# Patient Record
Sex: FEMALE | Race: BLACK OR AFRICAN AMERICAN | Employment: FULL TIME | URBAN - METROPOLITAN AREA
[De-identification: names, ages, dates, MRNs, and addresses within clinical notes are randomized per-mention and may not be internally consistent; named-entity substitution may affect disease eponyms.]

---

## 2017-02-21 RX ORDER — SERTRALINE HYDROCHLORIDE 100 MG/1
TABLET, FILM COATED ORAL
Qty: 90 TABLET | Refills: 2 | Status: SHIPPED | OUTPATIENT
Start: 2017-02-21 | End: 2017-12-13 | Stop reason: SDUPTHER

## 2017-02-21 RX ORDER — GLIMEPIRIDE 4 MG/1
TABLET ORAL
Qty: 180 TABLET | Refills: 2 | Status: SHIPPED | OUTPATIENT
Start: 2017-02-21 | End: 2018-10-01 | Stop reason: SDUPTHER

## 2017-03-02 ENCOUNTER — TELEPHONE (OUTPATIENT)
Dept: INTERNAL MEDICINE CLINIC | Age: 62
End: 2017-03-02

## 2017-03-02 DIAGNOSIS — E11.9 TYPE 2 DIABETES MELLITUS WITHOUT COMPLICATION, WITHOUT LONG-TERM CURRENT USE OF INSULIN (HCC): ICD-10-CM

## 2017-03-06 ENCOUNTER — OFFICE VISIT (OUTPATIENT)
Dept: INTERNAL MEDICINE CLINIC | Age: 62
End: 2017-03-06

## 2017-03-06 VITALS
OXYGEN SATURATION: 98 % | RESPIRATION RATE: 12 BRPM | HEIGHT: 66 IN | DIASTOLIC BLOOD PRESSURE: 78 MMHG | SYSTOLIC BLOOD PRESSURE: 114 MMHG | HEART RATE: 73 BPM | TEMPERATURE: 97.8 F | BODY MASS INDEX: 36.74 KG/M2 | WEIGHT: 228.6 LBS

## 2017-03-06 DIAGNOSIS — E55.9 VITAMIN D DEFICIENCY: ICD-10-CM

## 2017-03-06 DIAGNOSIS — E78.5 HYPERLIPIDEMIA, UNSPECIFIED HYPERLIPIDEMIA TYPE: ICD-10-CM

## 2017-03-06 DIAGNOSIS — I10 ESSENTIAL HYPERTENSION: ICD-10-CM

## 2017-03-06 DIAGNOSIS — E53.8 VITAMIN B 12 DEFICIENCY: ICD-10-CM

## 2017-03-06 DIAGNOSIS — F41.9 ANXIETY: ICD-10-CM

## 2017-03-06 DIAGNOSIS — F32.A DEPRESSION, UNSPECIFIED DEPRESSION TYPE: ICD-10-CM

## 2017-03-06 DIAGNOSIS — E11.9 TYPE 2 DIABETES MELLITUS WITHOUT COMPLICATION, WITHOUT LONG-TERM CURRENT USE OF INSULIN (HCC): Primary | ICD-10-CM

## 2017-03-06 LAB
CREATININE URINE: 62.7 MG/DL (ref 28–259)
HBA1C MFR BLD: 7.3 %
MICROALBUMIN UR-MCNC: <1.2 MG/DL
MICROALBUMIN/CREAT UR-RTO: NORMAL MG/G (ref 0–30)

## 2017-03-06 PROCEDURE — 3017F COLORECTAL CA SCREEN DOC REV: CPT | Performed by: INTERNAL MEDICINE

## 2017-03-06 PROCEDURE — 99214 OFFICE O/P EST MOD 30 MIN: CPT | Performed by: INTERNAL MEDICINE

## 2017-03-06 PROCEDURE — G8417 CALC BMI ABV UP PARAM F/U: HCPCS | Performed by: INTERNAL MEDICINE

## 2017-03-06 PROCEDURE — G8484 FLU IMMUNIZE NO ADMIN: HCPCS | Performed by: INTERNAL MEDICINE

## 2017-03-06 PROCEDURE — 1036F TOBACCO NON-USER: CPT | Performed by: INTERNAL MEDICINE

## 2017-03-06 PROCEDURE — 3045F PR MOST RECENT HEMOGLOBIN A1C LEVEL 7.0-9.0%: CPT | Performed by: INTERNAL MEDICINE

## 2017-03-06 PROCEDURE — 83036 HEMOGLOBIN GLYCOSYLATED A1C: CPT | Performed by: INTERNAL MEDICINE

## 2017-03-06 PROCEDURE — G8427 DOCREV CUR MEDS BY ELIG CLIN: HCPCS | Performed by: INTERNAL MEDICINE

## 2017-03-06 PROCEDURE — 3014F SCREEN MAMMO DOC REV: CPT | Performed by: INTERNAL MEDICINE

## 2017-03-06 ASSESSMENT — ENCOUNTER SYMPTOMS
CHEST TIGHTNESS: 0
SINUS PRESSURE: 0
CONSTIPATION: 0
COUGH: 0
VOMITING: 0
RHINORRHEA: 0
SORE THROAT: 0
DIARRHEA: 0
NAUSEA: 0
WHEEZING: 0
ABDOMINAL PAIN: 0
SHORTNESS OF BREATH: 0
BLOOD IN STOOL: 0

## 2017-03-21 RX ORDER — LOSARTAN POTASSIUM 50 MG/1
TABLET ORAL
Qty: 90 TABLET | Refills: 2 | Status: SHIPPED | OUTPATIENT
Start: 2017-03-21 | End: 2018-10-01 | Stop reason: SDUPTHER

## 2017-03-21 RX ORDER — HYDROCHLOROTHIAZIDE 25 MG/1
TABLET ORAL
Qty: 90 TABLET | Refills: 2 | Status: SHIPPED | OUTPATIENT
Start: 2017-03-21 | End: 2018-10-01 | Stop reason: SDUPTHER

## 2017-04-07 DIAGNOSIS — E11.9 TYPE 2 DIABETES MELLITUS WITHOUT COMPLICATION, WITHOUT LONG-TERM CURRENT USE OF INSULIN (HCC): ICD-10-CM

## 2017-04-07 RX ORDER — DAPAGLIFLOZIN 10 MG/1
TABLET, FILM COATED ORAL
Qty: 30 TABLET | Refills: 3 | Status: SHIPPED | OUTPATIENT
Start: 2017-04-07 | End: 2017-09-27 | Stop reason: SDUPTHER

## 2017-04-07 RX ORDER — SITAGLIPTIN 100 MG/1
TABLET, FILM COATED ORAL
Qty: 30 TABLET | Refills: 3 | Status: SHIPPED | OUTPATIENT
Start: 2017-04-07 | End: 2017-09-27 | Stop reason: SDUPTHER

## 2017-04-12 ENCOUNTER — HOSPITAL ENCOUNTER (OUTPATIENT)
Dept: DIABETES SERVICES | Age: 62
Discharge: HOME | End: 2017-04-30
Attending: INTERNAL MEDICINE | Admitting: INTERNAL MEDICINE

## 2017-04-12 DIAGNOSIS — E11.9 TYPE 2 DIABETES MELLITUS WITHOUT COMPLICATIONS (HCC): ICD-10-CM

## 2017-04-12 ASSESSMENT — PATIENT HEALTH QUESTIONNAIRE - PHQ9
SUM OF ALL RESPONSES TO PHQ9 QUESTIONS 1 & 2: 2
SUM OF ALL RESPONSES TO PHQ QUESTIONS 1-9: 2
1. LITTLE INTEREST OR PLEASURE IN DOING THINGS: 1
2. FEELING DOWN, DEPRESSED OR HOPELESS: 1

## 2017-05-08 RX ORDER — ATORVASTATIN CALCIUM 20 MG/1
TABLET, FILM COATED ORAL
Qty: 30 TABLET | Refills: 5 | Status: SHIPPED | OUTPATIENT
Start: 2017-05-08 | End: 2018-03-21 | Stop reason: SDUPTHER

## 2017-05-09 ENCOUNTER — OFFICE VISIT (OUTPATIENT)
Dept: INTERNAL MEDICINE CLINIC | Age: 62
End: 2017-05-09

## 2017-05-09 VITALS
DIASTOLIC BLOOD PRESSURE: 74 MMHG | TEMPERATURE: 98.7 F | HEART RATE: 89 BPM | HEIGHT: 66 IN | OXYGEN SATURATION: 98 % | WEIGHT: 227 LBS | BODY MASS INDEX: 36.48 KG/M2 | RESPIRATION RATE: 14 BRPM | SYSTOLIC BLOOD PRESSURE: 120 MMHG

## 2017-05-09 DIAGNOSIS — J06.9 UPPER RESPIRATORY TRACT INFECTION, UNSPECIFIED TYPE: Primary | ICD-10-CM

## 2017-05-09 PROCEDURE — 3017F COLORECTAL CA SCREEN DOC REV: CPT | Performed by: INTERNAL MEDICINE

## 2017-05-09 PROCEDURE — 99213 OFFICE O/P EST LOW 20 MIN: CPT | Performed by: INTERNAL MEDICINE

## 2017-05-09 PROCEDURE — G8427 DOCREV CUR MEDS BY ELIG CLIN: HCPCS | Performed by: INTERNAL MEDICINE

## 2017-05-09 PROCEDURE — G8417 CALC BMI ABV UP PARAM F/U: HCPCS | Performed by: INTERNAL MEDICINE

## 2017-05-09 PROCEDURE — 3014F SCREEN MAMMO DOC REV: CPT | Performed by: INTERNAL MEDICINE

## 2017-05-09 PROCEDURE — 1036F TOBACCO NON-USER: CPT | Performed by: INTERNAL MEDICINE

## 2017-05-09 RX ORDER — FLUTICASONE PROPIONATE 50 MCG
2 SPRAY, SUSPENSION (ML) NASAL DAILY
Qty: 1 BOTTLE | Refills: 0 | Status: SHIPPED | OUTPATIENT
Start: 2017-05-09 | End: 2020-04-29 | Stop reason: ALTCHOICE

## 2017-05-09 RX ORDER — BROMPHENIRAMINE MALEATE, PSEUDOEPHEDRINE HYDROCHLORIDE, AND DEXTROMETHORPHAN HYDROBROMIDE 2; 30; 10 MG/5ML; MG/5ML; MG/5ML
5 SYRUP ORAL 4 TIMES DAILY PRN
Qty: 120 ML | Refills: 0 | Status: SHIPPED | OUTPATIENT
Start: 2017-05-09 | End: 2017-06-05 | Stop reason: ALTCHOICE

## 2017-05-09 RX ORDER — AMOXICILLIN 875 MG/1
875 TABLET, COATED ORAL 2 TIMES DAILY
Qty: 20 TABLET | Refills: 0 | Status: SHIPPED | OUTPATIENT
Start: 2017-05-09 | End: 2017-05-19

## 2017-05-09 ASSESSMENT — ENCOUNTER SYMPTOMS
SORE THROAT: 1
RHINORRHEA: 1
COUGH: 1
SHORTNESS OF BREATH: 0
CHEST TIGHTNESS: 0
SINUS PRESSURE: 1
WHEEZING: 0

## 2017-06-05 ENCOUNTER — OFFICE VISIT (OUTPATIENT)
Dept: INTERNAL MEDICINE CLINIC | Age: 62
End: 2017-06-05

## 2017-06-05 VITALS
BODY MASS INDEX: 37.32 KG/M2 | RESPIRATION RATE: 12 BRPM | OXYGEN SATURATION: 98 % | WEIGHT: 232.2 LBS | HEART RATE: 71 BPM | TEMPERATURE: 98 F | SYSTOLIC BLOOD PRESSURE: 120 MMHG | HEIGHT: 66 IN | DIASTOLIC BLOOD PRESSURE: 76 MMHG

## 2017-06-05 DIAGNOSIS — E11.9 TYPE 2 DIABETES MELLITUS WITHOUT COMPLICATION, WITHOUT LONG-TERM CURRENT USE OF INSULIN (HCC): Primary | ICD-10-CM

## 2017-06-05 DIAGNOSIS — F41.9 ANXIETY: ICD-10-CM

## 2017-06-05 DIAGNOSIS — E55.9 VITAMIN D DEFICIENCY: ICD-10-CM

## 2017-06-05 DIAGNOSIS — F32.A DEPRESSION, UNSPECIFIED DEPRESSION TYPE: ICD-10-CM

## 2017-06-05 DIAGNOSIS — E78.5 HYPERLIPIDEMIA, UNSPECIFIED HYPERLIPIDEMIA TYPE: ICD-10-CM

## 2017-06-05 DIAGNOSIS — E53.8 VITAMIN B 12 DEFICIENCY: ICD-10-CM

## 2017-06-05 DIAGNOSIS — I10 ESSENTIAL HYPERTENSION: ICD-10-CM

## 2017-06-05 LAB
A/G RATIO: 1.5 (ref 1.1–2.2)
ALBUMIN SERPL-MCNC: 4.1 G/DL (ref 3.4–5)
ALP BLD-CCNC: 65 U/L (ref 40–129)
ALT SERPL-CCNC: 12 U/L (ref 10–40)
ANION GAP SERPL CALCULATED.3IONS-SCNC: 14 MMOL/L (ref 3–16)
AST SERPL-CCNC: 18 U/L (ref 15–37)
BILIRUB SERPL-MCNC: 0.4 MG/DL (ref 0–1)
BUN BLDV-MCNC: 22 MG/DL (ref 7–20)
CALCIUM SERPL-MCNC: 9.4 MG/DL (ref 8.3–10.6)
CHLORIDE BLD-SCNC: 101 MMOL/L (ref 99–110)
CHOLESTEROL, TOTAL: 166 MG/DL (ref 0–199)
CO2: 26 MMOL/L (ref 21–32)
CREAT SERPL-MCNC: 0.9 MG/DL (ref 0.6–1.2)
GFR AFRICAN AMERICAN: >60
GFR NON-AFRICAN AMERICAN: >60
GLOBULIN: 2.8 G/DL
GLUCOSE BLD-MCNC: 127 MG/DL (ref 70–99)
HDLC SERPL-MCNC: 68 MG/DL (ref 40–60)
LDL CHOLESTEROL CALCULATED: 83 MG/DL
POTASSIUM SERPL-SCNC: 4.6 MMOL/L (ref 3.5–5.1)
SODIUM BLD-SCNC: 141 MMOL/L (ref 136–145)
TOTAL PROTEIN: 6.9 G/DL (ref 6.4–8.2)
TRIGL SERPL-MCNC: 77 MG/DL (ref 0–150)
VITAMIN B-12: 452 PG/ML (ref 211–911)
VITAMIN D 25-HYDROXY: 43.2 NG/ML
VLDLC SERPL CALC-MCNC: 15 MG/DL

## 2017-06-05 PROCEDURE — 3045F PR MOST RECENT HEMOGLOBIN A1C LEVEL 7.0-9.0%: CPT | Performed by: INTERNAL MEDICINE

## 2017-06-05 PROCEDURE — 99214 OFFICE O/P EST MOD 30 MIN: CPT | Performed by: INTERNAL MEDICINE

## 2017-06-05 PROCEDURE — G8417 CALC BMI ABV UP PARAM F/U: HCPCS | Performed by: INTERNAL MEDICINE

## 2017-06-05 PROCEDURE — G8427 DOCREV CUR MEDS BY ELIG CLIN: HCPCS | Performed by: INTERNAL MEDICINE

## 2017-06-05 PROCEDURE — 3014F SCREEN MAMMO DOC REV: CPT | Performed by: INTERNAL MEDICINE

## 2017-06-05 PROCEDURE — 1036F TOBACCO NON-USER: CPT | Performed by: INTERNAL MEDICINE

## 2017-06-05 PROCEDURE — 3017F COLORECTAL CA SCREEN DOC REV: CPT | Performed by: INTERNAL MEDICINE

## 2017-06-05 ASSESSMENT — ENCOUNTER SYMPTOMS
CONSTIPATION: 0
ABDOMINAL PAIN: 0
SINUS PRESSURE: 0
DIARRHEA: 0
SHORTNESS OF BREATH: 0
BLOOD IN STOOL: 0
CHEST TIGHTNESS: 0
WHEEZING: 0
VOMITING: 0
NAUSEA: 0
COUGH: 0
RHINORRHEA: 0
SORE THROAT: 0

## 2017-06-06 LAB
ESTIMATED AVERAGE GLUCOSE: 188.6 MG/DL
HBA1C MFR BLD: 8.2 %

## 2017-09-14 ENCOUNTER — OFFICE VISIT (OUTPATIENT)
Dept: INTERNAL MEDICINE CLINIC | Age: 62
End: 2017-09-14

## 2017-09-14 VITALS
BODY MASS INDEX: 37 KG/M2 | WEIGHT: 230.2 LBS | RESPIRATION RATE: 14 BRPM | SYSTOLIC BLOOD PRESSURE: 128 MMHG | HEIGHT: 66 IN | TEMPERATURE: 97.4 F | HEART RATE: 64 BPM | DIASTOLIC BLOOD PRESSURE: 78 MMHG | OXYGEN SATURATION: 96 %

## 2017-09-14 DIAGNOSIS — F41.9 ANXIETY: ICD-10-CM

## 2017-09-14 DIAGNOSIS — R43.8 METALLIC TASTE: ICD-10-CM

## 2017-09-14 DIAGNOSIS — Z00.00 ANNUAL PHYSICAL EXAM: Primary | ICD-10-CM

## 2017-09-14 DIAGNOSIS — E78.5 HYPERLIPIDEMIA, UNSPECIFIED HYPERLIPIDEMIA TYPE: ICD-10-CM

## 2017-09-14 DIAGNOSIS — F32.A DEPRESSION, UNSPECIFIED DEPRESSION TYPE: ICD-10-CM

## 2017-09-14 DIAGNOSIS — E53.8 VITAMIN B 12 DEFICIENCY: ICD-10-CM

## 2017-09-14 DIAGNOSIS — E55.9 VITAMIN D DEFICIENCY: ICD-10-CM

## 2017-09-14 DIAGNOSIS — Z00.00 ANNUAL PHYSICAL EXAM: ICD-10-CM

## 2017-09-14 DIAGNOSIS — E11.9 TYPE 2 DIABETES MELLITUS WITHOUT COMPLICATION, WITHOUT LONG-TERM CURRENT USE OF INSULIN (HCC): ICD-10-CM

## 2017-09-14 DIAGNOSIS — I10 ESSENTIAL HYPERTENSION: ICD-10-CM

## 2017-09-14 LAB
A/G RATIO: 1.4 (ref 1.1–2.2)
ALBUMIN SERPL-MCNC: 3.8 G/DL (ref 3.4–5)
ALP BLD-CCNC: 62 U/L (ref 40–129)
ALT SERPL-CCNC: 12 U/L (ref 10–40)
ANION GAP SERPL CALCULATED.3IONS-SCNC: 13 MMOL/L (ref 3–16)
AST SERPL-CCNC: 16 U/L (ref 15–37)
BASOPHILS ABSOLUTE: 0.1 K/UL (ref 0–0.2)
BASOPHILS RELATIVE PERCENT: 1.1 %
BILIRUB SERPL-MCNC: 0.5 MG/DL (ref 0–1)
BILIRUBIN, POC: NORMAL
BLOOD URINE, POC: NORMAL
BUN BLDV-MCNC: 20 MG/DL (ref 7–20)
CALCIUM SERPL-MCNC: 9.4 MG/DL (ref 8.3–10.6)
CHLORIDE BLD-SCNC: 101 MMOL/L (ref 99–110)
CHOLESTEROL, TOTAL: 173 MG/DL (ref 0–199)
CLARITY, POC: NORMAL
CO2: 27 MMOL/L (ref 21–32)
COLOR, POC: NORMAL
CREAT SERPL-MCNC: 0.8 MG/DL (ref 0.6–1.2)
EOSINOPHILS ABSOLUTE: 0.1 K/UL (ref 0–0.6)
EOSINOPHILS RELATIVE PERCENT: 1.5 %
GFR AFRICAN AMERICAN: >60
GFR NON-AFRICAN AMERICAN: >60
GLOBULIN: 2.8 G/DL
GLUCOSE BLD-MCNC: 140 MG/DL (ref 70–99)
GLUCOSE URINE, POC: NORMAL
HBA1C MFR BLD: 7.7 %
HCT VFR BLD CALC: 42 % (ref 36–48)
HDLC SERPL-MCNC: 61 MG/DL (ref 40–60)
HEMOGLOBIN: 13.5 G/DL (ref 12–16)
KETONES, POC: NORMAL
LDL CHOLESTEROL CALCULATED: 92 MG/DL
LEUKOCYTE EST, POC: NORMAL
LYMPHOCYTES ABSOLUTE: 1.5 K/UL (ref 1–5.1)
LYMPHOCYTES RELATIVE PERCENT: 26.1 %
MCH RBC QN AUTO: 28.6 PG (ref 26–34)
MCHC RBC AUTO-ENTMCNC: 32.2 G/DL (ref 31–36)
MCV RBC AUTO: 88.9 FL (ref 80–100)
MONOCYTES ABSOLUTE: 0.3 K/UL (ref 0–1.3)
MONOCYTES RELATIVE PERCENT: 4.5 %
NEUTROPHILS ABSOLUTE: 3.9 K/UL (ref 1.7–7.7)
NEUTROPHILS RELATIVE PERCENT: 66.8 %
NITRITE, POC: NORMAL
PDW BLD-RTO: 15.8 % (ref 12.4–15.4)
PH, POC: 5.5
PLATELET # BLD: 180 K/UL (ref 135–450)
PMV BLD AUTO: 8.8 FL (ref 5–10.5)
POTASSIUM SERPL-SCNC: 4.5 MMOL/L (ref 3.5–5.1)
PROTEIN, POC: NORMAL
RBC # BLD: 4.72 M/UL (ref 4–5.2)
SODIUM BLD-SCNC: 141 MMOL/L (ref 136–145)
SPECIFIC GRAVITY, POC: 1.02
TOTAL PROTEIN: 6.6 G/DL (ref 6.4–8.2)
TRIGL SERPL-MCNC: 98 MG/DL (ref 0–150)
TSH SERPL DL<=0.05 MIU/L-ACNC: 1.21 UIU/ML (ref 0.27–4.2)
UROBILINOGEN, POC: 0.2
VITAMIN B-12: 341 PG/ML (ref 211–911)
VITAMIN D 25-HYDROXY: 43.5 NG/ML
VLDLC SERPL CALC-MCNC: 20 MG/DL
WBC # BLD: 5.8 K/UL (ref 4–11)

## 2017-09-14 PROCEDURE — 83036 HEMOGLOBIN GLYCOSYLATED A1C: CPT | Performed by: INTERNAL MEDICINE

## 2017-09-14 PROCEDURE — 99396 PREV VISIT EST AGE 40-64: CPT | Performed by: INTERNAL MEDICINE

## 2017-09-14 PROCEDURE — 81002 URINALYSIS NONAUTO W/O SCOPE: CPT | Performed by: INTERNAL MEDICINE

## 2017-09-14 RX ORDER — PIOGLITAZONEHYDROCHLORIDE 15 MG/1
15 TABLET ORAL DAILY
Qty: 30 TABLET | Refills: 3 | Status: SHIPPED | OUTPATIENT
Start: 2017-09-14 | End: 2017-12-13 | Stop reason: DRUGHIGH

## 2017-09-14 ASSESSMENT — ENCOUNTER SYMPTOMS
WHEEZING: 0
APNEA: 0
TROUBLE SWALLOWING: 0
CHEST TIGHTNESS: 0
VOMITING: 0
VOICE CHANGE: 0
EYE PAIN: 0
CONSTIPATION: 0
DIARRHEA: 0
EYE ITCHING: 0
SINUS PRESSURE: 0
SORE THROAT: 0
NAUSEA: 0
BLOOD IN STOOL: 0
ABDOMINAL DISTENTION: 0
BACK PAIN: 0
CHOKING: 0
SHORTNESS OF BREATH: 0
PHOTOPHOBIA: 0
COUGH: 0
ANAL BLEEDING: 0
RECTAL PAIN: 0
EYE REDNESS: 0
EYE DISCHARGE: 0
FACIAL SWELLING: 0
ABDOMINAL PAIN: 0

## 2017-09-19 PROBLEM — R43.8 METALLIC TASTE: Status: ACTIVE | Noted: 2017-09-19

## 2017-09-19 ASSESSMENT — ENCOUNTER SYMPTOMS: RHINORRHEA: 1

## 2017-09-27 DIAGNOSIS — E11.9 TYPE 2 DIABETES MELLITUS WITHOUT COMPLICATION, WITHOUT LONG-TERM CURRENT USE OF INSULIN (HCC): ICD-10-CM

## 2017-09-27 RX ORDER — SITAGLIPTIN 100 MG/1
TABLET, FILM COATED ORAL
Qty: 30 TABLET | Refills: 5 | Status: SHIPPED | OUTPATIENT
Start: 2017-09-27 | End: 2018-10-22 | Stop reason: SDUPTHER

## 2017-09-27 RX ORDER — DAPAGLIFLOZIN 10 MG/1
TABLET, FILM COATED ORAL
Qty: 30 TABLET | Refills: 5 | Status: SHIPPED | OUTPATIENT
Start: 2017-09-27 | End: 2018-10-22 | Stop reason: SDUPTHER

## 2017-10-31 LAB — DIABETIC RETINOPATHY: NORMAL

## 2017-11-22 ENCOUNTER — OFFICE VISIT (OUTPATIENT)
Dept: INTERNAL MEDICINE CLINIC | Age: 62
End: 2017-11-22

## 2017-11-22 VITALS
HEART RATE: 82 BPM | SYSTOLIC BLOOD PRESSURE: 132 MMHG | WEIGHT: 233 LBS | OXYGEN SATURATION: 98 % | BODY MASS INDEX: 38.18 KG/M2 | DIASTOLIC BLOOD PRESSURE: 88 MMHG | TEMPERATURE: 98.5 F

## 2017-11-22 DIAGNOSIS — J06.9 ACUTE URI: Primary | ICD-10-CM

## 2017-11-22 DIAGNOSIS — J04.0 LARYNGITIS: ICD-10-CM

## 2017-11-22 PROCEDURE — 3017F COLORECTAL CA SCREEN DOC REV: CPT | Performed by: INTERNAL MEDICINE

## 2017-11-22 PROCEDURE — 1036F TOBACCO NON-USER: CPT | Performed by: INTERNAL MEDICINE

## 2017-11-22 PROCEDURE — 99213 OFFICE O/P EST LOW 20 MIN: CPT | Performed by: INTERNAL MEDICINE

## 2017-11-22 PROCEDURE — 3014F SCREEN MAMMO DOC REV: CPT | Performed by: INTERNAL MEDICINE

## 2017-11-22 PROCEDURE — G8427 DOCREV CUR MEDS BY ELIG CLIN: HCPCS | Performed by: INTERNAL MEDICINE

## 2017-11-22 PROCEDURE — G8417 CALC BMI ABV UP PARAM F/U: HCPCS | Performed by: INTERNAL MEDICINE

## 2017-11-22 PROCEDURE — G8484 FLU IMMUNIZE NO ADMIN: HCPCS | Performed by: INTERNAL MEDICINE

## 2017-11-22 RX ORDER — AZITHROMYCIN 250 MG/1
TABLET, FILM COATED ORAL
Qty: 6 TABLET | Refills: 0 | Status: SHIPPED | OUTPATIENT
Start: 2017-11-22 | End: 2017-12-02

## 2017-11-22 ASSESSMENT — ENCOUNTER SYMPTOMS
COUGH: 1
VOICE CHANGE: 1
SORE THROAT: 1
WHEEZING: 0
SHORTNESS OF BREATH: 0

## 2017-11-22 NOTE — PROGRESS NOTES
Subjective:      Patient ID: Indra Leahy is a 60 y.o. female. HPI  She started coughing on Saturday. She is coughing up yellow green phlegm. She lost her voice yesterday. Review of Systems   Constitutional: Positive for chills. Negative for fever. HENT: Positive for congestion, postnasal drip, sore throat and voice change. Respiratory: Positive for cough. Negative for shortness of breath and wheezing. Objective:   Physical Exam   Constitutional: She is oriented to person, place, and time. She appears well-developed and well-nourished. No distress. Hoarse voice   HENT:   Right Ear: Tympanic membrane normal.   Left Ear: Tympanic membrane normal.   Nose: Nose normal.   Mouth/Throat: Oropharynx is clear and moist.   Eyes: Conjunctivae are normal. Pupils are equal, round, and reactive to light. Neck: Neck supple. No JVD present. No thyromegaly present. Cardiovascular: Normal rate, regular rhythm and normal heart sounds. Pulmonary/Chest: Effort normal and breath sounds normal. No respiratory distress. She has no wheezes. Musculoskeletal: She exhibits no edema. Lymphadenopathy:     She has no cervical adenopathy. Neurological: She is alert and oriented to person, place, and time. Psychiatric: She has a normal mood and affect. Blood pressure 132/88, pulse 82, temperature 98.5 °F (36.9 °C), temperature source Oral, weight 233 lb (105.7 kg), last menstrual period 01/02/2013, SpO2 98 %.         Assessment:      URI with bronchitis and/or sinusitis with post nasal drainage  laryngitis      Plan:      mucinex   Z abdifatah   Voice rest if possible

## 2017-11-30 ENCOUNTER — PATIENT MESSAGE (OUTPATIENT)
Dept: INTERNAL MEDICINE CLINIC | Age: 62
End: 2017-11-30

## 2017-12-01 NOTE — TELEPHONE ENCOUNTER
From: Magdi Kerns  To: Aidan Campa MD  Sent: 11/30/2017 5:26 PM EST  Subject: Test Results Question     Record update - Flu shot - September 19, 2017 and the pneumonia 23

## 2017-12-13 ENCOUNTER — OFFICE VISIT (OUTPATIENT)
Dept: INTERNAL MEDICINE CLINIC | Age: 62
End: 2017-12-13

## 2017-12-13 VITALS
TEMPERATURE: 98 F | SYSTOLIC BLOOD PRESSURE: 132 MMHG | HEART RATE: 70 BPM | HEIGHT: 66 IN | BODY MASS INDEX: 37.45 KG/M2 | RESPIRATION RATE: 12 BRPM | OXYGEN SATURATION: 95 % | WEIGHT: 233 LBS | DIASTOLIC BLOOD PRESSURE: 78 MMHG

## 2017-12-13 DIAGNOSIS — E78.2 MIXED HYPERLIPIDEMIA: ICD-10-CM

## 2017-12-13 DIAGNOSIS — E55.9 VITAMIN D DEFICIENCY: ICD-10-CM

## 2017-12-13 DIAGNOSIS — I10 ESSENTIAL HYPERTENSION: ICD-10-CM

## 2017-12-13 DIAGNOSIS — F41.9 ANXIETY: ICD-10-CM

## 2017-12-13 DIAGNOSIS — E53.8 VITAMIN B 12 DEFICIENCY: ICD-10-CM

## 2017-12-13 DIAGNOSIS — E11.9 TYPE 2 DIABETES MELLITUS WITHOUT COMPLICATION, WITHOUT LONG-TERM CURRENT USE OF INSULIN (HCC): Primary | ICD-10-CM

## 2017-12-13 DIAGNOSIS — F32.A DEPRESSION, UNSPECIFIED DEPRESSION TYPE: ICD-10-CM

## 2017-12-13 LAB — HBA1C MFR BLD: 8.9 %

## 2017-12-13 PROCEDURE — 3014F SCREEN MAMMO DOC REV: CPT | Performed by: INTERNAL MEDICINE

## 2017-12-13 PROCEDURE — G8484 FLU IMMUNIZE NO ADMIN: HCPCS | Performed by: INTERNAL MEDICINE

## 2017-12-13 PROCEDURE — 83036 HEMOGLOBIN GLYCOSYLATED A1C: CPT | Performed by: INTERNAL MEDICINE

## 2017-12-13 PROCEDURE — 1036F TOBACCO NON-USER: CPT | Performed by: INTERNAL MEDICINE

## 2017-12-13 PROCEDURE — G8417 CALC BMI ABV UP PARAM F/U: HCPCS | Performed by: INTERNAL MEDICINE

## 2017-12-13 PROCEDURE — 3045F PR MOST RECENT HEMOGLOBIN A1C LEVEL 7.0-9.0%: CPT | Performed by: INTERNAL MEDICINE

## 2017-12-13 PROCEDURE — 3017F COLORECTAL CA SCREEN DOC REV: CPT | Performed by: INTERNAL MEDICINE

## 2017-12-13 PROCEDURE — G8427 DOCREV CUR MEDS BY ELIG CLIN: HCPCS | Performed by: INTERNAL MEDICINE

## 2017-12-13 PROCEDURE — 99214 OFFICE O/P EST MOD 30 MIN: CPT | Performed by: INTERNAL MEDICINE

## 2017-12-13 RX ORDER — PIOGLITAZONEHYDROCHLORIDE 30 MG/1
30 TABLET ORAL DAILY
Qty: 30 TABLET | Refills: 3 | Status: SHIPPED | OUTPATIENT
Start: 2017-12-13 | End: 2018-10-01 | Stop reason: ALTCHOICE

## 2017-12-13 RX ORDER — SERTRALINE HYDROCHLORIDE 100 MG/1
150 TABLET, FILM COATED ORAL DAILY
Qty: 135 TABLET | Refills: 0 | Status: SHIPPED | OUTPATIENT
Start: 2017-12-13 | End: 2018-04-23 | Stop reason: DRUGHIGH

## 2017-12-13 ASSESSMENT — ENCOUNTER SYMPTOMS
ABDOMINAL PAIN: 0
CONSTIPATION: 0
SINUS PRESSURE: 0
CHEST TIGHTNESS: 0
WHEEZING: 0
BLOOD IN STOOL: 0
RHINORRHEA: 0
NAUSEA: 0
SHORTNESS OF BREATH: 0
VOMITING: 0
SORE THROAT: 0
COUGH: 0
DIARRHEA: 0

## 2017-12-13 NOTE — PATIENT INSTRUCTIONS
-Continue same medications  -Increase Actos to 30mg once daily  -Increase Sertraline to 150mg once daily  -Limit carbohydrates to 45 grams with meals and 15 grams with snacks  -Low sodium diet  -Low fat, low cholesterol diet  -Regular aerobic exercise

## 2017-12-13 NOTE — PROGRESS NOTES
syncope, weakness, light-headedness, numbness and headaches. Psychiatric/Behavioral: Negative for decreased concentration, dysphoric mood and sleep disturbance. The patient is not nervous/anxious. No Known Allergies      Outpatient Prescriptions Marked as Taking for the 12/13/17 encounter (Office Visit) with Yadi Zheng MD   Medication Sig Dispense Refill    FARXIGA 10 MG tablet TAKE 1 TABLET BY MOUTH IN THE MORNING  30 tablet 5    JANUVIA 100 MG tablet TAKE 1 TABLET BY MOUTH ONE TIME A DAY  30 tablet 5    pioglitazone (ACTOS) 15 MG tablet Take 1 tablet by mouth daily 30 tablet 3    metFORMIN (GLUCOPHAGE) 1000 MG tablet TAKE 1 TABLET BY MOUTH TWO TIMES A DAY WITH MEALS 60 tablet 5    fluticasone (FLONASE) 50 MCG/ACT nasal spray 2 sprays by Nasal route daily 1 Bottle 0    atorvastatin (LIPITOR) 20 MG tablet TAKE 1 TABLET BY MOUTH AT BEDTIME  30 tablet 5    hydrochlorothiazide (HYDRODIURIL) 25 MG tablet TAKE 1 TABLET DAILY 90 tablet 2    losartan (COZAAR) 50 MG tablet TAKE 1 TABLET DAILY 90 tablet 2    sertraline (ZOLOFT) 100 MG tablet TAKE 1 TABLET DAILY 90 tablet 2    glimepiride (AMARYL) 4 MG tablet TAKE 1 TABLET TWICE A  tablet 2    vitamin B-12 (CYANOCOBALAMIN) 500 MCG tablet Take 2 tablets by mouth daily 60 tablet 3    Ascorbic Acid (VITAMIN C) 500 MG tablet Take 500 mg by mouth daily.  Cholecalciferol (VITAMIN D PO) Take 1,000 Units by mouth.  MULTIPLE VITAMIN PO Take  by mouth.  aspirin 81 MG EC tablet Take 81 mg by mouth daily. Vitals:    12/13/17 0856   BP: 132/78   Site: Right Arm   Position: Sitting   Cuff Size: Large Adult   Pulse: 70   Resp: 12   Temp: 98 °F (36.7 °C)   TempSrc: Oral   SpO2: 95%   Weight: 233 lb (105.7 kg)   Height: 5' 5.5\" (1.664 m)     Body mass index is 38.18 kg/m². Physical Exam   Constitutional: She is oriented to person, place, and time. She appears well-developed and well-nourished. No distress.    Obese middle aged BF   HENT:   Mouth/Throat: Oropharynx is clear and moist and mucous membranes are normal.   Eyes: Conjunctivae, EOM and lids are normal. Pupils are equal, round, and reactive to light. Neck: Neck supple. Carotid bruit is not present. No thyromegaly present. Cardiovascular: Normal rate, regular rhythm, S1 normal, S2 normal and normal heart sounds. Exam reveals no gallop and no friction rub. No murmur heard. Pulmonary/Chest: Effort normal and breath sounds normal. No respiratory distress. She has no wheezes. She has no rhonchi. She has no rales. Abdominal: Soft. Normal appearance and bowel sounds are normal. She exhibits no distension. There is no hepatosplenomegaly. There is no tenderness. There is no rebound. Musculoskeletal: She exhibits no edema. Lymphadenopathy:        Head (right side): No submandibular adenopathy present. Head (left side): No submandibular adenopathy present. Neurological: She is alert and oriented to person, place, and time. Psychiatric: She has a normal mood and affect. Nursing note reviewed. Lab Results   Component Value Date    LABA1C 8.9 12/13/2017       Assessment/Plan     1. Type 2 diabetes mellitus without complication, without long-term current use of insulin (HCC)    - POCT glycosylated hemoglobin (Hb A1C)  - pioglitazone (ACTOS) 30 MG tablet; Take 1 tablet by mouth daily  Dispense: 30 tablet; Refill: 3    2. Essential hypertension  -blood pressure normal  -Continue same medications  -Low sodium diet  -Regular aerobic exercise    3. Mixed hyperlipidemia  -Continue same medications  -Low fat, low cholesterol diet  -Regular aerobic exercise    4. Depression, unspecified depression type    - sertraline (ZOLOFT) 100 MG tablet; Take 1.5 tablets by mouth daily  Dispense: 135 tablet; Refill: 0    5. Anxiety    - sertraline (ZOLOFT) 100 MG tablet; Take 1.5 tablets by mouth daily  Dispense: 135 tablet; Refill: 0    6. Vitamin D deficiency      7.  Vitamin B 12 deficiency        Discussed use, benefit, and side effects of prescribed medications. Barriers to medication compliance addressed. All patient questions answered. Pt voiced understanding. Return in about 3 months (around 3/13/2018) for diabetes, hypertension, hyperlipidemia, depression, and anxiety.

## 2018-03-22 RX ORDER — ATORVASTATIN CALCIUM 20 MG/1
TABLET, FILM COATED ORAL
Qty: 30 TABLET | Refills: 5 | Status: SHIPPED | OUTPATIENT
Start: 2018-03-22 | End: 2018-10-01 | Stop reason: SINTOL

## 2018-03-22 NOTE — TELEPHONE ENCOUNTER
Last Office Visit:12/13/2017  Future Office Visit: 4/23/2018  Last Filled: 2/12/2018 Atorvastatin 20 mg tab # 30

## 2018-04-23 ENCOUNTER — OFFICE VISIT (OUTPATIENT)
Dept: INTERNAL MEDICINE CLINIC | Age: 63
End: 2018-04-23

## 2018-04-23 VITALS
OXYGEN SATURATION: 95 % | HEART RATE: 71 BPM | WEIGHT: 239.6 LBS | DIASTOLIC BLOOD PRESSURE: 80 MMHG | HEIGHT: 66 IN | SYSTOLIC BLOOD PRESSURE: 128 MMHG | BODY MASS INDEX: 38.51 KG/M2

## 2018-04-23 DIAGNOSIS — F32.A DEPRESSION, UNSPECIFIED DEPRESSION TYPE: ICD-10-CM

## 2018-04-23 DIAGNOSIS — E78.2 MIXED HYPERLIPIDEMIA: ICD-10-CM

## 2018-04-23 DIAGNOSIS — E53.8 VITAMIN B 12 DEFICIENCY: ICD-10-CM

## 2018-04-23 DIAGNOSIS — I10 ESSENTIAL HYPERTENSION: ICD-10-CM

## 2018-04-23 DIAGNOSIS — Z12.31 ENCOUNTER FOR SCREENING MAMMOGRAM FOR BREAST CANCER: ICD-10-CM

## 2018-04-23 DIAGNOSIS — E55.9 VITAMIN D DEFICIENCY: ICD-10-CM

## 2018-04-23 DIAGNOSIS — K21.9 GASTROESOPHAGEAL REFLUX DISEASE, ESOPHAGITIS PRESENCE NOT SPECIFIED: ICD-10-CM

## 2018-04-23 DIAGNOSIS — F41.9 ANXIETY: ICD-10-CM

## 2018-04-23 LAB
A/G RATIO: 1.6 (ref 1.1–2.2)
ALBUMIN SERPL-MCNC: 4.2 G/DL (ref 3.4–5)
ALP BLD-CCNC: 75 U/L (ref 40–129)
ALT SERPL-CCNC: 16 U/L (ref 10–40)
ANION GAP SERPL CALCULATED.3IONS-SCNC: 12 MMOL/L (ref 3–16)
AST SERPL-CCNC: 19 U/L (ref 15–37)
BILIRUB SERPL-MCNC: 0.5 MG/DL (ref 0–1)
BUN BLDV-MCNC: 19 MG/DL (ref 7–20)
CALCIUM SERPL-MCNC: 9.3 MG/DL (ref 8.3–10.6)
CHLORIDE BLD-SCNC: 99 MMOL/L (ref 99–110)
CHOLESTEROL, TOTAL: 182 MG/DL (ref 0–199)
CO2: 29 MMOL/L (ref 21–32)
CREAT SERPL-MCNC: 0.8 MG/DL (ref 0.6–1.2)
CREATININE URINE: 103 MG/DL (ref 28–259)
GFR AFRICAN AMERICAN: >60
GFR NON-AFRICAN AMERICAN: >60
GLOBULIN: 2.7 G/DL
GLUCOSE BLD-MCNC: 178 MG/DL (ref 70–99)
HBA1C MFR BLD: 8.8 %
HDLC SERPL-MCNC: 67 MG/DL (ref 40–60)
LDL CHOLESTEROL CALCULATED: 96 MG/DL
MICROALBUMIN UR-MCNC: <1.2 MG/DL
MICROALBUMIN/CREAT UR-RTO: NORMAL MG/G (ref 0–30)
POTASSIUM SERPL-SCNC: 4.7 MMOL/L (ref 3.5–5.1)
SODIUM BLD-SCNC: 140 MMOL/L (ref 136–145)
TOTAL PROTEIN: 6.9 G/DL (ref 6.4–8.2)
TRIGL SERPL-MCNC: 94 MG/DL (ref 0–150)
VITAMIN B-12: 408 PG/ML (ref 211–911)
VITAMIN D 25-HYDROXY: 40.7 NG/ML
VLDLC SERPL CALC-MCNC: 19 MG/DL

## 2018-04-23 PROCEDURE — G8427 DOCREV CUR MEDS BY ELIG CLIN: HCPCS | Performed by: INTERNAL MEDICINE

## 2018-04-23 PROCEDURE — G8417 CALC BMI ABV UP PARAM F/U: HCPCS | Performed by: INTERNAL MEDICINE

## 2018-04-23 PROCEDURE — 99214 OFFICE O/P EST MOD 30 MIN: CPT | Performed by: INTERNAL MEDICINE

## 2018-04-23 PROCEDURE — 83036 HEMOGLOBIN GLYCOSYLATED A1C: CPT | Performed by: INTERNAL MEDICINE

## 2018-04-23 PROCEDURE — 3017F COLORECTAL CA SCREEN DOC REV: CPT | Performed by: INTERNAL MEDICINE

## 2018-04-23 PROCEDURE — 1036F TOBACCO NON-USER: CPT | Performed by: INTERNAL MEDICINE

## 2018-04-23 PROCEDURE — 3014F SCREEN MAMMO DOC REV: CPT | Performed by: INTERNAL MEDICINE

## 2018-04-23 PROCEDURE — 2022F DILAT RTA XM EVC RTNOPTHY: CPT | Performed by: INTERNAL MEDICINE

## 2018-04-23 PROCEDURE — 3045F PR MOST RECENT HEMOGLOBIN A1C LEVEL 7.0-9.0%: CPT | Performed by: INTERNAL MEDICINE

## 2018-04-23 RX ORDER — SERTRALINE HYDROCHLORIDE 100 MG/1
200 TABLET, FILM COATED ORAL DAILY
Qty: 60 TABLET | Refills: 1 | Status: SHIPPED | OUTPATIENT
Start: 2018-04-23 | End: 2018-10-01

## 2018-04-23 ASSESSMENT — PATIENT HEALTH QUESTIONNAIRE - PHQ9
SUM OF ALL RESPONSES TO PHQ QUESTIONS 1-9: 2
2. FEELING DOWN, DEPRESSED OR HOPELESS: 1
SUM OF ALL RESPONSES TO PHQ9 QUESTIONS 1 & 2: 2
1. LITTLE INTEREST OR PLEASURE IN DOING THINGS: 1

## 2018-04-23 ASSESSMENT — ENCOUNTER SYMPTOMS
SINUS PRESSURE: 0
ABDOMINAL PAIN: 0
CHEST TIGHTNESS: 0
DIARRHEA: 0
TROUBLE SWALLOWING: 0
WHEEZING: 0
BLOOD IN STOOL: 0
COUGH: 0
NAUSEA: 0
SHORTNESS OF BREATH: 0
SORE THROAT: 0
RHINORRHEA: 0
CONSTIPATION: 0
VOMITING: 0

## 2018-04-24 RX ORDER — PEN NEEDLE, DIABETIC 31 GX5/16"
NEEDLE, DISPOSABLE MISCELLANEOUS
Qty: 100 EACH | Refills: 3 | Status: SHIPPED | OUTPATIENT
Start: 2018-04-24 | End: 2019-01-02 | Stop reason: SDUPTHER

## 2018-05-21 ENCOUNTER — OFFICE VISIT (OUTPATIENT)
Dept: INTERNAL MEDICINE CLINIC | Age: 63
End: 2018-05-21

## 2018-05-21 VITALS
HEART RATE: 66 BPM | BODY MASS INDEX: 37.93 KG/M2 | WEIGHT: 236 LBS | OXYGEN SATURATION: 98 % | HEIGHT: 66 IN | DIASTOLIC BLOOD PRESSURE: 70 MMHG | SYSTOLIC BLOOD PRESSURE: 122 MMHG

## 2018-05-21 DIAGNOSIS — M79.605 LEFT LEG PAIN: ICD-10-CM

## 2018-05-21 DIAGNOSIS — H53.2 DOUBLE VISION: ICD-10-CM

## 2018-05-21 DIAGNOSIS — F32.A DEPRESSION, UNSPECIFIED DEPRESSION TYPE: ICD-10-CM

## 2018-05-21 LAB — TOTAL CK: 108 U/L (ref 26–192)

## 2018-05-21 PROCEDURE — 3014F SCREEN MAMMO DOC REV: CPT | Performed by: INTERNAL MEDICINE

## 2018-05-21 PROCEDURE — G8417 CALC BMI ABV UP PARAM F/U: HCPCS | Performed by: INTERNAL MEDICINE

## 2018-05-21 PROCEDURE — 1036F TOBACCO NON-USER: CPT | Performed by: INTERNAL MEDICINE

## 2018-05-21 PROCEDURE — 2022F DILAT RTA XM EVC RTNOPTHY: CPT | Performed by: INTERNAL MEDICINE

## 2018-05-21 PROCEDURE — G8427 DOCREV CUR MEDS BY ELIG CLIN: HCPCS | Performed by: INTERNAL MEDICINE

## 2018-05-21 PROCEDURE — 99214 OFFICE O/P EST MOD 30 MIN: CPT | Performed by: INTERNAL MEDICINE

## 2018-05-21 PROCEDURE — 3045F PR MOST RECENT HEMOGLOBIN A1C LEVEL 7.0-9.0%: CPT | Performed by: INTERNAL MEDICINE

## 2018-05-21 PROCEDURE — 3017F COLORECTAL CA SCREEN DOC REV: CPT | Performed by: INTERNAL MEDICINE

## 2018-05-22 ASSESSMENT — ENCOUNTER SYMPTOMS
VOMITING: 0
SINUS PRESSURE: 0
SORE THROAT: 0
RHINORRHEA: 0
SHORTNESS OF BREATH: 0
NAUSEA: 0
ABDOMINAL PAIN: 0
CONSTIPATION: 0
CHEST TIGHTNESS: 0
DIARRHEA: 0
COUGH: 0
WHEEZING: 0
BLOOD IN STOOL: 0

## 2018-05-30 ENCOUNTER — HOSPITAL ENCOUNTER (OUTPATIENT)
Dept: VASCULAR LAB | Age: 63
Discharge: HOME | End: 2018-05-30
Attending: INTERNAL MEDICINE | Admitting: INTERNAL MEDICINE

## 2018-05-30 DIAGNOSIS — M79.605 PAIN OF LEFT LEG: ICD-10-CM

## 2018-05-30 DIAGNOSIS — M79.652 PAIN OF LEFT THIGH: Primary | ICD-10-CM

## 2018-05-30 DIAGNOSIS — M25.572 LEFT ANKLE PAIN, UNSPECIFIED CHRONICITY: ICD-10-CM

## 2018-05-30 DIAGNOSIS — M25.472 LEFT ANKLE SWELLING: ICD-10-CM

## 2018-06-04 ENCOUNTER — OFFICE VISIT (OUTPATIENT)
Dept: INTERNAL MEDICINE CLINIC | Age: 63
End: 2018-06-04

## 2018-06-04 VITALS
BODY MASS INDEX: 38.83 KG/M2 | HEIGHT: 66 IN | OXYGEN SATURATION: 98 % | TEMPERATURE: 98.1 F | DIASTOLIC BLOOD PRESSURE: 72 MMHG | WEIGHT: 241.6 LBS | HEART RATE: 60 BPM | SYSTOLIC BLOOD PRESSURE: 122 MMHG

## 2018-06-04 DIAGNOSIS — M79.605 LEFT LEG PAIN: Primary | ICD-10-CM

## 2018-06-04 PROCEDURE — 3017F COLORECTAL CA SCREEN DOC REV: CPT | Performed by: INTERNAL MEDICINE

## 2018-06-04 PROCEDURE — G8427 DOCREV CUR MEDS BY ELIG CLIN: HCPCS | Performed by: INTERNAL MEDICINE

## 2018-06-04 PROCEDURE — 1036F TOBACCO NON-USER: CPT | Performed by: INTERNAL MEDICINE

## 2018-06-04 PROCEDURE — G8417 CALC BMI ABV UP PARAM F/U: HCPCS | Performed by: INTERNAL MEDICINE

## 2018-06-04 PROCEDURE — 99213 OFFICE O/P EST LOW 20 MIN: CPT | Performed by: INTERNAL MEDICINE

## 2018-06-04 PROCEDURE — 3014F SCREEN MAMMO DOC REV: CPT | Performed by: INTERNAL MEDICINE

## 2018-10-01 ENCOUNTER — OFFICE VISIT (OUTPATIENT)
Dept: INTERNAL MEDICINE CLINIC | Age: 63
End: 2018-10-01
Payer: COMMERCIAL

## 2018-10-01 VITALS
HEIGHT: 66 IN | DIASTOLIC BLOOD PRESSURE: 80 MMHG | BODY MASS INDEX: 38.38 KG/M2 | WEIGHT: 238.8 LBS | HEART RATE: 66 BPM | OXYGEN SATURATION: 99 % | SYSTOLIC BLOOD PRESSURE: 120 MMHG

## 2018-10-01 DIAGNOSIS — R07.9 CHEST PAIN, UNSPECIFIED TYPE: ICD-10-CM

## 2018-10-01 DIAGNOSIS — I10 ESSENTIAL HYPERTENSION: ICD-10-CM

## 2018-10-01 DIAGNOSIS — E78.2 MIXED HYPERLIPIDEMIA: ICD-10-CM

## 2018-10-01 DIAGNOSIS — K21.9 GASTROESOPHAGEAL REFLUX DISEASE, ESOPHAGITIS PRESENCE NOT SPECIFIED: ICD-10-CM

## 2018-10-01 DIAGNOSIS — F33.1 MODERATE EPISODE OF RECURRENT MAJOR DEPRESSIVE DISORDER (HCC): ICD-10-CM

## 2018-10-01 DIAGNOSIS — F41.9 ANXIETY: ICD-10-CM

## 2018-10-01 LAB
A/G RATIO: 1.3 (ref 1.1–2.2)
ALBUMIN SERPL-MCNC: 3.9 G/DL (ref 3.4–5)
ALP BLD-CCNC: 77 U/L (ref 40–129)
ALT SERPL-CCNC: 13 U/L (ref 10–40)
ANION GAP SERPL CALCULATED.3IONS-SCNC: 13 MMOL/L (ref 3–16)
AST SERPL-CCNC: 14 U/L (ref 15–37)
BILIRUB SERPL-MCNC: 0.6 MG/DL (ref 0–1)
BUN BLDV-MCNC: 17 MG/DL (ref 7–20)
CALCIUM SERPL-MCNC: 9.5 MG/DL (ref 8.3–10.6)
CHLORIDE BLD-SCNC: 101 MMOL/L (ref 99–110)
CHOLESTEROL, TOTAL: 234 MG/DL (ref 0–199)
CO2: 26 MMOL/L (ref 21–32)
CREAT SERPL-MCNC: 0.8 MG/DL (ref 0.6–1.2)
GFR AFRICAN AMERICAN: >60
GFR NON-AFRICAN AMERICAN: >60
GLOBULIN: 3.1 G/DL
GLUCOSE BLD-MCNC: 240 MG/DL (ref 70–99)
HBA1C MFR BLD: 11.4 %
HDLC SERPL-MCNC: 56 MG/DL (ref 40–60)
LDL CHOLESTEROL CALCULATED: 158 MG/DL
POTASSIUM SERPL-SCNC: 4.5 MMOL/L (ref 3.5–5.1)
SODIUM BLD-SCNC: 140 MMOL/L (ref 136–145)
TOTAL PROTEIN: 7 G/DL (ref 6.4–8.2)
TRIGL SERPL-MCNC: 101 MG/DL (ref 0–150)
VLDLC SERPL CALC-MCNC: 20 MG/DL

## 2018-10-01 PROCEDURE — G8417 CALC BMI ABV UP PARAM F/U: HCPCS | Performed by: INTERNAL MEDICINE

## 2018-10-01 PROCEDURE — 3046F HEMOGLOBIN A1C LEVEL >9.0%: CPT | Performed by: INTERNAL MEDICINE

## 2018-10-01 PROCEDURE — G8427 DOCREV CUR MEDS BY ELIG CLIN: HCPCS | Performed by: INTERNAL MEDICINE

## 2018-10-01 PROCEDURE — 3014F SCREEN MAMMO DOC REV: CPT | Performed by: INTERNAL MEDICINE

## 2018-10-01 PROCEDURE — 1036F TOBACCO NON-USER: CPT | Performed by: INTERNAL MEDICINE

## 2018-10-01 PROCEDURE — 2022F DILAT RTA XM EVC RTNOPTHY: CPT | Performed by: INTERNAL MEDICINE

## 2018-10-01 PROCEDURE — G8482 FLU IMMUNIZE ORDER/ADMIN: HCPCS | Performed by: INTERNAL MEDICINE

## 2018-10-01 PROCEDURE — 3017F COLORECTAL CA SCREEN DOC REV: CPT | Performed by: INTERNAL MEDICINE

## 2018-10-01 PROCEDURE — 99214 OFFICE O/P EST MOD 30 MIN: CPT | Performed by: INTERNAL MEDICINE

## 2018-10-01 PROCEDURE — 83036 HEMOGLOBIN GLYCOSYLATED A1C: CPT | Performed by: INTERNAL MEDICINE

## 2018-10-01 RX ORDER — LOSARTAN POTASSIUM 50 MG/1
TABLET ORAL
Qty: 90 TABLET | Refills: 2 | Status: SHIPPED | OUTPATIENT
Start: 2018-10-01 | End: 2019-10-23 | Stop reason: SDUPTHER

## 2018-10-01 RX ORDER — HYDROCHLOROTHIAZIDE 25 MG/1
TABLET ORAL
Qty: 90 TABLET | Refills: 2 | Status: SHIPPED | OUTPATIENT
Start: 2018-10-01 | End: 2019-10-23 | Stop reason: SDUPTHER

## 2018-10-01 RX ORDER — GLIMEPIRIDE 4 MG/1
TABLET ORAL
Qty: 180 TABLET | Refills: 2 | Status: SHIPPED | OUTPATIENT
Start: 2018-10-01 | End: 2019-10-23 | Stop reason: SDUPTHER

## 2018-10-01 RX ORDER — DULOXETIN HYDROCHLORIDE 30 MG/1
30 CAPSULE, DELAYED RELEASE ORAL DAILY
Qty: 30 CAPSULE | Refills: 1 | Status: SHIPPED | OUTPATIENT
Start: 2018-10-01 | End: 2018-10-29 | Stop reason: SDUPTHER

## 2018-10-01 RX ORDER — OMEPRAZOLE 20 MG/1
20 CAPSULE, DELAYED RELEASE ORAL DAILY
Qty: 30 CAPSULE | Refills: 1 | Status: SHIPPED | OUTPATIENT
Start: 2018-10-01 | End: 2018-10-29 | Stop reason: SDUPTHER

## 2018-10-01 ASSESSMENT — ENCOUNTER SYMPTOMS
DIARRHEA: 0
SHORTNESS OF BREATH: 0
ABDOMINAL PAIN: 0
CHEST TIGHTNESS: 0
NAUSEA: 0
SORE THROAT: 0
BLOOD IN STOOL: 0
SINUS PRESSURE: 0
WHEEZING: 0
RHINORRHEA: 0
COUGH: 0
CONSTIPATION: 0
VOMITING: 0

## 2018-10-01 NOTE — PROGRESS NOTES
Misty Puentes   YOB: 1955    Date of Visit:  10/1/2018    Chief Complaint   Patient presents with    Diabetes    Hypertension    Hyperlipidemia    Depression       HPI  Diabetes Mellitus Type 2:   Current Medications: Levemir 10 units nightly, Farxiga 10mg po q day, Januvia 100mg po q day, Glimepiride 4mg po bid, and Metformin 1000mg po bid  Medication compliance:  noncompliant: since last visit  Diet compliance:  sometimes  Home blood sugar records: patient does not test  Any episodes of hypoglycemia? no  Eye exam current (within one year): yes on 10/31/17  Daily Aspirin? Yes    Hypertension:    Current Medications:Losartan 50mg po q day and HCTZ 25mg po q day  Home blood pressure monitoring: No.    Patient is adherent to a low sodium diet. Antihypertensive medication side effects: no medication side effects noted. Hyperlipidemia:    Stopped Atorvastatin due to muscle pain and the pain resolved  Diet: Low fat, low cholesterol     Current exercise: walks 12,000-15,000 steps per day    Pt states she checked out and hasn't been taking her medication regularly and has only been taking medication off and on. Pt states she hasn't been checking her blood sugars. Pt states she ran out of money and couldn't come in for her appointment. Pt is working 2 jobs. Pt states she can't afford counseling. Pt states she has had 2 family members pass away and her sister was diagnosed with a brain tumor near her brainstem. Depression- Pt states Sertraline doesn't work and she stopped it shortly after last visit. Pt states she took Paxil in the past but doesn't recall about it. Pt feels depressed. Pt states she has no motivation. Pt feels sad and down. Pt has crying spells but not very often. Anxiety- Pt states Sertraline doesn't work and she stopped it shortly after last visit. Pt states she worries a lot. Pt denies feeling nervous and jittery.  Pt      Pt states she feels burning pain center of chest

## 2018-10-22 DIAGNOSIS — E11.9 TYPE 2 DIABETES MELLITUS WITHOUT COMPLICATION, WITHOUT LONG-TERM CURRENT USE OF INSULIN (HCC): ICD-10-CM

## 2018-10-29 ENCOUNTER — OFFICE VISIT (OUTPATIENT)
Dept: INTERNAL MEDICINE CLINIC | Age: 63
End: 2018-10-29
Payer: COMMERCIAL

## 2018-10-29 VITALS
BODY MASS INDEX: 37.67 KG/M2 | HEIGHT: 66 IN | HEART RATE: 63 BPM | WEIGHT: 234.4 LBS | SYSTOLIC BLOOD PRESSURE: 120 MMHG | OXYGEN SATURATION: 95 % | DIASTOLIC BLOOD PRESSURE: 72 MMHG

## 2018-10-29 DIAGNOSIS — K21.9 GASTROESOPHAGEAL REFLUX DISEASE, ESOPHAGITIS PRESENCE NOT SPECIFIED: ICD-10-CM

## 2018-10-29 DIAGNOSIS — F33.1 MODERATE EPISODE OF RECURRENT MAJOR DEPRESSIVE DISORDER (HCC): ICD-10-CM

## 2018-10-29 DIAGNOSIS — E78.2 MIXED HYPERLIPIDEMIA: ICD-10-CM

## 2018-10-29 DIAGNOSIS — F41.9 ANXIETY: ICD-10-CM

## 2018-10-29 PROCEDURE — G8417 CALC BMI ABV UP PARAM F/U: HCPCS | Performed by: INTERNAL MEDICINE

## 2018-10-29 PROCEDURE — 1036F TOBACCO NON-USER: CPT | Performed by: INTERNAL MEDICINE

## 2018-10-29 PROCEDURE — G8482 FLU IMMUNIZE ORDER/ADMIN: HCPCS | Performed by: INTERNAL MEDICINE

## 2018-10-29 PROCEDURE — G8427 DOCREV CUR MEDS BY ELIG CLIN: HCPCS | Performed by: INTERNAL MEDICINE

## 2018-10-29 PROCEDURE — 3014F SCREEN MAMMO DOC REV: CPT | Performed by: INTERNAL MEDICINE

## 2018-10-29 PROCEDURE — 3017F COLORECTAL CA SCREEN DOC REV: CPT | Performed by: INTERNAL MEDICINE

## 2018-10-29 PROCEDURE — 99214 OFFICE O/P EST MOD 30 MIN: CPT | Performed by: INTERNAL MEDICINE

## 2018-10-29 PROCEDURE — 3046F HEMOGLOBIN A1C LEVEL >9.0%: CPT | Performed by: INTERNAL MEDICINE

## 2018-10-29 PROCEDURE — 2022F DILAT RTA XM EVC RTNOPTHY: CPT | Performed by: INTERNAL MEDICINE

## 2018-10-29 RX ORDER — DULOXETIN HYDROCHLORIDE 30 MG/1
30 CAPSULE, DELAYED RELEASE ORAL DAILY
Qty: 90 CAPSULE | Refills: 0 | Status: SHIPPED | OUTPATIENT
Start: 2018-10-29 | End: 2019-04-18 | Stop reason: SDUPTHER

## 2018-10-29 RX ORDER — OMEPRAZOLE 20 MG/1
20 CAPSULE, DELAYED RELEASE ORAL DAILY
Qty: 90 CAPSULE | Refills: 1 | Status: SHIPPED | OUTPATIENT
Start: 2018-10-29 | End: 2019-06-21 | Stop reason: SDUPTHER

## 2018-10-29 RX ORDER — ROSUVASTATIN CALCIUM 5 MG/1
5 TABLET, COATED ORAL NIGHTLY
Qty: 30 TABLET | Refills: 3 | Status: SHIPPED | OUTPATIENT
Start: 2018-10-29 | End: 2019-03-31 | Stop reason: SDUPTHER

## 2018-10-29 ASSESSMENT — ENCOUNTER SYMPTOMS
COUGH: 0
SHORTNESS OF BREATH: 0
BLOOD IN STOOL: 0
VOMITING: 0
CHEST TIGHTNESS: 0
RHINORRHEA: 0
ABDOMINAL PAIN: 0
CONSTIPATION: 0
DIARRHEA: 0
SINUS PRESSURE: 0
SORE THROAT: 0
WHEEZING: 0
NAUSEA: 0

## 2018-10-29 NOTE — PATIENT INSTRUCTIONS
-Continue same medications  -Start Crestor 5mg nightly for cholesterol  -Limit carbohydrates to 45 grams with meals and 15 grams with snacks  -Low fat, low cholesterol diet  -Low sodium diet  -Regular aerobic exercise

## 2018-12-13 ENCOUNTER — OFFICE VISIT (OUTPATIENT)
Dept: INTERNAL MEDICINE CLINIC | Age: 63
End: 2018-12-13
Payer: COMMERCIAL

## 2018-12-13 VITALS
TEMPERATURE: 98.4 F | HEART RATE: 103 BPM | SYSTOLIC BLOOD PRESSURE: 146 MMHG | WEIGHT: 236 LBS | BODY MASS INDEX: 38.68 KG/M2 | OXYGEN SATURATION: 98 % | RESPIRATION RATE: 16 BRPM | DIASTOLIC BLOOD PRESSURE: 90 MMHG

## 2018-12-13 DIAGNOSIS — H61.22 IMPACTED CERUMEN OF LEFT EAR: ICD-10-CM

## 2018-12-13 DIAGNOSIS — J06.9 UPPER RESPIRATORY TRACT INFECTION, UNSPECIFIED TYPE: Primary | ICD-10-CM

## 2018-12-13 PROCEDURE — G8427 DOCREV CUR MEDS BY ELIG CLIN: HCPCS | Performed by: INTERNAL MEDICINE

## 2018-12-13 PROCEDURE — G8482 FLU IMMUNIZE ORDER/ADMIN: HCPCS | Performed by: INTERNAL MEDICINE

## 2018-12-13 PROCEDURE — 99213 OFFICE O/P EST LOW 20 MIN: CPT | Performed by: INTERNAL MEDICINE

## 2018-12-13 PROCEDURE — 3014F SCREEN MAMMO DOC REV: CPT | Performed by: INTERNAL MEDICINE

## 2018-12-13 PROCEDURE — G8417 CALC BMI ABV UP PARAM F/U: HCPCS | Performed by: INTERNAL MEDICINE

## 2018-12-13 PROCEDURE — 3017F COLORECTAL CA SCREEN DOC REV: CPT | Performed by: INTERNAL MEDICINE

## 2018-12-13 PROCEDURE — 1036F TOBACCO NON-USER: CPT | Performed by: INTERNAL MEDICINE

## 2018-12-13 RX ORDER — AMOXICILLIN 875 MG/1
875 TABLET, COATED ORAL 2 TIMES DAILY
Qty: 20 TABLET | Refills: 0 | Status: SHIPPED | OUTPATIENT
Start: 2018-12-13 | End: 2018-12-23

## 2018-12-13 RX ORDER — FLUTICASONE PROPIONATE 50 MCG
2 SPRAY, SUSPENSION (ML) NASAL DAILY
Qty: 1 BOTTLE | Refills: 0 | Status: SHIPPED | OUTPATIENT
Start: 2018-12-13 | End: 2019-04-16 | Stop reason: SDUPTHER

## 2018-12-13 RX ORDER — BROMPHENIRAMINE MALEATE, PSEUDOEPHEDRINE HYDROCHLORIDE, AND DEXTROMETHORPHAN HYDROBROMIDE 2; 30; 10 MG/5ML; MG/5ML; MG/5ML
5 SYRUP ORAL 4 TIMES DAILY PRN
Qty: 120 ML | Refills: 0 | Status: SHIPPED | OUTPATIENT
Start: 2018-12-13 | End: 2019-04-16

## 2018-12-13 ASSESSMENT — ENCOUNTER SYMPTOMS
SINUS PRESSURE: 0
COUGH: 1
RHINORRHEA: 1
WHEEZING: 0
SORE THROAT: 1
CHEST TIGHTNESS: 0
SINUS PAIN: 0
SHORTNESS OF BREATH: 0

## 2018-12-14 ASSESSMENT — ENCOUNTER SYMPTOMS: VOICE CHANGE: 1

## 2019-01-14 ENCOUNTER — OFFICE VISIT (OUTPATIENT)
Dept: INTERNAL MEDICINE CLINIC | Age: 64
End: 2019-01-14
Payer: COMMERCIAL

## 2019-01-14 VITALS
DIASTOLIC BLOOD PRESSURE: 80 MMHG | SYSTOLIC BLOOD PRESSURE: 110 MMHG | HEART RATE: 83 BPM | WEIGHT: 229.7 LBS | OXYGEN SATURATION: 99 % | BODY MASS INDEX: 36.92 KG/M2 | HEIGHT: 66 IN

## 2019-01-14 DIAGNOSIS — E78.2 MIXED HYPERLIPIDEMIA: ICD-10-CM

## 2019-01-14 DIAGNOSIS — K21.9 GASTROESOPHAGEAL REFLUX DISEASE, ESOPHAGITIS PRESENCE NOT SPECIFIED: ICD-10-CM

## 2019-01-14 DIAGNOSIS — F33.1 MODERATE EPISODE OF RECURRENT MAJOR DEPRESSIVE DISORDER (HCC): ICD-10-CM

## 2019-01-14 DIAGNOSIS — I10 ESSENTIAL HYPERTENSION: ICD-10-CM

## 2019-01-14 DIAGNOSIS — F41.9 ANXIETY: ICD-10-CM

## 2019-01-14 LAB
A/G RATIO: 1.5 (ref 1.1–2.2)
ALBUMIN SERPL-MCNC: 4.3 G/DL (ref 3.4–5)
ALP BLD-CCNC: 63 U/L (ref 40–129)
ALT SERPL-CCNC: 10 U/L (ref 10–40)
ANION GAP SERPL CALCULATED.3IONS-SCNC: 14 MMOL/L (ref 3–16)
AST SERPL-CCNC: 16 U/L (ref 15–37)
BILIRUB SERPL-MCNC: 0.4 MG/DL (ref 0–1)
BUN BLDV-MCNC: 25 MG/DL (ref 7–20)
CALCIUM SERPL-MCNC: 9.5 MG/DL (ref 8.3–10.6)
CHLORIDE BLD-SCNC: 101 MMOL/L (ref 99–110)
CHOLESTEROL, TOTAL: 152 MG/DL (ref 0–199)
CO2: 25 MMOL/L (ref 21–32)
CREAT SERPL-MCNC: 1 MG/DL (ref 0.6–1.2)
GFR AFRICAN AMERICAN: >60
GFR NON-AFRICAN AMERICAN: 56
GLOBULIN: 2.8 G/DL
GLUCOSE BLD-MCNC: 122 MG/DL (ref 70–99)
HBA1C MFR BLD: 9.9 %
HDLC SERPL-MCNC: 53 MG/DL (ref 40–60)
LDL CHOLESTEROL CALCULATED: 83 MG/DL
POTASSIUM SERPL-SCNC: 4.1 MMOL/L (ref 3.5–5.1)
SODIUM BLD-SCNC: 140 MMOL/L (ref 136–145)
TOTAL PROTEIN: 7.1 G/DL (ref 6.4–8.2)
TRIGL SERPL-MCNC: 78 MG/DL (ref 0–150)
VLDLC SERPL CALC-MCNC: 16 MG/DL

## 2019-01-14 PROCEDURE — 3046F HEMOGLOBIN A1C LEVEL >9.0%: CPT | Performed by: INTERNAL MEDICINE

## 2019-01-14 PROCEDURE — 3017F COLORECTAL CA SCREEN DOC REV: CPT | Performed by: INTERNAL MEDICINE

## 2019-01-14 PROCEDURE — G8482 FLU IMMUNIZE ORDER/ADMIN: HCPCS | Performed by: INTERNAL MEDICINE

## 2019-01-14 PROCEDURE — 99214 OFFICE O/P EST MOD 30 MIN: CPT | Performed by: INTERNAL MEDICINE

## 2019-01-14 PROCEDURE — 83036 HEMOGLOBIN GLYCOSYLATED A1C: CPT | Performed by: INTERNAL MEDICINE

## 2019-01-14 PROCEDURE — G8427 DOCREV CUR MEDS BY ELIG CLIN: HCPCS | Performed by: INTERNAL MEDICINE

## 2019-01-14 PROCEDURE — 2022F DILAT RTA XM EVC RTNOPTHY: CPT | Performed by: INTERNAL MEDICINE

## 2019-01-14 PROCEDURE — 1036F TOBACCO NON-USER: CPT | Performed by: INTERNAL MEDICINE

## 2019-01-14 PROCEDURE — G8417 CALC BMI ABV UP PARAM F/U: HCPCS | Performed by: INTERNAL MEDICINE

## 2019-01-14 ASSESSMENT — ENCOUNTER SYMPTOMS
WHEEZING: 0
COUGH: 0
SINUS PRESSURE: 0
NAUSEA: 0
ABDOMINAL PAIN: 0
CHEST TIGHTNESS: 0
CONSTIPATION: 0
DIARRHEA: 0
RHINORRHEA: 0
SORE THROAT: 0
SHORTNESS OF BREATH: 0
TROUBLE SWALLOWING: 0
VOMITING: 0
BLOOD IN STOOL: 0

## 2019-01-29 ENCOUNTER — CLINICAL DOCUMENTATION (OUTPATIENT)
Dept: INTERNAL MEDICINE CLINIC | Age: 64
End: 2019-01-29

## 2019-03-31 DIAGNOSIS — E78.2 MIXED HYPERLIPIDEMIA: ICD-10-CM

## 2019-04-01 RX ORDER — ROSUVASTATIN CALCIUM 5 MG/1
5 TABLET, COATED ORAL NIGHTLY
Qty: 30 TABLET | Refills: 3 | Status: SHIPPED | OUTPATIENT
Start: 2019-04-01 | End: 2019-04-16 | Stop reason: SDUPTHER

## 2019-04-01 NOTE — TELEPHONE ENCOUNTER
Medication:   Requested Prescriptions     Pending Prescriptions Disp Refills    rosuvastatin (CRESTOR) 5 MG tablet [Pharmacy Med Name: Rosuvastatin Calcium Oral Tablet 5 MG] 30 tablet 2     Sig: TAKE 1 TABLET BY MOUTH NIGHTLY       Last Filled:  10/29/18    Patient Phone Number: [phone] (home) [phone] Marisela Khan (work)    Last appt: 1/14/2019   Next appt: 4/16/2019    Last Lipid:   Lab Results   Component Value Date    CHOL 152 01/14/2019    TRIG 78 01/14/2019    HDL 53 01/14/2019    HDL 47 05/11/2012    [address] 83 01/14/2019       Last OARRS: No flowsheet data found.     Preferred Pharmacy:   [address] [place] [address] 45, [address] [address]  Phone: [phone] Fax: [phone] [facility], [address] [facility] [phone] Dewayne Perez [phone]  [facility] [address]  Phone: [phone] Fax: [phone] [facility] [address] [phone] Dewayne Perez [phone]  [facility] [address]  Phone: [phone] Fax: [phone]

## 2019-04-16 ENCOUNTER — OFFICE VISIT (OUTPATIENT)
Dept: INTERNAL MEDICINE CLINIC | Age: 64
End: 2019-04-16
Payer: COMMERCIAL

## 2019-04-16 VITALS
BODY MASS INDEX: 39.05 KG/M2 | TEMPERATURE: 97.8 F | SYSTOLIC BLOOD PRESSURE: 110 MMHG | OXYGEN SATURATION: 95 % | HEIGHT: 65 IN | WEIGHT: 234.4 LBS | HEART RATE: 66 BPM | DIASTOLIC BLOOD PRESSURE: 80 MMHG

## 2019-04-16 LAB
CREATININE URINE: 241.3 MG/DL (ref 28–259)
HBA1C MFR BLD: 7.9 %
MICROALBUMIN UR-MCNC: 1.8 MG/DL
MICROALBUMIN/CREAT UR-RTO: 7.5 MG/G (ref 0–30)

## 2019-04-16 PROCEDURE — 3017F COLORECTAL CA SCREEN DOC REV: CPT | Performed by: INTERNAL MEDICINE

## 2019-04-16 PROCEDURE — 3045F PR MOST RECENT HEMOGLOBIN A1C LEVEL 7.0-9.0%: CPT | Performed by: INTERNAL MEDICINE

## 2019-04-16 PROCEDURE — 99213 OFFICE O/P EST LOW 20 MIN: CPT | Performed by: INTERNAL MEDICINE

## 2019-04-16 PROCEDURE — 1036F TOBACCO NON-USER: CPT | Performed by: INTERNAL MEDICINE

## 2019-04-16 PROCEDURE — 83036 HEMOGLOBIN GLYCOSYLATED A1C: CPT | Performed by: INTERNAL MEDICINE

## 2019-04-16 PROCEDURE — 2022F DILAT RTA XM EVC RTNOPTHY: CPT | Performed by: INTERNAL MEDICINE

## 2019-04-16 PROCEDURE — G8427 DOCREV CUR MEDS BY ELIG CLIN: HCPCS | Performed by: INTERNAL MEDICINE

## 2019-04-16 PROCEDURE — G8417 CALC BMI ABV UP PARAM F/U: HCPCS | Performed by: INTERNAL MEDICINE

## 2019-04-16 RX ORDER — ROSUVASTATIN CALCIUM 5 MG/1
5 TABLET, COATED ORAL NIGHTLY
Qty: 90 TABLET | Refills: 1 | Status: SHIPPED | OUTPATIENT
Start: 2019-04-16 | End: 2019-12-20 | Stop reason: SDUPTHER

## 2019-04-16 ASSESSMENT — ENCOUNTER SYMPTOMS
VOMITING: 0
ABDOMINAL PAIN: 0
NAUSEA: 0
SHORTNESS OF BREATH: 0

## 2019-04-16 NOTE — PROGRESS NOTES
Eloisa Guzman   Date ofBirth:  1955    Date of Visit:  4/16/2019    Chief Complaint   Patient presents with    Diabetes       HPI    Diabetes Mellitus Type 2: uncontrolled   Hgb A1c 9.9%  on 1/14/19  Current Medications: Levemir to 20 units qhs, Farxiga 10mg po q day, Januvia 100mg po q day, Glimepiride 4mg po bid, and Metformin 1000mg po bid  Diet compliance:  Patient states she tries to decrease carbohydrates  Home blood sugar records: Patient tests her blood sugar  2 times daily. Fasting blood sugar averages 122 and bedtime blood sugar averages 100  Any episodes of hypoglycemia? No  Eye exam current (within one year): yes on 1/29/19  Daily Aspirin? Yes  Exercise- Patient states she started exercising regularly on 4/1/19 and walks and dances for exercise to get 12,000 steps per day     Review of Systems   Constitutional: Negative for fatigue. Eyes: Negative for visual disturbance. Respiratory: Negative for shortness of breath. Cardiovascular: Negative for chest pain. Gastrointestinal: Negative for abdominal pain, nausea and vomiting. Genitourinary: Negative for dysuria and frequency. Skin: Negative for wound. Neurological: Negative for dizziness, light-headedness, numbness and headaches.        No Known Allergies  Outpatient Medications Marked as Taking for the 4/16/19 encounter (Office Visit) with Denyce Schwab, MD   Medication Sig Dispense Refill    rosuvastatin (CRESTOR) 5 MG tablet TAKE 1 TABLET BY MOUTH NIGHTLY 30 tablet 3    insulin detemir (LEVEMIR FLEXTOUCH) 100 UNIT/ML injection pen Inject 20 Units into the skin nightly      Insulin Pen Needle (B-D ULTRAFINE III SHORT PEN) 31G X 8 MM MISC USE AS DIRECTED 100 each 3    omeprazole (PRILOSEC) 20 MG delayed release capsule Take 1 capsule by mouth daily 90 capsule 1    DULoxetine (CYMBALTA) 30 MG extended release capsule Take 1 capsule by mouth daily 90 capsule 0    dapagliflozin (FARXIGA) 10 MG tablet TAKE 1 TABLET BY MOUTH IN THE MORNING 30 tablet 5    metFORMIN (GLUCOPHAGE) 1000 MG tablet TAKE 1 TABLET BY MOUTH TWO TIMES A DAY WITH MEALS 60 tablet 5    SITagliptin (JANUVIA) 100 MG tablet TAKE 1 TABLET BY MOUTH ONE TIME A DAY 30 tablet 5    hydrochlorothiazide (HYDRODIURIL) 25 MG tablet TAKE 1 TABLET DAILY 90 tablet 2    losartan (COZAAR) 50 MG tablet TAKE 1 TABLET DAILY 90 tablet 2    glimepiride (AMARYL) 4 MG tablet TAKE 1 TABLET TWICE A  tablet 2    Insulin Pen Needle 31G X 6 MM MISC 1 each by Does not apply route daily 100 each 3    fluticasone (FLONASE) 50 MCG/ACT nasal spray 2 sprays by Nasal route daily 1 Bottle 0    vitamin B-12 (CYANOCOBALAMIN) 500 MCG tablet Take 2 tablets by mouth daily 60 tablet 3    Ascorbic Acid (VITAMIN C) 500 MG tablet Take 500 mg by mouth daily.  Cholecalciferol (VITAMIN D PO) Take 1,000 Units by mouth.  MULTIPLE VITAMIN PO Take  by mouth.  aspirin 81 MG EC tablet Take 81 mg by mouth daily. Vitals:    04/16/19 0900   BP: 110/80   Site: Right Upper Arm   Position: Sitting   Cuff Size: Large Adult   Pulse: 66   Temp: 97.8 °F (36.6 °C)   TempSrc: Oral   SpO2: 95%   Weight: 234 lb 6.4 oz (106.3 kg)   Height: 5' 5\" (1.651 m)     Body mass index is 39.01 kg/m². Physical Exam   Constitutional: She appears well-developed and well-nourished. No distress. HENT:   Mouth/Throat: Mucous membranes are normal.   Eyes: Pupils are equal, round, and reactive to light. Conjunctivae, EOM and lids are normal.   Neck: Carotid bruit is not present. No thyromegaly present. Cardiovascular: Normal rate, regular rhythm, S1 normal, S2 normal, normal heart sounds and intact distal pulses. No murmur heard. Pulmonary/Chest: Effort normal and breath sounds normal. She has no wheezes. She has no rhonchi. She has no rales. Abdominal: Soft. Normal appearance and bowel sounds are normal. She exhibits no distension. There is no hepatosplenomegaly. There is no tenderness.  There is no rebound. Lymphadenopathy:        Head (right side): No submandibular adenopathy present. Head (left side): No submandibular adenopathy present. Nursing note reviewed. Results for POC orders placed in visit on 04/16/19   POCT glycosylated hemoglobin (Hb A1C)   Result Value Ref Range    Hemoglobin A1C 7.9 %     Lab Review   Orders Only on 01/29/2019   Component Date Value    OD Visual Acuity Distance 01/29/2019 20/25     Visual Acuity Distance E* 01/29/2019 20/25     Intraocular Pressure Eye 01/29/2019                      Value:20  20      Diabetic Retinopathy 01/29/2019 NEGATIVE     Cataracts 01/29/2019 POSITIVE     Glaucoma 01/29/2019 NEGATIVE    Orders Only on 01/14/2019   Component Date Value    Sodium 01/14/2019 140     Potassium 01/14/2019 4.1     Chloride 01/14/2019 101     CO2 01/14/2019 25     Anion Gap 01/14/2019 14     Glucose 01/14/2019 122*    BUN 01/14/2019 25*    CREATININE 01/14/2019 1.0     GFR Non- 01/14/2019 56*    GFR  01/14/2019 >60     Calcium 01/14/2019 9.5     Total Protein 01/14/2019 7.1     Alb 01/14/2019 4.3     Albumin/Globulin Ratio 01/14/2019 1.5     Total Bilirubin 01/14/2019 0.4     Alkaline Phosphatase 01/14/2019 63     ALT 01/14/2019 10     AST 01/14/2019 16     Globulin 01/14/2019 2.8     Cholesterol, Total 01/14/2019 152     Triglycerides 01/14/2019 78     HDL 01/14/2019 53     LDL Calculated 01/14/2019 83     VLDL Cholesterol Calcula* 01/14/2019 16    Office Visit on 01/14/2019   Component Date Value    Hemoglobin A1C 01/14/2019 9.9          Assessment/Plan     1. Uncontrolled type 2 diabetes mellitus without complication, with long-term current use of insulin ([facility])  - Hemoglobin A1c of 7.9% has improved and is near goal of 7.0%   - Continue same medications  - Increase insulin detemir (LEVEMIR FLEXTOUCH) 100 UNIT/ML injection pen;  Inject 23 Units into the skin nightly  Dispense: 30 mL; Refill: 3  - Insulin Pen Needle (B-D ULTRAFINE III SHORT PEN) 31G X 8 MM MISC; USE AS DIRECTED  Dispense: 100 each; Refill: 3  - Limit carbohydrates to 45 grams with meals and 15 grams with snacks  - Regular aerobic exercise  - MICROALBUMIN / CREATININE URINE RATIO  - POCT glycosylated hemoglobin (Hb A1C)        Discussed medications with patient, who voiced understanding of their use and indications. All questions answered. Return in about 3 months (around 7/16/2019) for diabetes, hypertension, hyperlipidemia, depression, and anxiety.

## 2019-04-16 NOTE — PATIENT INSTRUCTIONS
1. Uncontrolled type 2 diabetes mellitus without complication, with long-term current use of insulin (HCC)  - Hemoglobin A1c of 7.9% has improved and is near goal of 7.0%   - Continue same medications  - Increase insulin detemir (LEVEMIR FLEXTOUCH) 100 UNIT/ML injection pen; Inject 23 Units into the skin nightly  Dispense: 30 mL; Refill: 3  - Insulin Pen Needle (B-D ULTRAFINE III SHORT PEN) 31G X 8 MM MISC; USE AS DIRECTED  Dispense: 100 each;  Refill: 3  - Limit carbohydrates to 45 grams with meals and 15 grams with snacks  - Regular aerobic exercise  - MICROALBUMIN / CREATININE URINE RATIO  - POCT glycosylated hemoglobin (Hb A1C)

## 2019-04-18 DIAGNOSIS — F41.9 ANXIETY: ICD-10-CM

## 2019-04-18 DIAGNOSIS — F33.1 MODERATE EPISODE OF RECURRENT MAJOR DEPRESSIVE DISORDER (HCC): ICD-10-CM

## 2019-04-18 RX ORDER — DULOXETIN HYDROCHLORIDE 30 MG/1
CAPSULE, DELAYED RELEASE ORAL
Qty: 90 CAPSULE | Refills: 1 | Status: SHIPPED | OUTPATIENT
Start: 2019-04-18 | End: 2020-02-04

## 2019-06-21 DIAGNOSIS — K21.9 GASTROESOPHAGEAL REFLUX DISEASE, ESOPHAGITIS PRESENCE NOT SPECIFIED: ICD-10-CM

## 2019-06-22 RX ORDER — OMEPRAZOLE 20 MG/1
20 CAPSULE, DELAYED RELEASE ORAL DAILY
Qty: 90 CAPSULE | Refills: 1 | Status: SHIPPED | OUTPATIENT
Start: 2019-06-22 | End: 2020-04-28

## 2019-07-16 ENCOUNTER — OFFICE VISIT (OUTPATIENT)
Dept: INTERNAL MEDICINE CLINIC | Age: 64
End: 2019-07-16
Payer: COMMERCIAL

## 2019-07-16 VITALS
OXYGEN SATURATION: 97 % | SYSTOLIC BLOOD PRESSURE: 120 MMHG | HEIGHT: 65 IN | DIASTOLIC BLOOD PRESSURE: 78 MMHG | HEART RATE: 72 BPM | BODY MASS INDEX: 38.25 KG/M2 | WEIGHT: 229.6 LBS

## 2019-07-16 DIAGNOSIS — E78.2 MIXED HYPERLIPIDEMIA: ICD-10-CM

## 2019-07-16 DIAGNOSIS — K21.9 GASTROESOPHAGEAL REFLUX DISEASE, ESOPHAGITIS PRESENCE NOT SPECIFIED: ICD-10-CM

## 2019-07-16 DIAGNOSIS — F33.1 MODERATE EPISODE OF RECURRENT MAJOR DEPRESSIVE DISORDER (HCC): ICD-10-CM

## 2019-07-16 DIAGNOSIS — M79.671 RIGHT FOOT PAIN: ICD-10-CM

## 2019-07-16 DIAGNOSIS — F41.9 ANXIETY: ICD-10-CM

## 2019-07-16 DIAGNOSIS — E11.9 TYPE 2 DIABETES MELLITUS WITHOUT COMPLICATION, WITHOUT LONG-TERM CURRENT USE OF INSULIN (HCC): Primary | ICD-10-CM

## 2019-07-16 DIAGNOSIS — I10 ESSENTIAL HYPERTENSION: ICD-10-CM

## 2019-07-16 LAB — HBA1C MFR BLD: 7.6 %

## 2019-07-16 PROCEDURE — 83036 HEMOGLOBIN GLYCOSYLATED A1C: CPT | Performed by: INTERNAL MEDICINE

## 2019-07-16 PROCEDURE — 99214 OFFICE O/P EST MOD 30 MIN: CPT | Performed by: INTERNAL MEDICINE

## 2019-07-16 PROCEDURE — 3017F COLORECTAL CA SCREEN DOC REV: CPT | Performed by: INTERNAL MEDICINE

## 2019-07-16 PROCEDURE — 1036F TOBACCO NON-USER: CPT | Performed by: INTERNAL MEDICINE

## 2019-07-16 PROCEDURE — G8417 CALC BMI ABV UP PARAM F/U: HCPCS | Performed by: INTERNAL MEDICINE

## 2019-07-16 PROCEDURE — 2022F DILAT RTA XM EVC RTNOPTHY: CPT | Performed by: INTERNAL MEDICINE

## 2019-07-16 PROCEDURE — 3045F PR MOST RECENT HEMOGLOBIN A1C LEVEL 7.0-9.0%: CPT | Performed by: INTERNAL MEDICINE

## 2019-07-16 PROCEDURE — G8427 DOCREV CUR MEDS BY ELIG CLIN: HCPCS | Performed by: INTERNAL MEDICINE

## 2019-07-16 ASSESSMENT — ENCOUNTER SYMPTOMS
VOMITING: 0
RHINORRHEA: 0
SHORTNESS OF BREATH: 0
SORE THROAT: 0
CHEST TIGHTNESS: 0
SINUS PRESSURE: 0
COUGH: 0
WHEEZING: 0
ABDOMINAL PAIN: 0
NAUSEA: 0
CONSTIPATION: 0
DIARRHEA: 0
TROUBLE SWALLOWING: 0
BLOOD IN STOOL: 0

## 2019-07-16 NOTE — PROGRESS NOTES
MG tablet TAKE 1 TABLET TWICE A  tablet 2    Insulin Pen Needle 31G X 6 MM MISC 1 each by Does not apply route daily 100 each 3    fluticasone (FLONASE) 50 MCG/ACT nasal spray 2 sprays by Nasal route daily 1 Bottle 0    vitamin B-12 (CYANOCOBALAMIN) 500 MCG tablet Take 2 tablets by mouth daily 60 tablet 3    Ascorbic Acid (VITAMIN C) 500 MG tablet Take 500 mg by mouth daily.  Cholecalciferol (VITAMIN D PO) Take 1,000 Units by mouth.  MULTIPLE VITAMIN PO Take  by mouth.  aspirin 81 MG EC tablet Take 81 mg by mouth daily. Vitals:    07/16/19 0842   BP: 120/78   Site: Left Upper Arm   Position: Sitting   Cuff Size: Large Adult   Pulse: 72   SpO2: 97%   Weight: 229 lb 9.6 oz (104.1 kg)   Height: 5' 5\" (1.651 m)     Body mass index is 38.21 kg/m². Physical Exam   Constitutional: She is oriented to person, place, and time. She appears well-developed and well-nourished. No distress. HENT:   Mouth/Throat: Oropharynx is clear and moist and mucous membranes are normal.   Eyes: Pupils are equal, round, and reactive to light. Conjunctivae, EOM and lids are normal.   Neck: Neck supple. Carotid bruit is not present. No thyromegaly present. Cardiovascular: Normal rate, regular rhythm, S1 normal, S2 normal, normal heart sounds and intact distal pulses. Exam reveals no gallop and no friction rub. No murmur heard. Pulmonary/Chest: Effort normal and breath sounds normal. No respiratory distress. She has no wheezes. She has no rhonchi. She has no rales. Abdominal: Soft. Normal appearance and bowel sounds are normal. She exhibits no distension. There is no hepatosplenomegaly. There is no tenderness. There is no rebound. Musculoskeletal: She exhibits no edema. Right foot: There is tenderness (right heel). There is no swelling. Lymphadenopathy:        Head (right side): No submandibular adenopathy present. Head (left side): No submandibular adenopathy present.

## 2019-08-26 ENCOUNTER — OFFICE VISIT (OUTPATIENT)
Dept: ORTHOPEDIC SURGERY | Age: 64
End: 2019-08-26
Payer: COMMERCIAL

## 2019-08-26 DIAGNOSIS — M76.61 ACHILLES TENDINITIS, RIGHT LEG: ICD-10-CM

## 2019-08-26 DIAGNOSIS — M79.671 PAIN OF RIGHT HEEL: Primary | ICD-10-CM

## 2019-08-26 DIAGNOSIS — M72.2 PLANTAR FASCIITIS OF RIGHT FOOT: ICD-10-CM

## 2019-08-26 PROCEDURE — L4361 PNEUMA/VAC WALK BOOT PRE OTS: HCPCS | Performed by: PODIATRIST

## 2019-08-26 PROCEDURE — G8427 DOCREV CUR MEDS BY ELIG CLIN: HCPCS | Performed by: PODIATRIST

## 2019-08-26 PROCEDURE — 3017F COLORECTAL CA SCREEN DOC REV: CPT | Performed by: PODIATRIST

## 2019-08-26 PROCEDURE — 99203 OFFICE O/P NEW LOW 30 MIN: CPT | Performed by: PODIATRIST

## 2019-08-26 PROCEDURE — G8417 CALC BMI ABV UP PARAM F/U: HCPCS | Performed by: PODIATRIST

## 2019-08-26 PROCEDURE — 1036F TOBACCO NON-USER: CPT | Performed by: PODIATRIST

## 2019-08-26 RX ORDER — POLYMYXIN B SULFATE AND TRIMETHOPRIM 1; 10000 MG/ML; [USP'U]/ML
SOLUTION OPHTHALMIC
Refills: 0 | Status: ON HOLD | COMMUNITY
Start: 2019-06-10 | End: 2020-06-01 | Stop reason: ALTCHOICE

## 2019-08-26 NOTE — PROGRESS NOTES
and written instructions for the use of and application of this item were provided. They were instructed to contact the office immediately should the brace result in increased pain, decreased sensation, increased swelling or worsening of the condition.

## 2019-08-27 ENCOUNTER — TELEPHONE (OUTPATIENT)
Dept: ORTHOPEDIC SURGERY | Age: 64
End: 2019-08-27

## 2019-10-05 DIAGNOSIS — E11.9 TYPE 2 DIABETES MELLITUS WITHOUT COMPLICATION, WITHOUT LONG-TERM CURRENT USE OF INSULIN (HCC): ICD-10-CM

## 2019-10-23 DIAGNOSIS — I10 ESSENTIAL HYPERTENSION: ICD-10-CM

## 2019-10-23 RX ORDER — HYDROCHLOROTHIAZIDE 25 MG/1
TABLET ORAL
Qty: 90 TABLET | Refills: 1 | Status: SHIPPED | OUTPATIENT
Start: 2019-10-23 | End: 2020-09-14 | Stop reason: SDUPTHER

## 2019-10-23 RX ORDER — GLIMEPIRIDE 4 MG/1
TABLET ORAL
Qty: 180 TABLET | Refills: 1 | Status: ON HOLD | OUTPATIENT
Start: 2019-10-23 | End: 2020-06-01 | Stop reason: ALTCHOICE

## 2019-10-23 RX ORDER — LOSARTAN POTASSIUM 50 MG/1
TABLET ORAL
Qty: 90 TABLET | Refills: 1 | Status: SHIPPED | OUTPATIENT
Start: 2019-10-23 | End: 2019-12-20 | Stop reason: SDUPTHER

## 2019-10-30 ENCOUNTER — OFFICE VISIT (OUTPATIENT)
Dept: PRIMARY CARE CLINIC | Age: 64
End: 2019-10-30
Payer: COMMERCIAL

## 2019-10-30 VITALS
BODY MASS INDEX: 38.99 KG/M2 | TEMPERATURE: 98 F | HEIGHT: 65 IN | OXYGEN SATURATION: 100 % | SYSTOLIC BLOOD PRESSURE: 162 MMHG | WEIGHT: 234 LBS | HEART RATE: 67 BPM | RESPIRATION RATE: 12 BRPM | DIASTOLIC BLOOD PRESSURE: 96 MMHG

## 2019-10-30 DIAGNOSIS — E78.2 MIXED HYPERLIPIDEMIA: ICD-10-CM

## 2019-10-30 DIAGNOSIS — K21.9 GASTROESOPHAGEAL REFLUX DISEASE, ESOPHAGITIS PRESENCE NOT SPECIFIED: ICD-10-CM

## 2019-10-30 DIAGNOSIS — E11.9 TYPE 2 DIABETES MELLITUS WITHOUT COMPLICATION, WITH LONG-TERM CURRENT USE OF INSULIN (HCC): ICD-10-CM

## 2019-10-30 DIAGNOSIS — Z79.4 TYPE 2 DIABETES MELLITUS WITHOUT COMPLICATION, WITH LONG-TERM CURRENT USE OF INSULIN (HCC): ICD-10-CM

## 2019-10-30 DIAGNOSIS — E53.8 VITAMIN B 12 DEFICIENCY: ICD-10-CM

## 2019-10-30 DIAGNOSIS — E55.9 VITAMIN D DEFICIENCY: ICD-10-CM

## 2019-10-30 DIAGNOSIS — Z00.00 ANNUAL PHYSICAL EXAM: ICD-10-CM

## 2019-10-30 DIAGNOSIS — R01.1 HEART MURMUR: ICD-10-CM

## 2019-10-30 DIAGNOSIS — F32.A DEPRESSION, UNSPECIFIED DEPRESSION TYPE: ICD-10-CM

## 2019-10-30 DIAGNOSIS — F41.9 ANXIETY: ICD-10-CM

## 2019-10-30 DIAGNOSIS — I10 ESSENTIAL HYPERTENSION: ICD-10-CM

## 2019-10-30 DIAGNOSIS — Z00.00 ANNUAL PHYSICAL EXAM: Primary | ICD-10-CM

## 2019-10-30 LAB
A/G RATIO: 1.6 (ref 1.1–2.2)
ALBUMIN SERPL-MCNC: 4.2 G/DL (ref 3.4–5)
ALP BLD-CCNC: 64 U/L (ref 40–129)
ALT SERPL-CCNC: 10 U/L (ref 10–40)
ANION GAP SERPL CALCULATED.3IONS-SCNC: 14 MMOL/L (ref 3–16)
AST SERPL-CCNC: 17 U/L (ref 15–37)
BASOPHILS ABSOLUTE: 0.1 K/UL (ref 0–0.2)
BASOPHILS RELATIVE PERCENT: 1.2 %
BILIRUB SERPL-MCNC: 0.5 MG/DL (ref 0–1)
BUN BLDV-MCNC: 15 MG/DL (ref 7–20)
CALCIUM SERPL-MCNC: 9.5 MG/DL (ref 8.3–10.6)
CHLORIDE BLD-SCNC: 100 MMOL/L (ref 99–110)
CHOLESTEROL, TOTAL: 199 MG/DL (ref 0–199)
CO2: 27 MMOL/L (ref 21–32)
CREAT SERPL-MCNC: 0.9 MG/DL (ref 0.6–1.2)
EOSINOPHILS ABSOLUTE: 0.1 K/UL (ref 0–0.6)
EOSINOPHILS RELATIVE PERCENT: 1.4 %
GFR AFRICAN AMERICAN: >60
GFR NON-AFRICAN AMERICAN: >60
GLOBULIN: 2.6 G/DL
GLUCOSE BLD-MCNC: 139 MG/DL (ref 70–99)
HCT VFR BLD CALC: 41.5 % (ref 36–48)
HDLC SERPL-MCNC: 57 MG/DL (ref 40–60)
HEMOGLOBIN: 13 G/DL (ref 12–16)
LDL CHOLESTEROL CALCULATED: 118 MG/DL
LYMPHOCYTES ABSOLUTE: 1.4 K/UL (ref 1–5.1)
LYMPHOCYTES RELATIVE PERCENT: 23.9 %
MCH RBC QN AUTO: 27.8 PG (ref 26–34)
MCHC RBC AUTO-ENTMCNC: 31.4 G/DL (ref 31–36)
MCV RBC AUTO: 88.8 FL (ref 80–100)
MONOCYTES ABSOLUTE: 0.3 K/UL (ref 0–1.3)
MONOCYTES RELATIVE PERCENT: 5.8 %
NEUTROPHILS ABSOLUTE: 4 K/UL (ref 1.7–7.7)
NEUTROPHILS RELATIVE PERCENT: 67.7 %
PDW BLD-RTO: 15.6 % (ref 12.4–15.4)
PLATELET # BLD: 227 K/UL (ref 135–450)
PMV BLD AUTO: 8.8 FL (ref 5–10.5)
POTASSIUM SERPL-SCNC: 4.6 MMOL/L (ref 3.5–5.1)
RBC # BLD: 4.67 M/UL (ref 4–5.2)
SODIUM BLD-SCNC: 141 MMOL/L (ref 136–145)
TOTAL PROTEIN: 6.8 G/DL (ref 6.4–8.2)
TRIGL SERPL-MCNC: 120 MG/DL (ref 0–150)
TSH SERPL DL<=0.05 MIU/L-ACNC: 2.67 UIU/ML (ref 0.27–4.2)
VITAMIN B-12: 525 PG/ML (ref 211–911)
VITAMIN D 25-HYDROXY: 40 NG/ML
VLDLC SERPL CALC-MCNC: 24 MG/DL
WBC # BLD: 5.9 K/UL (ref 4–11)

## 2019-10-30 PROCEDURE — G8484 FLU IMMUNIZE NO ADMIN: HCPCS | Performed by: INTERNAL MEDICINE

## 2019-10-30 PROCEDURE — 99396 PREV VISIT EST AGE 40-64: CPT | Performed by: INTERNAL MEDICINE

## 2019-10-30 ASSESSMENT — ENCOUNTER SYMPTOMS
VOICE CHANGE: 0
SORE THROAT: 0
ABDOMINAL PAIN: 0
PHOTOPHOBIA: 0
NAUSEA: 0
SINUS PRESSURE: 0
EYE DISCHARGE: 0
BLOOD IN STOOL: 0
SHORTNESS OF BREATH: 0
DIARRHEA: 0
WHEEZING: 0
ABDOMINAL DISTENTION: 0
CONSTIPATION: 0
CHEST TIGHTNESS: 0
TROUBLE SWALLOWING: 0
FACIAL SWELLING: 0
VOMITING: 0
EYE ITCHING: 0
ANAL BLEEDING: 0
BACK PAIN: 0
COUGH: 0
CHOKING: 0
SINUS PAIN: 0
RECTAL PAIN: 0
EYE REDNESS: 0
APNEA: 0
RHINORRHEA: 0
EYE PAIN: 0

## 2019-10-31 LAB
ESTIMATED AVERAGE GLUCOSE: 182.9 MG/DL
HBA1C MFR BLD: 8 %

## 2019-11-14 ENCOUNTER — HOSPITAL ENCOUNTER (OUTPATIENT)
Dept: NON INVASIVE DIAGNOSTICS | Age: 64
Discharge: HOME | End: 2019-11-14
Payer: COMMERCIAL

## 2019-11-14 DIAGNOSIS — R01.1 HEART MURMUR: ICD-10-CM

## 2019-11-14 LAB
LV EF: 58 %
LVEF MODALITY: NORMAL

## 2019-11-14 PROCEDURE — 93306 TTE W/DOPPLER COMPLETE: CPT

## 2019-11-27 ENCOUNTER — TELEPHONE (OUTPATIENT)
Dept: PRIMARY CARE CLINIC | Age: 64
End: 2019-11-27

## 2019-11-27 DIAGNOSIS — R01.1 MURMUR: Primary | ICD-10-CM

## 2019-11-27 DIAGNOSIS — I35.0 MODERATE TO SEVERE AORTIC STENOSIS: ICD-10-CM

## 2019-12-02 PROBLEM — I35.0 MODERATE TO SEVERE AORTIC STENOSIS: Status: ACTIVE | Noted: 2019-12-02

## 2019-12-02 PROBLEM — R01.1 MURMUR: Status: ACTIVE | Noted: 2019-12-02

## 2019-12-04 ENCOUNTER — TELEPHONE (OUTPATIENT)
Dept: PRIMARY CARE CLINIC | Age: 64
End: 2019-12-04

## 2019-12-20 ENCOUNTER — OFFICE VISIT (OUTPATIENT)
Dept: CARDIOLOGY CLINIC | Age: 64
End: 2019-12-20
Payer: COMMERCIAL

## 2019-12-20 VITALS
BODY MASS INDEX: 39.24 KG/M2 | HEART RATE: 76 BPM | SYSTOLIC BLOOD PRESSURE: 140 MMHG | WEIGHT: 235.8 LBS | DIASTOLIC BLOOD PRESSURE: 90 MMHG

## 2019-12-20 DIAGNOSIS — R53.83 FATIGUE, UNSPECIFIED TYPE: ICD-10-CM

## 2019-12-20 DIAGNOSIS — E78.2 MIXED HYPERLIPIDEMIA: ICD-10-CM

## 2019-12-20 DIAGNOSIS — I35.0 NONRHEUMATIC AORTIC (VALVE) STENOSIS: Primary | ICD-10-CM

## 2019-12-20 DIAGNOSIS — I10 ESSENTIAL HYPERTENSION: ICD-10-CM

## 2019-12-20 DIAGNOSIS — R01.1 MURMUR: ICD-10-CM

## 2019-12-20 PROCEDURE — G8417 CALC BMI ABV UP PARAM F/U: HCPCS | Performed by: INTERNAL MEDICINE

## 2019-12-20 PROCEDURE — 1123F ACP DISCUSS/DSCN MKR DOCD: CPT | Performed by: INTERNAL MEDICINE

## 2019-12-20 PROCEDURE — 1090F PRES/ABSN URINE INCON ASSESS: CPT | Performed by: INTERNAL MEDICINE

## 2019-12-20 PROCEDURE — 3017F COLORECTAL CA SCREEN DOC REV: CPT | Performed by: INTERNAL MEDICINE

## 2019-12-20 PROCEDURE — G8427 DOCREV CUR MEDS BY ELIG CLIN: HCPCS | Performed by: INTERNAL MEDICINE

## 2019-12-20 PROCEDURE — 93000 ELECTROCARDIOGRAM COMPLETE: CPT | Performed by: INTERNAL MEDICINE

## 2019-12-20 PROCEDURE — 99204 OFFICE O/P NEW MOD 45 MIN: CPT | Performed by: INTERNAL MEDICINE

## 2019-12-20 PROCEDURE — G8400 PT W/DXA NO RESULTS DOC: HCPCS | Performed by: INTERNAL MEDICINE

## 2019-12-20 PROCEDURE — 4040F PNEUMOC VAC/ADMIN/RCVD: CPT | Performed by: INTERNAL MEDICINE

## 2019-12-20 PROCEDURE — 1036F TOBACCO NON-USER: CPT | Performed by: INTERNAL MEDICINE

## 2019-12-20 PROCEDURE — G8484 FLU IMMUNIZE NO ADMIN: HCPCS | Performed by: INTERNAL MEDICINE

## 2019-12-20 RX ORDER — ROSUVASTATIN CALCIUM 20 MG/1
20 TABLET, COATED ORAL NIGHTLY
Qty: 90 TABLET | Refills: 3 | Status: SHIPPED | OUTPATIENT
Start: 2019-12-20 | End: 2020-09-14 | Stop reason: SDUPTHER

## 2019-12-20 RX ORDER — LOSARTAN POTASSIUM 100 MG/1
TABLET ORAL
Qty: 90 TABLET | Refills: 3 | Status: SHIPPED | OUTPATIENT
Start: 2019-12-20 | End: 2021-03-04 | Stop reason: SDUPTHER

## 2020-01-21 DIAGNOSIS — E78.2 MIXED HYPERLIPIDEMIA: ICD-10-CM

## 2020-01-21 DIAGNOSIS — I10 ESSENTIAL HYPERTENSION: ICD-10-CM

## 2020-01-21 LAB
A/G RATIO: 1.7 (ref 1.1–2.2)
ALBUMIN SERPL-MCNC: 4.1 G/DL (ref 3.4–5)
ALP BLD-CCNC: 59 U/L (ref 40–129)
ALT SERPL-CCNC: 18 U/L (ref 10–40)
ANION GAP SERPL CALCULATED.3IONS-SCNC: 13 MMOL/L (ref 3–16)
AST SERPL-CCNC: 27 U/L (ref 15–37)
BILIRUB SERPL-MCNC: 0.3 MG/DL (ref 0–1)
BUN BLDV-MCNC: 13 MG/DL (ref 7–20)
CALCIUM SERPL-MCNC: 9.2 MG/DL (ref 8.3–10.6)
CHLORIDE BLD-SCNC: 102 MMOL/L (ref 99–110)
CHOLESTEROL, TOTAL: 120 MG/DL (ref 0–199)
CO2: 25 MMOL/L (ref 21–32)
CREAT SERPL-MCNC: 0.9 MG/DL (ref 0.6–1.2)
GFR AFRICAN AMERICAN: >60
GFR NON-AFRICAN AMERICAN: >60
GLOBULIN: 2.4 G/DL
GLUCOSE BLD-MCNC: 153 MG/DL (ref 70–99)
HDLC SERPL-MCNC: 55 MG/DL (ref 40–60)
LDL CHOLESTEROL CALCULATED: 52 MG/DL
POTASSIUM SERPL-SCNC: 4.9 MMOL/L (ref 3.5–5.1)
SODIUM BLD-SCNC: 140 MMOL/L (ref 136–145)
TOTAL PROTEIN: 6.5 G/DL (ref 6.4–8.2)
TRIGL SERPL-MCNC: 67 MG/DL (ref 0–150)
VLDLC SERPL CALC-MCNC: 13 MG/DL

## 2020-01-30 ENCOUNTER — OFFICE VISIT (OUTPATIENT)
Dept: PRIMARY CARE CLINIC | Age: 65
End: 2020-01-30
Payer: COMMERCIAL

## 2020-01-30 VITALS
OXYGEN SATURATION: 98 % | HEIGHT: 65 IN | DIASTOLIC BLOOD PRESSURE: 78 MMHG | WEIGHT: 233 LBS | BODY MASS INDEX: 38.82 KG/M2 | SYSTOLIC BLOOD PRESSURE: 128 MMHG | HEART RATE: 72 BPM

## 2020-01-30 LAB — HBA1C MFR BLD: 8.3 %

## 2020-01-30 PROCEDURE — G8417 CALC BMI ABV UP PARAM F/U: HCPCS | Performed by: INTERNAL MEDICINE

## 2020-01-30 PROCEDURE — 1123F ACP DISCUSS/DSCN MKR DOCD: CPT | Performed by: INTERNAL MEDICINE

## 2020-01-30 PROCEDURE — 2022F DILAT RTA XM EVC RTNOPTHY: CPT | Performed by: INTERNAL MEDICINE

## 2020-01-30 PROCEDURE — 4040F PNEUMOC VAC/ADMIN/RCVD: CPT | Performed by: INTERNAL MEDICINE

## 2020-01-30 PROCEDURE — 3017F COLORECTAL CA SCREEN DOC REV: CPT | Performed by: INTERNAL MEDICINE

## 2020-01-30 PROCEDURE — 99214 OFFICE O/P EST MOD 30 MIN: CPT | Performed by: INTERNAL MEDICINE

## 2020-01-30 PROCEDURE — 1090F PRES/ABSN URINE INCON ASSESS: CPT | Performed by: INTERNAL MEDICINE

## 2020-01-30 PROCEDURE — 83036 HEMOGLOBIN GLYCOSYLATED A1C: CPT | Performed by: INTERNAL MEDICINE

## 2020-01-30 PROCEDURE — G8484 FLU IMMUNIZE NO ADMIN: HCPCS | Performed by: INTERNAL MEDICINE

## 2020-01-30 PROCEDURE — G8427 DOCREV CUR MEDS BY ELIG CLIN: HCPCS | Performed by: INTERNAL MEDICINE

## 2020-01-30 PROCEDURE — 1036F TOBACCO NON-USER: CPT | Performed by: INTERNAL MEDICINE

## 2020-01-30 PROCEDURE — G8400 PT W/DXA NO RESULTS DOC: HCPCS | Performed by: INTERNAL MEDICINE

## 2020-01-30 ASSESSMENT — ENCOUNTER SYMPTOMS
NAUSEA: 0
WHEEZING: 0
SHORTNESS OF BREATH: 0
TROUBLE SWALLOWING: 0
SORE THROAT: 0
CONSTIPATION: 0
SINUS PRESSURE: 0
ABDOMINAL PAIN: 0
VOMITING: 0
CHEST TIGHTNESS: 0
DIARRHEA: 0
BLOOD IN STOOL: 0
RHINORRHEA: 0
COUGH: 0

## 2020-01-30 NOTE — PROGRESS NOTES
constipation, diarrhea, nausea and vomiting. Genitourinary: Positive for frequency (usual, drinks alot of water). Negative for dysuria and hematuria. Musculoskeletal: Negative for arthralgias and myalgias. Skin: Negative for rash and wound. Neurological: Negative for dizziness, tremors, syncope, weakness, light-headedness, numbness and headaches. Psychiatric/Behavioral: Positive for dysphoric mood (stable). Negative for decreased concentration and sleep disturbance. The patient is nervous/anxious (stable).         Allergies   Allergen Reactions    No Known Allergies      Outpatient Medications Marked as Taking for the 1/30/20 encounter (Office Visit) with Christine Elise MD   Medication Sig Dispense Refill    losartan (COZAAR) 100 MG tablet Take 1 table daily 90 tablet 3    rosuvastatin (CRESTOR) 20 MG tablet Take 1 tablet by mouth nightly 90 tablet 3    hydrochlorothiazide (HYDRODIURIL) 25 MG tablet TAKE 1 TABLET DAILY 90 tablet 1    glimepiride (AMARYL) 4 MG tablet TAKE 1 TABLET TWICE A  tablet 1    dapagliflozin (FARXIGA) 10 MG tablet TAKE 1 TABLET BY MOUTH IN THE MORNING 30 tablet 4    metFORMIN (GLUCOPHAGE) 1000 MG tablet TAKE 1 TABLET BY MOUTH TWO TIMES A DAY WITH MEALS 60 tablet 4    SITagliptin (JANUVIA) 100 MG tablet TAKE 1 TABLET BY MOUTH ONE TIME A DAY 30 tablet 4    trimethoprim-polymyxin b (POLYTRIM) 96185-4.1 UNIT/ML-% ophthalmic solution INSTILL 1 DROP INTO THE AFFECTED EYE(S) EVERY 4 TO 6 HOURS FOR 7 TO 10 DAYS  0    omeprazole (PRILOSEC) 20 MG delayed release capsule Take 1 capsule by mouth daily 90 capsule 1    DULoxetine (CYMBALTA) 30 MG extended release capsule TAKE 1 CAPSULE DAILY 90 capsule 1    insulin detemir (LEVEMIR FLEXTOUCH) 100 UNIT/ML injection pen Inject 23 Units into the skin nightly 30 mL 3    Insulin Pen Needle (B-D ULTRAFINE III SHORT PEN) 31G X 8 MM MISC USE AS DIRECTED 100 each 3    Insulin Pen Needle 31G X 6 MM MISC 1 each by Does not apply route daily 100 each 3    fluticasone (FLONASE) 50 MCG/ACT nasal spray 2 sprays by Nasal route daily 1 Bottle 0    vitamin B-12 (CYANOCOBALAMIN) 500 MCG tablet Take 2 tablets by mouth daily 60 tablet 3    Ascorbic Acid (VITAMIN C) 500 MG tablet Take 500 mg by mouth daily.  Cholecalciferol (VITAMIN D PO) Take 1,000 Units by mouth.  MULTIPLE VITAMIN PO Take  by mouth.  aspirin 81 MG EC tablet Take 81 mg by mouth daily. Vitals:    01/30/20 0901   BP: 128/78   Site: Right Upper Arm   Position: Sitting   Cuff Size: Medium Adult   Pulse: 72   SpO2: 98%   Weight: 233 lb (105.7 kg)   Height: 5' 5\" (1.651 m)     Body mass index is 38.77 kg/m². Physical Exam  Nursing note reviewed. Constitutional:       General: She is not in acute distress. Appearance: Normal appearance. She is well-developed. Eyes:      General: Lids are normal.      Extraocular Movements: Extraocular movements intact. Conjunctiva/sclera: Conjunctivae normal.      Pupils: Pupils are equal, round, and reactive to light. Neck:      Musculoskeletal: Neck supple. Thyroid: No thyromegaly. Vascular: No carotid bruit. Cardiovascular:      Rate and Rhythm: Normal rate and regular rhythm. Heart sounds: Normal heart sounds, S1 normal and S2 normal. No murmur. No friction rub. No gallop. Pulmonary:      Effort: Pulmonary effort is normal. No respiratory distress. Breath sounds: Normal breath sounds. No wheezing, rhonchi or rales. Abdominal:      General: Bowel sounds are normal. There is no distension. Palpations: Abdomen is soft. Tenderness: There is no abdominal tenderness. Musculoskeletal:      Right lower leg: No edema. Left lower leg: No edema. Lymphadenopathy:      Head:      Right side of head: No submandibular adenopathy. Left side of head: No submandibular adenopathy. Neurological:      Mental Status: She is alert and oriented to person, place, and time. hemoglobin (Hb A1C)    2. Essential hypertension  -stable  -Continue same medications  -Low sodium diet  -Regular aerobic exercise    3. Mixed hyperlipidemia  -Continue same medications  -Low fat, low cholesterol diet  -Regular aerobic exercise    4. Depression, unspecified depression type  -stable  -Continue same medications    5. Anxiety  -stable  -Continue same medications    6. Gastroesophageal reflux disease, esophagitis presence not specified  -stable  -Continue same medications  -Decrease caffeine, avoid spicy foods, avoid tomato based foods  -Eat small meals instead of large meals  -Wait 2-3 hours after eating before lying down          Discussed medications with patient, who voiced understanding of their use and indications. All questions answered. Return in about 3 months (around 4/30/2020) for diabetes.

## 2020-01-30 NOTE — PATIENT INSTRUCTIONS
1. Type 2 diabetes mellitus without complication, with long-term current use of insulin (HCC)  - Hemoglobin A1c of 8.3% shows diabetes is not controlled  -Continue same medications  - metFORMIN (GLUCOPHAGE) 1000 MG tablet; Take 1 tablet by mouth 2 times daily (with meals)  Dispense: 180 tablet; Refill: 1  - Start Dulaglutide 0.75 MG/0.5ML SOPN; Inject 0.75 mg into the skin once a week  Dispense: 4 pen; Refill: 3  - Increase insulin detemir (LEVEMIR FLEXTOUCH) 100 UNIT/ML injection pen; Inject 30 Units into the skin nightly  Dispense: 30 mL; Refill: 1  -Decrease Glimepiride to 4mg with breakfast  -Limit carbohydrates to 45 grams with meals and 15 grams with snacks  -monitor blood sugars  -Regular aerobic exercise    2. Essential hypertension  -stable  -Continue same medications  -Low sodium diet  -Regular aerobic exercise    3. Mixed hyperlipidemia  -Continue same medications  -Low fat, low cholesterol diet  -Regular aerobic exercise    4. Depression, unspecified depression type  -stable  -Continue same medications    5. Anxiety  -stable  -Continue same medications    6.  Gastroesophageal reflux disease, esophagitis presence not specified  -stable  -Continue same medications  -Decrease caffeine, avoid spicy foods, avoid tomato based foods  -Eat small meals instead of large meals  -Wait 2-3 hours after eating before lying down

## 2020-02-04 RX ORDER — DULOXETIN HYDROCHLORIDE 30 MG/1
CAPSULE, DELAYED RELEASE ORAL
Qty: 90 CAPSULE | Refills: 1 | Status: SHIPPED | OUTPATIENT
Start: 2020-02-04 | End: 2020-09-14 | Stop reason: SDUPTHER

## 2020-02-04 NOTE — TELEPHONE ENCOUNTER
Medication:   Requested Prescriptions     Pending Prescriptions Disp Refills    DULoxetine (CYMBALTA) 30 MG extended release capsule [Pharmacy Med Name: DULOXETINE HCL DR CAPS 30MG] 90 capsule 4     Sig: TAKE 1 CAPSULE DAILY        Last Filled:      Patient Phone Number: [phone] (home) [phone] (work)    Last appt: 7/16/2019   Next appt: Visit date not found    Last OARRS: No flowsheet data found.     Preferred Pharmacy:   [facility] [address], [address] [address]  Phone: [phone] Fax: [phone] - [place] [address] [phone] [facility] [phone]  [facility] [place] [address]  Phone: [phone] Fax: [place] [address] [facility] 96, 92 [facility] [phone] [facility] [phone]  [facility] [address]  Phone: [phone] Fax: [phone]

## 2020-04-21 ENCOUNTER — TELEPHONE (OUTPATIENT)
Dept: PRIMARY CARE CLINIC | Age: 65
End: 2020-04-21

## 2020-04-22 ENCOUNTER — TELEPHONE (OUTPATIENT)
Dept: PRIMARY CARE CLINIC | Age: 65
End: 2020-04-22

## 2020-04-25 DIAGNOSIS — E11.9 TYPE 2 DIABETES MELLITUS WITHOUT COMPLICATION, WITHOUT LONG-TERM CURRENT USE OF INSULIN (HCC): ICD-10-CM

## 2020-04-25 DIAGNOSIS — E55.9 VITAMIN D DEFICIENCY: ICD-10-CM

## 2020-04-25 DIAGNOSIS — E53.8 VITAMIN B 12 DEFICIENCY: ICD-10-CM

## 2020-04-25 DIAGNOSIS — I10 ESSENTIAL HYPERTENSION: ICD-10-CM

## 2020-04-25 LAB
A/G RATIO: 1.3 (ref 1.1–2.2)
ALBUMIN SERPL-MCNC: 3.9 G/DL (ref 3.4–5)
ALP BLD-CCNC: 49 U/L (ref 40–129)
ALT SERPL-CCNC: 10 U/L (ref 10–40)
ANION GAP SERPL CALCULATED.3IONS-SCNC: 15 MMOL/L (ref 3–16)
AST SERPL-CCNC: 18 U/L (ref 15–37)
BILIRUB SERPL-MCNC: 0.4 MG/DL (ref 0–1)
BUN BLDV-MCNC: 18 MG/DL (ref 7–20)
CALCIUM SERPL-MCNC: 9.3 MG/DL (ref 8.3–10.6)
CHLORIDE BLD-SCNC: 104 MMOL/L (ref 99–110)
CO2: 25 MMOL/L (ref 21–32)
CREAT SERPL-MCNC: 0.9 MG/DL (ref 0.6–1.2)
GFR AFRICAN AMERICAN: >60
GFR NON-AFRICAN AMERICAN: >60
GLOBULIN: 3 G/DL
GLUCOSE BLD-MCNC: 81 MG/DL (ref 70–99)
POTASSIUM SERPL-SCNC: 4.9 MMOL/L (ref 3.5–5.1)
SODIUM BLD-SCNC: 144 MMOL/L (ref 136–145)
TOTAL PROTEIN: 6.9 G/DL (ref 6.4–8.2)
VITAMIN B-12: 386 PG/ML (ref 211–911)
VITAMIN D 25-HYDROXY: 24.4 NG/ML

## 2020-04-26 LAB
ESTIMATED AVERAGE GLUCOSE: 157.1 MG/DL
HBA1C MFR BLD: 7.1 %

## 2020-04-28 RX ORDER — OMEPRAZOLE 20 MG/1
CAPSULE, DELAYED RELEASE ORAL
Qty: 90 CAPSULE | Refills: 1 | Status: SHIPPED | OUTPATIENT
Start: 2020-04-28 | End: 2021-02-12 | Stop reason: DRUGHIGH

## 2020-04-28 RX ORDER — ROSUVASTATIN CALCIUM 5 MG/1
TABLET, COATED ORAL
Qty: 90 TABLET | Refills: 1 | Status: SHIPPED | OUTPATIENT
Start: 2020-04-28 | End: 2020-04-29 | Stop reason: DRUGHIGH

## 2020-04-29 ENCOUNTER — VIRTUAL VISIT (OUTPATIENT)
Dept: PRIMARY CARE CLINIC | Age: 65
End: 2020-04-29
Payer: COMMERCIAL

## 2020-04-29 VITALS — WEIGHT: 225 LBS | BODY MASS INDEX: 37.44 KG/M2

## 2020-04-29 DIAGNOSIS — Z00.00 ANNUAL PHYSICAL EXAM: ICD-10-CM

## 2020-04-29 LAB
BILIRUBIN URINE: NEGATIVE
BLOOD, URINE: NEGATIVE
CLARITY: CLEAR
COLOR: YELLOW
GLUCOSE URINE: 500 MG/DL
KETONES, URINE: NEGATIVE MG/DL
LEUKOCYTE ESTERASE, URINE: NEGATIVE
MICROSCOPIC EXAMINATION: ABNORMAL
NITRITE, URINE: NEGATIVE
PH UA: 6 (ref 5–8)
PROTEIN UA: NEGATIVE MG/DL
SPECIFIC GRAVITY UA: 1.01 (ref 1–1.03)
URINE TYPE: ABNORMAL
UROBILINOGEN, URINE: 1 E.U./DL

## 2020-04-29 PROCEDURE — 3017F COLORECTAL CA SCREEN DOC REV: CPT | Performed by: INTERNAL MEDICINE

## 2020-04-29 PROCEDURE — 3051F HG A1C>EQUAL 7.0%<8.0%: CPT | Performed by: INTERNAL MEDICINE

## 2020-04-29 PROCEDURE — G8427 DOCREV CUR MEDS BY ELIG CLIN: HCPCS | Performed by: INTERNAL MEDICINE

## 2020-04-29 PROCEDURE — 1123F ACP DISCUSS/DSCN MKR DOCD: CPT | Performed by: INTERNAL MEDICINE

## 2020-04-29 PROCEDURE — 99213 OFFICE O/P EST LOW 20 MIN: CPT | Performed by: INTERNAL MEDICINE

## 2020-04-29 PROCEDURE — 1090F PRES/ABSN URINE INCON ASSESS: CPT | Performed by: INTERNAL MEDICINE

## 2020-04-29 PROCEDURE — 2022F DILAT RTA XM EVC RTNOPTHY: CPT | Performed by: INTERNAL MEDICINE

## 2020-04-29 PROCEDURE — G8400 PT W/DXA NO RESULTS DOC: HCPCS | Performed by: INTERNAL MEDICINE

## 2020-04-29 PROCEDURE — 4040F PNEUMOC VAC/ADMIN/RCVD: CPT | Performed by: INTERNAL MEDICINE

## 2020-04-29 SDOH — ECONOMIC STABILITY: TRANSPORTATION INSECURITY
IN THE PAST 12 MONTHS, HAS THE LACK OF TRANSPORTATION KEPT YOU FROM MEDICAL APPOINTMENTS OR FROM GETTING MEDICATIONS?: NO

## 2020-04-29 SDOH — ECONOMIC STABILITY: FOOD INSECURITY: WITHIN THE PAST 12 MONTHS, YOU WORRIED THAT YOUR FOOD WOULD RUN OUT BEFORE YOU GOT MONEY TO BUY MORE.: NEVER TRUE

## 2020-04-29 SDOH — ECONOMIC STABILITY: INCOME INSECURITY: HOW HARD IS IT FOR YOU TO PAY FOR THE VERY BASICS LIKE FOOD, HOUSING, MEDICAL CARE, AND HEATING?: SOMEWHAT HARD

## 2020-04-29 SDOH — ECONOMIC STABILITY: TRANSPORTATION INSECURITY
IN THE PAST 12 MONTHS, HAS LACK OF TRANSPORTATION KEPT YOU FROM MEETINGS, WORK, OR FROM GETTING THINGS NEEDED FOR DAILY LIVING?: NO

## 2020-04-29 SDOH — ECONOMIC STABILITY: FOOD INSECURITY: WITHIN THE PAST 12 MONTHS, THE FOOD YOU BOUGHT JUST DIDN'T LAST AND YOU DIDN'T HAVE MONEY TO GET MORE.: NEVER TRUE

## 2020-04-29 ASSESSMENT — ENCOUNTER SYMPTOMS
SHORTNESS OF BREATH: 0
ABDOMINAL PAIN: 0
NAUSEA: 0
SINUS PRESSURE: 0
VOMITING: 0

## 2020-05-12 ENCOUNTER — NURSE TRIAGE (OUTPATIENT)
Dept: OTHER | Facility: CLINIC | Age: 65
End: 2020-05-12

## 2020-05-12 NOTE — TELEPHONE ENCOUNTER
Reason for Disposition   Age > 60 years    Answer Assessment - Initial Assessment Questions  1. LOCATION: \"Where does it hurt? \"       Middle of abd around belly button  2. RADIATION: \"Does the pain shoot anywhere else? \" (e.g., chest, back)     no  3. ONSET: \"When did the pain begin? \" (e.g., minutes, hours or days ago)      A month ago  4. SUDDEN: \"Gradual or sudden onset? \"     gradual  5. PATTERN \"Does the pain come and go, or is it constant? \"     - If constant: \"Is it getting better, staying the same, or worsening? \"       (Note: Constant means the pain never goes away completely; most serious pain is constant and it progresses)      - If intermittent: \"How long does it last?\" \"Do you have pain now? \"      (Note: Intermittent means the pain goes away completely between bouts)      Intermittent more constant  6. SEVERITY: \"How bad is the pain? \"  (e.g., Scale 1-10; mild, moderate, or severe)     - MILD (1-3): doesn't interfere with normal activities, abdomen soft and not tender to touch      - MODERATE (4-7): interferes with normal activities or awakens from sleep, tender to touch      - SEVERE (8-10): excruciating pain, doubled over, unable to do any normal activities        5  7. RECURRENT SYMPTOM: \"Have you ever had this type of abdominal pain before? \" If so, ask: \"When was the last time? \" and \"What happened that time? \"      Years ago  8. AGGRAVATING FACTORS: \"Does anything seem to cause this pain? \" (e.g., foods, stress, alcohol)      no  9. CARDIAC SYMPTOMS: \"Do you have any of the following symptoms: chest pain, difficulty breathing, sweating, nausea? \"      nausea  10. OTHER SYMPTOMS: \"Do you have any other symptoms? \" (e.g., fever, vomiting, diarrhea)      diarrhea  11. PREGNANCY: \"Is there any chance you are pregnant? \" \"When was your last menstrual period? \"        *No Answer*    Protocols used: ABDOMINAL PAIN - UPPER-ADULT-OH    Recommended pt call PCP for appointment or gastroenterologists for appointment

## 2020-05-14 RX ORDER — DULAGLUTIDE 0.75 MG/.5ML
INJECTION, SOLUTION SUBCUTANEOUS
Qty: 2 ML | Refills: 5 | Status: SHIPPED | OUTPATIENT
Start: 2020-05-14 | End: 2020-11-02

## 2020-05-14 NOTE — TELEPHONE ENCOUNTER
Medication:   Requested Prescriptions     Pending Prescriptions Disp Refills    TRULICITY 7.34 UA/7.7ZX SOPN [Pharmacy Med Name: Trulicity Subcutaneous Solution Pen-injector 0.75 MG/0.5ML] 2 mL 0     Sig: INJECT THE CONTENTS OF ONE PEN (0.75MG) UNDER THE SKIN ONCE A WEEK       Last Filled:      Patient Phone Number: [phone] (home) [phone] (work)    Last appt: 1/30/2020   Next appt: 7/29/2020    Last Labs DM:   Lab Results   Component Value Date    LABA1C 7.1 04/25/2020       Last OARRS: No flowsheet data found.     Preferred Pharmacy:   [facility] [address], [address] [address]  Phone: [phone] Fax: [phone] [facility] [place] [place] [phone] -  [phone]  [facility]  [address] [place] [address]  Phone: [phone] Fax: [place] [address] [facility] 96, 92 [facility] [phone] [place] [phone]  [facility] [address]  Phone: [phone] Fax: [phone]

## 2020-05-26 ENCOUNTER — OFFICE VISIT (OUTPATIENT)
Dept: PRIMARY CARE CLINIC | Age: 65
End: 2020-05-26

## 2020-05-27 LAB
SARS-COV-2: NOT DETECTED
SOURCE: NORMAL

## 2020-06-01 ENCOUNTER — HOSPITAL ENCOUNTER (OUTPATIENT)
Age: 65
Setting detail: OUTPATIENT SURGERY
Discharge: HOME | End: 2020-06-01
Attending: INTERNAL MEDICINE | Admitting: INTERNAL MEDICINE
Payer: COMMERCIAL

## 2020-06-01 VITALS
RESPIRATION RATE: 16 BRPM | BODY MASS INDEX: 37.49 KG/M2 | HEIGHT: 65 IN | OXYGEN SATURATION: 100 % | SYSTOLIC BLOOD PRESSURE: 144 MMHG | TEMPERATURE: 97.7 F | DIASTOLIC BLOOD PRESSURE: 77 MMHG | WEIGHT: 225 LBS | HEART RATE: 74 BPM

## 2020-06-01 LAB
GLUCOSE BLD-MCNC: 140 MG/DL (ref 70–99)
PERFORMED ON: ABNORMAL

## 2020-06-01 PROCEDURE — 7100000011 HC PHASE II RECOVERY - ADDTL 15 MIN: Performed by: INTERNAL MEDICINE

## 2020-06-01 PROCEDURE — 7100000010 HC PHASE II RECOVERY - FIRST 15 MIN: Performed by: INTERNAL MEDICINE

## 2020-06-01 PROCEDURE — 2709999900 HC NON-CHARGEABLE SUPPLY: Performed by: INTERNAL MEDICINE

## 2020-06-01 PROCEDURE — 88305 TISSUE EXAM BY PATHOLOGIST: CPT

## 2020-06-01 PROCEDURE — 3609012400 HC EGD TRANSORAL BIOPSY SINGLE/MULTIPLE: Performed by: INTERNAL MEDICINE

## 2020-06-01 PROCEDURE — 2580000003 HC RX 258: Performed by: INTERNAL MEDICINE

## 2020-06-01 PROCEDURE — 99153 MOD SED SAME PHYS/QHP EA: CPT | Performed by: INTERNAL MEDICINE

## 2020-06-01 PROCEDURE — 6360000002 HC RX W HCPCS: Performed by: INTERNAL MEDICINE

## 2020-06-01 PROCEDURE — 99152 MOD SED SAME PHYS/QHP 5/>YRS: CPT | Performed by: INTERNAL MEDICINE

## 2020-06-01 PROCEDURE — 3609010300 HC COLONOSCOPY W/BIOPSY SINGLE/MULTIPLE: Performed by: INTERNAL MEDICINE

## 2020-06-01 RX ORDER — DEXTROSE AND SODIUM CHLORIDE 5; .9 G/100ML; G/100ML
INJECTION, SOLUTION INTRAVENOUS CONTINUOUS
Status: DISCONTINUED | OUTPATIENT
Start: 2020-06-01 | End: 2020-06-01 | Stop reason: HOSPADM

## 2020-06-01 RX ORDER — MIDAZOLAM HYDROCHLORIDE 1 MG/ML
INJECTION INTRAMUSCULAR; INTRAVENOUS PRN
Status: DISCONTINUED | OUTPATIENT
Start: 2020-06-01 | End: 2020-06-01 | Stop reason: ALTCHOICE

## 2020-06-01 RX ORDER — FENTANYL CITRATE 50 UG/ML
INJECTION, SOLUTION INTRAMUSCULAR; INTRAVENOUS PRN
Status: DISCONTINUED | OUTPATIENT
Start: 2020-06-01 | End: 2020-06-01 | Stop reason: ALTCHOICE

## 2020-06-01 RX ADMIN — DEXTROSE AND SODIUM CHLORIDE: 5; 900 INJECTION, SOLUTION INTRAVENOUS at 09:11

## 2020-06-01 ASSESSMENT — PAIN SCALES - GENERAL
PAINLEVEL_OUTOF10: 0
PAINLEVEL_OUTOF10: 0

## 2020-06-01 ASSESSMENT — PAIN - FUNCTIONAL ASSESSMENT
PAIN_FUNCTIONAL_ASSESSMENT: FACES
PAIN_FUNCTIONAL_ASSESSMENT: 0-10

## 2020-06-01 ASSESSMENT — PAIN SCALES - WONG BAKER: WONGBAKER_NUMERICALRESPONSE: 0

## 2020-06-01 NOTE — H&P
Provider, MD   Insulin Pen Needle (B-D ULTRAFINE III SHORT PEN) 31G X 8 MM MISC USE AS DIRECTED 4/16/19   Tahira Olvera MD   vitamin B-12 (CYANOCOBALAMIN) 500 MCG tablet Take 2 tablets by mouth daily 11/23/15   Tahira Olvera MD   Cholecalciferol (VITAMIN D PO) Take 1,000 Units by mouth. Historical Provider, MD   MULTIPLE VITAMIN PO Take  by mouth. Historical Provider, MD   aspirin 81 MG EC tablet Take 81 mg by mouth daily. Historical Provider, MD       Allergies:    Allergies   Allergen Reactions    No Known Allergies        PSHx:    Past Surgical History:   Procedure Laterality Date    BREAST SURGERY      COLONOSCOPY  12/16/2016    Alonzo WIGGINS    DILATION AND CURETTAGE OF UTERUS      TONSILLECTOMY      TUBAL LIGATION         Social Hx:    Social History     Socioeconomic History    Marital status: Single     Spouse name: Not on file    Number of children: Not on file    Years of education: Not on file    Highest education level: Not on file   Occupational History    Occupation: [employer]    Occupation: [employer] resource strain: Somewhat hard   [place]hel[place] insecurity     Worry: Never true     Inability: Never true    Transportation needs     Medical: No     Non-medical: No   Tobacco Use    Smoking status: Never Smoker    Smokeless tobacco: Never Used   Substance and Sexual Activity    Alcohol use: Not Currently     Comment: 3 drinks per year    Drug use: No    Sexual activity: Not on file   Lifestyle    Physical activity     Days per week: Not on file     Minutes per session: Not on file    Stress: Not on file   Relationships    Social connections     Talks on phone: Not on file     Gets together: Not on file     Attends Protestant service: Not on file     Active member of club or organization: Not on file     Attends meetings of clubs or organizations: Not on file     Relationship status: Not on file    Intimate partner violence     Fear of polypectomy syndrome, splenic injury, need for additional procedures or surgery, risks of anesthesia. Patient understands it is their responsibility to call office for pathology results if they do not hear from my office within 1-2 weeks. All questions answered.     Sakshi Drew MD  6/1/2020

## 2020-06-17 RX ORDER — PEN NEEDLE, DIABETIC 31 GX5/16"
NEEDLE, DISPOSABLE MISCELLANEOUS
Qty: 90 EACH | Refills: 10 | Status: SHIPPED | OUTPATIENT
Start: 2020-06-17 | End: 2020-11-04

## 2020-06-17 RX ORDER — PEN NEEDLE, DIABETIC 31 GX5/16"
NEEDLE, DISPOSABLE MISCELLANEOUS
Qty: 100 EACH | Refills: 3 | Status: SHIPPED | OUTPATIENT
Start: 2020-06-17 | End: 2021-05-18 | Stop reason: SDUPTHER

## 2020-06-25 ENCOUNTER — HOSPITAL ENCOUNTER (OUTPATIENT)
Dept: NON INVASIVE DIAGNOSTICS | Age: 65
Discharge: HOME | End: 2020-06-25
Payer: COMMERCIAL

## 2020-06-25 LAB
LV EF: 65 %
LVEF MODALITY: NORMAL

## 2020-06-25 PROCEDURE — C8929 TTE W OR WO FOL WCON,DOPPLER: HCPCS

## 2020-06-26 ENCOUNTER — TELEPHONE (OUTPATIENT)
Dept: CARDIOLOGY CLINIC | Age: 65
End: 2020-06-26

## 2020-07-17 ENCOUNTER — OFFICE VISIT (OUTPATIENT)
Dept: CARDIOLOGY CLINIC | Age: 65
End: 2020-07-17
Payer: COMMERCIAL

## 2020-07-17 VITALS
HEART RATE: 68 BPM | WEIGHT: 219.8 LBS | HEIGHT: 65 IN | DIASTOLIC BLOOD PRESSURE: 76 MMHG | SYSTOLIC BLOOD PRESSURE: 134 MMHG | BODY MASS INDEX: 36.62 KG/M2 | TEMPERATURE: 97.9 F

## 2020-07-17 PROCEDURE — 99214 OFFICE O/P EST MOD 30 MIN: CPT | Performed by: INTERNAL MEDICINE

## 2020-07-17 PROCEDURE — G8427 DOCREV CUR MEDS BY ELIG CLIN: HCPCS | Performed by: INTERNAL MEDICINE

## 2020-07-17 PROCEDURE — G8417 CALC BMI ABV UP PARAM F/U: HCPCS | Performed by: INTERNAL MEDICINE

## 2020-07-17 PROCEDURE — 1090F PRES/ABSN URINE INCON ASSESS: CPT | Performed by: INTERNAL MEDICINE

## 2020-07-17 PROCEDURE — 1036F TOBACCO NON-USER: CPT | Performed by: INTERNAL MEDICINE

## 2020-07-17 PROCEDURE — G8400 PT W/DXA NO RESULTS DOC: HCPCS | Performed by: INTERNAL MEDICINE

## 2020-07-17 PROCEDURE — 3017F COLORECTAL CA SCREEN DOC REV: CPT | Performed by: INTERNAL MEDICINE

## 2020-07-17 PROCEDURE — 1123F ACP DISCUSS/DSCN MKR DOCD: CPT | Performed by: INTERNAL MEDICINE

## 2020-07-17 PROCEDURE — 4040F PNEUMOC VAC/ADMIN/RCVD: CPT | Performed by: INTERNAL MEDICINE

## 2020-07-17 NOTE — PROGRESS NOTES
71 y.o. here for FU echo for AS. States that she feels more tired, described as more sleepy. Denies exercise intolerance including SOB, CP, dizziness, fatigue. No syncope. States that she walks but not for exercise. No limiting symptoms. States that compared to 6 mos ago, she feels the same. 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       Past Medical History:   Diagnosis Date    Anemia     Anxiety     Depression 4/30/2013    GERD (gastroesophageal reflux disease) 1/31/2013    Hyperlipidemia     Hypertension     Panic disorder     PUD (peptic ulcer disease)     Type II or unspecified type diabetes mellitus without mention of complication, not stated as uncontrolled     Unspecified sleep apnea     Vitamin B 12 deficiency 10/19/2015    Vitamin D deficiency 5/19/2015     Past Surgical History:   Procedure Laterality Date    BREAST SURGERY      COLONOSCOPY  12/16/2016    Alonzo WIGGINS    COLONOSCOPY  6/1/2020    COLONOSCOPY WITH BIOPSY performed by Nhan Schrader MD at [place] [address]      TUBAL LIGATION      UPPER GASTROINTESTINAL ENDOSCOPY N/A 6/1/2020    EGD BIOPSY performed by Nhan Schrader MD at [address] History     Tobacco Use    Smoking status: Never Smoker    Smokeless tobacco: Never Used   Substance Use Topics    Alcohol use: Not Currently     Comment: 3 drinks per year    Drug use: No     Allergies   Allergen Reactions    No Known Allergies      Family History   Problem Relation Age of Onset    Breast Cancer Mother     Cancer Mother 52        breast     Diabetes Father     Hypertension Father     Colon Cancer Father     Cancer Father 72        colon    Breast Cancer Other     Cancer Other     Cancer Maternal Aunt         x 2 breast cancer    Diabetes Sister     Cancer Maternal Grandmother     Cancer Paternal Uncle         colon    Cancer Paternal Cousin         colon cancer - stage 4    Bipolar Disorder Son     Depression Son      Review of Systems   General: No fevers, chills, fatigue, or night sweats. No abnormal changes in weight. HEENT: No blurry or decreased vision. No changes in hearing, nasal discharge or sore throat. Cardiovascular: See HPI. No cramping in legs or buttocks when walking. Respiratory: No cough, hemoptysis, or wheezing. No history of asthma. Gastrointestinal: No abdominal pain, hematochezia, melana, or history of GI ulcers. Admits to acid reflux. Genito-Urinary: No dysuria or hematuria. No urgency or polyuria. Musculoskeletal: No complaints of joint pain, joint swelling or muscular weakness/soreness. Neurological: No dizziness or headaches. No numbness/tingling, speech problems or weakness. No history of a stroke or TIA. Psychological: No anxiety or depression  Hematological and Lymphatic: No abnormal bleeding or bruising, blood clots, jaundice. Endocrine: No malaise/lethargy, palpitations, polydipsia/polyuria, temperature intolerance or unexpected weight changes. Skin: No rashes or non-healing ulcers. PE:  Blood pressure 134/76, pulse 68, temperature 97.9 °F (36.6 °C), height 5' 5\" (1.651 m), weight 219 lb 12.8 oz (99.7 kg), last menstrual period 01/02/2013. General (appearance): Well developed. No distress  Eyes: Anicteric. EOMI. Neck: Supple. No JVD  Ears/Nose/Mouth/Thorat: No cyanosis  CV: RRR. 2/6 wing. Soft diastolic murmur? + I6/A4. Respiratory:  Clear B, normal respiratory effort  GI: Abd s/nt/nd. No peritoneal signs  Skin: Warm, dry. No rashes  Neuro/Psych: Alert and oriented x 3. Appropriate behavior  Ext:  No c/c. No pitting edema  Pulses:  2+ carotid.  No bruits    Lab Results   Component Value Date    WBC 5.9 10/30/2019    HGB 13.0 10/30/2019    HCT 41.5 10/30/2019    MCV 88.8 10/30/2019     10/30/2019     Lab Results   Component Value Date     04/25/2020    K 4.9 04/25/2020     04/25/2020    CO2 25 04/25/2020    BUN 18 04/25/2020    CREATININE 0.9 04/25/2020    GLUCOSE 81 04/25/2020    CALCIUM 9.3 04/25/2020    PROT 6.9 04/25/2020    LABALBU 3.9 04/25/2020    BILITOT 0.4 04/25/2020    ALKPHOS 49 04/25/2020    AST 18 04/25/2020    ALT 10 04/25/2020    LABGLOM >60 04/25/2020    GFRAA >60 04/25/2020    AGRATIO 1.3 04/25/2020    GLOB 3.0 04/25/2020     No results found for: INR, PROTIME    Lab Results   Component Value Date    CHOL 120 01/21/2020    CHOL 199 10/30/2019    CHOL 152 01/14/2019     Lab Results   Component Value Date    TRIG 67 01/21/2020    TRIG 120 10/30/2019    TRIG 78 01/14/2019     Lab Results   Component Value Date    HDL 55 01/21/2020    HDL 57 10/30/2019    HDL 53 01/14/2019     Lab Results   Component Value Date    LDLCALC 52 01/21/2020    LDLCALC 118 (H) 10/30/2019    LDLCALC 83 01/14/2019     Lab Results   Component Value Date    LABVLDL 13 01/21/2020    LABVLDL 24 10/30/2019    LABVLDL 16 01/14/2019     No results found for: CHOLHDLRATIO    6/2020 TTE: EF 65%. Cannot rule out bicuspid valve. Severe AS with MG of 43 and PV of 4.1 m/s. Tr TR. PASP 23.    11/2019 Echo: Concentric left ventricular hypertrophy. EF 55-60%. No RWMA. Mod-severe AS ( and MG 24). Mild MR and TX. Tr TR, RVSP of 19.     Current Outpatient Medications   Medication Instructions    aspirin 81 mg, DAILY    Cholecalciferol (VITAMIN D PO) 5,000 Units, Oral    dapagliflozin (FARXIGA) 10 MG tablet TAKE 1 TABLET BY MOUTH IN THE MORNING     DULoxetine (CYMBALTA) 30 MG extended release capsule TAKE 1 CAPSULE DAILY    hydrochlorothiazide (HYDRODIURIL) 25 MG tablet TAKE 1 TABLET DAILY    insulin detemir (LEVEMIR FLEXTOUCH) 30 Units, Subcutaneous, NIGHTLY    Insulin Pen Needle (B-D ULTRAFINE III SHORT PEN) 31G X 8 MM MISC USE AS DIRECTED    Insulin Pen Needle (B-D ULTRAFINE III SHORT PEN) 31G X 8 MM MISC USE AS DIRECTED    losartan (COZAAR) 100 MG tablet Take 1 table daily    metFORMIN (GLUCOPHAGE) 1,000 mg, Oral, 2 TIMES DAILY WITH MEALS    MULTIPLE VITAMIN PO Take  by mouth.  omeprazole (PRILOSEC) 20 MG delayed release capsule TAKE 1 CAPSULE DAILY    rosuvastatin (CRESTOR) 20 mg, Oral, NIGHTLY    SITagliptin (JANUVIA) 100 MG tablet TAKE 1 TABLET BY MOUTH ONE TIME A DAY     [facility] 2.76 EL/3.6PW SOPN INJECT THE CONTENTS OF ONE PEN (0.75MG) UNDER THE SKIN ONCE A WEEK    vitamin B-12 (CYANOCOBALAMIN) 1,000 mcg, Oral, DAILY    vitamin C (ASCORBIC ACID) 500 mg, DAILY       Assess:  1. Nonrheumatic aortic (valve) stenosis    2. Mixed hyperlipidemia    3. Fatigue, unspecified type    4. Essential hypertension        Plan:  -Continue to monitor for symptoms of AS. Try to increase exercise to assess for change in symptoms. -FU 3 mos, sooner if symptoms worsen  -Cont asa, statin, HCTZ, and losartan  -Discussed progression of AS, and particularly the time we will need to fix the valve. Luigi Beard MD, [facility]

## 2020-07-29 ENCOUNTER — OFFICE VISIT (OUTPATIENT)
Dept: PRIMARY CARE CLINIC | Age: 65
End: 2020-07-29
Payer: COMMERCIAL

## 2020-07-29 VITALS
OXYGEN SATURATION: 99 % | RESPIRATION RATE: 16 BRPM | WEIGHT: 219 LBS | DIASTOLIC BLOOD PRESSURE: 88 MMHG | TEMPERATURE: 98.6 F | SYSTOLIC BLOOD PRESSURE: 130 MMHG | BODY MASS INDEX: 36.44 KG/M2 | HEART RATE: 83 BPM

## 2020-07-29 DIAGNOSIS — E55.9 VITAMIN D DEFICIENCY: ICD-10-CM

## 2020-07-29 LAB
HBA1C MFR BLD: 6.6 %
VITAMIN D 25-HYDROXY: 60.7 NG/ML

## 2020-07-29 PROCEDURE — G8417 CALC BMI ABV UP PARAM F/U: HCPCS | Performed by: INTERNAL MEDICINE

## 2020-07-29 PROCEDURE — G8400 PT W/DXA NO RESULTS DOC: HCPCS | Performed by: INTERNAL MEDICINE

## 2020-07-29 PROCEDURE — 1123F ACP DISCUSS/DSCN MKR DOCD: CPT | Performed by: INTERNAL MEDICINE

## 2020-07-29 PROCEDURE — 99214 OFFICE O/P EST MOD 30 MIN: CPT | Performed by: INTERNAL MEDICINE

## 2020-07-29 PROCEDURE — 83036 HEMOGLOBIN GLYCOSYLATED A1C: CPT | Performed by: INTERNAL MEDICINE

## 2020-07-29 PROCEDURE — 2022F DILAT RTA XM EVC RTNOPTHY: CPT | Performed by: INTERNAL MEDICINE

## 2020-07-29 PROCEDURE — G8427 DOCREV CUR MEDS BY ELIG CLIN: HCPCS | Performed by: INTERNAL MEDICINE

## 2020-07-29 PROCEDURE — 4040F PNEUMOC VAC/ADMIN/RCVD: CPT | Performed by: INTERNAL MEDICINE

## 2020-07-29 PROCEDURE — 3044F HG A1C LEVEL LT 7.0%: CPT | Performed by: INTERNAL MEDICINE

## 2020-07-29 PROCEDURE — 1036F TOBACCO NON-USER: CPT | Performed by: INTERNAL MEDICINE

## 2020-07-29 PROCEDURE — 3017F COLORECTAL CA SCREEN DOC REV: CPT | Performed by: INTERNAL MEDICINE

## 2020-07-29 PROCEDURE — 1090F PRES/ABSN URINE INCON ASSESS: CPT | Performed by: INTERNAL MEDICINE

## 2020-07-29 NOTE — PATIENT INSTRUCTIONS
shingles will experience PHN. The risk of PHN increases with age. An older adult with shingles is more likely to develop PHN and have longer lasting and more severe pain than a younger person with shingles. Shingles is caused by the varicella zoster virus, the same virus that causes chickenpox. After you have chickenpox, the virus stays in your body and can cause shingles later in life. Shingles cannot be passed from one person to another, but the virus that causes shingles can spread and cause chickenpox in someone who had never had chickenpox or received chickenpox vaccine. Recombinant shingles vaccine  Recombinant shingles vaccine provides strong protection against shingles. By preventing shingles, recombinant shingles vaccine also protects against PHN. Recombinant shingles vaccine is the preferred vaccine for the prevention of shingles. However, a different vaccine, live shingles vaccine, may be used in some circumstances. The recombinant shingles vaccine is recommended for adults 50 years and older without serious immune problems. It is given as a two-dose series. This vaccine is also recommended for people who have already gotten another type of shingles vaccine, the live shingles vaccine. There is no live virus in this vaccine. Shingles vaccine may be given at the same time as other vaccines. Talk with your health care provider  Tell your vaccine provider if the person getting the vaccine:  · Has had an allergic reaction after a previous dose of recombinant shingles vaccine, or has any severe, life-threatening allergies. · Is pregnant or breastfeeding. · Is currently experiencing an episode of shingles. In some cases, your health care provider may decide to postpone shingles vaccination to a future visit. People with minor illnesses, such as a cold, may be vaccinated. People who are moderately or severely ill should usually wait until they recover before getting recombinant shingles vaccine.   Your health care provider can give you more information. Risks of a vaccine reaction  · A sore arm with mild or moderate pain is very common after recombinant shingles vaccine, affecting about 80% of vaccinated people. Redness and swelling can also happen at the site of the injection. · Tiredness, muscle pain, headache, shivering, fever, stomach pain, and nausea happen after vaccination in more than half of people who receive recombinant shingles vaccine. In clinical trials, about 1 out of 6 people who got recombinant zoster vaccine experienced side effects that prevented them from doing regular activities. Symptoms usually went away on their own in 2 to 3 days. You should still get the second dose of recombinant zoster vaccine even if you had one of these reactions after the first dose. People sometimes faint after medical procedures, including vaccination. Tell your provider if you feel dizzy or have vision changes or ringing in the ears. As with any medicine, there is a very remote chance of a vaccine causing a severe allergic reaction, other serious injury, or death. What if there is a serious problem? An allergic reaction could occur after the vaccinated person leaves the clinic. If you see signs of a severe allergic reaction (hives, swelling of the face and throat, difficulty breathing, a fast heartbeat, dizziness, or weakness), call 9-1-1 and get the person to the nearest hospital.  For other signs that concern you, call your health care provider. Adverse reactions should be reported to the Vaccine Adverse Event Reporting System (VAERS). Your health care provider will usually file this report, or you can do it yourself. Visit the VAERS website at www.vaers. hhs.gov or call 0-986.549.7839. VAERS is only for reporting reactions, and VAERS staff do not give medical advice. How can I learn more? · Ask your health care provider. · Call your local or state health department.   · Contact the [facility] Disease Control and Prevention (CDC):  ? Call 5-736.474.9573 (1-800-CDC-INFO) or  ? Visit CDC's website at www.cdc.gov/vaccines  Vaccine Information Statement  Recombinant Zoster Vaccine  10/30/2019  [facility] and [facility] for Disease Control and Prevention  Many Vaccine Information Statements are available in [place] and other languages. See www.immunize.org/vis. Hojas de Información Sobre Vacunas están disponibles en Español y en muchos otros idiomas. Visite Yoshi.si   Care instructions adapted under license by [facility] ([facility]). If you have questions about a medical condition or this instruction, always ask your healthcare professional. [address] any warranty or liability for your use of this information.

## 2020-07-29 NOTE — PROGRESS NOTES
Trini Lee   Date ofBirth:  1955    Date of Visit:  7/29/2020    Chief Complaint   Patient presents with    Diabetes    Hypertension    Hyperlipidemia    Depression    Anxiety    Gastroesophageal Reflux    Other     vitamin D deficiency       HPI  Diabetes Mellitus Type 2:   Current Medications: Levemir 30 units qhs, Farxiga 10mg po q day, Januvia 318RM po q day, Trulicity 7.52WS subcutaneously q week, and Metformin 1000mg po bid  Pt states Trulicity gives her a lot of gas. Diet:  low carbohydrates  Home blood sugar records: Patient tests her blood sugar 1-2 times per day. Fasting averages 100  Any episodes of hypoglycemia?no  Eye exam current (within one year): yes on 1/23/20  Daily Aspirin? Yes  Exercise-walks 20 minutes just started last week and plans to walk 5 days per week     Hypertension:    Current Medications:Losartan 100mg po q day and HCTZ 25mg po q day  Home blood pressure monitoring: No   Diet: low salt  Exercise-walks 20 minutes just started last week and plans to walk 5 days per week     Hyperlipidemia:    Current medications: Crestor 20mg po qhs  Diet: Low fat, low cholesterol     Depression:  Current Medication: Cymbalta 30mg po q day. Pt states depression has been ok. Pt denies crying spells.     Anxiety:  Current Medication: Cymbalta 30mg po q day. Pt states her anxiety has been ok. Pt denies feeling nervous and jittery.      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 Pt states her Gastroenterologist increased Omeprazole to 40mg po q day. Pt denies heartburn and reflux  Pt decreases spicy foods, tomato based foods, and caffeine.     Vitamin D deficiency:  Current Medication: Vitamin D 5,000 IU po q day       Review of Systems   Constitutional: Negative for appetite change, chills, fatigue, fever and unexpected weight change.    HENT: Negative for congestion, postnasal drip, rhinorrhea, sinus hydrochlorothiazide (HYDRODIURIL) 25 MG tablet TAKE 1 TABLET DAILY 90 tablet 1    vitamin B-12 (CYANOCOBALAMIN) 500 MCG tablet Take 2 tablets by mouth daily 60 tablet 3    Ascorbic Acid (VITAMIN C) 500 MG tablet Take 500 mg by mouth daily.  Cholecalciferol (VITAMIN D PO) Take 5,000 Units by mouth       MULTIPLE VITAMIN PO Take  by mouth.  aspirin 81 MG EC tablet Take 81 mg by mouth daily. Vitals:    07/29/20 0914   BP: 130/88   Pulse: 83   Resp: 16   Temp: 98.6 °F (37 °C)   SpO2: 99%   Weight: 219 lb (99.3 kg)     Body mass index is 36.44 kg/m². Physical Exam  Nursing note reviewed. Constitutional:       General: She is not in acute distress. Appearance: Normal appearance. She is well-developed. HENT:      Mouth/Throat:      Pharynx: Oropharynx is clear. Eyes:      General: Lids are normal.      Extraocular Movements: Extraocular movements intact. Conjunctiva/sclera: Conjunctivae normal.      Pupils: Pupils are equal, round, and reactive to light. Neck:      Musculoskeletal: Neck supple. Thyroid: No thyromegaly. Vascular: No carotid bruit. Cardiovascular:      Rate and Rhythm: Normal rate and regular rhythm. Heart sounds: Normal heart sounds, S1 normal and S2 normal. No murmur. No friction rub. No gallop. Pulmonary:      Effort: Pulmonary effort is normal. No respiratory distress. Breath sounds: Normal breath sounds. No wheezing, rhonchi or rales. Abdominal:      General: Bowel sounds are normal. There is no distension. Palpations: Abdomen is soft. Tenderness: There is no abdominal tenderness. Musculoskeletal:      Right lower leg: No edema. Left lower leg: No edema. Lymphadenopathy:      Head:      Right side of head: No submandibular adenopathy. Left side of head: No submandibular adenopathy. Neurological:      Mental Status: She is alert and oriented to person, place, and time.    Psychiatric:         Mood and Affect: Mood normal.           Results for POC orders placed in visit on 07/29/20   POCT glycosylated hemoglobin (Hb A1C)   Result Value Ref Range    Hemoglobin A1C 6.6 %     Lab Review   Hospital Outpatient Visit on 06/25/2020   Component Date Value    Left Ventricular Ejectio* 06/25/2020 65     LVEF MODALITY 06/25/2020 ECHO    Admission on 06/01/2020, Discharged on 06/01/2020   Component Date Value    POC Glucose 06/01/2020 140*    Performed on 06/01/2020 ACCU-CHEK    Office Visit on 05/26/2020   Component Date Value    SARS-CoV-2 05/26/2020 Not Detected     Source 05/26/2020 NP swab    Orders Only on 04/29/2020   Component Date Value    Color, UA 04/29/2020 YELLOW     Clarity, UA 04/29/2020 Clear     Glucose, Ur 04/29/2020 500*    Bilirubin Urine 04/29/2020 Negative     Ketones, Urine 04/29/2020 Negative     Specific Gravity, UA 04/29/2020 1.015     Blood, Urine 04/29/2020 Negative     pH, UA 04/29/2020 6.0     Protein, UA 04/29/2020 Negative     Urobilinogen, Urine 04/29/2020 1.0     Nitrite, Urine 04/29/2020 Negative     Leukocyte Esterase, Urine 04/29/2020 Negative     Microscopic Examination 04/29/2020 Not Indicated     Urine Type 04/29/2020 [place]    Orders Only on 04/25/2020   Component Date Value    Vitamin B-12 04/25/2020 386     Vit D, 25-Hydroxy 04/25/2020 24.4*    Hemoglobin A1C 04/25/2020 7.1     eAG 04/25/2020 157.1     Sodium 04/25/2020 144     Potassium 04/25/2020 4.9     Chloride 04/25/2020 104     CO2 04/25/2020 25     Anion Gap 04/25/2020 15     Glucose 04/25/2020 81     BUN 04/25/2020 18     CREATININE 04/25/2020 0.9     GFR Non- 04/25/2020 >60     GFR  04/25/2020 >60     Calcium 04/25/2020 9.3     Total Protein 04/25/2020 6.9     Alb 04/25/2020 3.9     Albumin/Globulin Ratio 04/25/2020 1.3     Total Bilirubin 04/25/2020 0.4     Alkaline Phosphatase 04/25/2020 49     ALT 04/25/2020 10     AST 04/25/2020 18     Globulin 04/25/2020 3.0    Office Visit on 01/30/2020   Component Date Value    Hemoglobin A1C 01/30/2020 8.3          Assessment/Plan     1. Type 2 diabetes mellitus without complication, without long-term current use of insulin (HCC)  - Hemoglobin A1c of 6.6% shows is controlled  -Continue same medications  -Limit carbohydrates to 45 grams with meals and 15 grams with snacks  -monitor blood sugars  -Regular aerobic exercise  - POCT glycosylated hemoglobin (Hb A1C)    2. Essential hypertension  -stable  -Continue same medications  -Low sodium diet  -Regular aerobic exercise    3. Mixed hyperlipidemia  -Continue same medications  -Low fat, low cholesterol diet  -Regular aerobic exercise    4. Vitamin D deficiency  -Continue same medications  - Vitamin D 25 Hydroxy; Future    5. Depression, unspecified depression type  -stable  -Continue same medications    6. Anxiety  -stable  -Continue same medications    7. Immunization due  - zoster recombinant adjuvanted vaccine [facility]) injection 50 mcg    8. Gastroesophageal reflux disease, esophagitis presence not specified  -stable  -Continue same medications  -Decrease caffeine, avoid spicy foods, avoid tomato based foods  -Eat small meals instead of large meals  -Wait 2-3 hours after eating before lying down        Discussed medications with patient, who voiced understanding of their use and indications. All questions answered.       Return in about 3 months (around 10/30/2020) for Annual physical exam.

## 2020-07-30 ASSESSMENT — ENCOUNTER SYMPTOMS
ABDOMINAL PAIN: 0
CONSTIPATION: 0
COUGH: 0
RHINORRHEA: 0
SINUS PRESSURE: 0
VOMITING: 0
SHORTNESS OF BREATH: 0
WHEEZING: 0
NAUSEA: 0
DIARRHEA: 0
CHEST TIGHTNESS: 0
SORE THROAT: 0
TROUBLE SWALLOWING: 0
BLOOD IN STOOL: 0

## 2020-09-08 NOTE — TELEPHONE ENCOUNTER
Medication:   Requested Prescriptions     Pending Prescriptions Disp Refills    SITagliptin (JANUVIA) 100 MG tablet [Pharmacy Med Name: Januvia Oral Tablet 100 MG] 30 tablet 0     Sig: TAKE 1 TABLET BY MOUTH ONE TIME A DAY    dapagliflozin (Siddharth Carrionn) 10 MG tablet [Pharmacy Med Name: Gurpreet Mo Oral Tablet 10 MG] 30 tablet 0     Sig: TAKE 1 TABLET BY MOUTH IN THE MORNING       Last Filled:      Patient Phone Number: [phone] (home) [phone] (work)    Last appt: 7/29/2020   Next appt: 11/3/2020    Last Labs DM:   Lab Results   Component Value Date    LABA1C 6.6 07/29/2020       Last OARRS: No flowsheet data found.     Preferred Pharmacy:   [facility] [address] [address]  Phone: [phone] Fax: [phone] [facility] [place] [phone] [facility] [phone]  [facility]  [place] [address]  Phone: [phone] Fax: [address] \[facility] [place] [facility]\"" [address] [facility] [phone] Clara Lloyd [phone]  [facility] [address]  Phone: [phone] Fax: [phone]

## 2020-09-14 RX ORDER — HYDROCHLOROTHIAZIDE 25 MG/1
25 TABLET ORAL DAILY
Qty: 90 TABLET | Refills: 1 | Status: SHIPPED | OUTPATIENT
Start: 2020-09-14 | End: 2021-03-04 | Stop reason: SDUPTHER

## 2020-09-14 RX ORDER — DULOXETIN HYDROCHLORIDE 30 MG/1
30 CAPSULE, DELAYED RELEASE ORAL DAILY
Qty: 90 CAPSULE | Refills: 1 | Status: SHIPPED | OUTPATIENT
Start: 2020-09-14 | End: 2021-04-24 | Stop reason: SDUPTHER

## 2020-09-14 NOTE — TELEPHONE ENCOUNTER
Medication:   Requested Prescriptions     Pending Prescriptions Disp Refills    DULoxetine (CYMBALTA) 30 MG extended release capsule 90 capsule 1    hydroCHLOROthiazide (HYDRODIURIL) 25 MG tablet 90 tablet 1     Last Filled: 2.4.20, 10.23.20    Last appt: 7/29/2020   Next appt: 11/3/2020    Last OARRS: No flowsheet data found.

## 2020-09-15 RX ORDER — ROSUVASTATIN CALCIUM 20 MG/1
20 TABLET, COATED ORAL NIGHTLY
Qty: 90 TABLET | Refills: 3 | Status: SHIPPED | OUTPATIENT
Start: 2020-09-15 | End: 2021-02-26 | Stop reason: SDUPTHER

## 2020-09-15 NOTE — TELEPHONE ENCOUNTER
Requested Prescriptions     Pending Prescriptions Disp Refills    rosuvastatin (CRESTOR) 20 MG tablet 90 tablet 3     Sig: Take 1 tablet by mouth nightly          Number: 90    Refills: 3    Last Office Visit: 7/17/20    Next Office Visit: 10/16/20

## 2020-10-30 ENCOUNTER — OFFICE VISIT (OUTPATIENT)
Dept: CARDIOLOGY CLINIC | Age: 65
End: 2020-10-30
Payer: COMMERCIAL

## 2020-10-30 VITALS
BODY MASS INDEX: 36.29 KG/M2 | TEMPERATURE: 96.6 F | SYSTOLIC BLOOD PRESSURE: 130 MMHG | WEIGHT: 217.8 LBS | HEIGHT: 65 IN | DIASTOLIC BLOOD PRESSURE: 78 MMHG | HEART RATE: 76 BPM

## 2020-10-30 PROCEDURE — G8484 FLU IMMUNIZE NO ADMIN: HCPCS | Performed by: INTERNAL MEDICINE

## 2020-10-30 PROCEDURE — G8417 CALC BMI ABV UP PARAM F/U: HCPCS | Performed by: INTERNAL MEDICINE

## 2020-10-30 PROCEDURE — G8399 PT W/DXA RESULTS DOCUMENT: HCPCS | Performed by: INTERNAL MEDICINE

## 2020-10-30 PROCEDURE — 1123F ACP DISCUSS/DSCN MKR DOCD: CPT | Performed by: INTERNAL MEDICINE

## 2020-10-30 PROCEDURE — G8427 DOCREV CUR MEDS BY ELIG CLIN: HCPCS | Performed by: INTERNAL MEDICINE

## 2020-10-30 PROCEDURE — 99214 OFFICE O/P EST MOD 30 MIN: CPT | Performed by: INTERNAL MEDICINE

## 2020-10-30 PROCEDURE — 4040F PNEUMOC VAC/ADMIN/RCVD: CPT | Performed by: INTERNAL MEDICINE

## 2020-10-30 PROCEDURE — 1036F TOBACCO NON-USER: CPT | Performed by: INTERNAL MEDICINE

## 2020-10-30 PROCEDURE — 3017F COLORECTAL CA SCREEN DOC REV: CPT | Performed by: INTERNAL MEDICINE

## 2020-10-30 PROCEDURE — 1090F PRES/ABSN URINE INCON ASSESS: CPT | Performed by: INTERNAL MEDICINE

## 2020-10-30 NOTE — PROGRESS NOTES
71 y.o. here for FU echo for AS. Still feeling very tired. Sometimes has a \"pressure\" on the heart/chest or like \"something is sitting on it. \"   Doesn't NOTICE that it is happening when walking. Doesn't notice pain while working at [employer] but does notice it sometimes at [employer]. Gets it at night and in morning; sometimes while in bed laying down. Also sometimes feels palpitations/heart beating fast.    No orthopnea. No PND. Overall feels more tired than previously. Does NOT feel winded. Does dancing and has no problems with that.     Past Medical History:   Diagnosis Date    Anemia     Anxiety     Depression 4/30/2013    GERD (gastroesophageal reflux disease) 1/31/2013    Hyperlipidemia     Hypertension     Panic disorder     PUD (peptic ulcer disease)     Type II or unspecified type diabetes mellitus without mention of complication, not stated as uncontrolled     Unspecified sleep apnea     Vitamin B 12 deficiency 10/19/2015    Vitamin D deficiency 5/19/2015     Past Surgical History:   Procedure Laterality Date    BREAST SURGERY      COLONOSCOPY  12/16/2016    Alonzo WIGGINS    COLONOSCOPY  6/1/2020    COLONOSCOPY WITH BIOPSY performed by Kyung Saldana MD at [address]      UPPER GASTROINTESTINAL ENDOSCOPY N/A 6/1/2020    EGD BIOPSY performed by Kyung Saldana MD at [address] [facility] [address] History     Tobacco Use    Smoking status: Never Smoker    Smokeless tobacco: Never Used   Substance Use Topics    Alcohol use: Not Currently     Comment: 3 drinks per year    Drug use: No     Allergies   Allergen Reactions    No Known Allergies      Family History   Problem Relation Age of Onset    Breast Cancer Mother     Cancer Mother 52        breast     Diabetes Father     Hypertension Father     Colon Cancer Father     Cancer Father 72        colon    Breast Cancer Other     Cancer Other     Cancer Maternal Aunt         x 2 breast cancer    Diabetes Sister     Cancer Maternal Grandmother     Cancer Paternal Uncle         colon    Cancer Paternal Cousin         colon cancer - stage 4    Bipolar Disorder Son     Depression Son      Review of Systems   General: No fevers, chills, fatigue, or night sweats. No abnormal changes in weight. HEENT: No blurry or decreased vision. No changes in hearing, nasal discharge or sore throat. Cardiovascular: See HPI. No cramping in legs or buttocks when walking. Respiratory: No cough, hemoptysis, or wheezing. No history of asthma. Gastrointestinal: No abdominal pain, hematochezia, melana, or history of GI ulcers. Admits to acid reflux. Genito-Urinary: No dysuria or hematuria. No urgency or polyuria. Musculoskeletal: No complaints of joint pain, joint swelling or muscular weakness/soreness. Neurological: No dizziness or headaches. No numbness/tingling, speech problems or weakness. No history of a stroke or TIA. Psychological: No anxiety or depression  Hematological and Lymphatic: No abnormal bleeding or bruising, blood clots, jaundice. Endocrine: No malaise/lethargy, palpitations, polydipsia/polyuria, temperature intolerance or unexpected weight changes. Skin: No rashes or non-healing ulcers. PE:  Blood pressure 130/78, pulse 76, temperature 96.6 °F (35.9 °C), height 5' 5\" (1.651 m), weight 217 lb 12.8 oz (98.8 kg), last menstrual period 01/02/2013. General (appearance): Well developed. No distress  Eyes: Anicteric. EOMI. Neck: Supple. No JVD  Ears/Nose/Mouth/Thorat: No cyanosis  CV: RRR, 2/6 wing. Good S1/S2. Murmur mid peaking   Respiratory:  Clear B, normal respiratory effort  GI: Abd s/nt/nd. No peritoneal signs  Skin: Warm, dry. No rashes  Neuro/Psych: Alert and oriented x 3. Appropriate behavior  Ext:  No c/c. No pitting edema  Pulses:  2+ carotid.  No bruits    Lab Results   Component Value Date    WBC 5.9 10/30/2019    HGB

## 2020-11-02 RX ORDER — DULAGLUTIDE 0.75 MG/.5ML
INJECTION, SOLUTION SUBCUTANEOUS
Qty: 2 ML | Refills: 5 | Status: SHIPPED | OUTPATIENT
Start: 2020-11-02 | End: 2021-03-08 | Stop reason: SDUPTHER

## 2020-11-03 ENCOUNTER — OFFICE VISIT (OUTPATIENT)
Dept: PRIMARY CARE CLINIC | Age: 65
End: 2020-11-03
Payer: COMMERCIAL

## 2020-11-03 VITALS
RESPIRATION RATE: 16 BRPM | HEIGHT: 65 IN | HEART RATE: 70 BPM | SYSTOLIC BLOOD PRESSURE: 126 MMHG | BODY MASS INDEX: 35.99 KG/M2 | WEIGHT: 216 LBS | TEMPERATURE: 97.5 F | OXYGEN SATURATION: 97 % | DIASTOLIC BLOOD PRESSURE: 82 MMHG

## 2020-11-03 DIAGNOSIS — E78.2 MIXED HYPERLIPIDEMIA: ICD-10-CM

## 2020-11-03 DIAGNOSIS — E55.9 VITAMIN D DEFICIENCY: ICD-10-CM

## 2020-11-03 DIAGNOSIS — Z00.00 ANNUAL PHYSICAL EXAM: ICD-10-CM

## 2020-11-03 DIAGNOSIS — E53.8 VITAMIN B 12 DEFICIENCY: ICD-10-CM

## 2020-11-03 PROBLEM — E11.9 TYPE 2 DIABETES MELLITUS WITHOUT COMPLICATION, WITH LONG-TERM CURRENT USE OF INSULIN (HCC): Status: ACTIVE | Noted: 2018-10-01

## 2020-11-03 PROBLEM — Z79.4 TYPE 2 DIABETES MELLITUS WITHOUT COMPLICATION, WITH LONG-TERM CURRENT USE OF INSULIN (HCC): Status: ACTIVE | Noted: 2018-10-01

## 2020-11-03 LAB
A/G RATIO: 1.8 (ref 1.1–2.2)
ALBUMIN SERPL-MCNC: 4.4 G/DL (ref 3.4–5)
ALP BLD-CCNC: 57 U/L (ref 40–129)
ALT SERPL-CCNC: 9 U/L (ref 10–40)
ANION GAP SERPL CALCULATED.3IONS-SCNC: 12 MMOL/L (ref 3–16)
AST SERPL-CCNC: 15 U/L (ref 15–37)
BASOPHILS ABSOLUTE: 0 K/UL (ref 0–0.2)
BASOPHILS RELATIVE PERCENT: 0.6 %
BILIRUB SERPL-MCNC: 0.4 MG/DL (ref 0–1)
BILIRUBIN, POC: NORMAL
BLOOD URINE, POC: NORMAL
BUN BLDV-MCNC: 26 MG/DL (ref 7–20)
CALCIUM SERPL-MCNC: 9.8 MG/DL (ref 8.3–10.6)
CHLORIDE BLD-SCNC: 102 MMOL/L (ref 99–110)
CHOLESTEROL, TOTAL: 139 MG/DL (ref 0–199)
CLARITY, POC: CLEAR
CO2: 27 MMOL/L (ref 21–32)
COLOR, POC: YELLOW
CREAT SERPL-MCNC: 1.1 MG/DL (ref 0.6–1.2)
CREATININE URINE: 183.9 MG/DL (ref 28–259)
EOSINOPHILS ABSOLUTE: 0.1 K/UL (ref 0–0.6)
EOSINOPHILS RELATIVE PERCENT: 1.2 %
GFR AFRICAN AMERICAN: >60
GFR NON-AFRICAN AMERICAN: 50
GLOBULIN: 2.4 G/DL
GLUCOSE BLD-MCNC: 87 MG/DL (ref 70–99)
GLUCOSE URINE, POC: 1000
HBA1C MFR BLD: 6.5 %
HCT VFR BLD CALC: 37.3 % (ref 36–48)
HDLC SERPL-MCNC: 56 MG/DL (ref 40–60)
HEMOGLOBIN: 12 G/DL (ref 12–16)
KETONES, POC: NORMAL
LDL CHOLESTEROL CALCULATED: 70 MG/DL
LEUKOCYTE EST, POC: NORMAL
LYMPHOCYTES ABSOLUTE: 1.3 K/UL (ref 1–5.1)
LYMPHOCYTES RELATIVE PERCENT: 20.3 %
MCH RBC QN AUTO: 28.5 PG (ref 26–34)
MCHC RBC AUTO-ENTMCNC: 32.1 G/DL (ref 31–36)
MCV RBC AUTO: 88.7 FL (ref 80–100)
MICROALBUMIN UR-MCNC: 1.8 MG/DL
MICROALBUMIN/CREAT UR-RTO: 9.8 MG/G (ref 0–30)
MONOCYTES ABSOLUTE: 0.3 K/UL (ref 0–1.3)
MONOCYTES RELATIVE PERCENT: 5.4 %
NEUTROPHILS ABSOLUTE: 4.6 K/UL (ref 1.7–7.7)
NEUTROPHILS RELATIVE PERCENT: 72.5 %
NITRITE, POC: NORMAL
PDW BLD-RTO: 15.4 % (ref 12.4–15.4)
PH, POC: 5
PLATELET # BLD: 209 K/UL (ref 135–450)
PMV BLD AUTO: 8.3 FL (ref 5–10.5)
POTASSIUM SERPL-SCNC: 4.9 MMOL/L (ref 3.5–5.1)
PROTEIN, POC: NORMAL
RBC # BLD: 4.2 M/UL (ref 4–5.2)
SODIUM BLD-SCNC: 141 MMOL/L (ref 136–145)
SPECIFIC GRAVITY, POC: 1.02
TOTAL PROTEIN: 6.8 G/DL (ref 6.4–8.2)
TRIGL SERPL-MCNC: 65 MG/DL (ref 0–150)
TSH SERPL DL<=0.05 MIU/L-ACNC: 1.63 UIU/ML (ref 0.27–4.2)
UROBILINOGEN, POC: 0.2
VITAMIN B-12: 445 PG/ML (ref 211–911)
VITAMIN D 25-HYDROXY: 61.6 NG/ML
VLDLC SERPL CALC-MCNC: 13 MG/DL
WBC # BLD: 6.4 K/UL (ref 4–11)

## 2020-11-03 PROCEDURE — 81002 URINALYSIS NONAUTO W/O SCOPE: CPT | Performed by: INTERNAL MEDICINE

## 2020-11-03 PROCEDURE — G8484 FLU IMMUNIZE NO ADMIN: HCPCS | Performed by: INTERNAL MEDICINE

## 2020-11-03 PROCEDURE — 99397 PER PM REEVAL EST PAT 65+ YR: CPT | Performed by: INTERNAL MEDICINE

## 2020-11-03 PROCEDURE — 90750 HZV VACC RECOMBINANT IM: CPT | Performed by: INTERNAL MEDICINE

## 2020-11-03 PROCEDURE — 83036 HEMOGLOBIN GLYCOSYLATED A1C: CPT | Performed by: INTERNAL MEDICINE

## 2020-11-03 PROCEDURE — 90471 IMMUNIZATION ADMIN: CPT | Performed by: INTERNAL MEDICINE

## 2020-11-03 RX ORDER — INSULIN DETEMIR 100 [IU]/ML
30 INJECTION, SOLUTION SUBCUTANEOUS NIGHTLY
Qty: 30 ML | Refills: 1 | Status: ON HOLD | OUTPATIENT
Start: 2020-11-03 | End: 2021-03-24 | Stop reason: SDUPTHER

## 2020-11-03 ASSESSMENT — ENCOUNTER SYMPTOMS
DIARRHEA: 0
PHOTOPHOBIA: 0
CONSTIPATION: 0
TROUBLE SWALLOWING: 0
BLOOD IN STOOL: 0
SHORTNESS OF BREATH: 0
VOICE CHANGE: 0
NAUSEA: 0
EYE ITCHING: 0
CHEST TIGHTNESS: 0
ABDOMINAL DISTENTION: 0
EYE DISCHARGE: 0
EYE PAIN: 0
EYE REDNESS: 0
RECTAL PAIN: 0
APNEA: 0
VOMITING: 0
SORE THROAT: 0
CHOKING: 0
COUGH: 0
BACK PAIN: 0
RHINORRHEA: 0
ABDOMINAL PAIN: 0
SINUS PRESSURE: 0
FACIAL SWELLING: 0
ANAL BLEEDING: 0
WHEEZING: 0
SINUS PAIN: 0

## 2020-11-03 NOTE — PROGRESS NOTES
Sandra Batista   YOB: 1955    Date of Visit:  11/3/2020    Chief Complaint   Patient presents with    Annual Exam       HPI  Pt presents for annual physical exam. Pap smear done by Gynecology on 9/21/20. Mammogram done on 10/10/20. Colonoscopy done on 6/1/20. Diabetes Mellitus Type 2:   Current Medications: Levemir 30 units qhs, Farxiga 10mg po q day, Januvia 673FM po q day, Trulicity 6.42UJ subcutaneously q week, and Metformin 1000mg po bid  Diet:  low carbohydrates  Home blood sugar records: Patient tests her blood sugar 1-2 times per day. Fasting averages 100 and bedtime averages 120  Any episodes of hypoglycemia? some blood sugars in the 60's every couple of weeks or so  Eye exam current (within one year): yes on 1/23/20  Daily Aspirin? Yes  Exercise-dancing around her house and walks 10 minutes per day 3 times per week     Hypertension:    Current Medications:Losartan 100mg po q day and HCTZ 25mg po q day  Home blood pressure monitoring: No   Diet: low salt  Exercise-dancing around her house and walks 10 minutes per day 3 times per week     Hyperlipidemia:    Current medications: Crestor 20mg po qhs  Diet: Low fat, low cholesterol     Depression:  Current Medication: Cymbalta 30mg po q day. Pt states depression has been ok. Pt denies crying spells. Pt states some mornings she struggles to get out of bed but gets up and then she is pretty much ok.     Anxiety:  Current Medication: Cymbalta 30mg po q day. Pt states her anxiety has been ok. Pt denies panic attacks.     GERD:  Current Medication: Omeprazole 40mg po q day  Pt states she had heartburn and reflux last night and prior to last night hadn't had any heartburn or reflux since August. Pt states she ate torrez beans with onions.   Pt decreases spicy foods, tomato based foods, and caffeine.     Vitamin D deficiency:  Current Medication: Vitamin D 5,000 IU po q day    Vitamin B12 deficiency:  Current Medication: Vitamin B12 1,000 mcg po q day    Review of Systems   Constitutional: Negative for activity change, appetite change, chills, diaphoresis, fatigue, fever and unexpected weight change. HENT: Negative for congestion, ear discharge, ear pain, facial swelling, hearing loss, mouth sores, nosebleeds, postnasal drip, rhinorrhea, sinus pressure, sinus pain, sneezing, sore throat, tinnitus, trouble swallowing and voice change. Eyes: Negative for photophobia, pain, discharge, redness, itching and visual disturbance. Respiratory: Negative for apnea, cough, choking, chest tightness, shortness of breath and wheezing. Cardiovascular: Positive for palpitations (occasional fast heartbeats at night. Pt is seeing Cardiology). Negative for chest pain and leg swelling. Gastrointestinal: Negative for abdominal distention, abdominal pain, anal bleeding, blood in stool, constipation, diarrhea, nausea, rectal pain and vomiting. Endocrine: Negative for cold intolerance and heat intolerance. Genitourinary: Negative for decreased urine volume, difficulty urinating, dysuria, flank pain, frequency, genital sores, hematuria, menstrual problem, pelvic pain, urgency, vaginal bleeding, vaginal discharge and vaginal pain. Musculoskeletal: Negative for arthralgias, back pain, gait problem, joint swelling, myalgias, neck pain and neck stiffness. Skin: Negative for rash and wound. Neurological: Negative for dizziness, tremors, seizures, syncope, facial asymmetry, speech difficulty, weakness, light-headedness, numbness and headaches. Hematological: Negative for adenopathy. Does not bruise/bleed easily. Psychiatric/Behavioral: Positive for dysphoric mood. Negative for decreased concentration and sleep disturbance. The patient is nervous/anxious. All other systems reviewed and are negative.       Past Medical History:   Diagnosis Date    Anemia     Anxiety     Depression 4/30/2013    GERD (gastroesophageal reflux disease) 1/31/2013    Hyperlipidemia     Hypertension     Panic disorder     PUD (peptic ulcer disease)     Type II or unspecified type diabetes mellitus without mention of complication, not stated as uncontrolled     Unspecified sleep apnea     Vitamin B 12 deficiency 10/19/2015    Vitamin D deficiency 5/19/2015       Past Surgical History:   Procedure Laterality Date    BREAST SURGERY      COLONOSCOPY  12/16/2016    [facility]GIGINS    COLONOSCOPY  6/1/2020    COLONOSCOPY WITH BIOPSY performed by Alvin Fraire MD at [address]      UPPER GASTROINTESTINAL ENDOSCOPY N/A 6/1/2020    EGD BIOPSY performed by Alvin Fraire MD at [address] [facility] Medications Marked as Taking for the 11/3/20 encounter (Office Visit) with Belgica Eastman MD   Medication Sig Dispense Refill    insulin detemir (LEVEMIR FLEXTOUCH) 100 UNIT/ML injection pen Inject 30 Units into the skin nightly 30 mL 1    TRULICITY 2.92 AF/8.2VP SOPN INJECT THE CONTENTS OF 1 PEN (0.75MG) UNDER THE SKIN ONCE A WEEK 2 mL 5    rosuvastatin (CRESTOR) 20 MG tablet Take 1 tablet by mouth nightly 90 tablet 3    DULoxetine (CYMBALTA) 30 MG extended release capsule Take 1 capsule by mouth daily 90 capsule 1    hydroCHLOROthiazide (HYDRODIURIL) 25 MG tablet Take 1 tablet by mouth daily 90 tablet 1    SITagliptin (JANUVIA) 100 MG tablet TAKE 1 TABLET BY MOUTH ONE TIME A DAY 30 tablet 5    dapagliflozin (FARXIGA) 10 MG tablet TAKE 1 TABLET BY MOUTH IN THE MORNING 30 tablet 5    Insulin Pen Needle (B-D ULTRAFINE III SHORT PEN) 31G X 8 MM MISC USE AS DIRECTED 100 each 3    omeprazole (PRILOSEC) 20 MG delayed release capsule TAKE 1 CAPSULE DAILY (Patient taking differently: 40 mg ) 90 capsule 1    metFORMIN (GLUCOPHAGE) 1000 MG tablet Take 1 tablet by mouth 2 times daily (with meals) 180 tablet 1    losartan (COZAAR) 100 MG tablet Take 1 table daily 90 tablet 3 Vitals:    11/03/20 0801   BP: 126/82   Site: Left Upper Arm   Position: Sitting   Cuff Size: Large Adult   Pulse: 70   Resp: 16   Temp: 97.5 °F (36.4 °C)   TempSrc: Infrared   SpO2: 97%   Weight: 216 lb (98 kg)   Height: 5' 5\" (1.651 m)     Body mass index is 35.94 kg/m². Physical Exam  Nursing note reviewed. Constitutional:       General: She is not in acute distress. Appearance: Normal appearance. She is well-developed. HENT:      Head: Normocephalic and atraumatic. Right Ear: Hearing, tympanic membrane and ear canal normal.      Left Ear: Hearing, tympanic membrane and ear canal normal.      Nose: Nose normal.      Mouth/Throat:      Pharynx: Oropharynx is clear. Uvula midline. Eyes:      General: Lids are normal.      Extraocular Movements: Extraocular movements intact. Conjunctiva/sclera: Conjunctivae normal.      Pupils: Pupils are equal, round, and reactive to light. Neck:      Musculoskeletal: Neck supple. Thyroid: No thyromegaly. Vascular: No carotid bruit. Cardiovascular:      Rate and Rhythm: Normal rate and regular rhythm. Heart sounds: S1 normal and S2 normal. Murmur present. Systolic murmur present. No friction rub. No gallop. Pulmonary:      Effort: Pulmonary effort is normal. No respiratory distress. Breath sounds: Normal breath sounds. No wheezing, rhonchi or rales. Abdominal:      General: Bowel sounds are normal. There is no distension. Palpations: Abdomen is soft. There is no mass. Tenderness: There is no abdominal tenderness. There is no rebound. Genitourinary:     Comments: Deferred  Musculoskeletal: Normal range of motion. Right shoulder: She exhibits normal range of motion and no tenderness. Left shoulder: She exhibits normal range of motion and no tenderness. Right elbow: She exhibits no swelling. No tenderness found. Left elbow: She exhibits no swelling. No tenderness found.       Right wrist: She exhibits no tenderness and no swelling. Left wrist: She exhibits no tenderness and no swelling. Right hip: She exhibits no tenderness. Left hip: She exhibits no tenderness. Right knee: She exhibits no swelling. No tenderness found. Left knee: She exhibits no swelling. No tenderness found. Right ankle: She exhibits no swelling. No tenderness. Left ankle: She exhibits no swelling. No tenderness. Cervical back: She exhibits normal range of motion, no tenderness and no spasm. Thoracic back: She exhibits no tenderness and no spasm. Lumbar back: She exhibits normal range of motion, no tenderness and no spasm. Right upper arm: She exhibits no tenderness. Left upper arm: She exhibits no tenderness. Right hand: She exhibits no tenderness and no swelling. Left hand: She exhibits no tenderness and no swelling. Right upper leg: She exhibits no tenderness. Left upper leg: She exhibits no tenderness. Right lower leg: She exhibits no tenderness. No edema. Left lower leg: She exhibits no tenderness. No edema. Right foot: No tenderness or swelling. Left foot: No tenderness or swelling. Lymphadenopathy:      Head:      Right side of head: No submandibular adenopathy. Left side of head: No submandibular adenopathy. Cervical: No cervical adenopathy. Skin:     General: Skin is warm and dry. Findings: No bruising, erythema, lesion or rash. Comments: No foot ulcers   Neurological:      Mental Status: She is alert and oriented to person, place, and time. Cranial Nerves: No cranial nerve deficit. Gait: Gait normal.      Deep Tendon Reflexes: Reflexes are normal and symmetric. Babinski sign absent on the right side. Babinski sign absent on the left side.       Comments: Bilateral foot monofilament exam normal   Psychiatric:         Mood and Affect: Mood normal.         Speech: Speech normal.             Results for POC orders placed in visit on 11/03/20   POCT glycosylated hemoglobin (Hb A1C)   Result Value Ref Range    Hemoglobin A1C 6.5 %   POCT Urinalysis no Micro   Result Value Ref Range    Color, UA yellow     Clarity, UA clear     Glucose, UA POC 1,000     Bilirubin, UA neg     Ketones, UA neg     Spec Grav, UA 1.025     Blood, UA POC neg     pH, UA 5.0     Protein, UA POC neg     Urobilinogen, UA 0.2     Leukocytes, UA neg     Nitrite, UA neg          Assessment/Plan     1. Annual physical exam  - CBC Auto Differential; Future  - Comprehensive Metabolic Panel; Future  - Lipid Panel; Future  - TSH without Reflex; Future  - POCT Urinalysis no Micro  - Pap smear done by Gynecology on 9/21/20  - Mammogram done on 10/10/20  - Colonoscopy done on 6/1/20  - Tdap done on 9/1/16  - Flu shot done on 9/14/20  - Shingrix vaccine done on 7/29/20 and 2nd dose today  - Regular aerobic exercise    2. Type 2 diabetes mellitus without complication, with long-term current use of insulin ([facility])  - Hemoglobin A1c of 6.5% shows diabetes is well controlled  - Microalbumin / Creatinine Urine Ratio  - POCT glycosylated hemoglobin (Hb A1C)  -  DIABETES FOOT EXAM  -Continue same medications  - insulin detemir (LEVEMIR FLEXTOUCH) 100 UNIT/ML injection pen; Inject 30 Units into the skin nightly  Dispense: 30 mL; Refill: 1  -Decrease SITagliptin (JANUVIA) 50 MG tablet; Take 1 tablet by mouth daily  Dispense: 30 tablet; Refill: 5  -Limit carbohydrates to 45 grams with meals and 15 grams with snacks  -monitor blood sugars  -Regular aerobic exercise    3. Essential hypertension  -stable  -Continue same medications  -Low sodium diet  -Regular aerobic exercise    4. Mixed hyperlipidemia  -Continue same medications  -Low fat, low cholesterol diet  -Regular aerobic exercise  - Lipid Panel; Future    5.  Gastroesophageal reflux disease, unspecified whether esophagitis present  -stable  -Continue same medications  -Decrease caffeine, avoid spicy foods, avoid tomato based foods  -Eat small meals instead of large meals  -Wait 2-3 hours after eating before lying down    6. Moderate episode of recurrent major depressive disorder (HCC)  -stable  -Continue same medications    7. Vitamin D deficiency  -stable  -Continue same medications  -Vitamin D 25 Hydroxy; Future    8. Vitamin B 12 deficiency  -stable  -Continue same medications  - Vitamin B12; Future    9. Anxiety  -stable  -Continue same medications    10. Need for shingles vaccine  - Zoster recombinant [facility]) given      Discussed medications with patient, who voiced understanding of their use and indications. All questions answered. Return in about 4 months (around 3/3/2021) for diabetes, hypertension, hyperlipidemia, depression, anxiety,and GERD.

## 2020-11-03 NOTE — PATIENT INSTRUCTIONS
1. Annual physical exam  - CBC Auto Differential; Future  - Comprehensive Metabolic Panel; Future  - Lipid Panel; Future  - TSH without Reflex; Future  - POCT Urinalysis no Micro  - Pap smear done by Gynecology on 9/21/20  - Mammogram done on 10/10/20  - Colonoscopy done on 6/1/20  - Tdap done on 9/1/16  - Flu shot done on 9/14/20  - Shingrix vaccine done on 7/29/20 and 2nd dose today  - Regular aerobic exercise    2. Type 2 diabetes mellitus without complication, with long-term current use of insulin ([facility])  - Hemoglobin A1c of 6.5% shows diabetes is well controlled  - Microalbumin / Creatinine Urine Ratio  - POCT glycosylated hemoglobin (Hb A1C)  -  DIABETES FOOT EXAM  -Continue same medications  - insulin detemir (LEVEMIR FLEXTOUCH) 100 UNIT/ML injection pen; Inject 30 Units into the skin nightly  Dispense: 30 mL; Refill: 1  -Decrease SITagliptin (JANUVIA) 50 MG tablet; Take 1 tablet by mouth daily  Dispense: 30 tablet; Refill: 5  -Limit carbohydrates to 45 grams with meals and 15 grams with snacks  -monitor blood sugars  -Regular aerobic exercise    3. Essential hypertension  -stable  -Continue same medications  -Low sodium diet  -Regular aerobic exercise    4. Mixed hyperlipidemia  -Continue same medications  -Low fat, low cholesterol diet  -Regular aerobic exercise  - Lipid Panel; Future    5. Gastroesophageal reflux disease, unspecified whether esophagitis present  -stable  -Continue same medications  -Decrease caffeine, avoid spicy foods, avoid tomato based foods  -Eat small meals instead of large meals  -Wait 2-3 hours after eating before lying down    6. Moderate episode of recurrent major depressive disorder (HCC)  -stable  -Continue same medications    7. Vitamin D deficiency  -stable  -Continue same medications  -Vitamin D 25 Hydroxy; Future    8. Vitamin B 12 deficiency  -stable  -Continue same medications  - Vitamin B12; Future    9.  Anxiety  -stable  -Continue same medications      Patient Education Well Visit, Over 72: Care Instructions  Your Care Instructions     Physical exams can help you stay healthy. Your doctor has checked your overall health and may have suggested ways to take good care of yourself. He or she also may have recommended tests. At home, you can help prevent illness with healthy eating, regular exercise, and other steps. Follow-up care is a key part of your treatment and safety. Be sure to make and go to all appointments, and call your doctor if you are having problems. It's also a good idea to know your test results and keep a list of the medicines you take. How can you care for yourself at home? · Reach and stay at a healthy weight. This will lower your risk for many problems, such as obesity, diabetes, heart disease, and high blood pressure. · Get at least 30 minutes of exercise on most days of the week. Walking is a good choice. You also may want to do other activities, such as running, swimming, cycling, or playing tennis or team sports. · Do not smoke. Smoking can make health problems worse. If you need help quitting, talk to your doctor about stop-smoking programs and medicines. These can increase your chances of quitting for good. · Protect your skin from too much sun. When you're outdoors from 10 a.m. to 4 p.m., stay in the shade or cover up with clothing and a hat with a wide brim. Wear sunglasses that block UV rays. Even when it's cloudy, put broad-spectrum sunscreen (SPF 30 or higher) on any exposed skin. · See a dentist one or two times a year for checkups and to have your teeth cleaned. · Wear a seat belt in the car. Follow your doctor's advice about when to have certain tests. These tests can spot problems early. For men and women  · Cholesterol. Your doctor will tell you how often to have this done based on your overall health and other things that can increase your risk for heart attack and stroke. · Blood pressure.  Have your blood pressure checked during a routine doctor visit. Your doctor will tell you how often to check your blood pressure based on your age, your blood pressure results, and other factors. · Diabetes. Ask your doctor whether you should have tests for diabetes. · Vision. Experts recommend that you have yearly exams for glaucoma and other age-related eye problems. · Hearing. Tell your doctor if you notice any change in your hearing. You can have tests to find out how well you hear. · Colon cancer tests. Keep having colon cancer tests as your doctor recommends. You can have one of several types of tests. · Heart attack and stroke risk. At least every 4 to 6 years, you should have your risk for heart attack and stroke assessed. Your doctor uses factors such as your age, blood pressure, cholesterol, and whether you smoke or have diabetes to show what your risk for a heart attack or stroke is over the next 10 years. · Osteoporosis. Talk to your doctor about whether you should have a bone density test to find out whether you have thinning bones. Ask your doctor if you need to take a calcium plus vitamin D supplement. You may be able to get enough calcium and vitamin D through your diet. For women  · Pap test and pelvic exam. You may no longer need a Pap test. Talk with your doctor about whether to stop or continue to have Pap tests. · Breast exam and mammogram. Ask how often you should have a mammogram, which is an X-ray of your breasts. A mammogram can spot breast cancer before it can be felt and when it is easiest to treat. · Thyroid disease. Talk to your doctor about whether to have your thyroid checked as part of a regular physical exam. Women have an increased chance of a thyroid problem. For men  · Prostate exam. Talk to your doctor about whether you should have a blood test (called a PSA test) for prostate cancer.  Experts recommend that you discuss the benefits and risks of the test with your doctor before you decide whether to have this

## 2021-01-08 ENCOUNTER — TELEPHONE (OUTPATIENT)
Dept: CARDIOLOGY CLINIC | Age: 66
End: 2021-01-08

## 2021-01-11 ENCOUNTER — PROCEDURE VISIT (OUTPATIENT)
Dept: CARDIOLOGY CLINIC | Age: 66
End: 2021-01-11
Payer: COMMERCIAL

## 2021-01-11 DIAGNOSIS — R53.83 FATIGUE, UNSPECIFIED TYPE: ICD-10-CM

## 2021-01-11 DIAGNOSIS — I35.0 NONRHEUMATIC AORTIC (VALVE) STENOSIS: ICD-10-CM

## 2021-01-11 DIAGNOSIS — R00.2 PALPITATIONS: ICD-10-CM

## 2021-01-11 LAB
LV EF: 65 %
LVEF MODALITY: NORMAL

## 2021-01-11 PROCEDURE — 93306 TTE W/DOPPLER COMPLETE: CPT | Performed by: INTERNAL MEDICINE

## 2021-01-15 ENCOUNTER — PREP FOR PROCEDURE (OUTPATIENT)
Dept: CARDIOLOGY CLINIC | Age: 66
End: 2021-01-15

## 2021-01-15 ENCOUNTER — NURSE ONLY (OUTPATIENT)
Dept: CARDIOLOGY CLINIC | Age: 66
End: 2021-01-15

## 2021-01-15 ENCOUNTER — OFFICE VISIT (OUTPATIENT)
Dept: CARDIOLOGY CLINIC | Age: 66
End: 2021-01-15
Payer: COMMERCIAL

## 2021-01-15 VITALS
HEIGHT: 65 IN | SYSTOLIC BLOOD PRESSURE: 130 MMHG | DIASTOLIC BLOOD PRESSURE: 76 MMHG | BODY MASS INDEX: 35.75 KG/M2 | HEART RATE: 76 BPM | WEIGHT: 214.6 LBS | TEMPERATURE: 97 F

## 2021-01-15 DIAGNOSIS — I35.0 AORTIC VALVE STENOSIS, ETIOLOGY OF CARDIAC VALVE DISEASE UNSPECIFIED: Primary | ICD-10-CM

## 2021-01-15 DIAGNOSIS — R42 DIZZINESS: ICD-10-CM

## 2021-01-15 DIAGNOSIS — I10 ESSENTIAL HYPERTENSION: ICD-10-CM

## 2021-01-15 DIAGNOSIS — I35.0 NONRHEUMATIC AORTIC VALVE STENOSIS: Primary | ICD-10-CM

## 2021-01-15 DIAGNOSIS — E78.2 MIXED HYPERLIPIDEMIA: ICD-10-CM

## 2021-01-15 PROCEDURE — 1036F TOBACCO NON-USER: CPT | Performed by: INTERNAL MEDICINE

## 2021-01-15 PROCEDURE — 3017F COLORECTAL CA SCREEN DOC REV: CPT | Performed by: INTERNAL MEDICINE

## 2021-01-15 PROCEDURE — 99214 OFFICE O/P EST MOD 30 MIN: CPT | Performed by: INTERNAL MEDICINE

## 2021-01-15 PROCEDURE — G8417 CALC BMI ABV UP PARAM F/U: HCPCS | Performed by: INTERNAL MEDICINE

## 2021-01-15 PROCEDURE — G8427 DOCREV CUR MEDS BY ELIG CLIN: HCPCS | Performed by: INTERNAL MEDICINE

## 2021-01-15 PROCEDURE — 1123F ACP DISCUSS/DSCN MKR DOCD: CPT | Performed by: INTERNAL MEDICINE

## 2021-01-15 PROCEDURE — G8484 FLU IMMUNIZE NO ADMIN: HCPCS | Performed by: INTERNAL MEDICINE

## 2021-01-15 PROCEDURE — 1090F PRES/ABSN URINE INCON ASSESS: CPT | Performed by: INTERNAL MEDICINE

## 2021-01-15 PROCEDURE — 4040F PNEUMOC VAC/ADMIN/RCVD: CPT | Performed by: INTERNAL MEDICINE

## 2021-01-15 PROCEDURE — G8399 PT W/DXA RESULTS DOCUMENT: HCPCS | Performed by: INTERNAL MEDICINE

## 2021-01-15 RX ORDER — SODIUM CHLORIDE 0.9 % (FLUSH) 0.9 %
10 SYRINGE (ML) INJECTION EVERY 12 HOURS SCHEDULED
Status: CANCELLED | OUTPATIENT
Start: 2021-01-15

## 2021-01-15 RX ORDER — SODIUM CHLORIDE 9 MG/ML
INJECTION, SOLUTION INTRAVENOUS CONTINUOUS
Status: CANCELLED | OUTPATIENT
Start: 2021-01-15

## 2021-01-15 RX ORDER — SODIUM CHLORIDE 0.9 % (FLUSH) 0.9 %
10 SYRINGE (ML) INJECTION PRN
Status: CANCELLED | OUTPATIENT
Start: 2021-01-15

## 2021-01-15 RX ORDER — ASPIRIN 325 MG
325 TABLET ORAL ONCE
Status: CANCELLED | OUTPATIENT
Start: 2021-01-28

## 2021-01-15 NOTE — PROGRESS NOTES
76 y.o. here for FU echo for AS. Feels tired. No change. \"Sometimes heart feels heavy. \" No major change. No sob ex when carrying heavy objects at work ([employer]). Gets dizzy bending over. No n/v/LH/dizziness. Still gets some cp at [employer]; does manual activity there. Every once in awhile dances; feels ok but gets winded doing that.     Past Medical History:   Diagnosis Date    Anemia     Anxiety     Depression 4/30/2013    GERD (gastroesophageal reflux disease) 1/31/2013    Hyperlipidemia     Hypertension     Panic disorder     PUD (peptic ulcer disease)     Type II or unspecified type diabetes mellitus without mention of complication, not stated as uncontrolled     Unspecified sleep apnea     Vitamin B 12 deficiency 10/19/2015    Vitamin D deficiency 5/19/2015     Past Surgical History:   Procedure Laterality Date    BREAST SURGERY      COLONOSCOPY  12/16/2016    Alonzo WIGGINS    COLONOSCOPY  6/1/2020    COLONOSCOPY WITH BIOPSY performed by Sindy Jones MD at [address]      UPPER GASTROINTESTINAL ENDOSCOPY N/A 6/1/2020    EGD BIOPSY performed by Sindy Jones MD at [address] History     Tobacco Use    Smoking status: Never Smoker    Smokeless tobacco: Never Used   Substance Use Topics    Alcohol use: Not Currently     Comment: 3 drinks per year    Drug use: No     Allergies   Allergen Reactions    No Known Allergies      Family History   Problem Relation Age of Onset    Breast Cancer Mother     Cancer Mother 52        breast     Diabetes Father     Hypertension Father     Colon Cancer Father     Cancer Father 72        colon    Breast Cancer Other     Cancer Other     Cancer Maternal Aunt         x 2 breast cancer    Diabetes Sister     Cancer Maternal Grandmother     Cancer Paternal Uncle         colon    Cancer Paternal Cousin         colon cancer - stage 4    Bipolar Disorder Son     Depression Son      Review of Systems   General: No fevers, chills, fatigue, or night sweats. No abnormal changes in weight. HEENT: No blurry or decreased vision. No changes in hearing, nasal discharge or sore throat. Cardiovascular: See HPI. No cramping in legs or buttocks when walking. Respiratory: No cough, hemoptysis, or wheezing. No history of asthma. Gastrointestinal: No abdominal pain, hematochezia, melana, or history of GI ulcers. Admits to acid reflux. Genito-Urinary: No dysuria or hematuria. No urgency or polyuria. Musculoskeletal: No complaints of joint pain, joint swelling or muscular weakness/soreness. Neurological: No dizziness or headaches. No numbness/tingling, speech problems or weakness. No history of a stroke or TIA. Psychological: No anxiety or depression  Hematological and Lymphatic: No abnormal bleeding or bruising, blood clots, jaundice. Endocrine: No malaise/lethargy, palpitations, polydipsia/polyuria, temperature intolerance or unexpected weight changes. Skin: No rashes or non-healing ulcers. PE:  Blood pressure 130/76, pulse 76, temperature 97 °F (36.1 °C), height 5' 5\" (1.651 m), weight 214 lb 9.6 oz (97.3 kg), last menstrual period 01/02/2013. General (appearance): Well developed. No distress  Eyes: Anicteric. EOMI. Neck: Supple. No JVD  Ears/Nose/Mouth/Thorat: No cyanosis  CV: RRR, 2/6 wing. + S1/S2. Respiratory:  Clear B, normal respiratory effort  GI: Abd s/nt/nd. No peritoneal signs  Skin: Warm, dry. No rashes  Neuro/Psych: Alert and oriented x 3. Appropriate behavior  Ext:  No c/c. No pitting edema  Pulses:  2+ carotid.  No bruits    Lab Results   Component Value Date    WBC 6.4 11/03/2020    HGB 12.0 11/03/2020    HCT 37.3 11/03/2020    MCV 88.7 11/03/2020     11/03/2020     Lab Results   Component Value Date     11/03/2020    K 4.9 11/03/2020     11/03/2020    CO2 27 11/03/2020    BUN 26 (H) 11/03/2020    CREATININE 1.1 11/03/2020    GLUCOSE 87 11/03/2020    CALCIUM 9.8 11/03/2020    PROT 6.8 11/03/2020    LABALBU 4.4 11/03/2020    BILITOT 0.4 11/03/2020    ALKPHOS 57 11/03/2020    AST 15 11/03/2020    ALT 9 (L) 11/03/2020    LABGLOM 50 (A) 11/03/2020    GFRAA >60 11/03/2020    AGRATIO 1.8 11/03/2020    GLOB 2.4 11/03/2020     No results found for: INR, PROTIME    Lab Results   Component Value Date    CHOL 139 11/03/2020    CHOL 120 01/21/2020    CHOL 199 10/30/2019     Lab Results   Component Value Date    TRIG 65 11/03/2020    TRIG 67 01/21/2020    TRIG 120 10/30/2019     Lab Results   Component Value Date    HDL 56 11/03/2020    HDL 55 01/21/2020    HDL 57 10/30/2019     Lab Results   Component Value Date    LDLCALC 70 11/03/2020    LDLCALC 52 01/21/2020    LDLCALC 118 (H) 10/30/2019     Lab Results   Component Value Date    LABVLDL 13 11/03/2020    LABVLDL 13 01/21/2020    LABVLDL 24 10/30/2019     No results found for: CHOLHDLRATIO    1/2021 TTE: EF 65%. Severe AS with MG of 46 and PV of 4.6 m/s    6/2020 TTE: EF 65%. Cannot rule out bicuspid valve. Severe AS with MG of 43 and PV of 4.1 m/s. Tr TR. PASP 23.    11/2019 Echo: Concentric left ventricular hypertrophy. EF 55-60%. No RWMA. Mod-severe AS ( and MG 24). Mild MR and NV. Tr TR, RVSP of 19. Current Outpatient Medications   Medication Instructions    aspirin 81 mg, DAILY    Cholecalciferol (VITAMIN D PO) 5,000 Units, Oral    dapagliflozin (FARXIGA) 10 MG tablet TAKE 1 TABLET BY MOUTH IN THE MORNING     DULoxetine (CYMBALTA) 30 mg, Oral, DAILY    hydroCHLOROthiazide (HYDRODIURIL) 25 mg, Oral, DAILY    Insulin Pen Needle (B-D ULTRAFINE III SHORT PEN) 31G X 8 MM MISC USE AS DIRECTED    Levemir FlexTouch 30 Units, Subcutaneous, NIGHTLY    losartan (COZAAR) 100 MG tablet Take 1 table daily    metFORMIN (GLUCOPHAGE) 1,000 mg, Oral, 2 TIMES DAILY WITH MEALS    MULTIPLE VITAMIN PO Take  by mouth.     omeprazole (PRILOSEC) 20 MG delayed release capsule TAKE 1 CAPSULE DAILY    rosuvastatin (CRESTOR) 20 mg, Oral, NIGHTLY    SITagliptin (JANUVIA) 50 mg, Oral, DAILY    TRULICITY 8.03 TI/2.3RW SOPN INJECT THE CONTENTS OF 1 PEN (0.75MG) UNDER THE SKIN ONCE A WEEK    verapamil (CALAN SR) 120 mg, Oral, DAILY    vitamin B-12 (CYANOCOBALAMIN) 1,000 mcg, Oral, DAILY    vitamin C (ASCORBIC ACID) 500 mg, DAILY       Assess:  76 y.o. here for f/u on AS. Previous echo showed possible bicuspid valve. Now having symptoms of SOB with exertion, dizziness, tiredness/sleepiness, and occ chest pain. 1. Nonrheumatic aortic valve stenosis    2. Dizziness    3. Mixed hyperlipidemia    4. Essential hypertension      Plan:  -AVR workup. TAVR vs SAVR. Will start workup; LHC,  carotid US, CVTS eval, dental eval. CTA C/A/P if TAVR is best option.   -Cont asa, statin, losartan, hctz  -Given the possibility of bicuspid valve, younger age, and generally good function, open surgical valve with Inspiris may be a good option. If discussion with Dr. Pearson Course leads to TAVR, that'd be a good option too. Desire Browne MD, [facility] [place], [place]

## 2021-01-15 NOTE — Clinical Note
Siddharth,  Severe symptomatic AS. Has DM and some weight issues, but actually in good shape and is 66. Echo suggested possible bicuspid valve, so may be good candidate for sheila capellan if you see fit. TAVR feasible too. Cath and consult with you coming up. Thanks,  Federico Early looks good, but just thought she's reached a fairly sig point and the AS is severe. She had not been excited in the past, but now she's ready to get fixed if needed.

## 2021-01-15 NOTE — PROGRESS NOTES
Left Heart Catheterization    A left heart catheterization is a procedure that provides your cardiologist with detailed information regarding how your heart functions. A small catheter (long, fine tube) is inserted into an artery (a vessel that carries blood and oxygen) that leads to your heart. While watching with x-ray equipment, small amounts of dye are injected which enables visualization of the heart arteries and chambers. The pictures that your cardiologist receives from the cardiac catheterization enable him or her to decide on the best treatment for you. Date of the procedure:  1/28/21    Time of arrival: 0830    Cardiologist performing the procedure: Ernesto_________________    Instructions for your left heart catheterization:    1. Bring a list of your medications to the hospital.    2.  Please notify us before the procedure if you are allergic to anything; especially x-ray contrast dye, iodine, nickel, or any type of jewelry. This is very important! 3. Do not eat or drink anything at all after midnight (or 8 hours) prior to the procedure. 4.  Take all morning medications EXCEPT any diuretics (water pills) the day of the procedure with a small sip of water. 5.  If you are on Coumadin, Warfarin, or Peguero Clas, please notify us so that we can make adjustments to your medication. 6.  If you are taking Xarelto, Eliquis, or Pradaxa, please stop staking these medications two days prior to the procedure (including the day of the procedure). 7.  If you are diabetic, check your blood sugar in the morning. If your blood sugar is 120 or less, do not take insulin. If your blood sugar is more than 120, take half the dose of your normal insulin. Do not take Metformin the night before your procedure or morning of the procedure. 8.  You MUST have someone to drive you home--no driving for 24 hours after your procedure. If an intervention is performed, you might stay overnight in the hospital.    9.  Discharge instructions will be given to you at the time of your procedure. 10.  For any questions or if you cannot keep this appointment for any reason, please call [phone]. Spoke with pt regarding procedure. Pre-op instructions, time and location of arrival discussed. Pt will get COVID test 5-6 days prior to procedure. Pt verbalized understanding and all questions answered at this time.

## 2021-01-18 NOTE — TELEPHONE ENCOUNTER
[facility] Medication Refills:         verapamil (CALAN SR) 120 MG extended release tablet  Take 1 tablet by mouth daily, Disp-90 tablet,R-3    [address], [address] [place] [address] [phone]         Last Office Visit: 01/15/21    Next Office Visit: 01/28/21    Last Refill: 10/30/20    Last Labs 11/03/20

## 2021-01-25 ENCOUNTER — OFFICE VISIT (OUTPATIENT)
Dept: PRIMARY CARE CLINIC | Age: 66
End: 2021-01-25
Payer: COMMERCIAL

## 2021-01-25 DIAGNOSIS — Z01.818 PREOP EXAMINATION: Primary | ICD-10-CM

## 2021-01-25 DIAGNOSIS — I35.0 NONRHEUMATIC AORTIC VALVE STENOSIS: ICD-10-CM

## 2021-01-25 LAB
BUN BLDV-MCNC: 23 MG/DL (ref 7–20)
CREAT SERPL-MCNC: 1.1 MG/DL (ref 0.6–1.2)
GFR AFRICAN AMERICAN: >60
GFR NON-AFRICAN AMERICAN: 50

## 2021-01-25 PROCEDURE — G8417 CALC BMI ABV UP PARAM F/U: HCPCS | Performed by: NURSE PRACTITIONER

## 2021-01-25 PROCEDURE — G8428 CUR MEDS NOT DOCUMENT: HCPCS | Performed by: NURSE PRACTITIONER

## 2021-01-25 PROCEDURE — 99211 OFF/OP EST MAY X REQ PHY/QHP: CPT | Performed by: NURSE PRACTITIONER

## 2021-01-26 LAB — SARS-COV-2: NOT DETECTED

## 2021-01-28 ENCOUNTER — HOSPITAL ENCOUNTER (OUTPATIENT)
Dept: CARDIAC CATH/INVASIVE PROCEDURES | Age: 66
Discharge: HOME | End: 2021-01-30
Payer: COMMERCIAL

## 2021-01-28 VITALS — HEIGHT: 66 IN | WEIGHT: 214 LBS | TEMPERATURE: 98.5 F | BODY MASS INDEX: 34.39 KG/M2

## 2021-01-28 LAB
A/G RATIO: 1.4 (ref 1.1–2.2)
ALBUMIN SERPL-MCNC: 3.9 G/DL (ref 3.4–5)
ALP BLD-CCNC: 48 U/L (ref 40–129)
ALT SERPL-CCNC: 9 U/L (ref 10–40)
ANION GAP SERPL CALCULATED.3IONS-SCNC: 9 MMOL/L (ref 3–16)
AST SERPL-CCNC: 14 U/L (ref 15–37)
BILIRUB SERPL-MCNC: 0.4 MG/DL (ref 0–1)
BUN BLDV-MCNC: 28 MG/DL (ref 7–20)
CALCIUM SERPL-MCNC: 9.5 MG/DL (ref 8.3–10.6)
CHLORIDE BLD-SCNC: 102 MMOL/L (ref 99–110)
CO2: 26 MMOL/L (ref 21–32)
CREAT SERPL-MCNC: 1 MG/DL (ref 0.6–1.2)
EKG ATRIAL RATE: 71 BPM
EKG DIAGNOSIS: NORMAL
EKG P AXIS: 40 DEGREES
EKG P-R INTERVAL: 138 MS
EKG Q-T INTERVAL: 446 MS
EKG QRS DURATION: 120 MS
EKG QTC CALCULATION (BAZETT): 484 MS
EKG R AXIS: -46 DEGREES
EKG T AXIS: 34 DEGREES
EKG VENTRICULAR RATE: 71 BPM
GFR AFRICAN AMERICAN: >60
GFR NON-AFRICAN AMERICAN: 55
GLOBULIN: 2.7 G/DL
GLUCOSE BLD-MCNC: 99 MG/DL (ref 70–99)
HCT VFR BLD CALC: 33.2 % (ref 36–48)
HEMOGLOBIN: 10.6 G/DL (ref 12–16)
INR BLD: 1.11 (ref 0.86–1.14)
MCH RBC QN AUTO: 26.7 PG (ref 26–34)
MCHC RBC AUTO-ENTMCNC: 32 G/DL (ref 31–36)
MCV RBC AUTO: 83.5 FL (ref 80–100)
PDW BLD-RTO: 15.4 % (ref 12.4–15.4)
PLATELET # BLD: 260 K/UL (ref 135–450)
PMV BLD AUTO: 7.7 FL (ref 5–10.5)
POTASSIUM SERPL-SCNC: 4.1 MMOL/L (ref 3.5–5.1)
PROTHROMBIN TIME: 12.9 SEC (ref 10–13.2)
RBC # BLD: 3.98 M/UL (ref 4–5.2)
SODIUM BLD-SCNC: 137 MMOL/L (ref 136–145)
TOTAL PROTEIN: 6.6 G/DL (ref 6.4–8.2)
WBC # BLD: 5.3 K/UL (ref 4–11)

## 2021-01-28 PROCEDURE — 93454 CORONARY ARTERY ANGIO S&I: CPT

## 2021-01-28 PROCEDURE — 6360000004 HC RX CONTRAST MEDICATION: Performed by: INTERNAL MEDICINE

## 2021-01-28 PROCEDURE — 93010 ELECTROCARDIOGRAM REPORT: CPT | Performed by: INTERNAL MEDICINE

## 2021-01-28 PROCEDURE — C1894 INTRO/SHEATH, NON-LASER: HCPCS

## 2021-01-28 PROCEDURE — C1769 GUIDE WIRE: HCPCS

## 2021-01-28 PROCEDURE — 80053 COMPREHEN METABOLIC PANEL: CPT

## 2021-01-28 PROCEDURE — 2500000003 HC RX 250 WO HCPCS

## 2021-01-28 PROCEDURE — 93005 ELECTROCARDIOGRAM TRACING: CPT | Performed by: INTERNAL MEDICINE

## 2021-01-28 PROCEDURE — 85610 PROTHROMBIN TIME: CPT

## 2021-01-28 PROCEDURE — 6360000002 HC RX W HCPCS

## 2021-01-28 PROCEDURE — 85027 COMPLETE CBC AUTOMATED: CPT

## 2021-01-28 PROCEDURE — 2709999900 HC NON-CHARGEABLE SUPPLY

## 2021-01-28 PROCEDURE — 99152 MOD SED SAME PHYS/QHP 5/>YRS: CPT | Performed by: INTERNAL MEDICINE

## 2021-01-28 PROCEDURE — 93454 CORONARY ARTERY ANGIO S&I: CPT | Performed by: INTERNAL MEDICINE

## 2021-01-28 RX ORDER — SODIUM CHLORIDE 9 MG/ML
INJECTION, SOLUTION INTRAVENOUS CONTINUOUS
Status: DISCONTINUED | OUTPATIENT
Start: 2021-01-28 | End: 2021-01-28 | Stop reason: SDUPTHER

## 2021-01-28 RX ORDER — SODIUM CHLORIDE 0.9 % (FLUSH) 0.9 %
10 SYRINGE (ML) INJECTION EVERY 12 HOURS SCHEDULED
Status: DISCONTINUED | OUTPATIENT
Start: 2021-01-28 | End: 2021-01-31 | Stop reason: HOSPADM

## 2021-01-28 RX ORDER — SODIUM CHLORIDE 0.9 % (FLUSH) 0.9 %
10 SYRINGE (ML) INJECTION PRN
Status: DISCONTINUED | OUTPATIENT
Start: 2021-01-28 | End: 2021-01-31 | Stop reason: HOSPADM

## 2021-01-28 RX ORDER — ACETAMINOPHEN 325 MG/1
650 TABLET ORAL EVERY 4 HOURS PRN
Status: DISCONTINUED | OUTPATIENT
Start: 2021-01-28 | End: 2021-01-31 | Stop reason: HOSPADM

## 2021-01-28 RX ORDER — SODIUM CHLORIDE 9 MG/ML
INJECTION, SOLUTION INTRAVENOUS CONTINUOUS
Status: ACTIVE | OUTPATIENT
Start: 2021-01-28 | End: 2021-01-28

## 2021-01-28 RX ORDER — SODIUM CHLORIDE 0.9 % (FLUSH) 0.9 %
10 SYRINGE (ML) INJECTION PRN
Status: DISCONTINUED | OUTPATIENT
Start: 2021-01-28 | End: 2021-01-28 | Stop reason: SDUPTHER

## 2021-01-28 RX ORDER — SODIUM CHLORIDE 0.9 % (FLUSH) 0.9 %
10 SYRINGE (ML) INJECTION EVERY 12 HOURS SCHEDULED
Status: DISCONTINUED | OUTPATIENT
Start: 2021-01-28 | End: 2021-01-28 | Stop reason: SDUPTHER

## 2021-01-28 RX ORDER — ASPIRIN 325 MG
325 TABLET ORAL ONCE
Status: DISCONTINUED | OUTPATIENT
Start: 2021-01-28 | End: 2021-01-31 | Stop reason: HOSPADM

## 2021-01-28 RX ADMIN — IOHEXOL 150 ML: 350 INJECTION, SOLUTION INTRAVENOUS at 10:36

## 2021-01-28 NOTE — PROCEDURES
Birdie Moritz  76 y.o.  [id number]      Indication:  Pre-op AVR    Mallampati Class: 3    ASA Class: 3    After informed consent was obtained and history and exam reviewed, the patient was taken to the cardiac cath lab. The patient had both groins and the right wrist prepped and draped in sterile fashion. 1% Xylocaine was used to anesthetize the right wrist.  A needle was inserted into the radial artery and a 5/6 Fr sheath was inserted. A solution of 2.5 mg verapamil, 200 mcg nitroglycerin, and 3,000U of heparin was administered via the sheath. Continuous pulse-oximetry and ECG monitoring was performed, and frequent blood pressure assessment was performed. An independent trained observer pushed medications at my direction. We monitored the patient's level of consciousness and vital signs/physiologic status throughout the procedure duration (see start and stop times below). A JR4 catheter was advanced through the sheath over a guide wire and advanced under fluoroscopic guidance into the ascending aorta. The wire was removed and the right coronary artery was engaged. Digital angiograms were recorded. The catheter was then removed and a JL 3.5 catheter was then advanced over the wire into the ascending aorta. The wire was removed, and the left main coronary artery was engaged. Digital angiograms were recorded. Catheter removed over a wire. Hemostasis was obtained by TR Band    Complications: None    Estimated blood loss: < 50 mL    Sedation: 1 mg Versed, 25 mcg Fentanyl  Sedation start: 1023  Sedation stop: 1038    Angiographic Findings:  Right dominant system  Left Main:  Normal    Left Anterior Descending:  Minimal irregular plaque    Ramus Intermedius: No obstructive plaque    Circumflex:  No obstructive plaque    Right Coronary:  <30% stenosis in the mid vessel, mildly irregular plaque. Left Ventriculogram:  Not performed.     Hemodynamics (mm Hg):  Central Aortic Pressure:  97/75 (86)    Conclusions:  -No obstructive coronary artery disease    Recommendations:  -Follow-up with Dr. Michel Regan for AVR  -Continue medical management of CAD

## 2021-01-28 NOTE — H&P
76 y.o. here for cath. Has severe AS and poss bicuspid valve. Has been having some chest pain and sob with exertion. No sob ex when carrying heavy objects at work ([employer]). Gets dizzy bending over. No n/v/LH/dizziness. Still gets some cp at [employer]; does manual activity there. Every once in awhile dances; feels ok but gets winded doing that.     Past Medical History:   Diagnosis Date    Anemia     Anxiety     Depression 4/30/2013    GERD (gastroesophageal reflux disease) 1/31/2013    Hyperlipidemia     Hypertension     Panic disorder     PUD (peptic ulcer disease)     Type II or unspecified type diabetes mellitus without mention of complication, not stated as uncontrolled     Unspecified sleep apnea     Vitamin B 12 deficiency 10/19/2015    Vitamin D deficiency 5/19/2015     Past Surgical History:   Procedure Laterality Date    BREAST SURGERY      COLONOSCOPY  12/16/2016    Alonzo WIGGINS    COLONOSCOPY  6/1/2020    COLONOSCOPY WITH BIOPSY performed by Colette Liu MD at [address]      UPPER GASTROINTESTINAL ENDOSCOPY N/A 6/1/2020    EGD BIOPSY performed by Colette Liu MD at [address] History     Tobacco Use    Smoking status: Never Smoker    Smokeless tobacco: Never Used   Substance Use Topics    Alcohol use: Not Currently     Comment: 3 drinks per year    Drug use: No     Allergies   Allergen Reactions    No Known Allergies      Family History   Problem Relation Age of Onset    Breast Cancer Mother     Cancer Mother 52        breast     Diabetes Father     Hypertension Father     Colon Cancer Father     Cancer Father 72        colon    Breast Cancer Other     Cancer Other     Cancer Maternal Aunt         x 2 breast cancer    Diabetes Sister     Cancer Maternal Grandmother     Cancer Paternal Uncle         colon    Cancer Paternal Cousin         colon cancer - stage 4    Bipolar Disorder Son     Depression Son      Review of Systems   General: No fevers, chills, fatigue, or night sweats. No abnormal changes in weight. HEENT: No blurry or decreased vision. No changes in hearing, nasal discharge or sore throat. Cardiovascular: See HPI. No cramping in legs or buttocks when walking. Respiratory: No cough, hemoptysis, or wheezing. No history of asthma. Gastrointestinal: No abdominal pain, hematochezia, melana, or history of GI ulcers. Admits to acid reflux. Genito-Urinary: No dysuria or hematuria. No urgency or polyuria. Musculoskeletal: No complaints of joint pain, joint swelling or muscular weakness/soreness. Neurological: No dizziness or headaches. No numbness/tingling, speech problems or weakness. No history of a stroke or TIA. Psychological: No anxiety or depression  Hematological and Lymphatic: No abnormal bleeding or bruising, blood clots, jaundice. Endocrine: No malaise/lethargy, palpitations, polydipsia/polyuria, temperature intolerance or unexpected weight changes. Skin: No rashes or non-healing ulcers. PE:  Temperature 98.5 °F (36.9 °C), temperature source Oral, height 5' 6\" (1.676 m), weight 214 lb (97.1 kg), last menstrual period 01/02/2013. General (appearance): Well developed. No distress  Eyes: Anicteric. EOMI. Neck: Supple. No JVD  Ears/Nose/Mouth/Thorat: No cyanosis  CV: RRR, 2/6 wing. + S1/S2. Respiratory:  Clear B, normal respiratory effort  GI: Abd s/nt/nd. No peritoneal signs  Skin: Warm, dry. No rashes  Neuro/Psych: Alert and oriented x 3. Appropriate behavior  Ext:  No c/c. No pitting edema  Pulses:  2+ carotid.  No bruits    Lab Results   Component Value Date    WBC 5.3 01/28/2021    HGB 10.6 (L) 01/28/2021    HCT 33.2 (L) 01/28/2021    MCV 83.5 01/28/2021     01/28/2021     Lab Results   Component Value Date     01/28/2021    K 4.1 01/28/2021     01/28/2021    CO2 26 01/28/2021    BUN 28 (H) 01/28/2021    CREATININE 1.0 01/28/2021    GLUCOSE 99 01/28/2021    CALCIUM 9.5 01/28/2021    PROT 6.6 01/28/2021    LABALBU 3.9 01/28/2021    BILITOT 0.4 01/28/2021    ALKPHOS 48 01/28/2021    AST 14 (L) 01/28/2021    ALT 9 (L) 01/28/2021    LABGLOM 55 (A) 01/28/2021    GFRAA >60 01/28/2021    AGRATIO 1.4 01/28/2021    GLOB 2.7 01/28/2021     Lab Results   Component Value Date    INR 1.11 01/28/2021    PROTIME 12.9 01/28/2021       Lab Results   Component Value Date    CHOL 139 11/03/2020    CHOL 120 01/21/2020    CHOL 199 10/30/2019     Lab Results   Component Value Date    TRIG 65 11/03/2020    TRIG 67 01/21/2020    TRIG 120 10/30/2019     Lab Results   Component Value Date    HDL 56 11/03/2020    HDL 55 01/21/2020    HDL 57 10/30/2019     Lab Results   Component Value Date    LDLCALC 70 11/03/2020    LDLCALC 52 01/21/2020    LDLCALC 118 (H) 10/30/2019     Lab Results   Component Value Date    LABVLDL 13 11/03/2020    LABVLDL 13 01/21/2020    LABVLDL 24 10/30/2019     No results found for: CHOLHDLRATIO    1/2021 TTE: EF 65%. Severe AS with MG of 46 and PV of 4.6 m/s    6/2020 TTE: EF 65%. Cannot rule out bicuspid valve. Severe AS with MG of 43 and PV of 4.1 m/s. Tr TR. PASP 23.    11/2019 Echo: Concentric left ventricular hypertrophy. EF 55-60%. No RWMA. Mod-severe AS ( and MG 24). Mild MR and MI. Tr TR, RVSP of 19.     Current Outpatient Medications   Medication Instructions    aspirin 81 mg, DAILY    Cholecalciferol (VITAMIN D PO) 5,000 Units, Oral    dapagliflozin (FARXIGA) 10 MG tablet TAKE 1 TABLET BY MOUTH IN THE MORNING     DULoxetine (CYMBALTA) 30 mg, Oral, DAILY    hydroCHLOROthiazide (HYDRODIURIL) 25 mg, Oral, DAILY    Insulin Pen Needle (B-D ULTRAFINE III SHORT PEN) 31G X 8 MM MISC USE AS DIRECTED    Levemir FlexTouch 30 Units, Subcutaneous, NIGHTLY    losartan (COZAAR) 100 MG tablet Take 1 table daily    metFORMIN (GLUCOPHAGE) 1,000 mg, Oral, 2 TIMES DAILY WITH MEALS    MULTIPLE VITAMIN PO Take  by mouth.  omeprazole (PRILOSEC) 20 MG delayed release capsule TAKE 1 CAPSULE DAILY    rosuvastatin (CRESTOR) 20 mg, Oral, NIGHTLY    SITagliptin (JANUVIA) 50 mg, Oral, DAILY    TRULICITY 6.87 IN/7.3BL SOPN INJECT THE CONTENTS OF 1 PEN (0.75MG) UNDER THE SKIN ONCE A WEEK    verapamil (CALAN SR) 120 mg, Oral, DAILY    vitamin B-12 (CYANOCOBALAMIN) 1,000 mcg, Oral, DAILY    vitamin C (ASCORBIC ACID) 500 mg, DAILY       Assess:  76 y.o. here for f/u on AS. Previous echo showed possible bicuspid valve. Now having symptoms of SOB with exertion, dizziness, tiredness/sleepiness, and occ chest pain. Severe AS  MONTANEZ  Chest pain    Plan:  -Cath today  -AVR workup. TAVR vs SAVR. -Cont asa, statin, losartan, hctz  -Given the possibility of bicuspid valve, younger age, and generally good function, open surgical valve with Inspiris may be a good option. If discussion with Dr. Jamey Gillette leads to TAVR, that'd be a good option too. Raymond Lockhart MD, [facility]

## 2021-01-28 NOTE — PLAN OF CARE
Brief Pre-Op Note/Sedation Assessment      Manuela Sharp  1955  Room/bed info not found      [id number]  10:01 AM    Planned Procedure: Cardiac Catheterization Procedure    Post Procedure Plan: Return to same level of care    Consent: I have discussed with the patient and/or the patient representative the indication, alternatives, and the possible risks and/or complications of the planned procedure and the anesthesia methods. The patient and/or patient representative appear to understand and agree to proceed. Chief Complaint: Chest Pain/Pressure  Dyspnea on Exertion      Indications for Cath Procedure:  Valvular Disease - Aortic Stenosis; Stenosis Severity: Severe  Anginal Classification within 2 weeks:  CCS III - Symptoms with everyday living activities, i.e., moderate limitation  NYHA Heart Failure Class within 2 weeks: class 2 CHF, diastolic  Is Cath Lab Visit Valve-related?: Aortic Stenosis; Stenosis Severity: Severe  Surgical Risk: N/A  Functional Type: Class 3 angina    Anti- Anginal Meds within 2 weeks:   Yes: Ca Channel Blockers, Aspirin and Statin (Any)    Stress or Imaging Studies Performed (within 6 months):  None     Vital Signs:  Temp 98.5 °F (36.9 °C) (Oral)   Ht 5' 6\" (1.676 m)   Wt 214 lb (97.1 kg)   LMP 01/02/2013   BMI 34.54 kg/m²     Allergies:   Allergies   Allergen Reactions    No Known Allergies        Past Medical History:  Past Medical History:   Diagnosis Date    Anemia     Anxiety     Depression 4/30/2013    GERD (gastroesophageal reflux disease) 1/31/2013    Hyperlipidemia     Hypertension     Panic disorder     PUD (peptic ulcer disease)     Type II or unspecified type diabetes mellitus without mention of complication, not stated as uncontrolled     Unspecified sleep apnea     Vitamin B 12 deficiency 10/19/2015    Vitamin D deficiency 5/19/2015         Surgical History:  Past Surgical History:   Procedure Laterality Date    BREAST SURGERY      COLONOSCOPY 12/16/2016    Alonzo WIGGINS    COLONOSCOPY  6/1/2020    COLONOSCOPY WITH BIOPSY performed by [facility]ine Paige MD at [facility]      TUBAL LIGATION      UPPER GASTROINTESTINAL ENDOSCOPY N/A 6/1/2020    EGD BIOPSY performed by Maxine Paige MD at [facility] ENDOSCOPY         Medications:  Current Outpatient Medications   Medication Sig Dispense Refill    verapamil (CALAN SR) 120 MG extended release tablet Take 1 tablet by mouth daily 90 tablet 3    insulin detemir (LEVEMIR FLEXTOUCH) 100 UNIT/ML injection pen Inject 30 Units into the skin nightly 30 mL 1    SITagliptin (JANUVIA) 50 MG tablet Take 1 tablet by mouth daily 30 tablet 5    metFORMIN (GLUCOPHAGE) 1000 MG tablet Take 1 tablet by mouth 2 times daily (with meals) 180 tablet 1    TRULICITY 1.99 TU/6.5XF SOPN INJECT THE CONTENTS OF 1 PEN (0.75MG) UNDER THE SKIN ONCE A WEEK 2 mL 5    rosuvastatin (CRESTOR) 20 MG tablet Take 1 tablet by mouth nightly 90 tablet 3    DULoxetine (CYMBALTA) 30 MG extended release capsule Take 1 capsule by mouth daily 90 capsule 1    hydroCHLOROthiazide (HYDRODIURIL) 25 MG tablet Take 1 tablet by mouth daily 90 tablet 1    dapagliflozin (FARXIGA) 10 MG tablet TAKE 1 TABLET BY MOUTH IN THE MORNING 30 tablet 5    Insulin Pen Needle (B-D ULTRAFINE III SHORT PEN) 31G X 8 MM MISC USE AS DIRECTED 100 each 3    omeprazole (PRILOSEC) 20 MG delayed release capsule TAKE 1 CAPSULE DAILY (Patient taking differently: 40 mg ) 90 capsule 1    losartan (COZAAR) 100 MG tablet Take 1 table daily 90 tablet 3    vitamin B-12 (CYANOCOBALAMIN) 500 MCG tablet Take 2 tablets by mouth daily 60 tablet 3    Ascorbic Acid (VITAMIN C) 500 MG tablet Take 500 mg by mouth daily.  Cholecalciferol (VITAMIN D PO) Take 5,000 Units by mouth       MULTIPLE VITAMIN PO Take  by mouth.  aspirin 81 MG EC tablet Take 81 mg by mouth daily.          Current Facility-Administered

## 2021-02-05 ENCOUNTER — HOSPITAL ENCOUNTER (OUTPATIENT)
Dept: VASCULAR LAB | Age: 66
Discharge: HOME | End: 2021-02-05
Payer: COMMERCIAL

## 2021-02-05 ENCOUNTER — HOSPITAL ENCOUNTER (OUTPATIENT)
Dept: CT IMAGING | Age: 66
Discharge: HOME | End: 2021-02-05
Payer: COMMERCIAL

## 2021-02-05 DIAGNOSIS — I35.0 NONRHEUMATIC AORTIC VALVE STENOSIS: ICD-10-CM

## 2021-02-05 DIAGNOSIS — R42 DIZZINESS: ICD-10-CM

## 2021-02-05 PROCEDURE — 6360000004 HC RX CONTRAST MEDICATION: Performed by: INTERNAL MEDICINE

## 2021-02-05 PROCEDURE — 74174 CTA ABD&PLVS W/CONTRAST: CPT

## 2021-02-05 PROCEDURE — 93880 EXTRACRANIAL BILAT STUDY: CPT

## 2021-02-05 RX ADMIN — IOPAMIDOL 100 ML: 755 INJECTION, SOLUTION INTRAVENOUS at 08:52

## 2021-02-12 ENCOUNTER — OFFICE VISIT (OUTPATIENT)
Dept: CARDIOTHORACIC SURGERY | Age: 66
End: 2021-02-12
Payer: COMMERCIAL

## 2021-02-12 VITALS
HEIGHT: 66 IN | WEIGHT: 211.8 LBS | OXYGEN SATURATION: 92 % | TEMPERATURE: 97.5 F | RESPIRATION RATE: 14 BRPM | BODY MASS INDEX: 34.04 KG/M2 | DIASTOLIC BLOOD PRESSURE: 82 MMHG | SYSTOLIC BLOOD PRESSURE: 122 MMHG | HEART RATE: 84 BPM

## 2021-02-12 DIAGNOSIS — I35.0 AORTIC STENOSIS, SEVERE: Primary | ICD-10-CM

## 2021-02-12 PROCEDURE — 1123F ACP DISCUSS/DSCN MKR DOCD: CPT | Performed by: THORACIC SURGERY (CARDIOTHORACIC VASCULAR SURGERY)

## 2021-02-12 PROCEDURE — G8399 PT W/DXA RESULTS DOCUMENT: HCPCS | Performed by: THORACIC SURGERY (CARDIOTHORACIC VASCULAR SURGERY)

## 2021-02-12 PROCEDURE — 3017F COLORECTAL CA SCREEN DOC REV: CPT | Performed by: THORACIC SURGERY (CARDIOTHORACIC VASCULAR SURGERY)

## 2021-02-12 PROCEDURE — 4040F PNEUMOC VAC/ADMIN/RCVD: CPT | Performed by: THORACIC SURGERY (CARDIOTHORACIC VASCULAR SURGERY)

## 2021-02-12 PROCEDURE — G8417 CALC BMI ABV UP PARAM F/U: HCPCS | Performed by: THORACIC SURGERY (CARDIOTHORACIC VASCULAR SURGERY)

## 2021-02-12 PROCEDURE — G8427 DOCREV CUR MEDS BY ELIG CLIN: HCPCS | Performed by: THORACIC SURGERY (CARDIOTHORACIC VASCULAR SURGERY)

## 2021-02-12 PROCEDURE — 1036F TOBACCO NON-USER: CPT | Performed by: THORACIC SURGERY (CARDIOTHORACIC VASCULAR SURGERY)

## 2021-02-12 PROCEDURE — 1090F PRES/ABSN URINE INCON ASSESS: CPT | Performed by: THORACIC SURGERY (CARDIOTHORACIC VASCULAR SURGERY)

## 2021-02-12 PROCEDURE — 99204 OFFICE O/P NEW MOD 45 MIN: CPT | Performed by: THORACIC SURGERY (CARDIOTHORACIC VASCULAR SURGERY)

## 2021-02-12 PROCEDURE — G8484 FLU IMMUNIZE NO ADMIN: HCPCS | Performed by: THORACIC SURGERY (CARDIOTHORACIC VASCULAR SURGERY)

## 2021-02-12 RX ORDER — OMEPRAZOLE 40 MG/1
40 CAPSULE, DELAYED RELEASE ORAL DAILY
Status: ON HOLD | COMMUNITY
End: 2021-03-24 | Stop reason: HOSPADM

## 2021-02-12 ASSESSMENT — ENCOUNTER SYMPTOMS
BACK PAIN: 0
WHEEZING: 0
APNEA: 0
CONSTIPATION: 0
SHORTNESS OF BREATH: 1
DIARRHEA: 0
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
## 2021-02-22 NOTE — PROGRESS NOTES
hospitalization, the order may or may not be in effect during this procedure. I give my doctor permission to give me blood or blood products. I understand that there are risks with receiving blood such as hepatitis, AIDS, fever, or allergic reaction. I acknowledge that the risks, benefits, and alternatives of this treatment have been explained to me and that no express or implied warranty has been given by the hospital, any blood bank, or any person or entity as to the blood or blood components transfused. At the discretion of my doctor, I agree to allow observers, equipment/product representatives and allow photographing, and/or televising of the procedure, provided my name or identity is maintained confidentially. I agree the hospital may dispose of or use for scientific or educational purposes any tissue, fluid, or body parts which may be removed.     ________________________________Date________Time______ am/pm  ([facility])  Patient or Signature of Closest Relative or Legal Guardian    ________________________________Date________Time______am/pm      Page 1 of  1  Witness

## 2021-02-24 ENCOUNTER — TELEPHONE (OUTPATIENT)
Dept: CARDIOTHORACIC SURGERY | Age: 66
End: 2021-02-24

## 2021-02-24 NOTE — TELEPHONE ENCOUNTER
Spoke with patient regarding surgery scheduled for 3/12/2021 at 7:00 am with arrival time of 5:00 am at [facility]. with Dr. Jenetta Saint to include: aortic valve replacement with dilated ascending aorta repair. Patient is scheduled for pre-admit lab work completion on 3/8/2021 at 9:00 am with COVID swab at 8:40 am. Patient instructed not to eat or drink after midnight the day of surgery and to call our office with any questions or concerns.

## 2021-02-26 DIAGNOSIS — E78.2 MIXED HYPERLIPIDEMIA: ICD-10-CM

## 2021-03-01 NOTE — TELEPHONE ENCOUNTER
Requested Prescriptions     Pending Prescriptions Disp Refills    rosuvastatin (CRESTOR) 20 MG tablet 90 tablet 3     Sig: Take 1 tablet by mouth nightly          Number: 90    Refills: 3    Last Office Visit: [phone]    Next Office Visit: Visit date not found     Last Labs: 1.28.21

## 2021-03-02 RX ORDER — ROSUVASTATIN CALCIUM 20 MG/1
20 TABLET, COATED ORAL NIGHTLY
Qty: 90 TABLET | Refills: 3 | Status: SHIPPED | OUTPATIENT
Start: 2021-03-02 | End: 2021-05-18 | Stop reason: SDUPTHER

## 2021-03-03 ENCOUNTER — OFFICE VISIT (OUTPATIENT)
Dept: PRIMARY CARE CLINIC | Age: 66
End: 2021-03-03
Payer: COMMERCIAL

## 2021-03-03 VITALS
TEMPERATURE: 97.1 F | RESPIRATION RATE: 14 BRPM | WEIGHT: 208 LBS | BODY MASS INDEX: 34.66 KG/M2 | SYSTOLIC BLOOD PRESSURE: 122 MMHG | DIASTOLIC BLOOD PRESSURE: 86 MMHG | HEART RATE: 70 BPM | HEIGHT: 65 IN

## 2021-03-03 DIAGNOSIS — D64.9 ANEMIA, UNSPECIFIED TYPE: ICD-10-CM

## 2021-03-03 DIAGNOSIS — E78.2 MIXED HYPERLIPIDEMIA: ICD-10-CM

## 2021-03-03 DIAGNOSIS — Z79.4 TYPE 2 DIABETES MELLITUS WITHOUT COMPLICATION, WITH LONG-TERM CURRENT USE OF INSULIN (HCC): Primary | ICD-10-CM

## 2021-03-03 DIAGNOSIS — K21.9 GASTROESOPHAGEAL REFLUX DISEASE, UNSPECIFIED WHETHER ESOPHAGITIS PRESENT: ICD-10-CM

## 2021-03-03 DIAGNOSIS — F41.9 ANXIETY: ICD-10-CM

## 2021-03-03 DIAGNOSIS — I10 ESSENTIAL HYPERTENSION: ICD-10-CM

## 2021-03-03 DIAGNOSIS — E11.9 TYPE 2 DIABETES MELLITUS WITHOUT COMPLICATION, WITH LONG-TERM CURRENT USE OF INSULIN (HCC): Primary | ICD-10-CM

## 2021-03-03 DIAGNOSIS — F33.1 MODERATE EPISODE OF RECURRENT MAJOR DEPRESSIVE DISORDER (HCC): ICD-10-CM

## 2021-03-03 DIAGNOSIS — E04.1 THYROID NODULE: ICD-10-CM

## 2021-03-03 DIAGNOSIS — N28.89 RIGHT RENAL MASS: ICD-10-CM

## 2021-03-03 LAB
HBA1C MFR BLD: 6.6 %
HCT VFR BLD CALC: 34.5 % (ref 36–48)
HEMOGLOBIN: 10.7 G/DL (ref 12–16)
IRON SATURATION: 12 % (ref 15–50)
IRON: 39 UG/DL (ref 37–145)
MCH RBC QN AUTO: 25.6 PG (ref 26–34)
MCHC RBC AUTO-ENTMCNC: 31.1 G/DL (ref 31–36)
MCV RBC AUTO: 82.2 FL (ref 80–100)
PDW BLD-RTO: 16.3 % (ref 12.4–15.4)
PLATELET # BLD: 253 K/UL (ref 135–450)
PMV BLD AUTO: 8.3 FL (ref 5–10.5)
RBC # BLD: 4.2 M/UL (ref 4–5.2)
TOTAL IRON BINDING CAPACITY: 326 UG/DL (ref 260–445)
WBC # BLD: 5.7 K/UL (ref 4–11)

## 2021-03-03 PROCEDURE — 4040F PNEUMOC VAC/ADMIN/RCVD: CPT | Performed by: INTERNAL MEDICINE

## 2021-03-03 PROCEDURE — 3017F COLORECTAL CA SCREEN DOC REV: CPT | Performed by: INTERNAL MEDICINE

## 2021-03-03 PROCEDURE — G8399 PT W/DXA RESULTS DOCUMENT: HCPCS | Performed by: INTERNAL MEDICINE

## 2021-03-03 PROCEDURE — G8427 DOCREV CUR MEDS BY ELIG CLIN: HCPCS | Performed by: INTERNAL MEDICINE

## 2021-03-03 PROCEDURE — 99214 OFFICE O/P EST MOD 30 MIN: CPT | Performed by: INTERNAL MEDICINE

## 2021-03-03 PROCEDURE — 1036F TOBACCO NON-USER: CPT | Performed by: INTERNAL MEDICINE

## 2021-03-03 PROCEDURE — G8417 CALC BMI ABV UP PARAM F/U: HCPCS | Performed by: INTERNAL MEDICINE

## 2021-03-03 PROCEDURE — G8484 FLU IMMUNIZE NO ADMIN: HCPCS | Performed by: INTERNAL MEDICINE

## 2021-03-03 PROCEDURE — 1090F PRES/ABSN URINE INCON ASSESS: CPT | Performed by: INTERNAL MEDICINE

## 2021-03-03 PROCEDURE — 2022F DILAT RTA XM EVC RTNOPTHY: CPT | Performed by: INTERNAL MEDICINE

## 2021-03-03 PROCEDURE — 1123F ACP DISCUSS/DSCN MKR DOCD: CPT | Performed by: INTERNAL MEDICINE

## 2021-03-03 PROCEDURE — 3044F HG A1C LEVEL LT 7.0%: CPT | Performed by: INTERNAL MEDICINE

## 2021-03-03 PROCEDURE — 83036 HEMOGLOBIN GLYCOSYLATED A1C: CPT | Performed by: INTERNAL MEDICINE

## 2021-03-03 ASSESSMENT — ENCOUNTER SYMPTOMS
ABDOMINAL PAIN: 0
VOMITING: 0
RHINORRHEA: 0
NAUSEA: 0
CONSTIPATION: 0
SINUS PAIN: 0
SINUS PRESSURE: 0
COUGH: 0
SHORTNESS OF BREATH: 0
BLOOD IN STOOL: 0
CHEST TIGHTNESS: 0
SORE THROAT: 0
TROUBLE SWALLOWING: 0
WHEEZING: 0
DIARRHEA: 0

## 2021-03-03 NOTE — PATIENT INSTRUCTIONS
-Discontinue Januvia  -Limit carbohydrates to 45 grams with meals and 15 grams with snacks  -Low sodium diet  -Low fat, low cholesterol diet  -Regular aerobic exercise  -Thyroid ultrasound  -CT renal

## 2021-03-03 NOTE — PROGRESS NOTES
Charla Nunez   Date ofBirth:  1955    Date of Visit:  3/3/2021    Chief Complaint   Patient presents with    Diabetes    Hypertension    Hyperlipidemia    Depression    Anxiety    Gastroesophageal Reflux       HPI  Diabetes Mellitus Type 2:   Current Medications: Levemir 30 units qhs, Farxiga 10mg po q day, Januvia 30AL po q day, Trulicity 0.75mg subcutaneously q week, and Metformin 1000mg po bid  Patient states her insurance will cover Januvia but patient's price would be $500 which is not affordable  Diet:  low carbohydrates  Home blood sugar records: Patient tests her blood sugar 1-2 times per day. Fasting averages 98 and bedtime averages 100  Any episodes of hypoglycemia? patient states she has had some low blood sugars of 54-60  Eye exam current (within one year): yes  Daily Aspirin? Yes  Exercise: none     Hypertension:    Current Medications:Losartan 100mg po q day and HCTZ 25mg po q day  Home blood pressure monitoring: No   Diet: low salt  Exercise: none     Hyperlipidemia:    Current medications: Crestor 20mg po qhs  Diet: Low fat, low cholesterol     Depression:  Current Medication: Cymbalta 30mg po q day. Patient states her depression is ok.      Anxiety:  Current Medication: Cymbalta 30mg po q day. Pt states her anxiety is ok. Patient states sometimes she gets a little anxious and tries to calm herself back down.     GERD:  Current Medication: Omeprazole 40mg po q day  Patient states he had an episode of heartburn yesterday after drinking coffee. Patient states otherwise she hasn't had any heartburn or reflux in a long time. Patient states she eats very little spicy foods. Patient decreases tomato based foods. Patient drinks 2 cups of coffee per day. Patient needs follow up for abnormalities on her CTA chest abdomen done on 2/5/21 for aortic valve stenosis. Patient's CT showed a renal mass and thyroid nodule. Review of Systems   Constitutional: Positive for fatigue. Negative for appetite change, chills, fever and unexpected weight change. HENT: Negative for congestion, postnasal drip, rhinorrhea, sinus pressure, sinus pain, sore throat and trouble swallowing. Eyes: Positive for visual disturbance (Pt is seeing Ophthalmology). Respiratory: Negative for cough, chest tightness, shortness of breath and wheezing. Cardiovascular: Negative for chest pain, palpitations and leg swelling. Gastrointestinal: Negative for abdominal pain, blood in stool, constipation, diarrhea, nausea and vomiting. Endocrine: Negative for cold intolerance. Genitourinary: Negative for dysuria, frequency and hematuria. Musculoskeletal: Negative for arthralgias and myalgias. Skin: Negative for rash and wound. Neurological: Positive for dizziness. Negative for tremors, syncope, weakness, light-headedness, numbness and headaches. Psychiatric/Behavioral: Positive for dysphoric mood. Negative for decreased concentration and sleep disturbance. The patient is nervous/anxious.         Allergies   Allergen Reactions    No Known Allergies      Outpatient Medications Marked as Taking for the 3/3/21 encounter (Office Visit) with Zoltan Steele MD   Medication Sig Dispense Refill    rosuvastatin (CRESTOR) 20 MG tablet Take 1 tablet by mouth nightly 90 tablet 3    omeprazole (PRILOSEC) 40 MG delayed release capsule Take 40 mg by mouth daily      verapamil (CALAN SR) 120 MG extended release tablet Take 1 tablet by mouth daily 90 tablet 3    insulin detemir (LEVEMIR FLEXTOUCH) 100 UNIT/ML injection pen Inject 30 Units into the skin nightly (Patient taking differently: Inject 30 Units into the skin daily (with breakfast)  30 mL 1    SITagliptin (JANUVIA) 50 MG tablet Take 1 tablet by mouth daily 30 tablet 5    metFORMIN (GLUCOPHAGE) 1000 MG tablet Take 1 tablet by mouth 2 times daily (with meals) 180 tablet 1    TRULICITY 6.07 TV/3.9JW SOPN INJECT THE CONTENTS OF 1 PEN (0.75MG) UNDER THE SKIN ONCE A WEEK 2 mL 5    DULoxetine (CYMBALTA) 30 MG extended release capsule Take 1 capsule by mouth daily 90 capsule 1     hydroCHLOROthiazide (HYDRODIURIL) 25 MG tablet Take 1 tablet by mouth daily 90 tablet 1    dapagliflozin (FARXIGA) 10 MG tablet TAKE 1 TABLET BY MOUTH IN THE MORNING 30 tablet 5    Insulin Pen Needle (B-D ULTRAFINE III SHORT PEN) 31G X 8 MM MISC USE AS DIRECTED 100 each 3    losartan (COZAAR) 100 MG tablet Take 1 table daily 90 tablet 3    vitamin B-12 (CYANOCOBALAMIN) 500 MCG tablet Take 2 tablets by mouth daily 60 tablet 3    Ascorbic Acid (VITAMIN C) 500 MG tablet Take 500 mg by mouth daily.  Cholecalciferol (VITAMIN D PO) Take 5,000 Units by mouth daily       MULTIPLE VITAMIN PO Take  by mouth.  aspirin 81 MG EC tablet Take 81 mg by mouth daily. Vitals:    03/03/21 0802   BP: 122/86   Site: Left Upper Arm   Position: Sitting   Cuff Size: Medium Adult   Pulse: 70   Resp: 14   Temp: 97.1 °F (36.2 °C)   TempSrc: Skin   Weight: 208 lb (94.3 kg)   Height: 5' 5\" (1.651 m)     Body mass index is 34.61 kg/m². Physical Exam  Nursing note reviewed. Constitutional:       General: She is not in acute distress. Appearance: Normal appearance. She is well-developed. HENT:      Mouth/Throat:      Pharynx: Oropharynx is clear. Eyes:      General: Lids are normal.      Extraocular Movements: Extraocular movements intact. Conjunctiva/sclera: Conjunctivae normal.      Pupils: Pupils are equal, round, and reactive to light. Neck:      Musculoskeletal: Neck supple. Thyroid: No thyromegaly. Vascular: No carotid bruit. Cardiovascular:      Rate and Rhythm: Normal rate and regular rhythm. Heart sounds: S1 normal and S2 normal. Murmur present. No friction rub. No gallop. Pulmonary:      Effort: Pulmonary effort is normal. No respiratory distress. Breath sounds: Normal breath sounds. No wheezing, rhonchi or rales.    Abdominal:      General: Bowel sounds are normal. There is no distension. Palpations: Abdomen is soft. Tenderness: There is no abdominal tenderness. Musculoskeletal:      Right lower leg: No edema. Left lower leg: No edema. Lymphadenopathy:      Head:      Right side of head: No submandibular adenopathy. Left side of head: No submandibular adenopathy. Neurological:      Mental Status: She is alert and oriented to person, place, and time.    Psychiatric:         Mood and Affect: Mood normal.           Results for POC orders placed in visit on 03/03/21   POCT glycosylated hemoglobin (Hb A1C)   Result Value Ref Range    Hemoglobin A1C 6.6 %     Lab Review   Hospital Outpatient Visit on 01/28/2021   Component Date Value    WBC 01/28/2021 5.3     RBC 01/28/2021 3.98*    Hemoglobin 01/28/2021 10.6*    Hematocrit 01/28/2021 33.2*    MCV 01/28/2021 83.5     MCH 01/28/2021 26.7     MCHC 01/28/2021 32.0     RDW 01/28/2021 15.4     Platelets 27/07/2531 260     MPV 01/28/2021 7.7     Sodium 01/28/2021 137     Potassium 01/28/2021 4.1     Chloride 01/28/2021 102     CO2 01/28/2021 26     Anion Gap 01/28/2021 9     Glucose 01/28/2021 99     BUN 01/28/2021 28*    CREATININE 01/28/2021 1.0     GFR Non- 01/28/2021 55*    GFR  01/28/2021 >60     Calcium 01/28/2021 9.5     Total Protein 01/28/2021 6.6     Albumin 01/28/2021 3.9     Albumin/Globulin Ratio 01/28/2021 1.4     Total Bilirubin 01/28/2021 0.4     Alkaline Phosphatase 01/28/2021 48     ALT 01/28/2021 9*    AST 01/28/2021 14*    Globulin 01/28/2021 2.7     Ventricular Rate 01/28/2021 71     Atrial Rate 01/28/2021 71     P-R Interval 01/28/2021 138     QRS Duration 01/28/2021 120     Q-T Interval 01/28/2021 446     QTc Calculation (Bazett) 01/28/2021 484     P Axis 01/28/2021 40     R Axis 01/28/2021 -55     T Axis 01/28/2021 34     Diagnosis 01/28/2021 Normal sinus rhythmRight bundle branch blockLeft anterior fascicular block Bifascicular block Voltage criteria for left ventricular hypertrophyAbnormal ECGConfirmed by AMANDA JJ MD ([id number]) on 1/28/2021 1:04:41 PM     Protime 01/28/2021 12.9     INR 01/28/2021 1.11    Orders Only on 01/25/2021   Component Date Value    CREATININE 01/25/2021 1.1     GFR Non- 01/25/2021 50*    GFR  01/25/2021 >60     BUN 01/25/2021 23*   Office Visit on 01/25/2021   Component Date Value    SARS-CoV-2 01/25/2021 Not Detected    Procedure visit on 01/11/2021   Component Date Value    Left Ventricular Ejectio* 01/11/2021 65     LVEF MODALITY 01/11/2021 ECHO    Orders Only on 11/03/2020   Component Date Value    Vit D, 25-Hydroxy 11/03/2020 61.6     Vitamin B-12 11/03/2020 445     TSH 11/03/2020 1.63     Cholesterol, Total 11/03/2020 139     Triglycerides 11/03/2020 65     HDL 11/03/2020 56     LDL Calculated 11/03/2020 70     VLDL Cholesterol Calcula* 11/03/2020 13     Sodium 11/03/2020 141     Potassium 11/03/2020 4.9     Chloride 11/03/2020 102     CO2 11/03/2020 27     Anion Gap 11/03/2020 12     Glucose 11/03/2020 87     BUN 11/03/2020 26*    CREATININE 11/03/2020 1.1     GFR Non- 11/03/2020 50*    GFR  11/03/2020 >60     Calcium 11/03/2020 9.8     Total Protein 11/03/2020 6.8     Albumin 11/03/2020 4.4     Albumin/Globulin Ratio 11/03/2020 1.8     Total Bilirubin 11/03/2020 0.4     Alkaline Phosphatase 11/03/2020 57     ALT 11/03/2020 9*    AST 11/03/2020 15     Globulin 11/03/2020 2.4     WBC 11/03/2020 6.4     RBC 11/03/2020 4.20     Hemoglobin 11/03/2020 12.0     Hematocrit 11/03/2020 37.3     MCV 11/03/2020 88.7     MCH 11/03/2020 28.5     MCHC 11/03/2020 32.1     RDW 11/03/2020 15.4     Platelets 19/30/6446 209     MPV 11/03/2020 8.3     Neutrophils % 11/03/2020 72.5     Lymphocytes % 11/03/2020 20.3     Monocytes % 11/03/2020 5.4     Eosinophils % 11/03/2020 1. 2     Basophils % 11/03/2020 0.6     Neutrophils Absolute 11/03/2020 4.6     Lymphocytes Absolute 11/03/2020 1.3     Monocytes Absolute 11/03/2020 0.3     Eosinophils Absolute 11/03/2020 0.1     Basophils Absolute 11/03/2020 0.0    Office Visit on 11/03/2020   Component Date Value    Microalbumin, Random Uri* 11/03/2020 1.80     Creatinine, Ur 11/03/2020 183.9     Microalbumin Creatinine * 11/03/2020 9.8     Color, UA 11/03/2020 yellow     Clarity, UA 11/03/2020 clear     Glucose, UA POC 11/03/2020 1,000     Bilirubin, UA 11/03/2020 neg     Ketones, UA 11/03/2020 neg     Spec Grav, UA 11/03/2020 1.025     Blood, UA POC 11/03/2020 neg     pH, UA 11/03/2020 5.0     Protein, UA POC 11/03/2020 neg     Urobilinogen, UA 11/03/2020 0.2     Leukocytes, UA 11/03/2020 neg     Nitrite, UA 11/03/2020 neg     Hemoglobin A1C 11/03/2020 6.5        EXAM: CT ANGIOGRAPHY CHEST/ABDOMEN/PELVIS WITHOUT AND WITH CONTRAST 2/5/21       INDICATION: Aortic stenosis. Preprocedure planning for TAVR       COMPARISON: None       TECHNIQUE: Helically acquired axial unenhanced images were obtained through the chest, abdomen, and pelvis.  Following administration of 100 mL [id number]-073 IV, helically acquired axial retrospectively ECG gated thin section CTA images were obtained    through the chest, abdomen, and pelvis. 3-D volume rendered and maximum intensity projection reformats were reconstructed on the [employer] workstation.   Up-to-date CT equipment and radiation dose reduction techniques were employed.       CTA CHEST WITH AND WITHOUT CONTRAST:       FINDINGS:       HEART AND PERICARDIUM: Normal heart size. No pericardial effusion. Ectatic thoracic aorta as previously described.       VALVES: Bicuspid aortic valve.  Mitral valve appears unremarkable.       MEASUREMENTS:            Annulus Cross-section: 21.1 x 18.6 mm        Annulus Area: 316 mm?        Annulus Circumference: 63.1 mm        Aortic Root at SOV: Bicuspid aortic valve with long axis measurement of 34.7 and short axis measurement of 27.3 mm.        Aortic Root at STJ: 32.7 x 35.3 mm        Ascending Aorta: 46.0 x 44.2 mm        Left Coronary Height: 16.4 mm        Right Coronary Height: 16.2 mm        Aortic Valve to Innominate Art: 124 mm       THORACIC AORTA AND GREAT VESSELS: Ectatic and mildly aneurysmal ascending thoracic aorta with fusiform appearance with maximum measurement of the ascending thoracic aorta 4.6 cm. Tapers at the aortic arch and the descending thoracic aorta is normal in    caliber. Minimal wall calcification of the aortic arch. No more than 5% circumferential calcification. Left vertebral artery arises from the aortic arch. Visualized common carotid arteries and bifurcations appear to be intact without evidence of    stenosis. The bilateral subclavian arteries appear to be widely patent.       MEDIASTINUM : There is a left low-density thyroid nodule measuring approximately 1.5 cm in long dimension. No axillary lymphadenopathy. No significant clavicular lymphadenopathy.       PLEURA: No pleural abnormality.       LUNGS AND AIRWAYS: No effusion or significant consolidation. No suspicious pulmonary nodule.       BONES: No acute osseous abnormality.               CTA ABDOMEN AND PELVIS WITH AND WITHOUT CONTRAST:       FINDINGS:       ABDOMINAL AND PELVIC ARTERIES: Abdominal aorta is normal in caliber. No stenosis or significant calcification. Celiac and superior mesenteric arteries are unremarkable. The renal arteries are widely patent. Mild calcification of the proximal common iliac    arteries bilaterally without evidence of stenosis.  External iliac arteries and femoral arteries appear to be widely patent.       MEASUREMENTS (minimum cross-sectional diameters):            AORTA: 15.1 x 14.0 mm        R TESFAYE: 7.5 x 8.2 mm        R EIA: 7.3 x 6.4 mm        R CFA: 7.2 x 8.1 mm        L TESFAYE: 8.4 x 8.4 mm        L EIA: 6.6 x 7.1 mm        L CFA: 7.3 x 7.5 mm       LIVER: No focal liver abnormality. Gallbladder is unremarkable.       GALLBLADDER AND BILE DUCTS: No focal inflammation.       SPLEEN: Normal.       ADRENAL GLANDS: No adrenal abnormality.       KIDNEYS: Left kidney is normal. No hydronephrosis. In the lateral right kidney there is a 1 cm intermediate density lesion possibly a complex cyst with attenuation of 72 Hounsfield units. Further evaluation with CT renal mass protocol recommended. Early    renal cell cancer cannot be excluded.       BOWEL: Nonobstructive bowel gas pattern. Normal appendix.       PERITONEUM/RETROPERITONEUM: No free air. No free fluid.       ABDOMINAL WALL: Normal.       URINARY BLADDER: Normal.       REPRODUCTIVE ORGANS: Leiomyomatous uterus with numerous large calcified fibroids.       BONES: No suspicious osseous abnormality.           Impression       1. 1 cm possibly enhancing nodule in the right kidney. Follow-up CT renal mass protocol with and without contrast is recommended. A small renal cell carcinoma cannot be entirely excluded. 2. Aortic measurements as described. 3. Bicuspid aortic valve. 4. Fusiform mild aneurysmal dilation of the ascending thoracic aorta to 4.6 cm.   5. Leiomyomatous uterus. .       Assessment/Plan     1. Type 2 diabetes mellitus without complication, with long-term current use of insulin (HCC)  - Hemoglobin A1c of 6.6% shows diabetes is controlled  -Continue same medications  -Discontinue Januvia due to cost  -Limit carbohydrates to 45 grams with meals and 15 grams with snacks  -monitor blood sugars  -Regular aerobic exercise  -POCT glycosylated hemoglobin (Hb A1C)    2. Essential hypertension  -stable  -Continue same medications  -Low sodium diet  -Regular aerobic exercise    3. Mixed hyperlipidemia  -stable  -Continue same medications  -Low fat, low cholesterol diet  -Regular aerobic exercise    4.  Gastroesophageal reflux disease, unspecified whether esophagitis present  -stable  -Continue same medications  -Decrease caffeine, avoid spicy foods, avoid tomato based foods  -Eat small meals instead of large meals  -Wait 2-3 hours after eating before lying down    5. Moderate episode of recurrent major depressive disorder (HCC)  -stable  -Continue same medications    6. Anxiety  -stable  -Continue same medications    7. Anemia, unspecified type  - CBC; Future  - Iron and TIBC; Future    8. Thyroid nodule  -seen on CTA chest abdomen   - US THYROID; Future    9. Right renal mass  -seen on CTA chest abdomen  - CT KIDNEY W WO CONTRAST; Future        Discussed medications with patient, who voiced understanding of their use and indications. All questions answered. Return in about 4 months (around 7/3/2021) for diabetes, hypertension, hyperlipidemia, GERD, depression, anxiety, vitamin D, and vitamin B12.

## 2021-03-04 DIAGNOSIS — I10 ESSENTIAL HYPERTENSION: ICD-10-CM

## 2021-03-04 RX ORDER — HYDROCHLOROTHIAZIDE 25 MG/1
25 TABLET ORAL DAILY
Qty: 90 TABLET | Refills: 1 | Status: ON HOLD | OUTPATIENT
Start: 2021-03-04 | End: 2021-03-24 | Stop reason: HOSPADM

## 2021-03-04 NOTE — TELEPHONE ENCOUNTER
Medication:   Requested Prescriptions     Pending Prescriptions Disp Refills    hydroCHLOROthiazide (HYDRODIURIL) 25 MG tablet 90 tablet 1     Sig: Take 1 tablet by mouth daily     Last Filled: 9.14.20    Last appt: 3/3/2021   Next appt: 7/6/2021    Last OARRS: No flowsheet data found.

## 2021-03-04 NOTE — TELEPHONE ENCOUNTER
Requested Prescriptions     Pending Prescriptions Disp Refills    losartan (COZAAR) 100 MG tablet 90 tablet 3     Sig: Take 1 table daily          Number: 90    Refills: 3    Last Office Visit: 01/05/2021    Next Office Visit: ALEXANDRE

## 2021-03-05 RX ORDER — LOSARTAN POTASSIUM 100 MG/1
TABLET ORAL
Qty: 90 TABLET | Refills: 3 | Status: ON HOLD | OUTPATIENT
Start: 2021-03-05 | End: 2021-03-24 | Stop reason: HOSPADM

## 2021-03-08 ENCOUNTER — HOSPITAL ENCOUNTER (OUTPATIENT)
Dept: PREADMISSION TESTING | Age: 66
Discharge: HOME | End: 2021-03-12
Payer: COMMERCIAL

## 2021-03-08 ENCOUNTER — HOSPITAL ENCOUNTER (OUTPATIENT)
Dept: GENERAL RADIOLOGY | Age: 66
Discharge: HOME | End: 2021-03-08
Payer: COMMERCIAL

## 2021-03-08 ENCOUNTER — OFFICE VISIT (OUTPATIENT)
Dept: PRIMARY CARE CLINIC | Age: 66
End: 2021-03-08
Payer: COMMERCIAL

## 2021-03-08 VITALS
HEART RATE: 63 BPM | BODY MASS INDEX: 35.02 KG/M2 | DIASTOLIC BLOOD PRESSURE: 95 MMHG | TEMPERATURE: 98.5 F | WEIGHT: 210.2 LBS | SYSTOLIC BLOOD PRESSURE: 132 MMHG | RESPIRATION RATE: 16 BRPM | OXYGEN SATURATION: 100 % | HEIGHT: 65 IN

## 2021-03-08 DIAGNOSIS — Z01.818 PREOP EXAMINATION: Primary | ICD-10-CM

## 2021-03-08 DIAGNOSIS — E11.9 TYPE 2 DIABETES MELLITUS WITHOUT COMPLICATION, WITH LONG-TERM CURRENT USE OF INSULIN (HCC): ICD-10-CM

## 2021-03-08 DIAGNOSIS — Z79.4 TYPE 2 DIABETES MELLITUS WITHOUT COMPLICATION, WITH LONG-TERM CURRENT USE OF INSULIN (HCC): ICD-10-CM

## 2021-03-08 LAB
A/G RATIO: 1.3 (ref 1.1–2.2)
ABO/RH: NORMAL
ALBUMIN SERPL-MCNC: 3.9 G/DL (ref 3.4–5)
ALP BLD-CCNC: 55 U/L (ref 40–129)
ALT SERPL-CCNC: 12 U/L (ref 10–40)
ANION GAP SERPL CALCULATED.3IONS-SCNC: 9 MMOL/L (ref 3–16)
ANTIBODY SCREEN: NORMAL
APTT: 20.1 SEC (ref 24.2–36.2)
AST SERPL-CCNC: 20 U/L (ref 15–37)
BACTERIA: ABNORMAL /HPF
BILIRUB SERPL-MCNC: 0.3 MG/DL (ref 0–1)
BILIRUBIN URINE: NEGATIVE
BLOOD, URINE: NEGATIVE
BUN BLDV-MCNC: 27 MG/DL (ref 7–20)
CALCIUM SERPL-MCNC: 9.6 MG/DL (ref 8.3–10.6)
CHLORIDE BLD-SCNC: 102 MMOL/L (ref 99–110)
CHOLESTEROL, TOTAL: 138 MG/DL (ref 0–199)
CLARITY: CLEAR
CO2: 28 MMOL/L (ref 21–32)
COLOR: YELLOW
CREAT SERPL-MCNC: 1.3 MG/DL (ref 0.6–1.2)
EKG ATRIAL RATE: 64 BPM
EKG DIAGNOSIS: NORMAL
EKG P AXIS: 4 DEGREES
EKG P-R INTERVAL: 120 MS
EKG Q-T INTERVAL: 452 MS
EKG QRS DURATION: 136 MS
EKG QTC CALCULATION (BAZETT): 466 MS
EKG R AXIS: -50 DEGREES
EKG T AXIS: 65 DEGREES
EKG VENTRICULAR RATE: 64 BPM
EPITHELIAL CELLS, UA: ABNORMAL /HPF (ref 0–5)
FIBRINOGEN: 207 MG/DL (ref 200–397)
GFR AFRICAN AMERICAN: 50
GFR NON-AFRICAN AMERICAN: 41
GLOBULIN: 3 G/DL
GLUCOSE BLD-MCNC: 96 MG/DL (ref 70–99)
GLUCOSE URINE: >=1000 MG/DL
HCT VFR BLD CALC: 36.1 % (ref 36–48)
HDLC SERPL-MCNC: 49 MG/DL (ref 40–60)
HEMOGLOBIN: 11.3 G/DL (ref 12–16)
INR BLD: 0.97 (ref 0.86–1.14)
KETONES, URINE: NEGATIVE MG/DL
LDL CHOLESTEROL CALCULATED: 77 MG/DL
LEUKOCYTE ESTERASE, URINE: NEGATIVE
MAGNESIUM: 1.9 MG/DL (ref 1.8–2.4)
MCH RBC QN AUTO: 25.5 PG (ref 26–34)
MCHC RBC AUTO-ENTMCNC: 31.1 G/DL (ref 31–36)
MCV RBC AUTO: 81.9 FL (ref 80–100)
MICROSCOPIC EXAMINATION: YES
NITRITE, URINE: POSITIVE
PDW BLD-RTO: 16.4 % (ref 12.4–15.4)
PH UA: 6.5 (ref 5–8)
PLATELET # BLD: 189 K/UL (ref 135–450)
PMV BLD AUTO: 8.2 FL (ref 5–10.5)
POTASSIUM SERPL-SCNC: 4.5 MMOL/L (ref 3.5–5.1)
PROTEIN UA: ABNORMAL MG/DL
PROTHROMBIN TIME: 11.2 SEC (ref 10–13.2)
RBC # BLD: 4.41 M/UL (ref 4–5.2)
RBC UA: ABNORMAL /HPF (ref 0–4)
SARS-COV-2: NOT DETECTED
SODIUM BLD-SCNC: 139 MMOL/L (ref 136–145)
SPECIFIC GRAVITY UA: 1.02 (ref 1–1.03)
TOTAL PROTEIN: 6.9 G/DL (ref 6.4–8.2)
TRIGL SERPL-MCNC: 60 MG/DL (ref 0–150)
URINE TYPE: ABNORMAL
UROBILINOGEN, URINE: 0.2 E.U./DL
VLDLC SERPL CALC-MCNC: 12 MG/DL
WBC # BLD: 5.8 K/UL (ref 4–11)
WBC UA: ABNORMAL /HPF (ref 0–5)

## 2021-03-08 PROCEDURE — 99211 OFF/OP EST MAY X REQ PHY/QHP: CPT | Performed by: NURSE PRACTITIONER

## 2021-03-08 PROCEDURE — 83036 HEMOGLOBIN GLYCOSYLATED A1C: CPT

## 2021-03-08 PROCEDURE — 85730 THROMBOPLASTIN TIME PARTIAL: CPT

## 2021-03-08 PROCEDURE — 86900 BLOOD TYPING SEROLOGIC ABO: CPT

## 2021-03-08 PROCEDURE — 87086 URINE CULTURE/COLONY COUNT: CPT

## 2021-03-08 PROCEDURE — 81001 URINALYSIS AUTO W/SCOPE: CPT

## 2021-03-08 PROCEDURE — G8428 CUR MEDS NOT DOCUMENT: HCPCS | Performed by: NURSE PRACTITIONER

## 2021-03-08 PROCEDURE — 86850 RBC ANTIBODY SCREEN: CPT

## 2021-03-08 PROCEDURE — G8417 CALC BMI ABV UP PARAM F/U: HCPCS | Performed by: NURSE PRACTITIONER

## 2021-03-08 PROCEDURE — 80053 COMPREHEN METABOLIC PANEL: CPT

## 2021-03-08 PROCEDURE — 85384 FIBRINOGEN ACTIVITY: CPT

## 2021-03-08 PROCEDURE — 86901 BLOOD TYPING SEROLOGIC RH(D): CPT

## 2021-03-08 PROCEDURE — 85610 PROTHROMBIN TIME: CPT

## 2021-03-08 PROCEDURE — 85027 COMPLETE CBC AUTOMATED: CPT

## 2021-03-08 PROCEDURE — 80061 LIPID PANEL: CPT

## 2021-03-08 PROCEDURE — 93010 ELECTROCARDIOGRAM REPORT: CPT | Performed by: INTERNAL MEDICINE

## 2021-03-08 PROCEDURE — 87641 MR-STAPH DNA AMP PROBE: CPT

## 2021-03-08 PROCEDURE — 71046 X-RAY EXAM CHEST 2 VIEWS: CPT

## 2021-03-08 PROCEDURE — 83735 ASSAY OF MAGNESIUM: CPT

## 2021-03-08 PROCEDURE — 93005 ELECTROCARDIOGRAM TRACING: CPT | Performed by: THORACIC SURGERY (CARDIOTHORACIC VASCULAR SURGERY)

## 2021-03-08 RX ORDER — ACETAMINOPHEN 500 MG
1000 TABLET ORAL ONCE
Status: CANCELLED | OUTPATIENT
Start: 2021-03-12

## 2021-03-08 RX ORDER — DULAGLUTIDE 0.75 MG/.5ML
INJECTION, SOLUTION SUBCUTANEOUS
Qty: 6 ML | Refills: 1 | Status: ON HOLD | OUTPATIENT
Start: 2021-03-08 | End: 2021-03-24 | Stop reason: HOSPADM

## 2021-03-08 RX ORDER — GABAPENTIN 300 MG/1
900 CAPSULE ORAL ONCE
Status: CANCELLED | OUTPATIENT
Start: 2021-03-12

## 2021-03-08 NOTE — PROGRESS NOTES
Rhoda Romero RN   Registered Nurse      Progress Notes   Incomplete   Date of Service:  3/8/2021  9:00 AM         Related encounter: PRETESTING OPEN HEART from 3/8/2021 in [address] [place] all  [x]Manual[]Template[]Copied    Added by:  [x]Kristyn Collado RN    []Vicky for details  910am,  patient arrived, placed into room 29, alert oriented x 4, denies c/o pain or discomfort. On room air. Height weight done.       915am Dr. Jay Lamar, anesthesia here.     925am ekg done and Dr. Jay Lamar reviewed and signed.     925am lab here.     930am  Patient unable to void just yet; taking water at this time.      10am PAT visit done, patient reviewed and signed consent.      10:20am urine and urine C& S obtained, mrsa swab obtained and sent to lab.      1025am pre op instructions reviewed with patient. Call placed to Sheree Mehta RN; she will be in.     1030am Sheree Mehta RN here with patient. 1120am, pre op instructions reviewed and given to patient. Escorted patient for her chest xray and directed her to the pharmacy she will need to go to after chest xray.  Patient acknowledges  Understanding of pre op instructions.

## 2021-03-08 NOTE — TELEPHONE ENCOUNTER
Medication:   Requested Prescriptions     Pending Prescriptions Disp Refills    Dulaglutide (TRULICITY) 0.61 XC/3.0FZ SOPN 2 mL 5     Last Filled: 11.2.20    Last appt: 3/3/2021   Next appt: 7/6/2021    Last OARRS: No flowsheet data found.

## 2021-03-08 NOTE — PROGRESS NOTES
[facility]                          Date of Procedure 03/12/2021       All patients having surgery or anesthesia are required to be Covid tested. You will need to quarantine from the time you are tested until your surgery. PRIOR TO PROCEDURE DATE:  1. Please follow any guidelines/instructions prior to your procedure as advised by your surgeon. 2. Arrange for someone to drive you home and be with you for the first 24 hours after discharge for your safety after your procedure for which you received sedation. Ensure it is someone we can share information with regarding your discharge. 3. You must contact your surgeon for instructions IF:   You are taking any blood thinners, aspirin, anti-inflammatory or vitamin E.   There is a change in your physical condition such as a cold, fever, rash, cuts, sores or any other infection, especially near your surgical site. 4. Do not drink alcohol the day before or day of your procedure. 5. A Pre-op History and Physical for surgery MUST be completed by your Physician or Urgent Care within 30 days of your procedure date. Please bring a copy with you on the day of your procedure and along with any other testing performed. THE DAY OF YOUR PROCEDURE:  1. Follow instructions for ARRIVAL TIME as DIRECTED BY YOUR SURGEON. 2. Enter the MAIN entrance from [address] and follow the signs to the free [employer] or [employer] parking (offered free of charge 6am-5pm). 3. Enter the Main Entrance of the hospital (do NOT enter from the lower level of the parking garage). Upon entrance, check in with the  at the main desk on your left. If no one is available at the desk, proceed into the [facility] Waiting Room and go through the door directly into the [facility]. There is a Check-in desk ACROSS from Room 5 (marked with a sign hanging from the ceiling).  The phone number for the surgery center is [phone].    4. DO NOT EAT ANYTHING eight hours prior to arrival for surgery. May have 8 ounces of water 4 hours prior to arrival for surgery. NOTE: ALL Gastric, Bariatric and Bowel surgery patients MUST follow their surgeon's instructions. 5.  MEDICATIONS    Take the following medications with a SMALL sip of water: none   Use your usual dose of inhalers the morning of surgery. BRING your rescue inhaler with you to hospital.    Anesthesia does NOT want you to take insulin the morning of surgery. They will control your blood sugar while you are at the hospital. Please contact your ordering physician for instructions regarding your insulin the night before your procedure. If you have an insulin pump, please keep it set on basal rate. 6.  Do not swallow water when brushing teeth. No gum, candy, mints or ice chips. Refrain from smoking or at least decrease the amount. 7.  Dress in loose, comfortable clothing appropriate for redressing after your procedure. Do not wear jewelry (including body piercings), make-up (especially NO eye make-up), fingernail polish (NO toenail polish if foot/leg surgery), lotion, powders or metal hairclips. 8.  Dentures, glasses, or contacts will need to be removed before your procedure. Bring cases for your glasses, contacts, dentures, or hearing aids to protect them while you are in surgery. 9.  If you use a CPAP, please bring it with you on the day of your procedure. 10. We recommend that valuable personal  belongings, such as cash, cell phones, e-tablets or jewelry, be left at home during your stay. The hospital will not be responsible for valuables that are not secured in the hospital safe. However, if your insurance requires a co-pay, you may want to bring a method of payment, i.e. Check or credit card, if you wish to pay your co-pay the day of surgery. 11. If you are to stay overnight, you may bring a bag with personal items.  Please have any large items you may need brought in by your family after your arrival to your hospital room. 12. If you have a Living Will or Durable Power of , please bring a copy on the day of your procedure. 15. With your permission, one family member may accompany you while you are being prepared for surgery. Once you are ready, additional family members may join you. HOW WE KEEP YOU SAFE and WORK TO PREVENT SURGICAL SITE INFECTIONS:  1. Health care workers should always check your ID bracelet to verify your name and birth date. You will be asked many times to state your name, date of birth, and allergies. 2. Health care workers should always clean their hands with soap or alcohol gel before providing care to you. It is okay to ask anyone if they cleaned their hands before they touch you. 3. You will be actively involved in verifying the type of procedure you are having and ensuring the correct surgical site. This will be confirmed multiple times prior to your procedure. Do NOT tiff your surgery site UNLESS instructed to by your surgeon. 4. Do not shave or wax for 72 hours prior to procedure near your operative site. Shaving with a razor can irritate your skin and make it easier to develop an infection. On the day of your procedure, any hair that needs to be removed near the surgical site will be clipped by a healthcare worker using a special clippers designed to avoid skin irritation. 5. When you are in the operating room, your surgical site will be cleansed with a special soap, and in most cases, you will be given an antibiotic before the surgery begins. What to expect AFTER YOUR PROCEDURE:  1. Immediately following your procedure, your will be taken to the PACU for the first phase of your recovery. Your nurse will help you recover from any potential side effects of anesthesia, such as extreme drowsiness, changes in your vital signs or breathing patterns.  Nausea, headache, muscle aches, or sore throat may also occur after anesthesia. Your nurse will help you manage these potential side effects. 2. For comfort and safety, arrange to have someone at home with you for the first 24 hours after discharge. 3. You and your family will be given written instructions about your diet, activity, dressing care, medications, and return visits. 4. Once at home, should issues with nausea, pain, or bleeding occur, or should you notice any signs of infection, you should call your surgeon. 5. Always clean your hands before and after caring for your wound. Do not let your family touch your surgery site without cleaning their hands. 6. Narcotic pain medications can cause significant constipation. You may want to add a stool softener to your postoperative medication schedule or speak to your surgeon on how best to manage this SIDE EFFECT. SPECIAL INSTRUCTIONS see educational packets given to you. Thank you for allowing us to care for you. We strive to exceed your expectations in the overall delivery of care and service provided to you and your family. If you need to contact us for any reason, please call us at [phone]. Instructions reviewed and copy given to patient during preadmission testing visit. Carolyn Jorgensen. 3/8/2021 .11:02 AM      ADDITIONAL EDUCATIONAL INFORMATION REVIEWED / PROVIDED TO YOU AND YOUR FAMILY:  Yes Taking Control of Your Pain   Yes FAQs about Surgical Site Infections    Yes Cardiac Surgery Instructions for AM admission to the hospital  Yes Bactroban® Nasal Ointment Instructions for Cardiac Surgery  Yes Learning About Preventing Pressure Sores  Yes Cardiac Surgery Preoperative Hibiclens® Bathing Instructions  Yes Your Care after Heart Surgery Binder    Yes Hibiclens® Bathing Instructions  No Antibacterial Soap

## 2021-03-08 NOTE — PROGRESS NOTES
910am,  patient arrived, placed into room 29, alert oriented x 4, denies c/o pain or discomfort. On room air. Height weight done. 915am Dr. Jose Lujan, anesthesia here. 925am ekg done and Dr. Jose Lujan reviewed and signed. 925am lab here. 930am  Patient unable to void just yet; taking water at this time. 10am PAT visit done, patient reviewed and signed consent. 10:20am urine and urine C& S obtained, mrsa swab obtained and sent to lab.     1025am pre op instructions reviewed with patient. Call placed to Gagan Camacho RN; she will be in.    1030am Gagan Camacho RN here with patient.

## 2021-03-08 NOTE — PROGRESS NOTES
PRE-OP CARDIAC SURGERY TEACHING    Reviewed the upcoming operation with patient. Patient educated, using the teach back method. Reviewed the open heart teaching binder with the patient, specifically patient plan of care, goals and expectations, diet and exercise, medications and discharge instructions. Instructed that after surgery nurses will be making frequent skin assessments particularly at the bony prominences. Gave patient prescription for Bactroban Nasal ointment 2% 1 gram with instructions for administration. Hibiclens was also given with instructions for administration pre-operatively. She is agreeable to home health at discharge. She will be staying at a friend's house and will bring address with her when she comes back for her operation. Discussed the option of participating in a Multi-Modal Pain Management Study for Cardiac Procedures. The patient's chart was reviewed and inclusion criteria was met. The study was explained in detail and all questions were answered. The patient is agreeable to participate. Consent was signed and preoperative orders were placed in the chart. All patient questions were answered pertaining to upcoming surgery and had no further questions.      Tomasz Bergeron, LIZZIE  3/8/2021  10:08 AM

## 2021-03-09 LAB
ESTIMATED AVERAGE GLUCOSE: 148.5 MG/DL
HBA1C MFR BLD: 6.8 %
MRSA SCREEN RT-PCR: NORMAL

## 2021-03-09 RX ORDER — CEFAZOLIN SODIUM 2 G/50ML
2000 SOLUTION INTRAVENOUS ONCE
Status: CANCELLED | OUTPATIENT
Start: 2021-03-19 | End: 2021-03-09

## 2021-03-09 RX ORDER — CHLORHEXIDINE GLUCONATE 0.12 MG/ML
15 RINSE ORAL ONCE
Status: CANCELLED | OUTPATIENT
Start: 2021-03-19 | End: 2021-03-09

## 2021-03-09 RX ORDER — SODIUM CHLORIDE, SODIUM LACTATE, POTASSIUM CHLORIDE, CALCIUM CHLORIDE 600; 310; 30; 20 MG/100ML; MG/100ML; MG/100ML; MG/100ML
INJECTION, SOLUTION INTRAVENOUS CONTINUOUS
Status: CANCELLED | OUTPATIENT
Start: 2021-03-19

## 2021-03-09 RX ORDER — SODIUM CHLORIDE 0.9 % (FLUSH) 0.9 %
10 SYRINGE (ML) INJECTION PRN
Status: CANCELLED | OUTPATIENT
Start: 2021-03-19

## 2021-03-09 RX ORDER — CHLORHEXIDINE GLUCONATE 4 G/100ML
SOLUTION TOPICAL ONCE
Status: CANCELLED | OUTPATIENT
Start: 2021-03-19 | End: 2021-03-09

## 2021-03-09 RX ORDER — SODIUM CHLORIDE 0.9 % (FLUSH) 0.9 %
10 SYRINGE (ML) INJECTION EVERY 12 HOURS SCHEDULED
Status: CANCELLED | OUTPATIENT
Start: 2021-03-19

## 2021-03-09 NOTE — PROGRESS NOTES
CBC, U/A, HGB A1C, AND PTT routed to Dr. Christina Blancas in epic. EKG reviewed and signed off by Dr. Nica Erickson. Spoke with Joel Mckinnon in lab. She will send off tube of urine which was in refrigerator  to [facility] for culture.

## 2021-03-10 PROBLEM — D64.9 ANEMIA: Status: ACTIVE | Noted: 2021-03-10

## 2021-03-10 PROBLEM — E04.1 THYROID NODULE: Status: ACTIVE | Noted: 2021-03-10

## 2021-03-10 PROBLEM — N28.89 RIGHT RENAL MASS: Status: ACTIVE | Noted: 2021-03-10

## 2021-03-10 LAB — URINE CULTURE, ROUTINE: NORMAL

## 2021-03-15 ENCOUNTER — OFFICE VISIT (OUTPATIENT)
Dept: PRIMARY CARE CLINIC | Age: 66
End: 2021-03-15
Payer: COMMERCIAL

## 2021-03-15 DIAGNOSIS — Z01.818 PREOP EXAMINATION: Primary | ICD-10-CM

## 2021-03-15 LAB — SARS-COV-2: NOT DETECTED

## 2021-03-15 PROCEDURE — 99211 OFF/OP EST MAY X REQ PHY/QHP: CPT | Performed by: NURSE PRACTITIONER

## 2021-03-15 PROCEDURE — G8428 CUR MEDS NOT DOCUMENT: HCPCS | Performed by: NURSE PRACTITIONER

## 2021-03-15 PROCEDURE — G8417 CALC BMI ABV UP PARAM F/U: HCPCS | Performed by: NURSE PRACTITIONER

## 2021-03-18 ENCOUNTER — ANESTHESIA EVENT (OUTPATIENT)
Dept: OPERATING ROOM | Age: 66
DRG: 220 | End: 2021-03-18
Payer: COMMERCIAL
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
## 2021-03-19 ENCOUNTER — ANESTHESIA (OUTPATIENT)
Dept: OPERATING ROOM | Age: 66
DRG: 220 | End: 2021-03-19
Payer: COMMERCIAL

## 2021-03-19 ENCOUNTER — APPOINTMENT (OUTPATIENT)
Dept: GENERAL RADIOLOGY | Age: 66
DRG: 220 | End: 2021-03-19
Attending: THORACIC SURGERY (CARDIOTHORACIC VASCULAR SURGERY)
Payer: COMMERCIAL

## 2021-03-19 ENCOUNTER — HOSPITAL ENCOUNTER (INPATIENT)
Age: 66
LOS: 5 days | Discharge: HOME | DRG: 220 | End: 2021-03-24
Attending: THORACIC SURGERY (CARDIOTHORACIC VASCULAR SURGERY) | Admitting: THORACIC SURGERY (CARDIOTHORACIC VASCULAR SURGERY)
Payer: COMMERCIAL

## 2021-03-19 VITALS
SYSTOLIC BLOOD PRESSURE: 97 MMHG | TEMPERATURE: 97.9 F | DIASTOLIC BLOOD PRESSURE: 62 MMHG | RESPIRATION RATE: 12 BRPM | OXYGEN SATURATION: 100 %

## 2021-03-19 DIAGNOSIS — I35.0 AORTIC VALVE STENOSIS, ETIOLOGY OF CARDIAC VALVE DISEASE UNSPECIFIED: ICD-10-CM

## 2021-03-19 DIAGNOSIS — E11.9 TYPE 2 DIABETES MELLITUS WITHOUT COMPLICATION, WITH LONG-TERM CURRENT USE OF INSULIN (HCC): ICD-10-CM

## 2021-03-19 DIAGNOSIS — Z79.4 TYPE 2 DIABETES MELLITUS WITHOUT COMPLICATION, WITH LONG-TERM CURRENT USE OF INSULIN (HCC): ICD-10-CM

## 2021-03-19 DIAGNOSIS — Q23.1 BICUSPID CARDIAC VALVE: ICD-10-CM

## 2021-03-19 PROBLEM — Q23.81 AORTIC STENOSIS DUE TO BICUSPID AORTIC VALVE: Status: ACTIVE | Noted: 2021-03-19

## 2021-03-19 PROBLEM — Q23.0 AORTIC STENOSIS DUE TO BICUSPID AORTIC VALVE: Status: ACTIVE | Noted: 2021-03-19

## 2021-03-19 LAB
ABO/RH: NORMAL
ACTIVATED CLOTTING TIME: 126 SEC (ref 99–130)
ACTIVATED CLOTTING TIME: 134 SEC (ref 99–130)
ACTIVATED CLOTTING TIME: 574 SEC (ref 99–130)
ACTIVATED CLOTTING TIME: 807 SEC (ref 99–130)
ACTIVATED CLOTTING TIME: 97 SEC (ref 99–130)
ACTIVATED CLOTTING TIME: >1005 SEC (ref 99–130)
ACTIVATED CLOTTING TIME: >1005 SEC (ref 99–130)
ANION GAP SERPL CALCULATED.3IONS-SCNC: 9 MMOL/L (ref 3–16)
ANTIBODY SCREEN: NORMAL
APTT: 30.7 SEC (ref 24.2–36.2)
BASE EXCESS ARTERIAL: -3 (ref -3–3)
BASE EXCESS ARTERIAL: -3 (ref -3–3)
BASE EXCESS ARTERIAL: -6 (ref -3–3)
BASE EXCESS ARTERIAL: 0 (ref -3–3)
BASE EXCESS ARTERIAL: 1 (ref -3–3)
BASE EXCESS ARTERIAL: 2 (ref -3–3)
BASE EXCESS ARTERIAL: 2 (ref -3–3)
BASE EXCESS ARTERIAL: 3 (ref -3–3)
BLOOD BANK DISPENSE STATUS: NORMAL
BLOOD BANK PRODUCT CODE: NORMAL
BPU ID: NORMAL
BUN BLDV-MCNC: 29 MG/DL (ref 7–20)
CALCIUM IONIZED: 0.97 MMOL/L (ref 1.12–1.32)
CALCIUM IONIZED: 1.07 MMOL/L (ref 1.12–1.32)
CALCIUM IONIZED: 1.08 MMOL/L (ref 1.12–1.32)
CALCIUM IONIZED: 1.14 MMOL/L (ref 1.12–1.32)
CALCIUM IONIZED: 1.23 MMOL/L (ref 1.12–1.32)
CALCIUM IONIZED: 1.26 MMOL/L (ref 1.12–1.32)
CALCIUM SERPL-MCNC: 9 MG/DL (ref 8.3–10.6)
CHLORIDE BLD-SCNC: 105 MMOL/L (ref 99–110)
CHOLESTEROL, TOTAL: 129 MG/DL (ref 0–199)
CO2: 24 MMOL/L (ref 21–32)
CREAT SERPL-MCNC: 1.3 MG/DL (ref 0.6–1.2)
DESCRIPTION BLOOD BANK: NORMAL
GFR AFRICAN AMERICAN: 50
GFR NON-AFRICAN AMERICAN: 41
GLUCOSE BLD-MCNC: 102 MG/DL (ref 70–99)
GLUCOSE BLD-MCNC: 105 MG/DL (ref 70–99)
GLUCOSE BLD-MCNC: 106 MG/DL (ref 70–99)
GLUCOSE BLD-MCNC: 106 MG/DL (ref 70–99)
GLUCOSE BLD-MCNC: 109 MG/DL (ref 70–99)
GLUCOSE BLD-MCNC: 117 MG/DL (ref 70–99)
GLUCOSE BLD-MCNC: 123 MG/DL (ref 70–99)
GLUCOSE BLD-MCNC: 128 MG/DL (ref 70–99)
GLUCOSE BLD-MCNC: 132 MG/DL (ref 70–99)
GLUCOSE BLD-MCNC: 141 MG/DL (ref 70–99)
GLUCOSE BLD-MCNC: 160 MG/DL (ref 70–99)
GLUCOSE BLD-MCNC: 175 MG/DL (ref 70–99)
GLUCOSE BLD-MCNC: 183 MG/DL (ref 70–99)
GLUCOSE BLD-MCNC: 95 MG/DL (ref 70–99)
GLUCOSE BLD-MCNC: 98 MG/DL (ref 70–99)
GLUCOSE BLD-MCNC: 99 MG/DL (ref 70–99)
HCO3 ARTERIAL: 19.2 MMOL/L (ref 21–29)
HCO3 ARTERIAL: 22.4 MMOL/L (ref 21–29)
HCO3 ARTERIAL: 22.7 MMOL/L (ref 21–29)
HCO3 ARTERIAL: 24.9 MMOL/L (ref 21–29)
HCO3 ARTERIAL: 25.1 MMOL/L (ref 21–29)
HCO3 ARTERIAL: 25.4 MMOL/L (ref 21–29)
HCO3 ARTERIAL: 26.2 MMOL/L (ref 21–29)
HCO3 ARTERIAL: 27.4 MMOL/L (ref 21–29)
HCT VFR BLD CALC: 24.7 % (ref 36–48)
HDLC SERPL-MCNC: 50 MG/DL (ref 40–60)
HEMOGLOBIN: 6.4 G/DL (ref 12–16)
HEMOGLOBIN: 6.4 G/DL (ref 12–16)
HEMOGLOBIN: 6.5 G/DL (ref 12–16)
HEMOGLOBIN: 6.7 G/DL (ref 12–16)
HEMOGLOBIN: 7.8 G/DL (ref 12–16)
HEMOGLOBIN: 9.1 G/DL (ref 12–16)
INR BLD: 1.56 (ref 0.86–1.14)
LACTATE: 1.53 MMOL/L (ref 0.4–2)
LDL CHOLESTEROL CALCULATED: 67 MG/DL
MAGNESIUM: 2.7 MG/DL (ref 1.8–2.4)
MCH RBC QN AUTO: 25.1 PG (ref 26–34)
MCHC RBC AUTO-ENTMCNC: 31.4 G/DL (ref 31–36)
MCV RBC AUTO: 79.9 FL (ref 80–100)
O2 SAT, ARTERIAL: 100 % (ref 93–100)
O2 SAT, ARTERIAL: 99 % (ref 93–100)
O2 SAT, ARTERIAL: 99 % (ref 93–100)
PCO2 ARTERIAL: 32.9 MM HG (ref 35–45)
PCO2 ARTERIAL: 33.6 MM HG (ref 35–45)
PCO2 ARTERIAL: 36.7 MM HG (ref 35–45)
PCO2 ARTERIAL: 38.2 MM HG (ref 35–45)
PCO2 ARTERIAL: 38.5 MM HG (ref 35–45)
PCO2 ARTERIAL: 39.2 MM HG (ref 35–45)
PCO2 ARTERIAL: 41.3 MM HG (ref 35–45)
PCO2 ARTERIAL: 41.8 MM HG (ref 35–45)
PDW BLD-RTO: 17 % (ref 12.4–15.4)
PERFORMED ON: ABNORMAL
PERFORMED ON: NORMAL
PH ARTERIAL: 7.37 (ref 7.35–7.45)
PH ARTERIAL: 7.37 (ref 7.35–7.45)
PH ARTERIAL: 7.38 (ref 7.35–7.45)
PH ARTERIAL: 7.39 (ref 7.35–7.45)
PH ARTERIAL: 7.42 (ref 7.35–7.45)
PH ARTERIAL: 7.43 (ref 7.35–7.45)
PH ARTERIAL: 7.44 (ref 7.35–7.45)
PH ARTERIAL: 7.49 (ref 7.35–7.45)
PLATELET # BLD: 141 K/UL (ref 135–450)
PMV BLD AUTO: 7.4 FL (ref 5–10.5)
PO2 ARTERIAL: 156.3 MM HG (ref 75–108)
PO2 ARTERIAL: 157.8 MM HG (ref 75–108)
PO2 ARTERIAL: 303.7 MM HG (ref 75–108)
PO2 ARTERIAL: 371.5 MM HG (ref 75–108)
PO2 ARTERIAL: 441.8 MM HG (ref 75–108)
PO2 ARTERIAL: 467.8 MM HG (ref 75–108)
PO2 ARTERIAL: 561.8 MM HG (ref 75–108)
PO2 ARTERIAL: 583.6 MM HG (ref 75–108)
POC POTASSIUM: 3.6 MMOL/L (ref 3.5–5.1)
POC POTASSIUM: 3.7 MMOL/L (ref 3.5–5.1)
POC POTASSIUM: 4 MMOL/L (ref 3.5–5.1)
POC POTASSIUM: 4 MMOL/L (ref 3.5–5.1)
POC POTASSIUM: 4.1 MMOL/L (ref 3.5–5.1)
POC POTASSIUM: 4.2 MMOL/L (ref 3.5–5.1)
POC POTASSIUM: 4.3 MMOL/L (ref 3.5–5.1)
POC SAMPLE TYPE: ABNORMAL
POC SODIUM: 132 MMOL/L (ref 136–145)
POC SODIUM: 137 MMOL/L (ref 136–145)
POC SODIUM: 138 MMOL/L (ref 136–145)
POC SODIUM: 139 MMOL/L (ref 136–145)
POC SODIUM: 140 MMOL/L (ref 136–145)
POC SODIUM: 141 MMOL/L (ref 136–145)
POTASSIUM SERPL-SCNC: 3.6 MMOL/L (ref 3.5–5.1)
PROTHROMBIN TIME: 18.2 SEC (ref 10–13.2)
RBC # BLD: 3.09 M/UL (ref 4–5.2)
SODIUM BLD-SCNC: 138 MMOL/L (ref 136–145)
TCO2 ARTERIAL: 20 MMOL/L
TCO2 ARTERIAL: 24 MMOL/L
TCO2 ARTERIAL: 24 MMOL/L
TCO2 ARTERIAL: 26 MMOL/L
TCO2 ARTERIAL: 27 MMOL/L
TCO2 ARTERIAL: 29 MMOL/L
TRIGL SERPL-MCNC: 59 MG/DL (ref 0–150)
VLDLC SERPL CALC-MCNC: 12 MG/DL
WBC # BLD: 13.8 K/UL (ref 4–11)

## 2021-03-19 PROCEDURE — C9290 INJ, BUPIVACAINE LIPOSOME: HCPCS | Performed by: ANESTHESIOLOGY

## 2021-03-19 PROCEDURE — 6370000000 HC RX 637 (ALT 250 FOR IP): Performed by: THORACIC SURGERY (CARDIOTHORACIC VASCULAR SURGERY)

## 2021-03-19 PROCEDURE — 2000000000 HC ICU R&B

## 2021-03-19 PROCEDURE — 2580000003 HC RX 258: Performed by: ANESTHESIOLOGY

## 2021-03-19 PROCEDURE — 3700000000 HC ANESTHESIA ATTENDED CARE: Performed by: THORACIC SURGERY (CARDIOTHORACIC VASCULAR SURGERY)

## 2021-03-19 PROCEDURE — 85018 HEMOGLOBIN: CPT

## 2021-03-19 PROCEDURE — 2580000003 HC RX 258: Performed by: THORACIC SURGERY (CARDIOTHORACIC VASCULAR SURGERY)

## 2021-03-19 PROCEDURE — 36415 COLL VENOUS BLD VENIPUNCTURE: CPT

## 2021-03-19 PROCEDURE — 88311 DECALCIFY TISSUE: CPT

## 2021-03-19 PROCEDURE — 6360000002 HC RX W HCPCS

## 2021-03-19 PROCEDURE — 86901 BLOOD TYPING SEROLOGIC RH(D): CPT

## 2021-03-19 PROCEDURE — 02RF08Z REPLACEMENT OF AORTIC VALVE WITH ZOOPLASTIC TISSUE, OPEN APPROACH: ICD-10-PCS | Performed by: THORACIC SURGERY (CARDIOTHORACIC VASCULAR SURGERY)

## 2021-03-19 PROCEDURE — 88305 TISSUE EXAM BY PATHOLOGIST: CPT

## 2021-03-19 PROCEDURE — 86900 BLOOD TYPING SEROLOGIC ABO: CPT

## 2021-03-19 PROCEDURE — 2780000006 HC MISC HEART VALVE: Performed by: THORACIC SURGERY (CARDIOTHORACIC VASCULAR SURGERY)

## 2021-03-19 PROCEDURE — 71045 X-RAY EXAM CHEST 1 VIEW: CPT

## 2021-03-19 PROCEDURE — 7100000010 HC PHASE II RECOVERY - FIRST 15 MIN

## 2021-03-19 PROCEDURE — 85610 PROTHROMBIN TIME: CPT

## 2021-03-19 PROCEDURE — 94002 VENT MGMT INPAT INIT DAY: CPT

## 2021-03-19 PROCEDURE — 3600000008 HC SURGERY OHS BASE: Performed by: THORACIC SURGERY (CARDIOTHORACIC VASCULAR SURGERY)

## 2021-03-19 PROCEDURE — 5A1221Z PERFORMANCE OF CARDIAC OUTPUT, CONTINUOUS: ICD-10-PCS | Performed by: THORACIC SURGERY (CARDIOTHORACIC VASCULAR SURGERY)

## 2021-03-19 PROCEDURE — 84295 ASSAY OF SERUM SODIUM: CPT

## 2021-03-19 PROCEDURE — 2700000000 HC OXYGEN THERAPY PER DAY

## 2021-03-19 PROCEDURE — C2626 INFUSION PUMP, NON-PROG,TEMP: HCPCS | Performed by: THORACIC SURGERY (CARDIOTHORACIC VASCULAR SURGERY)

## 2021-03-19 PROCEDURE — 6360000002 HC RX W HCPCS: Performed by: ANESTHESIOLOGY

## 2021-03-19 PROCEDURE — 2500000003 HC RX 250 WO HCPCS: Performed by: THORACIC SURGERY (CARDIOTHORACIC VASCULAR SURGERY)

## 2021-03-19 PROCEDURE — 0BH17EZ INSERTION OF ENDOTRACHEAL AIRWAY INTO TRACHEA, VIA NATURAL OR ARTIFICIAL OPENING: ICD-10-PCS | Performed by: THORACIC SURGERY (CARDIOTHORACIC VASCULAR SURGERY)

## 2021-03-19 PROCEDURE — 2500000003 HC RX 250 WO HCPCS

## 2021-03-19 PROCEDURE — 33405 REPLACEMENT AORTIC VALVE OPN: CPT | Performed by: THORACIC SURGERY (CARDIOTHORACIC VASCULAR SURGERY)

## 2021-03-19 PROCEDURE — 80061 LIPID PANEL: CPT

## 2021-03-19 PROCEDURE — C1729 CATH, DRAINAGE: HCPCS | Performed by: THORACIC SURGERY (CARDIOTHORACIC VASCULAR SURGERY)

## 2021-03-19 PROCEDURE — 3700000001 HC ADD 15 MINUTES (ANESTHESIA): Performed by: THORACIC SURGERY (CARDIOTHORACIC VASCULAR SURGERY)

## 2021-03-19 PROCEDURE — 36620 INSERTION CATHETER ARTERY: CPT

## 2021-03-19 PROCEDURE — 85347 COAGULATION TIME ACTIVATED: CPT

## 2021-03-19 PROCEDURE — 83735 ASSAY OF MAGNESIUM: CPT

## 2021-03-19 PROCEDURE — C1768 GRAFT, VASCULAR: HCPCS | Performed by: THORACIC SURGERY (CARDIOTHORACIC VASCULAR SURGERY)

## 2021-03-19 PROCEDURE — C9113 INJ PANTOPRAZOLE SODIUM, VIA: HCPCS | Performed by: THORACIC SURGERY (CARDIOTHORACIC VASCULAR SURGERY)

## 2021-03-19 PROCEDURE — 86923 COMPATIBILITY TEST ELECTRIC: CPT

## 2021-03-19 PROCEDURE — 80048 BASIC METABOLIC PNL TOTAL CA: CPT

## 2021-03-19 PROCEDURE — 93503 INSERT/PLACE HEART CATHETER: CPT

## 2021-03-19 PROCEDURE — 84132 ASSAY OF SERUM POTASSIUM: CPT

## 2021-03-19 PROCEDURE — 6360000002 HC RX W HCPCS: Performed by: THORACIC SURGERY (CARDIOTHORACIC VASCULAR SURGERY)

## 2021-03-19 PROCEDURE — 85730 THROMBOPLASTIN TIME PARTIAL: CPT

## 2021-03-19 PROCEDURE — 83605 ASSAY OF LACTIC ACID: CPT

## 2021-03-19 PROCEDURE — 6370000000 HC RX 637 (ALT 250 FOR IP): Performed by: CLINICAL NURSE SPECIALIST

## 2021-03-19 PROCEDURE — 82947 ASSAY GLUCOSE BLOOD QUANT: CPT

## 2021-03-19 PROCEDURE — 2709999900 HC NON-CHARGEABLE SUPPLY: Performed by: THORACIC SURGERY (CARDIOTHORACIC VASCULAR SURGERY)

## 2021-03-19 PROCEDURE — 35211 RPR BLVSL DIR NTRATHRC W/BYP: CPT | Performed by: THORACIC SURGERY (CARDIOTHORACIC VASCULAR SURGERY)

## 2021-03-19 PROCEDURE — P9016 RBC LEUKOCYTES REDUCED: HCPCS

## 2021-03-19 PROCEDURE — P9041 ALBUMIN (HUMAN),5%, 50ML: HCPCS | Performed by: THORACIC SURGERY (CARDIOTHORACIC VASCULAR SURGERY)

## 2021-03-19 PROCEDURE — 32551 INSERTION OF CHEST TUBE: CPT

## 2021-03-19 PROCEDURE — B24BZZ4 ULTRASONOGRAPHY OF HEART WITH AORTA, TRANSESOPHAGEAL: ICD-10-PCS | Performed by: ANESTHESIOLOGY

## 2021-03-19 PROCEDURE — 02HV33Z INSERTION OF INFUSION DEVICE INTO SUPERIOR VENA CAVA, PERCUTANEOUS APPROACH: ICD-10-PCS | Performed by: ANESTHESIOLOGY

## 2021-03-19 PROCEDURE — 36556 INSERT NON-TUNNEL CV CATH: CPT

## 2021-03-19 PROCEDURE — 7100000011 HC PHASE II RECOVERY - ADDTL 15 MIN

## 2021-03-19 PROCEDURE — 76942 ECHO GUIDE FOR BIOPSY: CPT | Performed by: ANESTHESIOLOGY

## 2021-03-19 PROCEDURE — 3600000018 HC SURGERY OHS ADDTL 15MIN: Performed by: THORACIC SURGERY (CARDIOTHORACIC VASCULAR SURGERY)

## 2021-03-19 PROCEDURE — B24BZZ4 ULTRASONOGRAPHY OF HEART WITH AORTA, TRANSESOPHAGEAL: ICD-10-PCS | Performed by: THORACIC SURGERY (CARDIOTHORACIC VASCULAR SURGERY)

## 2021-03-19 PROCEDURE — 82803 BLOOD GASES ANY COMBINATION: CPT

## 2021-03-19 PROCEDURE — 82330 ASSAY OF CALCIUM: CPT

## 2021-03-19 PROCEDURE — P9041 ALBUMIN (HUMAN),5%, 50ML: HCPCS | Performed by: ANESTHESIOLOGY

## 2021-03-19 PROCEDURE — 5A1945Z RESPIRATORY VENTILATION, 24-96 CONSECUTIVE HOURS: ICD-10-PCS | Performed by: THORACIC SURGERY (CARDIOTHORACIC VASCULAR SURGERY)

## 2021-03-19 PROCEDURE — 94761 N-INVAS EAR/PLS OXIMETRY MLT: CPT

## 2021-03-19 PROCEDURE — 86850 RBC ANTIBODY SCREEN: CPT

## 2021-03-19 PROCEDURE — 2500000003 HC RX 250 WO HCPCS: Performed by: ANESTHESIOLOGY

## 2021-03-19 PROCEDURE — 85027 COMPLETE CBC AUTOMATED: CPT

## 2021-03-19 PROCEDURE — 2720000010 HC SURG SUPPLY STERILE: Performed by: THORACIC SURGERY (CARDIOTHORACIC VASCULAR SURGERY)

## 2021-03-19 DEVICE — VALVE AORT 25MM REPL RESILIENT BOV PERICARD CO CHROMIUM ALLY: Type: IMPLANTABLE DEVICE | Site: CHEST | Status: FUNCTIONAL

## 2021-03-19 RX ORDER — KETOROLAC TROMETHAMINE 30 MG/ML
15 INJECTION, SOLUTION INTRAMUSCULAR; INTRAVENOUS EVERY 6 HOURS PRN
Status: DISCONTINUED | OUTPATIENT
Start: 2021-03-19 | End: 2021-03-22

## 2021-03-19 RX ORDER — ROCURONIUM BROMIDE 10 MG/ML
INJECTION, SOLUTION INTRAVENOUS PRN
Status: DISCONTINUED | OUTPATIENT
Start: 2021-03-19 | End: 2021-03-19 | Stop reason: SDUPTHER

## 2021-03-19 RX ORDER — ONDANSETRON 2 MG/ML
4 INJECTION INTRAMUSCULAR; INTRAVENOUS EVERY 8 HOURS PRN
Status: DISCONTINUED | OUTPATIENT
Start: 2021-03-19 | End: 2021-03-24 | Stop reason: HOSPADM

## 2021-03-19 RX ORDER — ALBUMIN, HUMAN INJ 5% 5 %
25 SOLUTION INTRAVENOUS PRN
Status: DISCONTINUED | OUTPATIENT
Start: 2021-03-19 | End: 2021-03-22

## 2021-03-19 RX ORDER — CEFAZOLIN SODIUM 2 G/50ML
2000 SOLUTION INTRAVENOUS EVERY 8 HOURS
Status: COMPLETED | OUTPATIENT
Start: 2021-03-19 | End: 2021-03-21

## 2021-03-19 RX ORDER — FENTANYL CITRATE 50 UG/ML
25 INJECTION, SOLUTION INTRAMUSCULAR; INTRAVENOUS
Status: DISPENSED | OUTPATIENT
Start: 2021-03-19 | End: 2021-03-20

## 2021-03-19 RX ORDER — ALBUMIN, HUMAN INJ 5% 5 %
SOLUTION INTRAVENOUS PRN
Status: DISCONTINUED | OUTPATIENT
Start: 2021-03-19 | End: 2021-03-19

## 2021-03-19 RX ORDER — CEFAZOLIN SODIUM 2 G/50ML
2000 SOLUTION INTRAVENOUS ONCE
Status: COMPLETED | OUTPATIENT
Start: 2021-03-19 | End: 2021-03-19

## 2021-03-19 RX ORDER — PROTAMINE SULFATE 10 MG/ML
INJECTION, SOLUTION INTRAVENOUS PRN
Status: DISCONTINUED | OUTPATIENT
Start: 2021-03-19 | End: 2021-03-19

## 2021-03-19 RX ORDER — SUCCINYLCHOLINE/SOD CL,ISO/PF 200MG/10ML
SYRINGE (ML) INTRAVENOUS PRN
Status: DISCONTINUED | OUTPATIENT
Start: 2021-03-19 | End: 2021-03-19 | Stop reason: SDUPTHER

## 2021-03-19 RX ORDER — POLYETHYLENE GLYCOL 3350 17 G/17G
17 POWDER, FOR SOLUTION ORAL DAILY PRN
Status: DISCONTINUED | OUTPATIENT
Start: 2021-03-19 | End: 2021-03-24 | Stop reason: HOSPADM

## 2021-03-19 RX ORDER — ROSUVASTATIN CALCIUM 20 MG/1
20 TABLET, COATED ORAL NIGHTLY
Status: DISCONTINUED | OUTPATIENT
Start: 2021-03-19 | End: 2021-03-24 | Stop reason: HOSPADM

## 2021-03-19 RX ORDER — INSULIN LISPRO 100 [IU]/ML
0-12 INJECTION, SOLUTION INTRAVENOUS; SUBCUTANEOUS
Status: DISCONTINUED | OUTPATIENT
Start: 2021-03-21 | End: 2021-03-20

## 2021-03-19 RX ORDER — SODIUM CHLORIDE 9 MG/ML
INJECTION, SOLUTION INTRAVENOUS CONTINUOUS PRN
Status: DISCONTINUED | OUTPATIENT
Start: 2021-03-19 | End: 2021-03-19 | Stop reason: SDUPTHER

## 2021-03-19 RX ORDER — FENTANYL CITRATE 0.05 MG/ML
INJECTION, SOLUTION INTRAMUSCULAR; INTRAVENOUS PRN
Status: DISCONTINUED | OUTPATIENT
Start: 2021-03-19 | End: 2021-03-19 | Stop reason: SDUPTHER

## 2021-03-19 RX ORDER — HYDRALAZINE HYDROCHLORIDE 20 MG/ML
5 INJECTION INTRAMUSCULAR; INTRAVENOUS EVERY 5 MIN PRN
Status: DISCONTINUED | OUTPATIENT
Start: 2021-03-19 | End: 2021-03-24 | Stop reason: HOSPADM

## 2021-03-19 RX ORDER — MILRINONE LACTATE 0.2 MG/ML
0.38 INJECTION, SOLUTION INTRAVENOUS CONTINUOUS PRN
Status: DISCONTINUED | OUTPATIENT
Start: 2021-03-19 | End: 2021-03-22

## 2021-03-19 RX ORDER — MIDAZOLAM HYDROCHLORIDE 1 MG/ML
INJECTION INTRAMUSCULAR; INTRAVENOUS PRN
Status: DISCONTINUED | OUTPATIENT
Start: 2021-03-19 | End: 2021-03-19 | Stop reason: SDUPTHER

## 2021-03-19 RX ORDER — ALBUTEROL SULFATE 2.5 MG/3ML
2.5 SOLUTION RESPIRATORY (INHALATION) EVERY 6 HOURS PRN
Status: DISCONTINUED | OUTPATIENT
Start: 2021-03-19 | End: 2021-03-19

## 2021-03-19 RX ORDER — NICOTINE POLACRILEX 4 MG
15 LOZENGE BUCCAL PRN
Status: DISCONTINUED | OUTPATIENT
Start: 2021-03-19 | End: 2021-03-24 | Stop reason: HOSPADM

## 2021-03-19 RX ORDER — PANTOPRAZOLE SODIUM 40 MG/1
40 TABLET, DELAYED RELEASE ORAL
Status: DISCONTINUED | OUTPATIENT
Start: 2021-03-21 | End: 2021-03-24 | Stop reason: HOSPADM

## 2021-03-19 RX ORDER — DEXTROSE MONOHYDRATE 50 MG/ML
100 INJECTION, SOLUTION INTRAVENOUS PRN
Status: DISCONTINUED | OUTPATIENT
Start: 2021-03-19 | End: 2021-03-24 | Stop reason: HOSPADM

## 2021-03-19 RX ORDER — METOPROLOL TARTRATE 5 MG/5ML
2.5 INJECTION INTRAVENOUS EVERY 10 MIN PRN
Status: DISCONTINUED | OUTPATIENT
Start: 2021-03-19 | End: 2021-03-24 | Stop reason: HOSPADM

## 2021-03-19 RX ORDER — CHLORHEXIDINE GLUCONATE 0.12 MG/ML
15 RINSE ORAL 2 TIMES DAILY
Status: DISCONTINUED | OUTPATIENT
Start: 2021-03-19 | End: 2021-03-24 | Stop reason: HOSPADM

## 2021-03-19 RX ORDER — CLOPIDOGREL BISULFATE 75 MG/1
75 TABLET ORAL DAILY
Status: DISCONTINUED | OUTPATIENT
Start: 2021-03-20 | End: 2021-03-24 | Stop reason: HOSPADM

## 2021-03-19 RX ORDER — SODIUM CHLORIDE 0.9 % (FLUSH) 0.9 %
10 SYRINGE (ML) INJECTION PRN
Status: DISCONTINUED | OUTPATIENT
Start: 2021-03-19 | End: 2021-03-24 | Stop reason: HOSPADM

## 2021-03-19 RX ORDER — HEPARIN SODIUM 1000 [USP'U]/ML
INJECTION, SOLUTION INTRAVENOUS; SUBCUTANEOUS PRN
Status: DISCONTINUED | OUTPATIENT
Start: 2021-03-19 | End: 2021-03-19 | Stop reason: SDUPTHER

## 2021-03-19 RX ORDER — MAGNESIUM SULFATE IN WATER 40 MG/ML
2000 INJECTION, SOLUTION INTRAVENOUS PRN
Status: DISCONTINUED | OUTPATIENT
Start: 2021-03-19 | End: 2021-03-24 | Stop reason: HOSPADM

## 2021-03-19 RX ORDER — PANTOPRAZOLE SODIUM 40 MG/10ML
40 INJECTION, POWDER, LYOPHILIZED, FOR SOLUTION INTRAVENOUS 2 TIMES DAILY
Status: COMPLETED | OUTPATIENT
Start: 2021-03-19 | End: 2021-03-20

## 2021-03-19 RX ORDER — SODIUM CHLORIDE, SODIUM LACTATE, POTASSIUM CHLORIDE, CALCIUM CHLORIDE 600; 310; 30; 20 MG/100ML; MG/100ML; MG/100ML; MG/100ML
INJECTION, SOLUTION INTRAVENOUS CONTINUOUS
Status: DISCONTINUED | OUTPATIENT
Start: 2021-03-19 | End: 2021-03-22

## 2021-03-19 RX ORDER — POLYETHYLENE GLYCOL 3350 17 G/17G
17 POWDER, FOR SOLUTION ORAL DAILY
Status: DISCONTINUED | OUTPATIENT
Start: 2021-03-20 | End: 2021-03-24 | Stop reason: HOSPADM

## 2021-03-19 RX ORDER — KETOROLAC TROMETHAMINE 30 MG/ML
30 INJECTION, SOLUTION INTRAMUSCULAR; INTRAVENOUS ONCE
Status: COMPLETED | OUTPATIENT
Start: 2021-03-19 | End: 2021-03-19

## 2021-03-19 RX ORDER — SODIUM CHLORIDE 0.9 % (FLUSH) 0.9 %
10 SYRINGE (ML) INJECTION EVERY 12 HOURS SCHEDULED
Status: DISCONTINUED | OUTPATIENT
Start: 2021-03-19 | End: 2021-03-24 | Stop reason: HOSPADM

## 2021-03-19 RX ORDER — METOCLOPRAMIDE HYDROCHLORIDE 5 MG/ML
5 INJECTION INTRAMUSCULAR; INTRAVENOUS EVERY 6 HOURS PRN
Status: DISCONTINUED | OUTPATIENT
Start: 2021-03-19 | End: 2021-03-24 | Stop reason: HOSPADM

## 2021-03-19 RX ORDER — MEPERIDINE HYDROCHLORIDE 50 MG/ML
25 INJECTION INTRAMUSCULAR; INTRAVENOUS; SUBCUTANEOUS
Status: ACTIVE | OUTPATIENT
Start: 2021-03-19 | End: 2021-03-19

## 2021-03-19 RX ORDER — ACETAMINOPHEN 500 MG
1000 TABLET ORAL EVERY 6 HOURS
Status: DISCONTINUED | OUTPATIENT
Start: 2021-03-19 | End: 2021-03-24 | Stop reason: HOSPADM

## 2021-03-19 RX ORDER — SODIUM CHLORIDE, SODIUM LACTATE, POTASSIUM CHLORIDE, CALCIUM CHLORIDE 600; 310; 30; 20 MG/100ML; MG/100ML; MG/100ML; MG/100ML
INJECTION, SOLUTION INTRAVENOUS CONTINUOUS
Status: DISCONTINUED | OUTPATIENT
Start: 2021-03-19 | End: 2021-03-19

## 2021-03-19 RX ORDER — FUROSEMIDE 10 MG/ML
40 INJECTION INTRAMUSCULAR; INTRAVENOUS
Status: ACTIVE | OUTPATIENT
Start: 2021-03-19 | End: 2021-03-19

## 2021-03-19 RX ORDER — FUROSEMIDE 10 MG/ML
40 INJECTION INTRAMUSCULAR; INTRAVENOUS 2 TIMES DAILY
Status: DISCONTINUED | OUTPATIENT
Start: 2021-03-20 | End: 2021-03-21

## 2021-03-19 RX ORDER — LANOLIN ALCOHOL/MO/W.PET/CERES
400 CREAM (GRAM) TOPICAL 2 TIMES DAILY
Status: DISCONTINUED | OUTPATIENT
Start: 2021-03-20 | End: 2021-03-24 | Stop reason: HOSPADM

## 2021-03-19 RX ORDER — ETOMIDATE 2 MG/ML
INJECTION INTRAVENOUS PRN
Status: DISCONTINUED | OUTPATIENT
Start: 2021-03-19 | End: 2021-03-19 | Stop reason: SDUPTHER

## 2021-03-19 RX ORDER — POTASSIUM CHLORIDE 750 MG/1
10 TABLET, EXTENDED RELEASE ORAL
Status: DISCONTINUED | OUTPATIENT
Start: 2021-03-20 | End: 2021-03-23

## 2021-03-19 RX ORDER — 0.9 % SODIUM CHLORIDE 0.9 %
1000 INTRAVENOUS SOLUTION INTRAVENOUS CONTINUOUS PRN
Status: DISCONTINUED | OUTPATIENT
Start: 2021-03-19 | End: 2021-03-22

## 2021-03-19 RX ORDER — ALBUTEROL SULFATE 90 UG/1
2 AEROSOL, METERED RESPIRATORY (INHALATION) EVERY 6 HOURS PRN
Status: DISCONTINUED | OUTPATIENT
Start: 2021-03-19 | End: 2021-03-19 | Stop reason: ALTCHOICE

## 2021-03-19 RX ORDER — BUPIVACAINE HYDROCHLORIDE 5 MG/ML
INJECTION, SOLUTION EPIDURAL; INTRACAUDAL
Status: COMPLETED | OUTPATIENT
Start: 2021-03-19 | End: 2021-03-19

## 2021-03-19 RX ORDER — LISINOPRIL 2.5 MG/1
2.5 TABLET ORAL
Status: DISCONTINUED | OUTPATIENT
Start: 2021-03-21 | End: 2021-03-21

## 2021-03-19 RX ORDER — ACETAMINOPHEN 500 MG
1000 TABLET ORAL ONCE
Status: COMPLETED | OUTPATIENT
Start: 2021-03-19 | End: 2021-03-19

## 2021-03-19 RX ORDER — AMINOCAPROIC ACID 250 MG/ML
INJECTION, SOLUTION INTRAVENOUS PRN
Status: DISCONTINUED | OUTPATIENT
Start: 2021-03-19 | End: 2021-03-19 | Stop reason: SDUPTHER

## 2021-03-19 RX ORDER — SODIUM CHLORIDE 0.9 % (FLUSH) 0.9 %
10 SYRINGE (ML) INJECTION PRN
Status: DISCONTINUED | OUTPATIENT
Start: 2021-03-19 | End: 2021-03-19 | Stop reason: HOSPADM

## 2021-03-19 RX ORDER — DULOXETIN HYDROCHLORIDE 30 MG/1
30 CAPSULE, DELAYED RELEASE ORAL DAILY
Status: DISCONTINUED | OUTPATIENT
Start: 2021-03-20 | End: 2021-03-24 | Stop reason: HOSPADM

## 2021-03-19 RX ORDER — SENNA AND DOCUSATE SODIUM 50; 8.6 MG/1; MG/1
1 TABLET, FILM COATED ORAL 2 TIMES DAILY
Status: DISCONTINUED | OUTPATIENT
Start: 2021-03-19 | End: 2021-03-24 | Stop reason: HOSPADM

## 2021-03-19 RX ORDER — GABAPENTIN 300 MG/1
900 CAPSULE ORAL ONCE
Status: COMPLETED | OUTPATIENT
Start: 2021-03-19 | End: 2021-03-19

## 2021-03-19 RX ORDER — PROTAMINE SULFATE 10 MG/ML
50 INJECTION, SOLUTION INTRAVENOUS
Status: ACTIVE | OUTPATIENT
Start: 2021-03-19 | End: 2021-03-19

## 2021-03-19 RX ORDER — CHLORHEXIDINE GLUCONATE 4 G/100ML
SOLUTION TOPICAL ONCE
Status: DISCONTINUED | OUTPATIENT
Start: 2021-03-19 | End: 2021-03-19 | Stop reason: HOSPADM

## 2021-03-19 RX ORDER — ATORVASTATIN CALCIUM 40 MG/1
40 TABLET, FILM COATED ORAL NIGHTLY
Status: DISCONTINUED | OUTPATIENT
Start: 2021-03-20 | End: 2021-03-19

## 2021-03-19 RX ORDER — SODIUM CHLORIDE 0.9 % (FLUSH) 0.9 %
10 SYRINGE (ML) INJECTION EVERY 12 HOURS SCHEDULED
Status: DISCONTINUED | OUTPATIENT
Start: 2021-03-19 | End: 2021-03-19 | Stop reason: HOSPADM

## 2021-03-19 RX ORDER — GABAPENTIN 300 MG/1
300 CAPSULE ORAL 3 TIMES DAILY
Status: DISCONTINUED | OUTPATIENT
Start: 2021-03-19 | End: 2021-03-24 | Stop reason: HOSPADM

## 2021-03-19 RX ORDER — CHLORHEXIDINE GLUCONATE 0.12 MG/ML
15 RINSE ORAL ONCE
Status: COMPLETED | OUTPATIENT
Start: 2021-03-19 | End: 2021-03-19

## 2021-03-19 RX ORDER — MIDAZOLAM HYDROCHLORIDE 1 MG/ML
1 INJECTION, SOLUTION INTRAMUSCULAR; INTRAVENOUS
Status: ACTIVE | OUTPATIENT
Start: 2021-03-19 | End: 2021-03-20

## 2021-03-19 RX ORDER — DIPHENHYDRAMINE HCL 25 MG
25 TABLET ORAL NIGHTLY PRN
Status: DISCONTINUED | OUTPATIENT
Start: 2021-03-20 | End: 2021-03-22

## 2021-03-19 RX ORDER — ALBUTEROL SULFATE 2.5 MG/3ML
2.5 SOLUTION RESPIRATORY (INHALATION) EVERY 4 HOURS
Status: DISCONTINUED | OUTPATIENT
Start: 2021-03-19 | End: 2021-03-20

## 2021-03-19 RX ORDER — SODIUM CHLORIDE 9 MG/ML
10 INJECTION INTRAVENOUS 2 TIMES DAILY
Status: COMPLETED | OUTPATIENT
Start: 2021-03-19 | End: 2021-03-20

## 2021-03-19 RX ORDER — MAGNESIUM HYDROXIDE 1200 MG/15ML
LIQUID ORAL CONTINUOUS PRN
Status: COMPLETED | OUTPATIENT
Start: 2021-03-19 | End: 2021-03-19

## 2021-03-19 RX ORDER — DEXTROSE MONOHYDRATE 25 G/50ML
12.5 INJECTION, SOLUTION INTRAVENOUS PRN
Status: DISCONTINUED | OUTPATIENT
Start: 2021-03-19 | End: 2021-03-24 | Stop reason: HOSPADM

## 2021-03-19 RX ORDER — DOPAMINE HYDROCHLORIDE 160 MG/100ML
2 INJECTION, SOLUTION INTRAVENOUS CONTINUOUS PRN
Status: DISCONTINUED | OUTPATIENT
Start: 2021-03-19 | End: 2021-03-22

## 2021-03-19 RX ORDER — NITROGLYCERIN 20 MG/100ML
10 INJECTION INTRAVENOUS CONTINUOUS PRN
Status: DISCONTINUED | OUTPATIENT
Start: 2021-03-19 | End: 2021-03-22

## 2021-03-19 RX ORDER — POTASSIUM CHLORIDE 29.8 MG/ML
20 INJECTION INTRAVENOUS PRN
Status: ACTIVE | OUTPATIENT
Start: 2021-03-19 | End: 2021-03-21

## 2021-03-19 RX ADMIN — MUPIROCIN: 20 OINTMENT TOPICAL at 22:12

## 2021-03-19 RX ADMIN — GABAPENTIN 900 MG: 300 CAPSULE ORAL at 06:30

## 2021-03-19 RX ADMIN — CEFAZOLIN SODIUM 2000 MG: 2 SOLUTION INTRAVENOUS at 08:45

## 2021-03-19 RX ADMIN — Medication 10 ML: at 22:12

## 2021-03-19 RX ADMIN — FENTANYL CITRATE 250 MCG: 50 INJECTION, SOLUTION INTRAMUSCULAR; INTRAVENOUS at 08:10

## 2021-03-19 RX ADMIN — ROCURONIUM BROMIDE 50 MG: 10 INJECTION INTRAVENOUS at 08:15

## 2021-03-19 RX ADMIN — AMINOCAPROIC ACID 1 G/HR: 250 INJECTION, SOLUTION INTRAVENOUS at 09:00

## 2021-03-19 RX ADMIN — HEPARIN SODIUM 37000 UNITS: 1000 INJECTION, SOLUTION INTRAVENOUS; SUBCUTANEOUS at 09:08

## 2021-03-19 RX ADMIN — ALBUMIN (HUMAN) 250 ML: 12.5 INJECTION, SOLUTION INTRAVENOUS at 10:52

## 2021-03-19 RX ADMIN — ALBUTEROL SULFATE 2.5 MG: 2.5 SOLUTION RESPIRATORY (INHALATION) at 23:47

## 2021-03-19 RX ADMIN — ETOMIDATE 10 MG: 2 INJECTION, SOLUTION INTRAVENOUS at 08:10

## 2021-03-19 RX ADMIN — METOPROLOL TARTRATE 12.5 MG: 25 TABLET, FILM COATED ORAL at 06:30

## 2021-03-19 RX ADMIN — BUPIVACAINE HYDROCHLORIDE 20 ML: 5 INJECTION, SOLUTION EPIDURAL; INTRACAUDAL; PERINEURAL at 11:52

## 2021-03-19 RX ADMIN — DEXMEDETOMIDINE HYDROCHLORIDE 0.1 MCG/KG/HR: 100 INJECTION, SOLUTION INTRAVENOUS at 11:52

## 2021-03-19 RX ADMIN — FENTANYL CITRATE 250 MCG: 50 INJECTION, SOLUTION INTRAMUSCULAR; INTRAVENOUS at 10:26

## 2021-03-19 RX ADMIN — CHLORHEXIDINE GLUCONATE 15 ML: 1.2 RINSE ORAL at 06:30

## 2021-03-19 RX ADMIN — ALBUTEROL SULFATE 2.5 MG: 2.5 SOLUTION RESPIRATORY (INHALATION) at 19:38

## 2021-03-19 RX ADMIN — MUPIROCIN: 20 OINTMENT TOPICAL at 06:30

## 2021-03-19 RX ADMIN — PANTOPRAZOLE SODIUM 40 MG: 40 INJECTION, POWDER, FOR SOLUTION INTRAVENOUS at 15:50

## 2021-03-19 RX ADMIN — SODIUM BICARBONATE 50 MEQ: 84 INJECTION, SOLUTION INTRAVENOUS at 18:10

## 2021-03-19 RX ADMIN — ROCURONIUM BROMIDE 50 MG: 10 INJECTION INTRAVENOUS at 09:26

## 2021-03-19 RX ADMIN — SODIUM CHLORIDE: 9 INJECTION, SOLUTION INTRAVENOUS at 08:04

## 2021-03-19 RX ADMIN — ROCURONIUM BROMIDE 50 MG: 10 INJECTION INTRAVENOUS at 10:26

## 2021-03-19 RX ADMIN — VANCOMYCIN HYDROCHLORIDE 1500 MG: 10 INJECTION, POWDER, LYOPHILIZED, FOR SOLUTION INTRAVENOUS at 20:08

## 2021-03-19 RX ADMIN — NITROGLYCERIN 50 MCG/MIN: 20 INJECTION INTRAVENOUS at 14:25

## 2021-03-19 RX ADMIN — FENTANYL CITRATE 25 MCG: 50 INJECTION INTRAMUSCULAR; INTRAVENOUS at 16:04

## 2021-03-19 RX ADMIN — Medication 10 ML: at 15:50

## 2021-03-19 RX ADMIN — NOREPINEPHRINE BITARTRATE 0.1 MCG/KG/MIN: 1 INJECTION, SOLUTION, CONCENTRATE INTRAVENOUS at 10:52

## 2021-03-19 RX ADMIN — Medication 10 ML: at 15:51

## 2021-03-19 RX ADMIN — ALBUMIN (HUMAN) 250 ML: 12.5 INJECTION, SOLUTION INTRAVENOUS at 11:52

## 2021-03-19 RX ADMIN — PROTAMINE SULFATE 400 MG: 10 INJECTION, SOLUTION INTRAVENOUS at 10:52

## 2021-03-19 RX ADMIN — PANTOPRAZOLE SODIUM 40 MG: 40 INJECTION, POWDER, FOR SOLUTION INTRAVENOUS at 20:08

## 2021-03-19 RX ADMIN — ALBUMIN (HUMAN) 25 G: 12.5 INJECTION, SOLUTION INTRAVENOUS at 12:20

## 2021-03-19 RX ADMIN — Medication 15 ML: at 22:11

## 2021-03-19 RX ADMIN — PROTAMINE SULFATE 50 MG: 10 INJECTION, SOLUTION INTRAVENOUS at 11:39

## 2021-03-19 RX ADMIN — FENTANYL CITRATE 250 MCG: 50 INJECTION, SOLUTION INTRAMUSCULAR; INTRAVENOUS at 08:50

## 2021-03-19 RX ADMIN — MIDAZOLAM 2.5 MG: 1 INJECTION INTRAMUSCULAR; INTRAVENOUS at 10:26

## 2021-03-19 RX ADMIN — ROCURONIUM BROMIDE 50 MG: 10 INJECTION INTRAVENOUS at 08:50

## 2021-03-19 RX ADMIN — Medication 120 MG: at 08:10

## 2021-03-19 RX ADMIN — POTASSIUM CHLORIDE 20 MEQ: 29.8 INJECTION, SOLUTION INTRAVENOUS at 18:10

## 2021-03-19 RX ADMIN — FENTANYL CITRATE 250 MCG: 50 INJECTION, SOLUTION INTRAMUSCULAR; INTRAVENOUS at 09:26

## 2021-03-19 RX ADMIN — BUPIVACAINE 20 ML: 13.3 INJECTION, SUSPENSION, LIPOSOMAL INFILTRATION at 11:52

## 2021-03-19 RX ADMIN — ACETAMINOPHEN 1000 MG: 500 TABLET ORAL at 06:30

## 2021-03-19 RX ADMIN — SODIUM CHLORIDE, POTASSIUM CHLORIDE, SODIUM LACTATE AND CALCIUM CHLORIDE: 600; 310; 30; 20 INJECTION, SOLUTION INTRAVENOUS at 12:20

## 2021-03-19 RX ADMIN — MIDAZOLAM 2.5 MG: 1 INJECTION INTRAMUSCULAR; INTRAVENOUS at 09:26

## 2021-03-19 RX ADMIN — VANCOMYCIN HYDROCHLORIDE 1500 MG: 10 INJECTION, POWDER, LYOPHILIZED, FOR SOLUTION INTRAVENOUS at 08:15

## 2021-03-19 RX ADMIN — POTASSIUM CHLORIDE 20 MEQ: 29.8 INJECTION, SOLUTION INTRAVENOUS at 12:18

## 2021-03-19 RX ADMIN — POTASSIUM CHLORIDE 20 MEQ: 29.8 INJECTION, SOLUTION INTRAVENOUS at 13:37

## 2021-03-19 RX ADMIN — KETOROLAC TROMETHAMINE 30 MG: 30 INJECTION, SOLUTION INTRAMUSCULAR at 20:08

## 2021-03-19 RX ADMIN — CEFAZOLIN SODIUM 2000 MG: 2 SOLUTION INTRAVENOUS at 17:09

## 2021-03-19 RX ADMIN — AMINOCAPROIC ACID 5000 MG: 250 INJECTION, SOLUTION INTRAVENOUS at 08:45

## 2021-03-19 RX ADMIN — MUPIROCIN: 20 OINTMENT TOPICAL at 12:45

## 2021-03-19 RX ADMIN — SODIUM CHLORIDE 0.1 MG/KG/HR: 9 INJECTION, SOLUTION INTRAVENOUS at 09:20

## 2021-03-19 RX ADMIN — Medication 15 ML: at 15:50

## 2021-03-19 RX ADMIN — SODIUM CHLORIDE 1000 ML: 9 INJECTION, SOLUTION INTRAVENOUS at 12:40

## 2021-03-19 RX ADMIN — SODIUM CHLORIDE, POTASSIUM CHLORIDE, SODIUM LACTATE AND CALCIUM CHLORIDE: 600; 310; 30; 20 INJECTION, SOLUTION INTRAVENOUS at 06:30

## 2021-03-19 RX ADMIN — ALBUMIN (HUMAN) 25 G: 12.5 INJECTION, SOLUTION INTRAVENOUS at 14:01

## 2021-03-19 RX ADMIN — SODIUM CHLORIDE 1.89 UNITS/HR: 900 INJECTION, SOLUTION INTRAVENOUS at 12:30

## 2021-03-19 RX ADMIN — ALBUMIN (HUMAN) 25 G: 12.5 INJECTION, SOLUTION INTRAVENOUS at 15:38

## 2021-03-19 ASSESSMENT — PULMONARY FUNCTION TESTS
PIF_VALUE: 15
PIF_VALUE: 31
PIF_VALUE: 0
PIF_VALUE: 17
PIF_VALUE: 0
PIF_VALUE: 18
PIF_VALUE: 0
PIF_VALUE: 0
PIF_VALUE: 15
PIF_VALUE: 15
PIF_VALUE: 18
PIF_VALUE: 0
PIF_VALUE: 17
PIF_VALUE: 0
PIF_VALUE: 18
PIF_VALUE: 14
PIF_VALUE: 17
PIF_VALUE: 0
PIF_VALUE: 3
PIF_VALUE: 16
PIF_VALUE: 17
PIF_VALUE: 16
PIF_VALUE: 18
PIF_VALUE: 1
PIF_VALUE: 1
PIF_VALUE: 19
PIF_VALUE: 0
PIF_VALUE: 18
PIF_VALUE: 18
PIF_VALUE: 16
PIF_VALUE: 22
PIF_VALUE: 0
PIF_VALUE: 18
PIF_VALUE: 17
PIF_VALUE: 0
PIF_VALUE: 15
PIF_VALUE: 26
PIF_VALUE: 18
PIF_VALUE: 15
PIF_VALUE: 0
PIF_VALUE: 20
PIF_VALUE: 16
PIF_VALUE: 15
PIF_VALUE: 16
PIF_VALUE: 0
PIF_VALUE: 0
PIF_VALUE: 18
PIF_VALUE: 17
PIF_VALUE: 16
PIF_VALUE: 17
PIF_VALUE: 0
PIF_VALUE: 16
PIF_VALUE: 23
PIF_VALUE: 17
PIF_VALUE: 0
PIF_VALUE: 2
PIF_VALUE: 1
PIF_VALUE: 16
PIF_VALUE: 0
PIF_VALUE: 0
PIF_VALUE: 20
PIF_VALUE: 0
PIF_VALUE: 15
PIF_VALUE: 18
PIF_VALUE: 15
PIF_VALUE: 0
PIF_VALUE: 0
PIF_VALUE: 17
PIF_VALUE: 16
PIF_VALUE: 18
PIF_VALUE: 16
PIF_VALUE: 17
PIF_VALUE: 17
PIF_VALUE: 0
PIF_VALUE: 18
PIF_VALUE: 0
PIF_VALUE: 34
PIF_VALUE: 0
PIF_VALUE: 16
PIF_VALUE: 17
PIF_VALUE: 0
PIF_VALUE: 15
PIF_VALUE: 17
PIF_VALUE: 15
PIF_VALUE: 18
PIF_VALUE: 16
PIF_VALUE: 18
PIF_VALUE: 18
PIF_VALUE: 14
PIF_VALUE: 17
PIF_VALUE: 0
PIF_VALUE: 16
PIF_VALUE: 0
PIF_VALUE: 0
PIF_VALUE: 17
PIF_VALUE: 14
PIF_VALUE: 16
PIF_VALUE: 0
PIF_VALUE: 15
PIF_VALUE: 15
PIF_VALUE: 0
PIF_VALUE: 15
PIF_VALUE: 0
PIF_VALUE: 18
PIF_VALUE: 0
PIF_VALUE: 20
PIF_VALUE: 0
PIF_VALUE: 17
PIF_VALUE: 16
PIF_VALUE: 0
PIF_VALUE: 18
PIF_VALUE: 15
PIF_VALUE: 15
PIF_VALUE: 21
PIF_VALUE: 15
PIF_VALUE: 0
PIF_VALUE: 17
PIF_VALUE: 0
PIF_VALUE: 0
PIF_VALUE: 15
PIF_VALUE: 17
PIF_VALUE: 14
PIF_VALUE: 18
PIF_VALUE: 17
PIF_VALUE: 16
PIF_VALUE: 17
PIF_VALUE: 15
PIF_VALUE: 0
PIF_VALUE: 18
PIF_VALUE: 14
PIF_VALUE: 17
PIF_VALUE: 0
PIF_VALUE: 16

## 2021-03-19 ASSESSMENT — PAIN SCALES - GENERAL
PAINLEVEL_OUTOF10: 8
PAINLEVEL_OUTOF10: 3
PAINLEVEL_OUTOF10: 0

## 2021-03-19 ASSESSMENT — PAIN - FUNCTIONAL ASSESSMENT: PAIN_FUNCTIONAL_ASSESSMENT: 0-10

## 2021-03-19 ASSESSMENT — PAIN DESCRIPTION - FREQUENCY: FREQUENCY: CONTINUOUS

## 2021-03-19 ASSESSMENT — PAIN DESCRIPTION - LOCATION: LOCATION: BACK

## 2021-03-19 ASSESSMENT — PAIN DESCRIPTION - ONSET: ONSET: ON-GOING

## 2021-03-19 NOTE — PROGRESS NOTES
Pt admitted to ICU from OR with surgical team and MD at bedside. Pt connected to monitors BP hypotensive on admit PAD low. Albumin given see MAR. Pt has levophed gtt, precedex gtt, and amicar gtt infusing on admit. Pt has unit of blood infusing on admit; connected to fluid warmer for pt temp control. Safety precautions in place pt connected to ventilator per RT. Pt daughter to bedside updated on pt status. Pt unresponsive on admit, NSR, fluid replacement in process. Will monitor.

## 2021-03-19 NOTE — ANESTHESIA PROCEDURE NOTES
Arterial Line:    An arterial line was placed using ultrasound guidance and surface landmarks, in the OR for the following indication(s): continuous blood pressure monitoring and blood sampling needed. A 20 gauge (size), 5 cm (length), Arrow (type) catheter was placed, Seldinger technique not used, into the right brachial artery, secured by suture and Tegaderm. Anesthesia type: General    Events:  patient tolerated procedure well with no complications and EBL 0mL.   Anesthesiologist: Abram Crowder MD  Performed: Anesthesiologist   Preanesthetic Checklist  Completed: patient identified, IV checked, site marked, risks and benefits discussed, surgical consent, monitors and equipment checked, pre-op evaluation, timeout performed, anesthesia consent given, oxygen available and patient being monitored

## 2021-03-19 NOTE — PROGRESS NOTES
Pt is more alert able to follow commands and shake head yes/no appropriately. Pt has been placed on 3 SBTs per RT/RN driven protocol but pt continues to go apneic after only a few minutes. MD aware will monitor pt and trial again when pt more awake. Will monitor closely.
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
## 2021-03-19 NOTE — PROGRESS NOTES
RESPIRATORY THERAPY ASSESSMENT    Name:  Ramya Nina [facility] Record Number:  [id number]  Age: 76 y.o. Gender: female  : 1955  Today's Date:  3/19/2021  Room:  [address] [place] [address]    Assessment     Is the patient being admitted for a COPD or Asthma exacerbation? No   (If yes the patient will be seen every 4 hours for the first 24 hours and then reassessed)    Patient Admission Diagnosis      Allergies  Allergies   Allergen Reactions    No Known Allergies        Minimum Predicted Vital Capacity:     850          Actual Vital Capacity:      On vent              Pulmonary History:No history  Home Oxygen Therapy:  room air  Home Respiratory Therapy:None   Current Respiratory Therapy:  Albuterol Q6prn          Respiratory Severity Index(RSI)   Patients with orders for inhalation medications, oxygen, or any therapeutic treatment modality will be placed on Respiratory Protocol. They will be assessed with the first treatment and at least every 72 hours thereafter. The following severity scale will be used to determine frequency of treatment intervention.     Smoking History: No Smoking History = 0    Social History  Social History     Tobacco Use    Smoking status: Never Smoker    Smokeless tobacco: Never Used   Substance Use Topics    Alcohol use: Not Currently    Drug use: No       Recent Surgical History: Thoracic or Pulmonary Resection = 4  Past Surgical History  Past Surgical History:   Procedure Laterality Date    AORTIC VALVE REPLACEMENT N/A 3/19/2021    ROSETTA; TCPB; AVR with 21 mm Weir Inspiris resilia bovine pericardial bioprosthesis; ascending aortoplasty performed by Kaur De La Rosa MD at [facility] COLONOSCOPY  2016    Alonzo WIGGINS    COLONOSCOPY  2020    COLONOSCOPY WITH BIOPSY performed by Colletta Keas, MD at [address] [place] [address]      UPPER GASTROINTESTINAL ENDOSCOPY N/A 2020 EGD BIOPSY performed by Pepper Dior MD at [facility]         Level of Consciousness: Comatose = 4    Level of Activity: Bedridden, unresponsive or quadriplegic = 4    Respiratory Pattern: Regular Pattern; RR 8-20 = 0    Breath Sounds: Diminshed bilaterally and/or crackles = 2    Sputum   ,  , Sputum How Obtained: None  Cough: Strong, spontaneous, non-productive = 0    Vital Signs   /77 Comment: RIGHT ARM  Pulse 79   Temp 98.4 °F (36.9 °C) (Core)   Resp 23   Ht 5' 5\" (1.651 m)   Wt 204 lb (92.5 kg)   LMP 01/02/2013   SpO2 100%   BMI 33.95 kg/m²   SPO2 (COPD values may differ): 86-87% on room air or greater than 92% on FiO2 35- 50% = 3    Peak Flow (asthma only): not applicable = 0    RSI: 33-10 = Q4 (every four hours)        Plan       Goals: medication delivery, mobilize retained secretions, volume expansion and improve oxygenation    Patient/caregiver was educated on the proper method of use for Respiratory Care Devices:  No: on vent      Level of patient/caregiver understanding able to:   ? Verbalize understanding   ? Demonstrate understanding       ? Teach back        ? Needs reinforcement       ? No available caregiver               ? Other:     Response to education:  on vent     Is patient being placed on Home Treatment Regimen? No     Does the patient have everything they need prior to discharge? NA     Comments: pt assessed, chart reviewed, pt CABG 3/19, remains intubated at this time. Plan of Care: albuterol Q4, wean vent as tolerated    Electronically signed by Margarita Alberts RCP on 3/19/2021 at 7:21 PM    Respiratory Protocol Guidelines     1. Assessment and treatment by Respiratory Therapy will be initiated for medication and therapeutic interventions upon initiation of aerosolized medication. 2. Physician will be contacted for respiratory rate (RR) greater than 35 breaths per minute.  Therapy will be held for heart rate (HR) greater than 140 beats per minute, pending direction from physician. 3. Bronchodilators will be administered via Metered Dose Inhaler (MDI) with spacer when the following criteria are met:  a. Alert and cooperative     b. HR < 140 bpm  c. RR < 30 bpm                d. Can demonstrate a 2-3 second inspiratory hold  4. Bronchodilators will be administered via Hand Held Nebulizer CRISTINA [facility]) to patients when ANY of the following criteria are met  a. Incognizant or uncooperative          b. Patients treated with HHN at Home        c. Unable to demonstrate proper use of MDI with spacer     d. RR > 30 bpm   5. Bronchodilators will be delivered via Metered Dose Inhaler (MDI), HHN, Aerogen to intubated patients on mechanical ventilation. 6. Inhalation medication orders will be delivered and/or substituted as outlined below. Aerosolized Medications Ordering and Administration Guidelines:    1. All Medications will be ordered by a physician, and their frequency and/or modality will be adjusted as defined by the patients Respiratory Severity Index (RSI) score. 2. If the patient does not have documented COPD, consider discontinuing anticholinergics when RSI is less than 9.  3. If the bronchospasm worsens (increased RSI), then the bronchodilator frequency can be increased to a maximum of every 4 hours. If greater than every 4 hours is required, the physician will be contacted. 4. If the bronchospasm improves, the frequency of the bronchodilator can be decreased, based on the patient's RSI, but not less than home treatment regimen frequency. 5. Bronchodilator(s) will be discontinued if patient has a RSI less than 9 and has received no scheduled or as needed treatment for 72  Hrs. Patients Ordered on a Mucolytic Agent:    1. Must always be administered with a bronchodilator.     2. Discontinue if patient experiences worsened bronchospasm, or secretions have lessened to the point that the patient is able to clear them with a cough.    Anti-inflammatory and Combination Medications:    1. If the patient lacks prior history of lung disease, is not using inhaled anti-inflammatory medication at home, and lacks wheezing by examination or by history for at least 24 hours, contact physician for possible discontinuation.

## 2021-03-19 NOTE — BRIEF OP NOTE
Pre-op Dx:severely stenotic and mildly insufficient bicuspid aortic valve with dilated ascending aorta; HTN; Type 2 DM      Post-op Dx:same; acute blood loss anemia; LVH      Procedure: ROSETTA; TCPB; AVR with 21 mm Weir Inspiris resilia bovine pericardial bioprosthesis; ascending aortoplasty      Anesthesia:General endotracheal anesthesia      Surgeon/Assistants:Sohail/Marco/Dmitriy      Specimens:aortic valve leaflets and annular calcium; pieces of ascending aorta      Complications: none      Findings:congenitally bicuspid aortic valve (Jai Type 0 with vertical opening) with severe stenosis and intense leaflet and annular calcification; zero perivalvular leak on post op ROSETTA; normal LV function pre and post op

## 2021-03-19 NOTE — DISCHARGE INSTR - COC
Continuity of Care Form    Patient Name: José Miguel Levi   :  1955  MRN:  [id number]    Admit date:  3/19/2021  Discharge date:  ***    Code Status Order: Full Code   Advance Directives:   Advance Care Flowsheet Documentation       Date/Time Healthcare Directive Type of Healthcare Directive Copy in [address] Agent's Name Healthcare Agent's Phone Number    21 0519  No, patient does not have an advance directive for healthcare treatment -- -- -- -- --            Admitting Physician:  Merlinda Saunas, MD  PCP: Jordan Calixto MD    Discharging Nurse: [facility] Care Unit/Room#: OR/NONE  Discharging Unit Phone Number: ***    Emergency Contact:   Extended Emergency Contact Information  Primary Emergency Contact: [address] Phone: [phone]  Relation: Aunt/Uncle  Secondary Emergency Contact: [address] [facility] Phone: [phone]  Work Phone: [phone]  Mobile Phone: [phone]  Relation: Child    Past Surgical History:  Past Surgical History:   Procedure Laterality Date    BREAST SURGERY      COLONOSCOPY  2016    Alonzo WIGGINS    COLONOSCOPY  2020    COLONOSCOPY WITH BIOPSY performed by Papa Araya MD at [facility] N/A 2020    EGD BIOPSY performed by Papa Araya MD at [address] [place] [address].         Immunization History:   Immunization History   Administered Date(s) Administered    Influenza 10/12/2012    Influenza Vaccine, unspecified formulation 2016, 2017    Influenza Virus Vaccine 2013, 2015, 10/09/2019, 2020    Influenza Whole 2011    Influenza, MDCK, Preservative free 10/01/2014    Influenza, Quadv, IM, (6 mo and older Fluzone, Flulaval, Fluarix and 3 yrs and older Afluria) 10/01/2016, 2018    Influenza, Quadv, IM, PF (6 mo MENTAL STATUS:06122:::0}    IV Access:  { FRANCISCA IV ACCESS:367930809:::0}    Nursing Mobility/ADLs:  Walking   {CHP DME ADLs:657121079:::0}  Transfer  {CHP DME ADLs:957721994:::0}  Bathing  {CHP DME ADLs:324384006:::0}  Dressing  {CHP DME ADLs:444634814:::0}  Toileting  {CHP DME ADLs:176267116:::0}  Feeding  {CHP DME ADLs:082101624:::0}  Med Admin  {CHP DME ADLs:911618076:::0}  Med Delivery   { FRANCISCA MED Delivery:382918808:::0}    Wound Care Documentation and Therapy:        Elimination:  Continence: Bowel: {YES / ZU:12041}  Bladder: {YES / EV:55861}  Urinary Catheter: {Urinary Catheter:076610614:::0}   Colostomy/Ileostomy/Ileal Conduit: {YES / US:54734}       Date of Last BM: ***    Intake/Output Summary (Last 24 hours) at 3/19/2021 1025  Last data filed at 3/19/2021 0920  Gross per 24 hour   Intake 1000 ml   Output 200 ml   Net 800 ml     No intake/output data recorded.     Safety Concerns:     [address] Safety Concerns:813498390:::0}    Impairments/Disabilities:      [address] Impairments/Disabilities:420359040:::0}    Nutrition Therapy:  Current Nutrition Therapy:   [address] Diet List:891433364:::0}    Routes of Feeding: {CHP DME Other Feedings:621883541:::0}  Liquids: {Slp liquid thickness:71214}  Daily Fluid Restriction: {CHP DME Yes amt example:200435288:::0}  Last Modified Barium Swallow with Video (Video Swallowing Test): {Done Not Done WWCH:852443654:::8}    Treatments at the Time of Hospital Discharge:   Respiratory Treatments: ***  Oxygen Therapy:  {Therapy; copd oxygen:78928:::0}  Ventilator:    { CC Vent List:108485817:::0}    Rehab Therapies: {THERAPEUTIC INTERVENTION:9728512844}  Weight Bearing Status/Restrictions: PhilipDelaney LEA Weight Bearin:::0}  Other Medical Equipment (for information only, NOT a DME order):  {EQUIPMENT:826768299}  Other Treatments: ***    Patient's personal belongings (please select all that are sent with patient):  {CHP DME Belongings:642264211:::0}    RN SIGNATURE: {Esignature:000359466:::0}    CASE MANAGEMENT/SOCIAL WORK SECTION    Inpatient Status Date: ***    Readmission Risk Assessment Score:  Readmission Risk              Risk of Unplanned Readmission:        0           Discharging to Facility/ Agency   Name:   Address:  Phone:  Fax:    Dialysis Facility (if applicable)   Name:  Address:  Dialysis Schedule:  Phone:  Fax:    / signature: {Esignature:551382559:::0}    PHYSICIAN SECTION    Prognosis: {Prognosis:0381931064:::0}    Condition at Discharge: Ishan Reddy Patient Condition:808010240:::0}    Rehab Potential (if transferring to Rehab): {Prognosis:0131182919:::0}    Recommended Labs or Other Treatments After Discharge: MUST  [facility] 24-48 [address] [facility]    Pre-op weight:   204 lbs    Incision/Wound Care:    Midsternal      Chest Tube Site     Legs         Clean with antibacterial soap and water daily. Monitor for signs of infection    Upper Extremity Restrictions (sternal precautions):  No lifting, pushing, pulling over 5 pounds for 8 weeks. Remove chest tubes sutures 10 days post-op, after checking with surgeon's office. Please remove IJ suture if it is still in.   1.  Wash EACH incision daily with separate wash cloth with antibacterial soap and water; pat dry. Do NOT apply anything to incisions - NO LOTIONS, HYDROGEN PEROXIDE, POWDER. 2.  If discharged with dressing on wound, remove dressing and wash daily with antibacterial soap and water. Leave open to air unless draining. 3. EVA hose on during the day and off at night. Keep legs elevated while sitting, especially if edematous. 4.  Incentive spiropmeter X 10, cough and deep breath every hour while awake. 5.  Pulse Ox checks with each visit for home care and with vital signs for ECF. CALL if pulse ox less than 90%. 6.  Keep activity log (ambulate as directed and bring log to follow up appointment.)  7. Daily weight and record  8.  No tub bath. May shower  9. Up in chair for all meals  10. PT/OT - evaluate and treat  11. Follow up to see Dr Guzman Ferrer in 1 week  12. Labs:    Call the surgeon at [phone] for any for any of the following reasons:  Changes in incision (increased redness, tenderness, warmth or drainage)  Call for pain not relieved with pain medication  Call for temp >101, HR <50 or >110 beats/min, SBP < 90 or >160. No bowel movement for 3 days  Daily weights: call if 2 pound weight gain in 24 hours or 5 pound weight gain in 1 week. Call for persistent diarrhea, nausea, or vomiting. Pain, tenderness, warmth, or redness in calf  Call for symptomatic fluttering in chest, irregular pulse, dyspnea  DEPRESSION: Call Primary Care Physician if feelings of depression and depression persists  DIABETICS: Call Primary Care Physician for blood sugars >130 consistently       Physician Certification: I certify the above information and transfer of Rebecca García  is necessary for the continuing treatment of the diagnosis listed and that she requires Home Care for less 30 days. Update Admission H&P: No change in H&P    PHYSICIAN SIGNATURE:  Electronically signed by LIZZIE Mims/HAKEEM Valadez MD on 3/24/21 at 10:13 AM EDT

## 2021-03-19 NOTE — ANESTHESIA PROCEDURE NOTES
Procedure Performed: ROSETTA     Start Time:        End Time:      Preanesthesia Checklist:  Patient identified, IV assessed, risks and benefits discussed, monitors and equipment assessed, procedure being performed at surgeon's request and anesthesia consent obtained. General Procedure Information  Diagnostic Indications for Echo:  assessment of ascending aorta, assessment of surgical repair, hemodynamic monitoring and assessment of valve function  Physician Requesting Echo: Raya Herbert MD  Location performed:  OR  Intubated  Bite block not placed  Heart visualized  Probe Insertion:  Easy  Probe Type:  3D  Modalities:  2D only, 3D, color flow mapping, continuous wave Doppler, contrast, pulse wave Doppler and M-mode        Anesthesia Information  Performed Personally  Anesthesiologist:  Cornelia Anand MD          Transesophageal Echocardiogram (ROSETTA) ProbePlacement Procedure Note    NAME: Kim Calzada     MR#: [id number]    Surgical Procedure: AVRAsc Ao repair        Surgeon: Windy Mcnulty             Date: 19 March 2021    Indication: anne-op monitoring and access    Procedure: After induction of General Endotracheal Anesthesia, a standard ROSETTA probe was placed through the mouth and advanced into the esophagus and stomach. Multiple planes at multiple depths were viewed and the findings related to the surgeon. Ultrasound Findings:  Prepump: LVH present mild/moderate. The LV size is normal, function preserved with EF 50% no RWMA noted. There is trace TR and mild central MR noted. The mitral valve appears thickened though opens and closes near normal.  The AV is thick, calcified, sclerotic and appears bicuspid. Unable to reproduce severity under GA. Doppler angle is skewed with respect to LVOT and AV to assess gradient. Post: EF 55%, tissue valve in position of AV, well seated. No perivalvular leaks, no AI noted.   Mean PG across tissue valve of 7 mmHG    Complications: none    Matthew Presley MD  9:00 AM

## 2021-03-19 NOTE — ANESTHESIA PROCEDURE NOTES
Peripheral Block    Patient location during procedure: OR  Staffing  Performed: anesthesiologist   Anesthesiologist: Yaneth Lyle MD  Preanesthetic Checklist  Completed: patient identified, IV checked, site marked, risks and benefits discussed, surgical consent, monitors and equipment checked, pre-op evaluation, timeout performed, anesthesia consent given, oxygen available and patient being monitored  Peripheral Block  Patient position: supine  Prep: ChloraPrep  Patient monitoring: cardiac monitor, continuous pulse ox, frequent blood pressure checks and IV access  Block type: PECS II  Laterality: bilateral  Injection technique: single-shot  Guidance: ultrasound guided  Local infiltration: lidocaine  Infiltration strength: 1 %  Dose: 3 mL  Provider prep: mask and sterile gloves  Local infiltration: lidocaine  Needle  Needle gauge: 21 G  Needle length: 10 cm  Needle localization: ultrasound guidance  Assessment  Injection assessment: negative aspiration for heme, no paresthesia on injection and local visualized surrounding nerve on ultrasound  Paresthesia pain: none  Slow fractionated injection: yes  Hemodynamics: stable  Additional Notes  Immediately prior to procedure a \"time out\" was called to verify the correct patient, allergies, laterality, procedure and equipment. Time out performed with  RN    Local Anesthetic:10 cc  0.5 %  Bupivacaine + 10 cc Exparel   Amount: 20 ml  in 5 ml increments after negative aspiration each time. Pec amjor, pec minor,serratus muscle are identified; the tip of the needle and the spread of the local anesthetic between the Internal pec minor and serratus muscles are visualized. The muscles appeared to be anatomically normal and there were no abnormal pathologically findings seen.          Medications Administered  Bupivacaine (MARCAINE) PF injection 0.5%, 20 mL  bupivacaine liposome (EXPAREL) injection 1.3%, 20 mL  Reason for block: post-op pain management and at surgeon's request

## 2021-03-19 NOTE — ANESTHESIA PROCEDURE NOTES
Central Venous Line:    A central venous line was placed using ultrasound guidance, in the OR for the following indication(s): central venous access and CVP monitoring. Sterility preparation included the following: hand hygiene performed prior to procedure, maximum sterile barriers used and sterile technique used to drape from head to toe. The patient was placed in Trendelenburg position. The right internal jugular vein was prepped. The site was prepped with Chloraprep. A 9 Fr (size), 10 (length), introducer double lumen was placed. During the procedure, the following specific steps were taken: target vein identified, needle advanced into vein and blood aspirated and guidewire advanced into vein. Intravenous verification was obtained by ultrasound and venous blood return. Post insertion care included: all ports aspirated, all ports flushed easily, guidewire removed intact, Biopatch applied, line sutured in place and dressing applied. During the procedure the patient experienced: patient tolerated procedure well with no complications and EBL 0mL. Anesthesia type: generalA(n) oximetric, 8 (size) Pulmonary Artery Catheter (PAC) was placed through the Introducer CVL in the right internal jugular vein. The PAC placement was confirmed by pressure tracing changes and ROSETTA and secured at 45 cm (depth). The patient experienced the following events during the procedure: patient tolerated procedure well with no complications. PA Cath placed?: Yes  Staffing  Performed: Anesthesiologist   Anesthesiologist: Myra Wilder MD  Preanesthetic Checklist  Completed: patient identified, IV checked, site marked, risks and benefits discussed, surgical consent, monitors and equipment checked, pre-op evaluation, timeout performed, anesthesia consent given, oxygen available and patient being monitored

## 2021-03-20 LAB
ANION GAP SERPL CALCULATED.3IONS-SCNC: 11 MMOL/L (ref 3–16)
BASE EXCESS ARTERIAL: -5 (ref -3–3)
BASE EXCESS ARTERIAL: -5 (ref -3–3)
BASE EXCESS VENOUS: -5 (ref -3–3)
BUN BLDV-MCNC: 29 MG/DL (ref 7–20)
CALCIUM IONIZED: 1.15 MMOL/L (ref 1.12–1.32)
CALCIUM SERPL-MCNC: 8.6 MG/DL (ref 8.3–10.6)
CHLORIDE BLD-SCNC: 109 MMOL/L (ref 99–110)
CO2: 20 MMOL/L (ref 21–32)
CREAT SERPL-MCNC: 1.4 MG/DL (ref 0.6–1.2)
GFR AFRICAN AMERICAN: 45
GFR NON-AFRICAN AMERICAN: 38
GLUCOSE BLD-MCNC: 100 MG/DL (ref 70–99)
GLUCOSE BLD-MCNC: 104 MG/DL (ref 70–99)
GLUCOSE BLD-MCNC: 110 MG/DL (ref 70–99)
GLUCOSE BLD-MCNC: 112 MG/DL (ref 70–99)
GLUCOSE BLD-MCNC: 131 MG/DL (ref 70–99)
GLUCOSE BLD-MCNC: 131 MG/DL (ref 70–99)
GLUCOSE BLD-MCNC: 147 MG/DL (ref 70–99)
GLUCOSE BLD-MCNC: 227 MG/DL (ref 70–99)
GLUCOSE BLD-MCNC: 65 MG/DL (ref 70–99)
GLUCOSE BLD-MCNC: 66 MG/DL (ref 70–99)
GLUCOSE BLD-MCNC: 85 MG/DL (ref 70–99)
GLUCOSE BLD-MCNC: 86 MG/DL (ref 70–99)
GLUCOSE BLD-MCNC: 94 MG/DL (ref 70–99)
GLUCOSE BLD-MCNC: 94 MG/DL (ref 70–99)
HCO3 ARTERIAL: 20.3 MMOL/L (ref 21–29)
HCO3 ARTERIAL: 21.2 MMOL/L (ref 21–29)
HCO3 VENOUS: 21.4 MMOL/L (ref 23–29)
HCT VFR BLD CALC: 26.1 % (ref 36–48)
HEMOGLOBIN: 8.4 G/DL (ref 12–16)
INR BLD: 1.26 (ref 0.86–1.14)
MAGNESIUM: 2.4 MG/DL (ref 1.8–2.4)
MCH RBC QN AUTO: 26.1 PG (ref 26–34)
MCHC RBC AUTO-ENTMCNC: 32.2 G/DL (ref 31–36)
MCV RBC AUTO: 81.2 FL (ref 80–100)
O2 SAT, ARTERIAL: 93 % (ref 93–100)
O2 SAT, ARTERIAL: 97 % (ref 93–100)
O2 SAT, VEN: 41 %
PCO2 ARTERIAL: 35.7 MM HG (ref 35–45)
PCO2 ARTERIAL: 40.1 MM HG (ref 35–45)
PCO2, VEN: 42.2 MM HG (ref 40–50)
PDW BLD-RTO: 17 % (ref 12.4–15.4)
PERFORMED ON: ABNORMAL
PERFORMED ON: NORMAL
PH ARTERIAL: 7.33 (ref 7.35–7.45)
PH ARTERIAL: 7.36 (ref 7.35–7.45)
PH VENOUS: 7.31 (ref 7.35–7.45)
PLATELET # BLD: 105 K/UL (ref 135–450)
PMV BLD AUTO: 7.9 FL (ref 5–10.5)
PO2 ARTERIAL: 68.7 MM HG (ref 75–108)
PO2 ARTERIAL: 95.7 MM HG (ref 75–108)
PO2, VEN: 25 MM HG
POC POTASSIUM: 4.3 MMOL/L (ref 3.5–5.1)
POC SAMPLE TYPE: ABNORMAL
POC SODIUM: 140 MMOL/L (ref 136–145)
POTASSIUM SERPL-SCNC: 4.9 MMOL/L (ref 3.5–5.1)
PROTHROMBIN TIME: 14.7 SEC (ref 10–13.2)
RBC # BLD: 3.22 M/UL (ref 4–5.2)
SODIUM BLD-SCNC: 140 MMOL/L (ref 136–145)
TCO2 ARTERIAL: 21 MMOL/L
TCO2 ARTERIAL: 22 MMOL/L
TCO2 CALC VENOUS: 23 MMOL/L
WBC # BLD: 7.9 K/UL (ref 4–11)

## 2021-03-20 PROCEDURE — 94761 N-INVAS EAR/PLS OXIMETRY MLT: CPT

## 2021-03-20 PROCEDURE — 2580000003 HC RX 258: Performed by: THORACIC SURGERY (CARDIOTHORACIC VASCULAR SURGERY)

## 2021-03-20 PROCEDURE — 6370000000 HC RX 637 (ALT 250 FOR IP): Performed by: CLINICAL NURSE SPECIALIST

## 2021-03-20 PROCEDURE — 2500000003 HC RX 250 WO HCPCS: Performed by: THORACIC SURGERY (CARDIOTHORACIC VASCULAR SURGERY)

## 2021-03-20 PROCEDURE — 6370000000 HC RX 637 (ALT 250 FOR IP): Performed by: THORACIC SURGERY (CARDIOTHORACIC VASCULAR SURGERY)

## 2021-03-20 PROCEDURE — 94150 VITAL CAPACITY TEST: CPT

## 2021-03-20 PROCEDURE — 82803 BLOOD GASES ANY COMBINATION: CPT

## 2021-03-20 PROCEDURE — 80048 BASIC METABOLIC PNL TOTAL CA: CPT

## 2021-03-20 PROCEDURE — C9113 INJ PANTOPRAZOLE SODIUM, VIA: HCPCS | Performed by: THORACIC SURGERY (CARDIOTHORACIC VASCULAR SURGERY)

## 2021-03-20 PROCEDURE — 2000000000 HC ICU R&B

## 2021-03-20 PROCEDURE — 83735 ASSAY OF MAGNESIUM: CPT

## 2021-03-20 PROCEDURE — 6360000002 HC RX W HCPCS: Performed by: THORACIC SURGERY (CARDIOTHORACIC VASCULAR SURGERY)

## 2021-03-20 PROCEDURE — 2700000000 HC OXYGEN THERAPY PER DAY

## 2021-03-20 PROCEDURE — 37799 UNLISTED PX VASCULAR SURGERY: CPT

## 2021-03-20 PROCEDURE — 94640 AIRWAY INHALATION TREATMENT: CPT

## 2021-03-20 PROCEDURE — 82947 ASSAY GLUCOSE BLOOD QUANT: CPT

## 2021-03-20 PROCEDURE — 99024 POSTOP FOLLOW-UP VISIT: CPT | Performed by: THORACIC SURGERY (CARDIOTHORACIC VASCULAR SURGERY)

## 2021-03-20 PROCEDURE — 85610 PROTHROMBIN TIME: CPT

## 2021-03-20 PROCEDURE — 85027 COMPLETE CBC AUTOMATED: CPT

## 2021-03-20 PROCEDURE — 94003 VENT MGMT INPAT SUBQ DAY: CPT

## 2021-03-20 RX ORDER — LIDOCAINE 4 G/G
1 PATCH TOPICAL DAILY
Status: DISCONTINUED | OUTPATIENT
Start: 2021-03-20 | End: 2021-03-24 | Stop reason: HOSPADM

## 2021-03-20 RX ORDER — INSULIN LISPRO 100 [IU]/ML
0-12 INJECTION, SOLUTION INTRAVENOUS; SUBCUTANEOUS
Status: DISCONTINUED | OUTPATIENT
Start: 2021-03-20 | End: 2021-03-24 | Stop reason: HOSPADM

## 2021-03-20 RX ORDER — ALBUTEROL SULFATE 2.5 MG/3ML
2.5 SOLUTION RESPIRATORY (INHALATION) EVERY 4 HOURS PRN
Status: DISCONTINUED | OUTPATIENT
Start: 2021-03-20 | End: 2021-03-24 | Stop reason: HOSPADM

## 2021-03-20 RX ADMIN — CEFAZOLIN SODIUM 2000 MG: 2 SOLUTION INTRAVENOUS at 08:45

## 2021-03-20 RX ADMIN — FUROSEMIDE 40 MG: 10 INJECTION, SOLUTION INTRAMUSCULAR; INTRAVENOUS at 09:07

## 2021-03-20 RX ADMIN — KETOROLAC TROMETHAMINE 15 MG: 30 INJECTION, SOLUTION INTRAMUSCULAR at 02:15

## 2021-03-20 RX ADMIN — Medication 10 ML: at 21:00

## 2021-03-20 RX ADMIN — Medication 15 ML: at 21:09

## 2021-03-20 RX ADMIN — ACETAMINOPHEN 1000 MG: 500 TABLET, COATED ORAL at 12:19

## 2021-03-20 RX ADMIN — ROSUVASTATIN CALCIUM 20 MG: 20 TABLET, COATED ORAL at 00:50

## 2021-03-20 RX ADMIN — DULOXETINE HYDROCHLORIDE 30 MG: 30 CAPSULE, DELAYED RELEASE ORAL at 09:08

## 2021-03-20 RX ADMIN — Medication 10 ML: at 09:06

## 2021-03-20 RX ADMIN — ROSUVASTATIN CALCIUM 20 MG: 20 TABLET, COATED ORAL at 21:09

## 2021-03-20 RX ADMIN — ACETAMINOPHEN 1000 MG: 500 TABLET, COATED ORAL at 17:45

## 2021-03-20 RX ADMIN — FUROSEMIDE 40 MG: 10 INJECTION, SOLUTION INTRAMUSCULAR; INTRAVENOUS at 17:45

## 2021-03-20 RX ADMIN — MUPIROCIN: 20 OINTMENT TOPICAL at 09:07

## 2021-03-20 RX ADMIN — PANTOPRAZOLE SODIUM 40 MG: 40 INJECTION, POWDER, FOR SOLUTION INTRAVENOUS at 21:09

## 2021-03-20 RX ADMIN — POTASSIUM CHLORIDE 10 MEQ: 750 TABLET, EXTENDED RELEASE ORAL at 12:19

## 2021-03-20 RX ADMIN — MUPIROCIN: 20 OINTMENT TOPICAL at 21:09

## 2021-03-20 RX ADMIN — GABAPENTIN 300 MG: 300 CAPSULE ORAL at 09:05

## 2021-03-20 RX ADMIN — CEFAZOLIN SODIUM 2000 MG: 2 SOLUTION INTRAVENOUS at 02:22

## 2021-03-20 RX ADMIN — MAGNESIUM OXIDE TAB 400 MG (240 MG ELEMENTAL MG) 400 MG: 400 (240 MG) TAB at 21:09

## 2021-03-20 RX ADMIN — Medication 15 ML: at 09:06

## 2021-03-20 RX ADMIN — DOCUSATE SODIUM 50 MG AND SENNOSIDES 8.6 MG 1 TABLET: 8.6; 5 TABLET, FILM COATED ORAL at 00:51

## 2021-03-20 RX ADMIN — ACETAMINOPHEN 1000 MG: 500 TABLET, COATED ORAL at 00:56

## 2021-03-20 RX ADMIN — POTASSIUM CHLORIDE 10 MEQ: 750 TABLET, EXTENDED RELEASE ORAL at 09:05

## 2021-03-20 RX ADMIN — GABAPENTIN 300 MG: 300 CAPSULE ORAL at 21:09

## 2021-03-20 RX ADMIN — VANCOMYCIN HYDROCHLORIDE 1500 MG: 10 INJECTION, POWDER, LYOPHILIZED, FOR SOLUTION INTRAVENOUS at 09:12

## 2021-03-20 RX ADMIN — VANCOMYCIN HYDROCHLORIDE 1500 MG: 10 INJECTION, POWDER, LYOPHILIZED, FOR SOLUTION INTRAVENOUS at 21:20

## 2021-03-20 RX ADMIN — INSULIN LISPRO 6 UNITS: 100 INJECTION, SOLUTION INTRAVENOUS; SUBCUTANEOUS at 21:10

## 2021-03-20 RX ADMIN — CEFAZOLIN SODIUM 2000 MG: 2 SOLUTION INTRAVENOUS at 16:25

## 2021-03-20 RX ADMIN — ASPIRIN 325 MG: 325 TABLET, COATED ORAL at 09:05

## 2021-03-20 RX ADMIN — INSULIN LISPRO 4 UNITS: 100 INJECTION, SOLUTION INTRAVENOUS; SUBCUTANEOUS at 13:00

## 2021-03-20 RX ADMIN — MAGNESIUM OXIDE TAB 400 MG (240 MG ELEMENTAL MG) 400 MG: 400 (240 MG) TAB at 09:05

## 2021-03-20 RX ADMIN — POTASSIUM CHLORIDE 20 MEQ: 29.8 INJECTION, SOLUTION INTRAVENOUS at 00:29

## 2021-03-20 RX ADMIN — GABAPENTIN 300 MG: 300 CAPSULE ORAL at 14:30

## 2021-03-20 RX ADMIN — POTASSIUM CHLORIDE 10 MEQ: 750 TABLET, EXTENDED RELEASE ORAL at 17:45

## 2021-03-20 RX ADMIN — CLOPIDOGREL BISULFATE 75 MG: 75 TABLET ORAL at 09:05

## 2021-03-20 RX ADMIN — PANTOPRAZOLE SODIUM 40 MG: 40 INJECTION, POWDER, FOR SOLUTION INTRAVENOUS at 09:06

## 2021-03-20 RX ADMIN — NOREPINEPHRINE BITARTRATE 0.01 MCG/KG/MIN: 1 INJECTION, SOLUTION, CONCENTRATE INTRAVENOUS at 06:47

## 2021-03-20 RX ADMIN — ACETAMINOPHEN 1000 MG: 500 TABLET, COATED ORAL at 06:46

## 2021-03-20 ASSESSMENT — PAIN DESCRIPTION - LOCATION: LOCATION: BACK

## 2021-03-20 ASSESSMENT — PAIN DESCRIPTION - PAIN TYPE
TYPE: SURGICAL PAIN
TYPE: ACUTE PAIN
TYPE: ACUTE PAIN

## 2021-03-20 ASSESSMENT — PAIN SCALES - GENERAL
PAINLEVEL_OUTOF10: 5
PAINLEVEL_OUTOF10: 4
PAINLEVEL_OUTOF10: 0
PAINLEVEL_OUTOF10: 0
PAINLEVEL_OUTOF10: 1
PAINLEVEL_OUTOF10: 0
PAINLEVEL_OUTOF10: 4
PAINLEVEL_OUTOF10: 8

## 2021-03-20 ASSESSMENT — PAIN DESCRIPTION - PROGRESSION
CLINICAL_PROGRESSION: GRADUALLY IMPROVING
CLINICAL_PROGRESSION: NOT CHANGED
CLINICAL_PROGRESSION: GRADUALLY IMPROVING
CLINICAL_PROGRESSION: NOT CHANGED
CLINICAL_PROGRESSION: NOT CHANGED
CLINICAL_PROGRESSION: GRADUALLY IMPROVING

## 2021-03-20 ASSESSMENT — PAIN DESCRIPTION - DESCRIPTORS
DESCRIPTORS: ACHING
DESCRIPTORS: ACHING

## 2021-03-20 ASSESSMENT — PAIN DESCRIPTION - FREQUENCY
FREQUENCY: CONTINUOUS

## 2021-03-20 ASSESSMENT — PAIN - FUNCTIONAL ASSESSMENT: PAIN_FUNCTIONAL_ASSESSMENT: ACTIVITIES ARE NOT PREVENTED

## 2021-03-20 ASSESSMENT — PULMONARY FUNCTION TESTS
PIF_VALUE: 15

## 2021-03-20 ASSESSMENT — PAIN DESCRIPTION - ONSET
ONSET: ON-GOING
ONSET: ON-GOING

## 2021-03-20 ASSESSMENT — PAIN DESCRIPTION - ORIENTATION: ORIENTATION: MID;LOWER

## 2021-03-20 NOTE — PLAN OF CARE
Problem: Pain:  Goal: Pain level will decrease  Description: Pain level will decrease  3/20/2021 1537 by Corona Sainz RN  Outcome: Ongoing  3/20/2021 0743 by Ruth Rogers RN  Outcome: Ongoing  Goal: Control of acute pain  Description: Control of acute pain  3/20/2021 1537 by Corona Sainz RN  Outcome: Ongoing  3/20/2021 0743 by Ruth Rogers RN  Outcome: Ongoing  Goal: Control of chronic pain  Description: Control of chronic pain  3/20/2021 1537 by Corona Sainz RN  Outcome: Ongoing  3/20/2021 0743 by Ruth Rogers RN  Outcome: Ongoing     Problem: Cardiac Output - Decreased:  Goal: Hemodynamic stability will improve  Description: Hemodynamic stability will improve  3/20/2021 1537 by Corona Sainz RN  Outcome: Ongoing  3/20/2021 0743 by Ruth Rogers RN  Outcome: Ongoing     Problem: Falls - Risk of:  Goal: Will remain free from falls  Description: Will remain free from falls  3/20/2021 1537 by Corona Sainz RN  Outcome: Ongoing  3/20/2021 0743 by Ruth Rogers RN  Outcome: Ongoing  Goal: Absence of physical injury  Description: Absence of physical injury  3/20/2021 1537 by Corona Sainz RN  Outcome: Ongoing  3/20/2021 0743 by Ruth Rogers RN  Outcome: Ongoing     Problem: Infection - Central Venous Catheter-Associated Bloodstream Infection:  Goal: Will show no infection signs and symptoms  Description: Will show no infection signs and symptoms  3/20/2021 1537 by Corona Sainz RN  Outcome: Ongoing  3/20/2021 0743 by Ruth Rogers RN  Outcome: Ongoing

## 2021-03-20 NOTE — PROGRESS NOTES
Cardiac index (1.6) , urine output (25-30 mL/hour), and BP (low to PQX-26'T systolic) have been gradually decreasing this shift. PAD 20 and CVP 15. Precedex is infusing at minimum rate, and no BP improvement noted with patient wakefulness. Low-dose Levophed restarted as charted.

## 2021-03-20 NOTE — PROGRESS NOTES
After extubation, patient is oriented X 4, able to carry on full conversations, but still drowsy. She reports back and sternal incision pain (8/10 to back) - Precedex restarted and Toradol given per PRN order. Back pain assisted with frequent repositioning. Patient is now resting intermittently with eyes closed. 1L nasal cannula.

## 2021-03-20 NOTE — PROGRESS NOTES
Dr. Kellee Esquivel and patient's daughter Michael Mitchell both updated via telephone. Patient awakens to voice, follows all commands and nods appropriately to orientation questions, but is still drowsy. Toradol given for back pain 8/10. Will continue attempts to extubate. Insulin and Lactated Ringers infusing. Levophed and Nitroglycerin have been off since 1738 and 1325 respectively.

## 2021-03-20 NOTE — PROGRESS NOTES
RESPIRATORY THERAPY ASSESSMENT    Name:  Ramya Nina [facility] Record Number:  [id number]  Age: 76 y.o. Gender: female  : 1955  Today's Date:  3/20/2021  Room:  [address] [place] [address]    Assessment     Is the patient being admitted for a COPD or Asthma exacerbation? No   (If yes the patient will be seen every 4 hours for the first 24 hours and then reassessed)    Patient Admission Diagnosis      Allergies  Allergies   Allergen Reactions    No Known Allergies            Pulmonary History:No history  Home Oxygen Therapy:  room air  Home Respiratory Therapy:None   Current Respiratory Therapy:  Albuterol  Treatment Type: Aerosol generator  Medications: Albuterol    Respiratory Severity Index(RSI)   Patients with orders for inhalation medications, oxygen, or any therapeutic treatment modality will be placed on Respiratory Protocol. They will be assessed with the first treatment and at least every 72 hours thereafter. The following severity scale will be used to determine frequency of treatment intervention.     Smoking History: No Smoking History = 0    Social History  Social History     Tobacco Use    Smoking status: Never Smoker    Smokeless tobacco: Never Used   Substance Use Topics    Alcohol use: Not Currently    Drug use: No       Recent Surgical History: Thoracic or Pulmonary Resection = 4  Past Surgical History  Past Surgical History:   Procedure Laterality Date    AORTIC VALVE REPLACEMENT N/A 3/19/2021    ROSETTA; TCPB; AVR with 21 mm Weir Inspiris resilia bovine pericardial bioprosthesis; ascending aortoplasty performed by Millicent Dubon MD at [facility] COLONOSCOPY  2016    Alonzo WIGGINS    COLONOSCOPY  2020    COLONOSCOPY WITH BIOPSY performed by Onelia Butt MD at [address] [place] [address]      UPPER GASTROINTESTINAL ENDOSCOPY N/A 2020    EGD BIOPSY performed by Onelia Butt MD at [address] [place] [address]         Level of Consciousness: Alert, Oriented, and Cooperative = 0    Level of Activity: Walking with assistance = 1    Respiratory Pattern: Regular Pattern; RR 8-20 = 0    Breath Sounds: Clear = 0    Sputum  Sputum Color: Cloudy, Tenacity: Thick, Sputum How Obtained: Oral, Suctioned  Cough: Strong, spontaneous, non-productive = 0    Vital Signs   BP 86/61   Pulse 77   Temp 99.2 °F (37.3 °C) (Core)   Resp 18   Ht 5' 5\" (1.651 m)   Wt 204 lb (92.5 kg)   LMP 01/02/2013   SpO2 100%   BMI 33.95 kg/m²   SPO2 (COPD values may differ): Greater than or equal to 92% on room air = 0    Peak Flow (asthma only): not applicable = 0    RSI: 5-6 = Q4hr PRN (every four hours as needed) for dyspnea        Plan       Goals: medication delivery    Patient/caregiver was educated on the proper method of use for Respiratory Care Devices:  Yes      Level of patient/caregiver understanding able to:   ? Verbalize understanding   ? Demonstrate understanding       ? Teach back        ? Needs reinforcement       ? No available caregiver               ? Other:     Response to education:  Good     Is patient being placed on Home Treatment Regimen? No     Does the patient have everything they need prior to discharge? Yes     Comments: Pt denies dyspnea or SOB at this time. Pt seen and chart reviewed. Pt extubated without complications. Plan of Care: Albuterol PRN and keep SpO2 > or = 92% per protocol. Electronically signed by Sylvain Farrell RCP on 3/20/2021 at 4:12 AM    Respiratory Protocol Guidelines     1. Assessment and treatment by Respiratory Therapy will be initiated for medication and therapeutic interventions upon initiation of aerosolized medication. 2. Physician will be contacted for respiratory rate (RR) greater than 35 breaths per minute.  Therapy will be held for heart rate (HR) greater than 140 beats per minute, pending direction from physician. 3. Bronchodilators will be administered via Metered Dose Inhaler (MDI) with spacer when the following criteria are met:  a. Alert and cooperative     b. HR < 140 bpm  c. RR < 30 bpm                d. Can demonstrate a 2-3 second inspiratory hold  4. Bronchodilators will be administered via Hand Held Nebulizer [facility]) to patients when ANY of the following criteria are met  a. Incognizant or uncooperative          b. Patients treated with HHN at Home        c. Unable to demonstrate proper use of MDI with spacer     d. RR > 30 bpm   5. Bronchodilators will be delivered via Metered Dose Inhaler (MDI), HHN, Aerogen to intubated patients on mechanical ventilation. 6. Inhalation medication orders will be delivered and/or substituted as outlined below. Aerosolized Medications Ordering and Administration Guidelines:    1. All Medications will be ordered by a physician, and their frequency and/or modality will be adjusted as defined by the patients Respiratory Severity Index (RSI) score. 2. If the patient does not have documented COPD, consider discontinuing anticholinergics when RSI is less than 9.  3. If the bronchospasm worsens (increased RSI), then the bronchodilator frequency can be increased to a maximum of every 4 hours. If greater than every 4 hours is required, the physician will be contacted. 4. If the bronchospasm improves, the frequency of the bronchodilator can be decreased, based on the patient's RSI, but not less than home treatment regimen frequency. 5. Bronchodilator(s) will be discontinued if patient has a RSI less than 9 and has received no scheduled or as needed treatment for 72  Hrs. Patients Ordered on a Mucolytic Agent:    1. Must always be administered with a bronchodilator. 2. Discontinue if patient experiences worsened bronchospasm, or secretions have lessened to the point that the patient is able to clear them with a cough.     Anti-inflammatory and Combination Medications:    1. If the patient lacks prior history of lung disease, is not using inhaled anti-inflammatory medication at home, and lacks wheezing by examination or by history for at least 24 hours, contact physician for possible discontinuation.

## 2021-03-20 NOTE — PLAN OF CARE
Problem: Pain:  Description: Pain management should include both nonpharmacologic and pharmacologic interventions.   Goal: Pain level will decrease  Description: Pain level will decrease  Outcome: Ongoing  Goal: Control of acute pain  Description: Control of acute pain  Outcome: Ongoing  Goal: Control of chronic pain  Description: Control of chronic pain  Outcome: Ongoing     Problem: Cardiac Output - Decreased:  Goal: Hemodynamic stability will improve  Description: Hemodynamic stability will improve  Outcome: Ongoing     Problem: Falls - Risk of:  Goal: Will remain free from falls  Description: Will remain free from falls  Outcome: Ongoing  Goal: Absence of physical injury  Description: Absence of physical injury  Outcome: Ongoing     Problem: Infection - Central Venous Catheter-Associated Bloodstream Infection:  Goal: Will show no infection signs and symptoms  Description: Will show no infection signs and symptoms  Outcome: Ongoing

## 2021-03-21 LAB
ALBUMIN SERPL-MCNC: 3.7 G/DL (ref 3.4–5)
ANION GAP SERPL CALCULATED.3IONS-SCNC: 16 MMOL/L (ref 3–16)
ANION GAP SERPL CALCULATED.3IONS-SCNC: 9 MMOL/L (ref 3–16)
BUN BLDV-MCNC: 29 MG/DL (ref 7–20)
BUN BLDV-MCNC: 30 MG/DL (ref 7–20)
CALCIUM IONIZED: 1.16 MMOL/L (ref 1.12–1.32)
CALCIUM SERPL-MCNC: 8 MG/DL (ref 8.3–10.6)
CALCIUM SERPL-MCNC: 8.7 MG/DL (ref 8.3–10.6)
CHLORIDE BLD-SCNC: 104 MMOL/L (ref 99–110)
CHLORIDE BLD-SCNC: 95 MMOL/L (ref 99–110)
CO2: 21 MMOL/L (ref 21–32)
CO2: 25 MMOL/L (ref 21–32)
CREAT SERPL-MCNC: 1.3 MG/DL (ref 0.6–1.2)
CREAT SERPL-MCNC: 2.5 MG/DL (ref 0.6–1.2)
EOSINOPHIL,URINE: NORMAL
GFR AFRICAN AMERICAN: 23
GFR AFRICAN AMERICAN: 50
GFR NON-AFRICAN AMERICAN: 19
GFR NON-AFRICAN AMERICAN: 41
GLUCOSE BLD-MCNC: 106 MG/DL (ref 70–99)
GLUCOSE BLD-MCNC: 115 MG/DL (ref 70–99)
GLUCOSE BLD-MCNC: 119 MG/DL (ref 70–99)
GLUCOSE BLD-MCNC: 161 MG/DL (ref 70–99)
GLUCOSE BLD-MCNC: 231 MG/DL (ref 70–99)
GLUCOSE BLD-MCNC: 341 MG/DL (ref 70–99)
HCT VFR BLD CALC: 26.6 % (ref 36–48)
HEMOGLOBIN: 8.4 G/DL (ref 12–16)
MAGNESIUM: 2.2 MG/DL (ref 1.8–2.4)
MAGNESIUM: 2.3 MG/DL (ref 1.8–2.4)
MCH RBC QN AUTO: 25.8 PG (ref 26–34)
MCHC RBC AUTO-ENTMCNC: 31.5 G/DL (ref 31–36)
MCV RBC AUTO: 82.1 FL (ref 80–100)
PDW BLD-RTO: 17.1 % (ref 12.4–15.4)
PERFORMED ON: ABNORMAL
PH VENOUS: 7.36 (ref 7.35–7.45)
PHOSPHORUS: 3 MG/DL (ref 2.5–4.9)
PLATELET # BLD: 102 K/UL (ref 135–450)
PMV BLD AUTO: 8.1 FL (ref 5–10.5)
POTASSIUM SERPL-SCNC: 4.3 MMOL/L (ref 3.5–5.1)
POTASSIUM SERPL-SCNC: 4.5 MMOL/L (ref 3.5–5.1)
RBC # BLD: 3.24 M/UL (ref 4–5.2)
SODIUM BLD-SCNC: 132 MMOL/L (ref 136–145)
SODIUM BLD-SCNC: 138 MMOL/L (ref 136–145)
SODIUM URINE: 31 MMOL/L
VANCOMYCIN RANDOM: 22.1 UG/ML
WBC # BLD: 10 K/UL (ref 4–11)

## 2021-03-21 PROCEDURE — 82570 ASSAY OF URINE CREATININE: CPT

## 2021-03-21 PROCEDURE — 97530 THERAPEUTIC ACTIVITIES: CPT

## 2021-03-21 PROCEDURE — 6370000000 HC RX 637 (ALT 250 FOR IP): Performed by: THORACIC SURGERY (CARDIOTHORACIC VASCULAR SURGERY)

## 2021-03-21 PROCEDURE — 36592 COLLECT BLOOD FROM PICC: CPT

## 2021-03-21 PROCEDURE — 2580000003 HC RX 258: Performed by: THORACIC SURGERY (CARDIOTHORACIC VASCULAR SURGERY)

## 2021-03-21 PROCEDURE — 6360000002 HC RX W HCPCS: Performed by: INTERNAL MEDICINE

## 2021-03-21 PROCEDURE — 84300 ASSAY OF URINE SODIUM: CPT

## 2021-03-21 PROCEDURE — 83735 ASSAY OF MAGNESIUM: CPT

## 2021-03-21 PROCEDURE — 2000000000 HC ICU R&B

## 2021-03-21 PROCEDURE — 97116 GAIT TRAINING THERAPY: CPT

## 2021-03-21 PROCEDURE — 87205 SMEAR GRAM STAIN: CPT

## 2021-03-21 PROCEDURE — 80048 BASIC METABOLIC PNL TOTAL CA: CPT

## 2021-03-21 PROCEDURE — 80202 ASSAY OF VANCOMYCIN: CPT

## 2021-03-21 PROCEDURE — 6370000000 HC RX 637 (ALT 250 FOR IP): Performed by: CLINICAL NURSE SPECIALIST

## 2021-03-21 PROCEDURE — 2580000003 HC RX 258: Performed by: INTERNAL MEDICINE

## 2021-03-21 PROCEDURE — 6360000002 HC RX W HCPCS: Performed by: THORACIC SURGERY (CARDIOTHORACIC VASCULAR SURGERY)

## 2021-03-21 PROCEDURE — 84156 ASSAY OF PROTEIN URINE: CPT

## 2021-03-21 PROCEDURE — 36415 COLL VENOUS BLD VENIPUNCTURE: CPT

## 2021-03-21 PROCEDURE — 80069 RENAL FUNCTION PANEL: CPT

## 2021-03-21 PROCEDURE — 94150 VITAL CAPACITY TEST: CPT

## 2021-03-21 PROCEDURE — 82330 ASSAY OF CALCIUM: CPT

## 2021-03-21 PROCEDURE — 85027 COMPLETE CBC AUTOMATED: CPT

## 2021-03-21 PROCEDURE — 97162 PT EVAL MOD COMPLEX 30 MIN: CPT

## 2021-03-21 RX ADMIN — Medication 10 ML: at 20:51

## 2021-03-21 RX ADMIN — CLOPIDOGREL BISULFATE 75 MG: 75 TABLET ORAL at 08:01

## 2021-03-21 RX ADMIN — MUPIROCIN: 20 OINTMENT TOPICAL at 20:52

## 2021-03-21 RX ADMIN — MAGNESIUM OXIDE TAB 400 MG (240 MG ELEMENTAL MG) 400 MG: 400 (240 MG) TAB at 20:38

## 2021-03-21 RX ADMIN — INSULIN GLARGINE 14 UNITS: 100 INJECTION, SOLUTION SUBCUTANEOUS at 00:34

## 2021-03-21 RX ADMIN — CEFAZOLIN SODIUM 2000 MG: 2 SOLUTION INTRAVENOUS at 00:31

## 2021-03-21 RX ADMIN — POTASSIUM CHLORIDE 10 MEQ: 750 TABLET, EXTENDED RELEASE ORAL at 08:02

## 2021-03-21 RX ADMIN — MAGNESIUM OXIDE TAB 400 MG (240 MG ELEMENTAL MG) 400 MG: 400 (240 MG) TAB at 08:02

## 2021-03-21 RX ADMIN — ROSUVASTATIN CALCIUM 20 MG: 20 TABLET, COATED ORAL at 20:37

## 2021-03-21 RX ADMIN — PANTOPRAZOLE SODIUM 40 MG: 40 TABLET, DELAYED RELEASE ORAL at 17:21

## 2021-03-21 RX ADMIN — INSULIN LISPRO 6 UNITS: 100 INJECTION, SOLUTION INTRAVENOUS; SUBCUTANEOUS at 11:12

## 2021-03-21 RX ADMIN — INSULIN GLARGINE 14 UNITS: 100 INJECTION, SOLUTION SUBCUTANEOUS at 20:40

## 2021-03-21 RX ADMIN — Medication 15 ML: at 08:02

## 2021-03-21 RX ADMIN — DULOXETINE HYDROCHLORIDE 30 MG: 30 CAPSULE, DELAYED RELEASE ORAL at 08:01

## 2021-03-21 RX ADMIN — ACETAMINOPHEN 1000 MG: 500 TABLET, COATED ORAL at 00:31

## 2021-03-21 RX ADMIN — PANTOPRAZOLE SODIUM 40 MG: 40 TABLET, DELAYED RELEASE ORAL at 07:59

## 2021-03-21 RX ADMIN — POTASSIUM CHLORIDE 10 MEQ: 750 TABLET, EXTENDED RELEASE ORAL at 17:22

## 2021-03-21 RX ADMIN — GABAPENTIN 300 MG: 300 CAPSULE ORAL at 08:01

## 2021-03-21 RX ADMIN — GABAPENTIN 300 MG: 300 CAPSULE ORAL at 12:27

## 2021-03-21 RX ADMIN — Medication 10 ML: at 08:01

## 2021-03-21 RX ADMIN — ASPIRIN 325 MG: 325 TABLET, COATED ORAL at 08:02

## 2021-03-21 RX ADMIN — INSULIN LISPRO 4 UNITS: 100 INJECTION, SOLUTION INTRAVENOUS; SUBCUTANEOUS at 20:40

## 2021-03-21 RX ADMIN — FUROSEMIDE 40 MG: 10 INJECTION, SOLUTION INTRAMUSCULAR; INTRAVENOUS at 08:02

## 2021-03-21 RX ADMIN — CALCIUM GLUCONATE 2000 MG: 98 INJECTION, SOLUTION INTRAVENOUS at 15:52

## 2021-03-21 RX ADMIN — ACETAMINOPHEN 1000 MG: 500 TABLET, COATED ORAL at 12:27

## 2021-03-21 RX ADMIN — GABAPENTIN 300 MG: 300 CAPSULE ORAL at 20:38

## 2021-03-21 RX ADMIN — ACETAMINOPHEN 1000 MG: 500 TABLET, COATED ORAL at 06:02

## 2021-03-21 RX ADMIN — POTASSIUM CHLORIDE 10 MEQ: 750 TABLET, EXTENDED RELEASE ORAL at 12:27

## 2021-03-21 RX ADMIN — ACETAMINOPHEN 1000 MG: 500 TABLET, COATED ORAL at 17:22

## 2021-03-21 RX ADMIN — Medication 15 ML: at 20:52

## 2021-03-21 RX ADMIN — MUPIROCIN: 20 OINTMENT TOPICAL at 08:02

## 2021-03-21 ASSESSMENT — PAIN SCALES - GENERAL
PAINLEVEL_OUTOF10: 0
PAINLEVEL_OUTOF10: 3
PAINLEVEL_OUTOF10: 0
PAINLEVEL_OUTOF10: 3

## 2021-03-21 ASSESSMENT — PAIN DESCRIPTION - DESCRIPTORS: DESCRIPTORS: ACHING

## 2021-03-21 ASSESSMENT — PAIN DESCRIPTION - LOCATION: LOCATION: BACK

## 2021-03-21 ASSESSMENT — PAIN DESCRIPTION - ONSET: ONSET: ON-GOING

## 2021-03-21 ASSESSMENT — PAIN DESCRIPTION - FREQUENCY: FREQUENCY: CONTINUOUS

## 2021-03-21 ASSESSMENT — PAIN DESCRIPTION - PAIN TYPE: TYPE: CHRONIC PAIN

## 2021-03-21 NOTE — PROGRESS NOTES
Pt resting in bed with eyes closed. precedex infusing at 0.02, for pain control. Pt systolic BP to the 10W during sleep. Pt cooperative and slightly withdrawn this evening. Stated that she has been unable to reach son by phone all day.  Emotional support provided  Will continue to monitor

## 2021-03-21 NOTE — PLAN OF CARE
Problem: Pain:  Goal: Pain level will decrease  Description: Pain level will decrease  3/21/2021 0327 by Naseem Byrne RN  Outcome: Ongoing  3/20/2021 1537 by Fanny Fox RN  Outcome: Ongoing  Goal: Control of acute pain  Description: Control of acute pain  3/21/2021 0327 by Naseem Byrne RN  Outcome: Ongoing  3/20/2021 1537 by Fanny Fox RN  Outcome: Ongoing  Goal: Control of chronic pain  Description: Control of chronic pain  3/21/2021 0327 by Naseem Byrne RN  Outcome: Ongoing  3/20/2021 1537 by Fanny Fox RN  Outcome: Ongoing     Problem: Cardiac Output - Decreased:  Goal: Hemodynamic stability will improve  Description: Hemodynamic stability will improve  3/20/2021 1537 by Fanny Fox RN  Outcome: Ongoing     Problem: Falls - Risk of:  Goal: Will remain free from falls  Description: Will remain free from falls  3/20/2021 1537 by Fanny Fox RN  Outcome: Ongoing  Goal: Absence of physical injury  Description: Absence of physical injury  3/20/2021 1537 by Fanny Fox RN  Outcome: Ongoing     Problem: Infection - Central Venous Catheter-Associated Bloodstream Infection:  Goal: Will show no infection signs and symptoms  Description: Will show no infection signs and symptoms  3/20/2021 1537 by Fanny Fox RN  Outcome: Ongoing
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
## 2021-03-21 NOTE — PROGRESS NOTES
Thank you for consulting [facility] in the care of your patient. A full consult will follow but if you have any questions I can be reached through our office at [phone] or through [address] [place] [address]. Thank you.   Dr. Vinita Delgado

## 2021-03-21 NOTE — PROGRESS NOTES
POD 2 # s/p tissue AVR/Ascendin aorta Aortoplasty    NSR, VSS  However with worsening EVA overnight with Cr jumping up to 2.4, non oliguric, received morning dose of lasix  Will obtain nephrology consult (Dr. Ghada Liriano)  Holding BB to maintain MAP>70, will d/c toradol, metformin and lisinopril. Will check mid day BMP, lytes and correct as needed  Leave PW and CT one more day  Leave caicedo in for better UO monitoring due to the EVA   begin routine rehab.

## 2021-03-21 NOTE — PROGRESS NOTES
Patient up to chair most of the day, ambulated in hallway x 3. Tolerated well. NSR per monitor, pt on room air at this time. Sternal incision approximated and open to air. Chest tube placed to water seal today per Dr. Hina Mondragon. Order to keep caicedo catheter, pacing wires, and chest tube in place for now. Nephrology consulted for elevated creatinine. Dr. Hanane Pope here to see patient and orders received. Pt states pain is well controlled at this time. Patient has been in contact personally with family and updated today. Will continue to monitor closely.

## 2021-03-21 NOTE — CONSULTS
Patient Name: Cathy Mehta                                                    Primary Physician: Jenny Florez MD  Admitting Dx: Aortic valve stenosis, etiology of cardiac valve disease unspecified [I35.0]  Bicuspid cardiac valve [Q23.1]  Aortic stenosis due to bicuspid aortic valve [Q23.0, Q23.1]    Nephrology [facility]  [email or web address] [place]                                          Assessment / PLan:     EVA w/ CKD 2  · Baseline SCr of 1.3 prior to admission though this is up from 1.0 in 1/2021 and may be related to CTA done on 2/2021. · Holding ACEi though VERY low dose but also received 3 high doses of Vanc and checking Vanc trough now. · CTA on 2/5/21 showed normal renal arteries. Rt Kidney shows 1 cm dense lesion / complex cyst and requires further evaluation likely MRI. · Urine studies ordered. · Cautious use of Neruontin in renal insufficiency 300 mg tid is large dose causing altered mental status: obtundation, lethargy, confusion, etc.    S/P AVR  · Surgery following. Hypotensive  · Holding Lisinopril and Lopressor for now. Hypocalcemia  · Check Vit D and iCa. · Replaced IV. Please call our office at [phone] or [employer] with any questions or contact me directly. Subjective:     CC / Reason for Consult: EVA post CABG    HPI / PMHx:  This is a consult for aCthy Mehta  requested by Jenny Florez MD for the reason of  EVA w/ CKD 2. Cathy Mehta is a 76 y.o. female admitted for AVR with PMHx of CKD 2, HLD, HTN, DM type 2, Anxiety, Anemia, GERD, Thyroid nodule, Vit D Deficiency. Baseline SCr of 1.0 in 1/2021 and 2020 though was 0.8 in 2018 suggesting CKD and now admitted with SCr of 1.3 on 3/8/21 and 3/19/21 now up to 2.5. Received IVF in surgery and +ve 4.6 L with diuresis of 2 L yesterday. BP remains low in 90s/60s. She was on very low dose of Lisinopril 2.5 mg now stopped.   Significant home meds include Losartan, HCTZ but denies NSAID use. Was on Vanc 1.5 gm x 3 doses. CTA done on 21    I thank Dr. Anjali Herrera MD for consulting [facility] [address] [place] [address] Nephrology in the care of your patient. Please call with any questions or concerns. [phone]. Ema Lung    Objective:     SocHx: NO hx of smoking and not drinking currently. FamHx: Parents . Hx of Cancer (Colon and Breast), DM, HTN.       Current Medications:  Scheduled Medications:  lidocaine, 1 patch, Daily  insulin lispro, 0-12 Units, 4x Daily AC & HS  sodium chloride flush, 10 mL, 2 times per day  aspirin, 325 mg, Daily  clopidogrel, 75 mg, Daily  chlorhexidine, 15 mL, BID  furosemide, 40 mg, BID  magnesium oxide, 400 mg, BID  mupirocin, , BID  potassium chloride, 10 mEq, TID WC  sennosides-docusate sodium, 1 tablet, BID  metoprolol tartrate, 12.5 mg, BID  lisinopril, 2.5 mg, Lunch  pantoprazole, 40 mg, BID AC  insulin glargine, 0.15 Units/kg, Nightly  polyethylene glycol, 17 g, Daily  acetaminophen, 1,000 mg, Q6H  gabapentin, 300 mg, TID  DULoxetine, 30 mg, Daily  metFORMIN, 1,000 mg, BID WC  rosuvastatin, 20 mg, Nightly       IV Meds:  norepinephrine, Last Rate: Stopped (21 1325)  lactated ringers, Last Rate: 50 mL/hr at 21 1220  sodium chloride, Last Rate: Stopped (21 1440)  DOPamine  milrinone  norepinephrine, Last Rate: Stopped (21 0704)  nitroGLYCERIN, Last Rate: Stopped (21 1535)  nitroprusside (NIPRIDE) 50 mg in D5W infusion  insulin, Last Rate: 1.3 Units/hr (21 0700)  dextrose  dexmedetomidine (PRECEDEX) IV infusion, Last Rate: 0.02 mcg/kg/hr (21 1110)       PRN Medications:  albuterol, 2.5 mg, Q4H PRN  sodium chloride flush, 10 mL, PRN  ondansetron, 4 mg, Q8H PRN  metoclopramide, 5 mg, Q6H PRN  ketorolac, 15 mg, Q6H PRN  hydrALAZINE, 5 mg, Q5 Min PRN  metoprolol, 2.5 mg, Q10 Min PRN  diphenhydrAMINE, 25 mg, Nightly PRN  polyethylene glycol, 17 g, Daily PRN  magnesium sulfate, 2,000 mg, PRN  albumin human, 25 g, PRN  sodium chloride, 1,000 mL, Continuous PRN  DOPamine, 2 mcg/kg/min, Continuous PRN  milrinone, 0.375 mcg/kg/min, Continuous PRN  norepinephrine, 0.1 mcg/kg/min, Continuous PRN  nitroGLYCERIN, 10 mcg/min, Continuous PRN  nitroprusside (NIPRIDE) 50 mg in D5W infusion, 0.1 mcg/kg/min, Continuous PRN  glucose, 15 g, PRN  dextrose, 12.5 g, PRN  glucagon (rDNA), 1 mg, PRN  dextrose, 100 mL/hr, PRN        Allergies: No known allergies    ROS: Positives in BOLD     GEN:   fevers, chills, sweats, fatigue and weight loss     HEENT:    nasal congestion, sore mouth and sore throat     Resp:    cough, hemoptysis, pneumonia or dyspnea on exertion     Card:  chest pain, chest pressure/discomfort, dyspnea, palpitations,  lower extremity edema     GI:   nausea, vomiting, diarrhea, constipation and abdominal pain     :  dysuria, nocturia, urinary incontinence, hesitancy and hematuria     Derm:   rash, skin lesion(s), pruritus and dryness     Neuro:   headaches, dizziness, seizures, gait problems, tremor and weakness     MS:  myalgias, arthralgias, neck pain and back pain     Endo:    nephropathy and cardiovascular disease    PE:      Gen: alert, well appearing, and in no distress and oriented to person, place, and time     HEENT:pupils equal and reactive, extraocular eye movements intact      Neuro: alert, oriented, normal speech, no focal findings or movement disorder noted     Neck:  supple, no significant adenopathy     Cardio: normal rate, regular rhythm, normal S1, S2, no murmurs, rubs, clicks or gallops      Resp: clear to auscultation, no wheezes, rales or rhonchi, symmetric air entry. GI:  soft, nontender, nondistended, no masses or organomegaly. Ext:  pedal edema minimal +      MS: no joint tenderness, deformity or swelling      DERM: normal coloration and turgor, no rashesor bruising.        Vitals:   Patient Vitals for the past 8 hrs:   BP Temp Temp src Pulse Resp SpO2   03/21/21 1228 95/65 -- -- -- -- -- 03/21/21 1200 95/65 98 °F (36.7 °C) Oral 85 16 97 %   03/21/21 1100 -- -- -- 88 20 97 %   03/21/21 1000 -- -- -- 98 23 --   03/21/21 0928 -- -- -- -- -- 97 %   03/21/21 0900 100/68 -- -- 74 21 --   03/21/21 0800 100/68 98.2 °F (36.8 °C) Oral 74 15 96 %   03/21/21 0700 105/70 -- -- 77 14 97 %   03/21/21 0603 -- -- -- 73 19 --   03/21/21 0601 -- -- -- 76 21 98 %   03/21/21 0600 88/78 98.1 °F (36.7 °C) Oral 76 16 --   03/21/21 0559 -- -- -- 77 22 --   03/21/21 0558 -- -- -- 75 21 --   03/21/21 0557 -- -- -- 73 24 --   03/21/21 0556 -- -- -- 76 18 --   03/21/21 0555 -- -- -- 79 18 --   03/21/21 0554 -- -- -- 77 20 --   03/21/21 0553 -- -- -- 78 18 --   03/21/21 0552 -- -- -- 80 19 --   03/21/21 0551 -- -- -- 79 17 --   03/21/21 0550 -- -- -- 79 17 --   03/21/21 0549 -- -- -- 77 18 99 %   03/21/21 0548 -- -- -- 81 21 --   03/21/21 0547 -- -- -- 95 24 --   03/21/21 0546 -- -- -- 106 21 (!) 74 %   03/21/21 0545 -- -- -- 111 19 (!) 75 %   03/21/21 0544 -- -- -- 110 23 (!) 76 %   03/21/21 0543 -- -- -- 106 24 (!) 77 %   03/21/21 0542 -- -- -- 104 22 --          I/Os:     Intake/Output Summary (Last 24 hours) at 3/21/2021 1341  Last data filed at 3/21/2021 1200  Gross per 24 hour   Intake 1801.46 ml   Output 4015 ml   Net -2213.54 ml       LABS:    Lab Results   Component Value Date    CREATININE 2.5 03/21/2021    BUN 29 03/21/2021     03/21/2021    K 4.3 03/21/2021    CL 95 03/21/2021    CO2 21 03/21/2021     No results found for: [id number]   Lab Results   Component Value Date    WBC 10.0 03/21/2021    HGB 8.4 03/21/2021    HCT 26.6 03/21/2021    MCV 82.1 03/21/2021     03/21/2021      Lab Results   Component Value Date    IRON 39 03/03/2021    TIBC 326 03/03/2021      No results found for: Zygmunt Coke  Lab Results   Component Value Date    CALCIUM 8.0 03/21/2021    CAION 1.15 03/19/2021

## 2021-03-22 LAB
ALBUMIN SERPL-MCNC: 3.5 G/DL (ref 3.4–5)
ANION GAP SERPL CALCULATED.3IONS-SCNC: 10 MMOL/L (ref 3–16)
ANION GAP SERPL CALCULATED.3IONS-SCNC: 9 MMOL/L (ref 3–16)
BUN BLDV-MCNC: 28 MG/DL (ref 7–20)
BUN BLDV-MCNC: 28 MG/DL (ref 7–20)
CALCIUM SERPL-MCNC: 8.7 MG/DL (ref 8.3–10.6)
CALCIUM SERPL-MCNC: 9 MG/DL (ref 8.3–10.6)
CHLORIDE BLD-SCNC: 103 MMOL/L (ref 99–110)
CHLORIDE BLD-SCNC: 103 MMOL/L (ref 99–110)
CO2: 24 MMOL/L (ref 21–32)
CO2: 24 MMOL/L (ref 21–32)
CREAT SERPL-MCNC: 1 MG/DL (ref 0.6–1.2)
CREAT SERPL-MCNC: 1.1 MG/DL (ref 0.6–1.2)
CREATININE URINE: 57.2 MG/DL (ref 28–259)
EKG ATRIAL RATE: 82 BPM
EKG DIAGNOSIS: NORMAL
EKG P AXIS: 5 DEGREES
EKG P-R INTERVAL: 126 MS
EKG Q-T INTERVAL: 404 MS
EKG QRS DURATION: 112 MS
EKG QTC CALCULATION (BAZETT): 472 MS
EKG R AXIS: -32 DEGREES
EKG T AXIS: 39 DEGREES
EKG VENTRICULAR RATE: 82 BPM
GFR AFRICAN AMERICAN: >60
GFR AFRICAN AMERICAN: >60
GFR NON-AFRICAN AMERICAN: 50
GFR NON-AFRICAN AMERICAN: 55
GLUCOSE BLD-MCNC: 112 MG/DL (ref 70–99)
GLUCOSE BLD-MCNC: 117 MG/DL (ref 70–99)
GLUCOSE BLD-MCNC: 131 MG/DL (ref 70–99)
GLUCOSE BLD-MCNC: 134 MG/DL (ref 70–99)
GLUCOSE BLD-MCNC: 136 MG/DL (ref 70–99)
GLUCOSE BLD-MCNC: 89 MG/DL (ref 70–99)
HCT VFR BLD CALC: 25.8 % (ref 36–48)
HEMOGLOBIN: 8.2 G/DL (ref 12–16)
MAGNESIUM: 2.1 MG/DL (ref 1.8–2.4)
MAGNESIUM: 2.2 MG/DL (ref 1.8–2.4)
MCH RBC QN AUTO: 26.2 PG (ref 26–34)
MCHC RBC AUTO-ENTMCNC: 31.7 G/DL (ref 31–36)
MCV RBC AUTO: 82.7 FL (ref 80–100)
PDW BLD-RTO: 17.2 % (ref 12.4–15.4)
PERFORMED ON: ABNORMAL
PERFORMED ON: NORMAL
PHOSPHORUS: 2.5 MG/DL (ref 2.5–4.9)
PLATELET # BLD: 110 K/UL (ref 135–450)
PMV BLD AUTO: 8.4 FL (ref 5–10.5)
POTASSIUM SERPL-SCNC: 4.7 MMOL/L (ref 3.5–5.1)
POTASSIUM SERPL-SCNC: 4.8 MMOL/L (ref 3.5–5.1)
PROTEIN PROTEIN: 27.6 MG/DL
PROTEIN/CREAT RATIO: 0.5 MG/DL
RBC # BLD: 3.13 M/UL (ref 4–5.2)
SODIUM BLD-SCNC: 136 MMOL/L (ref 136–145)
SODIUM BLD-SCNC: 137 MMOL/L (ref 136–145)
VITAMIN D 25-HYDROXY: 41.1 NG/ML
WBC # BLD: 9.6 K/UL (ref 4–11)

## 2021-03-22 PROCEDURE — 80069 RENAL FUNCTION PANEL: CPT

## 2021-03-22 PROCEDURE — 85027 COMPLETE CBC AUTOMATED: CPT

## 2021-03-22 PROCEDURE — 97116 GAIT TRAINING THERAPY: CPT

## 2021-03-22 PROCEDURE — 82306 VITAMIN D 25 HYDROXY: CPT

## 2021-03-22 PROCEDURE — 93005 ELECTROCARDIOGRAM TRACING: CPT | Performed by: THORACIC SURGERY (CARDIOTHORACIC VASCULAR SURGERY)

## 2021-03-22 PROCEDURE — 36415 COLL VENOUS BLD VENIPUNCTURE: CPT

## 2021-03-22 PROCEDURE — 2000000000 HC ICU R&B

## 2021-03-22 PROCEDURE — 93010 ELECTROCARDIOGRAM REPORT: CPT | Performed by: INTERNAL MEDICINE

## 2021-03-22 PROCEDURE — 36592 COLLECT BLOOD FROM PICC: CPT

## 2021-03-22 PROCEDURE — 6370000000 HC RX 637 (ALT 250 FOR IP): Performed by: THORACIC SURGERY (CARDIOTHORACIC VASCULAR SURGERY)

## 2021-03-22 PROCEDURE — 97535 SELF CARE MNGMENT TRAINING: CPT

## 2021-03-22 PROCEDURE — 6370000000 HC RX 637 (ALT 250 FOR IP): Performed by: CLINICAL NURSE SPECIALIST

## 2021-03-22 PROCEDURE — 2580000003 HC RX 258: Performed by: THORACIC SURGERY (CARDIOTHORACIC VASCULAR SURGERY)

## 2021-03-22 PROCEDURE — 97530 THERAPEUTIC ACTIVITIES: CPT

## 2021-03-22 PROCEDURE — 97166 OT EVAL MOD COMPLEX 45 MIN: CPT

## 2021-03-22 PROCEDURE — 83735 ASSAY OF MAGNESIUM: CPT

## 2021-03-22 RX ORDER — TRAMADOL HYDROCHLORIDE 50 MG/1
50 TABLET ORAL EVERY 4 HOURS PRN
Status: DISCONTINUED | OUTPATIENT
Start: 2021-03-22 | End: 2021-03-24 | Stop reason: HOSPADM

## 2021-03-22 RX ADMIN — GABAPENTIN 300 MG: 300 CAPSULE ORAL at 09:43

## 2021-03-22 RX ADMIN — METFORMIN HYDROCHLORIDE 1000 MG: 500 TABLET ORAL at 17:38

## 2021-03-22 RX ADMIN — ACETAMINOPHEN 1000 MG: 500 TABLET, COATED ORAL at 17:38

## 2021-03-22 RX ADMIN — ACETAMINOPHEN 1000 MG: 500 TABLET, COATED ORAL at 12:30

## 2021-03-22 RX ADMIN — Medication 15 ML: at 21:19

## 2021-03-22 RX ADMIN — ROSUVASTATIN CALCIUM 20 MG: 20 TABLET, COATED ORAL at 21:19

## 2021-03-22 RX ADMIN — MUPIROCIN: 20 OINTMENT TOPICAL at 21:19

## 2021-03-22 RX ADMIN — MUPIROCIN: 20 OINTMENT TOPICAL at 09:45

## 2021-03-22 RX ADMIN — Medication 10 ML: at 09:43

## 2021-03-22 RX ADMIN — METFORMIN HYDROCHLORIDE 1000 MG: 500 TABLET ORAL at 09:43

## 2021-03-22 RX ADMIN — ACETAMINOPHEN 1000 MG: 500 TABLET, COATED ORAL at 07:40

## 2021-03-22 RX ADMIN — ASPIRIN 325 MG: 325 TABLET, COATED ORAL at 09:43

## 2021-03-22 RX ADMIN — GABAPENTIN 300 MG: 300 CAPSULE ORAL at 13:59

## 2021-03-22 RX ADMIN — Medication 10 ML: at 21:29

## 2021-03-22 RX ADMIN — DULOXETINE HYDROCHLORIDE 30 MG: 30 CAPSULE, DELAYED RELEASE ORAL at 09:43

## 2021-03-22 RX ADMIN — CLOPIDOGREL BISULFATE 75 MG: 75 TABLET ORAL at 09:43

## 2021-03-22 RX ADMIN — GABAPENTIN 300 MG: 300 CAPSULE ORAL at 21:20

## 2021-03-22 RX ADMIN — MAGNESIUM OXIDE TAB 400 MG (240 MG ELEMENTAL MG) 400 MG: 400 (240 MG) TAB at 09:43

## 2021-03-22 RX ADMIN — POTASSIUM CHLORIDE 10 MEQ: 750 TABLET, EXTENDED RELEASE ORAL at 12:30

## 2021-03-22 RX ADMIN — POTASSIUM CHLORIDE 10 MEQ: 750 TABLET, EXTENDED RELEASE ORAL at 17:38

## 2021-03-22 RX ADMIN — Medication 15 ML: at 09:43

## 2021-03-22 RX ADMIN — PANTOPRAZOLE SODIUM 40 MG: 40 TABLET, DELAYED RELEASE ORAL at 07:40

## 2021-03-22 RX ADMIN — PANTOPRAZOLE SODIUM 40 MG: 40 TABLET, DELAYED RELEASE ORAL at 17:38

## 2021-03-22 RX ADMIN — MAGNESIUM OXIDE TAB 400 MG (240 MG ELEMENTAL MG) 400 MG: 400 (240 MG) TAB at 21:19

## 2021-03-22 RX ADMIN — POTASSIUM CHLORIDE 10 MEQ: 750 TABLET, EXTENDED RELEASE ORAL at 09:47

## 2021-03-22 ASSESSMENT — PAIN SCALES - GENERAL
PAINLEVEL_OUTOF10: 0
PAINLEVEL_OUTOF10: 0
PAINLEVEL_OUTOF10: 2
PAINLEVEL_OUTOF10: 0
PAINLEVEL_OUTOF10: 0

## 2021-03-22 ASSESSMENT — PAIN SCALES - WONG BAKER: WONGBAKER_NUMERICALRESPONSE: 0

## 2021-03-22 NOTE — ANESTHESIA POSTPROCEDURE EVALUATION
Department of Anesthesiology  Postprocedure Note    Patient: Mina Max  MRN: [id number]  YOB: 1955  Date of evaluation: 3/22/2021  Time:  6:39 AM     Procedure Summary     Date: 03/19/21 Room / Location: [facility] [address] [facility] / [place] [address] [facility],[address]    Anesthesia Start: 0804 Anesthesia Stop: 6738    Procedure: ROSETTA; TCPB; AVR with 21 mm Weir Inspiris resilia bovine pericardial bioprosthesis; ascending aortoplasty (N/A Chest) Diagnosis:       Aortic valve stenosis, etiology of cardiac valve disease unspecified      Bicuspid cardiac valve      (Aortic valve stenosis, etiology of cardiac valve disease unspecified [I35.0] Bicuspid cardiac valve [Q23.1])    Surgeons: Rafa Alvarado MD Responsible Provider: Miguel Slaughter MD    Anesthesia Type: general ASA Status: 4          Anesthesia Type: general    Perez Phase I: Perez Score: 10    Perez Phase II:      Last vitals: Reviewed and per EMR flowsheets.        Anesthesia Post Evaluation    Patient location during evaluation: ICU  Patient participation: complete - patient participated  Level of consciousness: awake and alert  Airway patency: patent  Nausea & Vomiting: no vomiting  Complications: no  Cardiovascular status: hemodynamically stable  Respiratory status: acceptable  Hydration status: euvolemic
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
## 2021-03-22 NOTE — PROGRESS NOTES
Progress Note  Hospital Day: 4  POD#  3  S/P Aortic bioprosthetic valve replacement + ascending aortoplasty (3/19/2021)    Chief Complaint: Postop follow up    Ms. Aries Chavira is sitting up in the chair. Complains of butt and back pain     24 hour Interval History:   3/20 - worsening EVA. Nephrology consulted    VITAL SIGNS   Temperature:  Current - Temp: 98.1 °F (36.7 °C); Max - Temp  Av.1 °F (36.7 °C)  Min: 98 °F (36.7 °C)  Max: 98.1 °F (36.7 °C)    Respiratory Rate : Resp  Av.6  Min: 15  Max: 25    Pulse Range: Pulse  Av.1  Min: 74  Max: 98    Blood Pressure Range:  Systolic (34AQJ), PBR:16 , Min:82 , GGK:870   ; Diastolic (27DWP), GZN:09, Min:54, Max:80    Pulse ox Range: SpO2  Av.8 %  Min: 94 %  Max: 100 %  O2 Flow Rate (L/min): 0 L/min    Admission Weight: Weight: 204 lb (92.5 kg)    Current Weight: up 18 lbs from preop  Patient Vitals for the past 96 hrs (Last 3 readings):   Weight   21 0600 222 lb 10.6 oz (101 kg)   21 0500 225 lb 8.5 oz (102.3 kg)   21 0630 219 lb 2.2 oz (99.4 kg)     I/O:     Intake/Output Summary (Last 24 hours) at 3/22/2021 0846  Last data filed at 3/22/2021 4078  Gross per 24 hour   Intake 778 ml   Output 4095 ml   Net -3317 ml     Urine (caicedo): [phone] ml/shift (4L)  Chest tube: 10/10/0  ml/shift  serosanguinous, on waterseal, no leak    LINES/ACCESS:   Central Access: RIJ Day #: 3   Peripheral Access: Rt wrist Day #: 3                             Caicedo Day #: 3           Pacing Wires Day #:  3      Diet: DIET CARDIAC; Carb Control: 3 carb choices (45 gms)/meal; No Added Salt (3-4 GM);  Daily Fluid Restriction: 1500 ml    MEDICATIONS:      insulin glargine  18 Units Subcutaneous Nightly    lidocaine  1 patch Transdermal Daily    insulin lispro  0-12 Units Subcutaneous 4x Daily AC & HS    sodium chloride flush  10 mL Intravenous 2 times per day    aspirin  325 mg Oral Daily    clopidogrel  75 mg Oral Daily    chlorhexidine  15 mL Mouth/Throat BID    magnesium oxide  400 mg Oral BID    mupirocin   Nasal BID    potassium chloride  10 mEq Oral TID     sennosides-docusate sodium  1 tablet Oral BID    pantoprazole  40 mg Oral BID AC    polyethylene glycol  17 g Oral Daily    acetaminophen  1,000 mg Oral Q6H    gabapentin  300 mg Oral TID    DULoxetine  30 mg Oral Daily    metFORMIN  1,000 mg Oral BID WC    rosuvastatin  20 mg Oral Nightly       Infusions:   dextrose      dexmedetomidine (PRECEDEX) IV infusion 0.02 mcg/kg/hr (03/21/21 1110)       DATA REVIEW:   CBC:   Recent Labs     03/19/21  1107 03/19/21  1239 03/20/21  0308 03/21/21  0314 03/22/21  0444   WBC  --  13.8* 7.9 10.0 9.6   HGB 6.7* 7.8* 8.4* 8.4* 8.2*   HCT  --  24.7* 26.1* 26.6* 25.8*   MCV  --  79.9* 81.2 82.1 82.7   PLT  --  141 105* 102* 110*     BMP:   Recent Labs     03/19/21  1239 03/20/21  0308 03/21/21  0314 03/21/21  1540 03/22/21  0444    140 132* 138 137   K 3.6 4.9 4.3 4.5 4.8    109 95* 104 103   CO2 24 20* 21 25 24   PHOS  --   --   --  3.0  --    GLUCOSE 117* 100* 341* 115* 136*   BUN 29* 29* 29* 30* 28*   CREATININE 1.3* 1.4* 2.5* 1.3* 1.0   CALCIUM 9.0 8.6 8.0* 8.7 9.0   MG 2.70* 2.40 2.20 2.30 2.20     Accucheck Glucoses:   Recent Labs     03/20/21  2031 03/21/21  0745 03/21/21  1058 03/21/21  1710 03/21/21 2036   POCGLU 227* 119* 231* 106* 161*     PT/INR:   Recent Labs     03/19/21  1239 03/20/21  0308   PROTIME 18.2* 14.7*   INR 1.56* 1.26*     APTT:   Recent Labs     03/19/21  1239   APTT 30.7     ABG:    Lab Results   Component Value Date    PHART 7.363 03/20/2021    PO2ART 68.7 03/20/2021    EJS1YSD 35.7 03/20/2021    D3TTSJBU 93 03/20/2021    XFF4QFA 21 03/20/2021    GFD5XSK 20.3 03/20/2021    BEART -5 03/20/2021    BEART -5 03/19/2021    BEART -6 03/19/2021    BEART -3 03/19/2021    BEART 0 03/19/2021     EKG 3/22/2021: NSR    ASSESSMENT:   Patient Active Problem List:     Type II or unspecified type diabetes mellitus without mention of complication, not stated as uncontrolled     Essential hypertension     Urinary incontinence     Urinary urgency     GERD (gastroesophageal reflux disease)     Depression     Aortic valve calcification     Epigastric abdominal pain     Fatigue     Vitamin D deficiency     Type 2 diabetes mellitus without complication (HCC)     Vitamin B 12 deficiency     Hyperlipidemia     Anxiety     Type 2 diabetes mellitus without complication, without long-term current use of insulin (HCC)     Upper respiratory tract infection     Metallic taste     Type 2 diabetes mellitus without complication, with long-term current use of insulin (HCC)     Moderate episode of recurrent major depressive disorder ([facility])     Murmur     Moderate to severe aortic stenosis     Anemia     Thyroid nodule     Right renal mass     Aortic stenosis due to bicuspid aortic valve      Neuro: awake, alert and oriented x 3  CV:   Sinus, pacing wires intact  Pulm:  Diminished, normal work of breathing  Abd:  soft, non-tender; bowel sounds normal; passing flatus, +BM  Colunga: clear yellow  Wounds: clean, dry and intact  Ext: warm, + edema    PLAN:   · Neuro - pain tolerated    - intraop pec blocks, Precedex while chest tube are in place   - continued scheduled APAP and gabapentin, PRN tramadol   - PT  18/24 on 3/21  · CV - on Beta blocker, statin, ASA/Plavix   - no ace inhibitor due to CKD   - remove pacing wires  · Pulm - off O2, continue incentive spirometry   - chest tube 20 ml, remove chest tubes  · Renal - Acute on chronic kidney failure - Cr 1.0, preop Cr 1.3  - Nephrology following - Dr Neff Part  - wt 222 lbs, preop wt 204 lbs   - diuresis for postop fluid retention              - keep urinary catheter for accurate I & O  · Heme - Acute blood loss anemia as expected- hgb 8.2, continue to monitor            - Acute postoperative thrombocytopenia - plt ct 110, resolving        · ID - surgical prophylaxis antibiotics completed  · FEN - cardiac diet with 1500 ml fluid restriction     - increase Lantus to 18 units (preop Levemir 30 units)     - hold preop metformin & Trulicity one more day   · GI prophylaxis - Protonix 40 mg bid  · VTE prophylaxis - SCD and EVA hose  · Disposition -  Await OT eval, home health at discharge. Will be staying at a friend's house  Discussed patient with Dr Tiesha Gallo who is agreeable to assessment and plan.         Jean Claude Romano, APRN  [phone] pager  3/22/2021  8:46 AM

## 2021-03-22 NOTE — CARE COORDINATION
Patient is planning on staying at a friend's house at d/c:    [address], [address]    Her daughter will transport to home at d/c. She will need a rolling walker and referral was made with [employer] to deliver prior to d/c. She has agreed to home care with [facility] and referral was made with Rhiannon Mack from [facility] to follow. Patient remains on precedex drip and chest tube in place.      Electronically signed by Cristy Maldonado RN on 3/22/2021 at 11:19 AM  [phone]
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
## 2021-03-22 NOTE — CONSULTS
Patient Name: Yovana Howard                                                    Primary Physician: Myron Lezama MD  Admitting Dx: Aortic valve stenosis, etiology of cardiac valve disease unspecified [I35.0]  Bicuspid cardiac valve [Q23.1]  Aortic stenosis due to bicuspid aortic valve [Q23.0, Q23.1]    Nephrology Hospital Progress Note  [facility]ph[facility]  [facility] [employer]                                          Interval Plan:     · SCr now back to nearly normal 1.0 better than reported. Vanc level high at 22 but not significantly. · Holding ACEi and received fluids and BP improving 113/76 up from 26J-66Z systolic. · Excellent UOP 4 L with LR at 50 cc/hr and continue for now. Start on diuretics tomorrow if renal function still improving and BP is stable. · Chest tube remains in place and draining only 20 cc yesterday. Assessment:     EVA w/ CKD 2  · Baseline SCr of 1.3 prior to admission though this is up from 1.0 in 1/2021 and may be related to CTA done on 2/2021. · Holding ACEi though VERY low dose but also received 3 high doses of Vanc and checking Vanc trough now. · CTA on 2/5/21 showed normal renal arteries. Rt Kidney shows 1 cm dense lesion / complex cyst and requires further evaluation likely MRI. · Urine studies ordered. · Cautious use of Neruontin in renal insufficiency 300 mg tid is large dose causing altered mental status: obtundation, lethargy, confusion, etc.    S/P AVR  · Surgery following. Hypotensive  · Holding Lisinopril and Lopressor for now. Hypocalcemia  · Check Vit D and iCa. · Replaced IV. Please call our office at [phone] or [employer] with any questions or contact me directly. Subjective:     CC / Reason for Consult: EVA post CABG    HPI / PMHx:  This is a consult for Yovana Lee  requested by Myron Lezama MD for the reason of  EVA w/ CKD 2.   Yovana Howard is a 76 y.o. female admitted for AVR with PMHx of CKD 2, HLD, HTN, DM type 2, Anxiety, Anemia, GERD, Thyroid nodule, Vit D Deficiency. Baseline SCr of 1.0 in 2021 and  though was 0.8 in 2018 suggesting CKD and now admitted with SCr of 1.3 on 3/8/21 and 3/19/21 now up to 2.5. Received IVF in surgery and +ve 4.6 L with diuresis of 2 L yesterday. BP remains low in 90s/60s. She was on very low dose of Lisinopril 2.5 mg now stopped. Significant home meds include Losartan, HCTZ but denies NSAID use. Was on Vanc 1.5 gm x 3 doses. CTA done on 21    I thank Dr. Silvia Hernandez MD for consulting [facility] [address] [place] [address] Nephrology in the care of your patient. Please call with any questions or concerns. [phone]. Esther Amin    Objective:     SocHx: NO hx of smoking and not drinking currently. FamHx: Parents . Hx of Cancer (Colon and Breast), DM, HTN.       Current Medications:  Scheduled Medications:  lidocaine, 1 patch, Daily  insulin lispro, 0-12 Units, 4x Daily AC & HS  sodium chloride flush, 10 mL, 2 times per day  aspirin, 325 mg, Daily  clopidogrel, 75 mg, Daily  chlorhexidine, 15 mL, BID  magnesium oxide, 400 mg, BID  mupirocin, , BID  potassium chloride, 10 mEq, TID WC  sennosides-docusate sodium, 1 tablet, BID  pantoprazole, 40 mg, BID AC  insulin glargine, 0.15 Units/kg, Nightly  polyethylene glycol, 17 g, Daily  acetaminophen, 1,000 mg, Q6H  gabapentin, 300 mg, TID  DULoxetine, 30 mg, Daily  metFORMIN, 1,000 mg, BID WC  rosuvastatin, 20 mg, Nightly       IV Meds:  norepinephrine, Last Rate: Stopped (21 1325)  lactated ringers, Last Rate: 50 mL/hr at 21 1220  sodium chloride, Last Rate: Stopped (21 1440)  DOPamine  milrinone  norepinephrine, Last Rate: Stopped (21 0704)  nitroGLYCERIN, Last Rate: Stopped (21 1535)  nitroprusside (NIPRIDE) 50 mg in D5W infusion  insulin, Last Rate: 1.3 Units/hr (21 0700)  dextrose  dexmedetomidine (PRECEDEX) IV infusion, Last Rate: 0.02 mcg/kg/hr (21 1110)       PRN Medications:  albuterol, 2.5

## 2021-03-22 NOTE — CONSULTS
NUTRITION NOTE   Admission Date: 3/19/2021     Type and Reason for Visit: Consult, Patient Education    NUTRITION RECOMMENDATIONS:   1. PO Diet: Continue carb control, no added salt, 1500 mL fluid restriction diet  2. Education: completed. Re-consult to review as needed    NUTRITION ASSESSMENT:  Pt visited while eating breakfast. Pt reports she has had a good appetite and po intake post-surgery. Pt educated on post-CABG diet. RD reviewed diet reccomendations and left educational materials with pt. RD encouraged pt to reach out to RDs for any questions or review of education as needed. MALNUTRITION ASSESSMENT  Malnutrition Status: No malnutrition    NUTRITION DIAGNOSIS No nutrition diagnosis at this time. NUTRITION INTERVENTION  Food and/or Nutrient Delivery: Continue Current Diet   Nutrition Education/Counseling: Education completed Coordination of Nutrition Care: Continue to monitor while inpatient   NUTRITION RISK LEVEL: Risk Level: Low        The patient will still be monitored per nutrition standards of care. Consult dietitian if nutrition interventions essential to patient care is needed.      iGgi Castrejon MS, [address]  Office:  [phone]

## 2021-03-23 LAB
ALBUMIN SERPL-MCNC: 3.2 G/DL (ref 3.4–5)
ALBUMIN SERPL-MCNC: 3.3 G/DL (ref 3.4–5)
ANION GAP SERPL CALCULATED.3IONS-SCNC: 6 MMOL/L (ref 3–16)
ANION GAP SERPL CALCULATED.3IONS-SCNC: 8 MMOL/L (ref 3–16)
BUN BLDV-MCNC: 30 MG/DL (ref 7–20)
BUN BLDV-MCNC: 32 MG/DL (ref 7–20)
CALCIUM SERPL-MCNC: 8.7 MG/DL (ref 8.3–10.6)
CALCIUM SERPL-MCNC: 8.8 MG/DL (ref 8.3–10.6)
CHLORIDE BLD-SCNC: 105 MMOL/L (ref 99–110)
CHLORIDE BLD-SCNC: 106 MMOL/L (ref 99–110)
CO2: 24 MMOL/L (ref 21–32)
CO2: 25 MMOL/L (ref 21–32)
CREAT SERPL-MCNC: 1 MG/DL (ref 0.6–1.2)
CREAT SERPL-MCNC: 1.1 MG/DL (ref 0.6–1.2)
GFR AFRICAN AMERICAN: >60
GFR AFRICAN AMERICAN: >60
GFR NON-AFRICAN AMERICAN: 50
GFR NON-AFRICAN AMERICAN: 55
GLUCOSE BLD-MCNC: 101 MG/DL (ref 70–99)
GLUCOSE BLD-MCNC: 103 MG/DL (ref 70–99)
GLUCOSE BLD-MCNC: 129 MG/DL (ref 70–99)
GLUCOSE BLD-MCNC: 130 MG/DL (ref 70–99)
GLUCOSE BLD-MCNC: 140 MG/DL (ref 70–99)
GLUCOSE BLD-MCNC: 164 MG/DL (ref 70–99)
HCT VFR BLD CALC: 24.1 % (ref 36–48)
HEMOGLOBIN: 7.8 G/DL (ref 12–16)
MAGNESIUM: 2 MG/DL (ref 1.8–2.4)
MAGNESIUM: 2.1 MG/DL (ref 1.8–2.4)
MAGNESIUM: 2.2 MG/DL (ref 1.8–2.4)
MCH RBC QN AUTO: 26.3 PG (ref 26–34)
MCHC RBC AUTO-ENTMCNC: 32.3 G/DL (ref 31–36)
MCV RBC AUTO: 81.4 FL (ref 80–100)
PDW BLD-RTO: 16.7 % (ref 12.4–15.4)
PERFORMED ON: ABNORMAL
PHOSPHORUS: 2.4 MG/DL (ref 2.5–4.9)
PHOSPHORUS: 2.7 MG/DL (ref 2.5–4.9)
PLATELET # BLD: 130 K/UL (ref 135–450)
PMV BLD AUTO: 7.9 FL (ref 5–10.5)
POTASSIUM SERPL-SCNC: 4.5 MMOL/L (ref 3.5–5.1)
POTASSIUM SERPL-SCNC: 4.7 MMOL/L (ref 3.5–5.1)
RBC # BLD: 2.96 M/UL (ref 4–5.2)
SODIUM BLD-SCNC: 136 MMOL/L (ref 136–145)
SODIUM BLD-SCNC: 138 MMOL/L (ref 136–145)
WBC # BLD: 8.2 K/UL (ref 4–11)

## 2021-03-23 PROCEDURE — 6370000000 HC RX 637 (ALT 250 FOR IP): Performed by: CLINICAL NURSE SPECIALIST

## 2021-03-23 PROCEDURE — 36415 COLL VENOUS BLD VENIPUNCTURE: CPT

## 2021-03-23 PROCEDURE — 97535 SELF CARE MNGMENT TRAINING: CPT

## 2021-03-23 PROCEDURE — 94761 N-INVAS EAR/PLS OXIMETRY MLT: CPT

## 2021-03-23 PROCEDURE — 6370000000 HC RX 637 (ALT 250 FOR IP): Performed by: THORACIC SURGERY (CARDIOTHORACIC VASCULAR SURGERY)

## 2021-03-23 PROCEDURE — 97110 THERAPEUTIC EXERCISES: CPT

## 2021-03-23 PROCEDURE — 2580000003 HC RX 258: Performed by: THORACIC SURGERY (CARDIOTHORACIC VASCULAR SURGERY)

## 2021-03-23 PROCEDURE — 97116 GAIT TRAINING THERAPY: CPT

## 2021-03-23 PROCEDURE — 2000000000 HC ICU R&B

## 2021-03-23 PROCEDURE — 85027 COMPLETE CBC AUTOMATED: CPT

## 2021-03-23 PROCEDURE — 97530 THERAPEUTIC ACTIVITIES: CPT

## 2021-03-23 PROCEDURE — 83735 ASSAY OF MAGNESIUM: CPT

## 2021-03-23 PROCEDURE — 80069 RENAL FUNCTION PANEL: CPT

## 2021-03-23 PROCEDURE — 36592 COLLECT BLOOD FROM PICC: CPT

## 2021-03-23 RX ORDER — POTASSIUM CHLORIDE 750 MG/1
10 TABLET, EXTENDED RELEASE ORAL DAILY
Status: DISCONTINUED | OUTPATIENT
Start: 2021-03-24 | End: 2021-03-24 | Stop reason: HOSPADM

## 2021-03-23 RX ORDER — FUROSEMIDE 40 MG/1
40 TABLET ORAL DAILY
Status: DISCONTINUED | OUTPATIENT
Start: 2021-03-23 | End: 2021-03-24 | Stop reason: HOSPADM

## 2021-03-23 RX ADMIN — CLOPIDOGREL BISULFATE 75 MG: 75 TABLET ORAL at 08:24

## 2021-03-23 RX ADMIN — ACETAMINOPHEN 1000 MG: 500 TABLET, COATED ORAL at 00:46

## 2021-03-23 RX ADMIN — Medication 15 ML: at 20:55

## 2021-03-23 RX ADMIN — POTASSIUM CHLORIDE 10 MEQ: 750 TABLET, EXTENDED RELEASE ORAL at 08:24

## 2021-03-23 RX ADMIN — FUROSEMIDE 40 MG: 40 TABLET ORAL at 09:18

## 2021-03-23 RX ADMIN — INSULIN LISPRO 4 UNITS: 100 INJECTION, SOLUTION INTRAVENOUS; SUBCUTANEOUS at 20:55

## 2021-03-23 RX ADMIN — Medication 15 ML: at 08:24

## 2021-03-23 RX ADMIN — INSULIN LISPRO 4 UNITS: 100 INJECTION, SOLUTION INTRAVENOUS; SUBCUTANEOUS at 12:10

## 2021-03-23 RX ADMIN — MUPIROCIN: 20 OINTMENT TOPICAL at 08:23

## 2021-03-23 RX ADMIN — GABAPENTIN 300 MG: 300 CAPSULE ORAL at 08:24

## 2021-03-23 RX ADMIN — MUPIROCIN: 20 OINTMENT TOPICAL at 20:55

## 2021-03-23 RX ADMIN — ROSUVASTATIN CALCIUM 20 MG: 20 TABLET, COATED ORAL at 20:55

## 2021-03-23 RX ADMIN — PANTOPRAZOLE SODIUM 40 MG: 40 TABLET, DELAYED RELEASE ORAL at 06:55

## 2021-03-23 RX ADMIN — METFORMIN HYDROCHLORIDE 1000 MG: 500 TABLET ORAL at 08:25

## 2021-03-23 RX ADMIN — Medication 10 ML: at 08:23

## 2021-03-23 RX ADMIN — GABAPENTIN 300 MG: 300 CAPSULE ORAL at 20:55

## 2021-03-23 RX ADMIN — PANTOPRAZOLE SODIUM 40 MG: 40 TABLET, DELAYED RELEASE ORAL at 17:32

## 2021-03-23 RX ADMIN — MAGNESIUM OXIDE TAB 400 MG (240 MG ELEMENTAL MG) 400 MG: 400 (240 MG) TAB at 20:55

## 2021-03-23 RX ADMIN — ASPIRIN 325 MG: 325 TABLET, COATED ORAL at 08:24

## 2021-03-23 RX ADMIN — DULOXETINE HYDROCHLORIDE 30 MG: 30 CAPSULE, DELAYED RELEASE ORAL at 08:24

## 2021-03-23 RX ADMIN — MAGNESIUM OXIDE TAB 400 MG (240 MG ELEMENTAL MG) 400 MG: 400 (240 MG) TAB at 08:24

## 2021-03-23 RX ADMIN — METFORMIN HYDROCHLORIDE 1000 MG: 500 TABLET ORAL at 17:32

## 2021-03-23 ASSESSMENT — PAIN DESCRIPTION - LOCATION
LOCATION: CHEST
LOCATION: HEAD

## 2021-03-23 ASSESSMENT — PAIN DESCRIPTION - ORIENTATION: ORIENTATION: MID

## 2021-03-23 ASSESSMENT — PAIN DESCRIPTION - DESCRIPTORS
DESCRIPTORS: ACHING
DESCRIPTORS: DISCOMFORT

## 2021-03-23 ASSESSMENT — PAIN SCALES - GENERAL
PAINLEVEL_OUTOF10: 3
PAINLEVEL_OUTOF10: 0
PAINLEVEL_OUTOF10: 0

## 2021-03-23 ASSESSMENT — PAIN DESCRIPTION - PROGRESSION: CLINICAL_PROGRESSION: GRADUALLY IMPROVING

## 2021-03-23 ASSESSMENT — PAIN - FUNCTIONAL ASSESSMENT: PAIN_FUNCTIONAL_ASSESSMENT: ACTIVITIES ARE NOT PREVENTED

## 2021-03-23 ASSESSMENT — PAIN DESCRIPTION - FREQUENCY
FREQUENCY: INTERMITTENT
FREQUENCY: INTERMITTENT

## 2021-03-23 ASSESSMENT — PAIN DESCRIPTION - PAIN TYPE: TYPE: SURGICAL PAIN
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
## 2021-03-23 NOTE — PLAN OF CARE
Problem: Pain:  Goal: Pain level will decrease  Description: Pain level will decrease  3/23/2021 1423 by Yuval Latham RN  Outcome: Ongoing    Goal: Control of acute pain  Description: Control of acute pain  3/23/2021 1423 by Yuval Latham RN  Outcome: Ongoing    Goal: Control of chronic pain  Description: Control of chronic pain  3/23/2021 1423 by Yuval Latham RN  Outcome: Ongoing       Problem: Cardiac Output - Decreased:  Goal: Hemodynamic stability will improve  Description: Hemodynamic stability will improve  3/23/2021 1423 by Yuval Latham RN  Outcome: Ongoing       Problem: Falls - Risk of:  Goal: Will remain free from falls  Description: Will remain free from falls  3/23/2021 1423 by Yuval Latham RN  Outcome: Ongoing    Goal: Absence of physical injury  Description: Absence of physical injury  3/23/2021 1423 by Yuval Latham RN  Outcome: Ongoing       Problem: Infection - Central Venous Catheter-Associated Bloodstream Infection:  Goal: Will show no infection signs and symptoms  Description: Will show no infection signs and symptoms  3/23/2021 1423 by Yuval Latham RN  Outcome: Ongoing

## 2021-03-23 NOTE — CONSULTS
Patient Name: Kim Calzada                                                    Primary Physician: Raya Herbert MD  Admitting Dx: Aortic valve stenosis, etiology of cardiac valve disease unspecified [I35.0]  Bicuspid cardiac valve [Q23.1]  Aortic stenosis due to bicuspid aortic valve [Q23.0, Q23.1]    Nephrology Hospital Progress Note  [facility] Nephrology  [facility] [email or web address]                                          Interval Plan:     · SCr now back to nearly normal 1.0 better than reported. Vanc level high at 22 but not significantly. · Holding ACEi and received fluids and BP still soft 89T-07Z systolic. · Excellent UOP 4 L with LR discontinued. No edema and breathing excellent so hold on diuretics. She was on HCTZ and Cozaar at home on hold now. · Chest tube remains in place and draining only 20 cc yesterday. Assessment:     EVA w/ CKD 2  · Baseline SCr of 1.3 prior to admission though this is up from 1.0 in 1/2021 and may be related to CTA done on 2/2021. · Holding ACEi though VERY low dose but also received 3 high doses of Vanc and checking Vanc trough now. · CTA on 2/5/21 showed normal renal arteries. Rt Kidney shows 1 cm dense lesion / complex cyst and requires further evaluation likely MRI. · Urine studies ordered. · Cautious use of Neruontin in renal insufficiency 300 mg tid is large dose causing altered mental status: obtundation, lethargy, confusion, etc.    S/P AVR  · Surgery following. Hypotensive  · Holding Lisinopril and Lopressor for now. Hypocalcemia  · Check Vit D and iCa. · Replaced IV. Please call our office at [phone] or [employer] with any questions or contact me directly. Subjective:     CC / Reason for Consult: EVA post CABG    HPI / PMHx:  This is a consult for Kim Calzada  requested by Raya Herbert MD for the reason of  EVA w/ CKD 2.   Kim Calzada is a 76 y.o. female admitted for AVR with PMHx of CKD 2, HLD, HTN, DM type 2, allergies    ROS: Positives in BOLD     GEN:   fevers, chills, sweats, fatigue and weight loss     HEENT:    nasal congestion, sore mouth and sore throat     Resp:    cough, hemoptysis, pneumonia or dyspnea on exertion     Card:  chest pain, chest pressure/discomfort,palpitations,    edema in hands     GI:   nausea, vomiting, diarrhea, constipation and abdominal pain     :  dysuria, nocturia, urinary incontinence, hesitancy and hematuria     Derm:   rash, skin lesion(s), pruritus and dryness     Neuro:   headaches, dizziness, seizures, gait problems, tremor and weakness     MS:  myalgias, arthralgias, neck pain and back pain     Endo:    nephropathy and cardiovascular disease    PE:      Gen: alert, well appearing, and in no distress and oriented to person, place, and time     HEENT:pupils equal and reactive, extraocular eye movements intact      Neuro: alert, oriented, normal speech, no focal findings or movement disorder noted     Neck:  supple, no significant adenopathy     Cardio: normal rate, regular rhythm, normal S1, S2, no murmurs, rubs, clicks or gallops      Resp: clear to auscultation, no wheezes, rales or rhonchi, symmetric air entry. GI:  soft, nontender, nondistended, no masses or organomegaly. Ext: no pedal edema minimal       MS: no joint tenderness, deformity or swelling      DERM: normal coloration and turgor, no rashesor bruising.        Vitals:   Patient Vitals for the past 8 hrs:   BP Temp src Pulse Resp SpO2 Weight   03/23/21 0700 97/76 -- 84 19 -- --   03/23/21 0600 91/67 -- 78 16 -- 223 lb 1.7 oz (101.2 kg)   03/23/21 0500 (!) 99/59 -- 75 15 -- --   03/23/21 0400 91/62 Oral 78 14 98 % --   03/23/21 0300 92/63 -- 81 15 96 % --   03/23/21 0200 (!) 87/59 -- 80 14 98 % --   03/23/21 0100 (!) 91/57 -- 82 20 -- --          I/Os:     Intake/Output Summary (Last 24 hours) at 3/23/2021 0812  Last data filed at 3/22/2021 1600  Gross per 24 hour   Intake 260 ml   Output 700 ml   Net -440 ml LABS:    Lab Results   Component Value Date    CREATININE 1.0 03/23/2021    BUN 30 03/23/2021     03/23/2021    K 4.7 03/23/2021     03/23/2021    CO2 25 03/23/2021     No results found for: [id number]   Lab Results   Component Value Date    WBC 8.2 03/23/2021    HGB 7.8 03/23/2021    HCT 24.1 03/23/2021    MCV 81.4 03/23/2021     03/23/2021      Lab Results   Component Value Date    IRON 39 03/03/2021    TIBC 326 03/03/2021      No results found for: [place]  Lab Results   Component Value Date    CALCIUM 8.8 03/23/2021    CAION 1.16 03/21/2021    PHOS 2.7 03/23/2021

## 2021-03-23 NOTE — PLAN OF CARE
Problem: Pain:  Goal: Pain level will decrease  Description: Pain level will decrease  Outcome: Ongoing  Goal: Control of acute pain  Description: Control of acute pain  Outcome: Ongoing  Goal: Control of chronic pain  Description: Control of chronic pain  Outcome: Ongoing     Problem: Cardiac Output - Decreased:  Goal: Hemodynamic stability will improve  Description: Hemodynamic stability will improve  Outcome: Ongoing     Problem: Falls - Risk of:  Goal: Will remain free from falls  Description: Will remain free from falls  Outcome: Ongoing  Goal: Absence of physical injury  Description: Absence of physical injury  Outcome: Ongoing     Problem: Infection - Central Venous Catheter-Associated Bloodstream Infection:  Goal: Will show no infection signs and symptoms  Description: Will show no infection signs and symptoms  Outcome: Ongoing

## 2021-03-23 NOTE — PROGRESS NOTES
Mouth/Throat BID    magnesium oxide  400 mg Oral BID    mupirocin   Nasal BID    sennosides-docusate sodium  1 tablet Oral BID    pantoprazole  40 mg Oral BID AC    polyethylene glycol  17 g Oral Daily    acetaminophen  1,000 mg Oral Q6H    gabapentin  300 mg Oral TID    DULoxetine  30 mg Oral Daily    metFORMIN  1,000 mg Oral BID WC    rosuvastatin  20 mg Oral Nightly     DATA REVIEW:   CBC:   Recent Labs     03/21/21  0314 03/22/21  0444 03/23/21  0453   WBC 10.0 9.6 8.2   HGB 8.4* 8.2* 7.8*   HCT 26.6* 25.8* 24.1*   MCV 82.1 82.7 81.4   * 110* 130*     BMP:   Recent Labs     03/21/21  0314 03/21/21  1540 03/22/21  0444 03/22/21  1508 03/23/21  0453   * 138 137 136 138   K 4.3 4.5 4.8 4.7 4.7   CL 95* 104 103 103 105   CO2 21 25 24 24 25   PHOS  --  3.0  --  2.5 2.7   GLUCOSE 341* 115* 136* 112* 129*   BUN 29* 30* 28* 28* 30*   CREATININE 2.5* 1.3* 1.0 1.1 1.0   CALCIUM 8.0* 8.7 9.0 8.7 8.8   MG 2.20 2.30 2.20 2.10 2.20  2.10     Accucheck Glucoses:   Recent Labs     03/22/21  0940 03/22/21  1228 03/22/21  1742 03/22/21  2123 03/23/21  0820   POCGLU 117* 131* 89 134* 101*     ABG:    Lab Results   Component Value Date    PHART 7.363 03/20/2021    PO2ART 68.7 03/20/2021    WAM6BRS 35.7 03/20/2021    K8MZYHUM 93 03/20/2021    ZQX0MFW 21 03/20/2021    WAT5XHV 20.3 03/20/2021    BEART -5 03/20/2021    BEART -5 03/19/2021    BEART -6 03/19/2021    BEART -3 03/19/2021    BEART 0 03/19/2021     EKG 3/22/2021: NSR    ASSESSMENT:   Patient Active Problem List:     Type II or unspecified type diabetes mellitus without mention of complication, not stated as uncontrolled     Essential hypertension     Urinary incontinence     Urinary urgency     GERD (gastroesophageal reflux disease)     Depression     Aortic valve calcification     Epigastric abdominal pain     Fatigue     Vitamin D deficiency     Type 2 diabetes mellitus without complication (HCC)     Vitamin B 12 deficiency     Hyperlipidemia Anxiety     Type 2 diabetes mellitus without complication, without long-term current use of insulin (HCC)     Upper respiratory tract infection     Metallic taste     Type 2 diabetes mellitus without complication, with long-term current use of insulin (HCC)     Moderate episode of recurrent major depressive disorder (HCC)     Murmur     Moderate to severe aortic stenosis     Anemia     Thyroid nodule     Right renal mass     Aortic stenosis due to bicuspid aortic valve      Neuro: awake, alert and oriented x 3  CV:   Sinus  Pulm:  Diminished, normal work of breathing  Abd:  soft, non-tender; bowel sounds normal; passing flatus, +BM  Wounds: clean, dry and intact  Ext: warm, + edema    PLAN:   · Neuro - pain tolerated    - intraop pec blocks, Precedex while chest tube are in place   - continued scheduled APAP and gabapentin, PRN tramadol   - PT  18/24 on 3/21   - OT 19/24 on 3/21  · CV - on Beta blocker, statin, ASA/Plavix   - no ace inhibitor due to CKD  · Pulm - off O2, continue incentive spirometry  · Renal - Acute on chronic kidney failure - Cr 1.0, preop Cr 1.3  - Nephrology following - Dr Lavonne Adams  - wt 223 lbs, preop wt 204 lbs   - restart Lasix 40 mg  · Heme - Acute blood loss anemia as expected- hgb 7.8, continue to monitor            - Acute postoperative thrombocytopenia - plt ct 130, resolving        · ID - surgical prophylaxis antibiotics completed  · FEN - cardiac diet with 1500 ml fluid restriction     - continue Lantus to 18 units (preop Levemir 30 units)     - hold preop metformin & Trulicity one more day   · GI prophylaxis - Protonix 40 mg bid  · VTE prophylaxis - SCD and EVA hose  · Disposition -  home health at discharge probably tomorrow. Will be staying at a friend's house  Discussed patient with Dr Nina Carvajal who is agreeable to assessment and plan.         Esperanza Arias, APRN  [phone] pager  3/23/2021  10:39 AM

## 2021-03-23 NOTE — CARE COORDINATION
Case Management Assessment           Daily Note                 Date/ Time of Note: 3/23/2021 12:01 PM         Note completed by: Torrie Walker    Patient Name: Devin Pierre  YOB: 1955    Diagnosis:Aortic valve stenosis, etiology of cardiac valve disease unspecified [I35.0]  Bicuspid cardiac valve [Q23.1]  Aortic stenosis due to bicuspid aortic valve [Q23.0, Q23.1]  Patient Admission Status: Inpatient    Date of Admission:3/19/2021  5:58 AM Length of Stay: 4 GLOS:      Current Plan of Care: chest tube removed,up with therapy  ________________________________________________________________________________________  PT AM-PAC: 18 / 24 per last evaluation on: 03/22    OT AM-PAC: 20 / 24 per last evaluation on: 03/23    DME Needs for discharge: would like a reacher-does not need the walker  ________________________________________________________________________________________  Discharge Plan: Home with [address]: [facility]    Tentative discharge date: 03/24    Current barriers to discharge: not medically ready    Referrals completed: [address]: [facility]    Resources/ information provided: Not indicated at this time  ________________________________________________________________________________________  Case Management Notes:Patient will d/c to home with a friend and with home care through [facility]. She has decided that she will not need the walker but did ask about a reacher. Christopher Malik from OT is going to show her one to see if she wants to purchase this. Billy Healy and her family were provided with choice of provider; she and her family are in agreement with the discharge plan.     Care Transition Patient: Mindy Walker RN  [facility]  Case Management Department  Ph: [phone]  Fax: [phone]
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
## 2021-03-24 VITALS
BODY MASS INDEX: 36.66 KG/M2 | SYSTOLIC BLOOD PRESSURE: 115 MMHG | DIASTOLIC BLOOD PRESSURE: 77 MMHG | HEIGHT: 65 IN | RESPIRATION RATE: 21 BRPM | HEART RATE: 88 BPM | OXYGEN SATURATION: 97 % | TEMPERATURE: 98 F | WEIGHT: 220.02 LBS

## 2021-03-24 LAB
ALBUMIN SERPL-MCNC: 3.1 G/DL (ref 3.4–5)
ANION GAP SERPL CALCULATED.3IONS-SCNC: 2 MMOL/L (ref 3–16)
BASOPHILS ABSOLUTE: 0 K/UL (ref 0–0.2)
BASOPHILS RELATIVE PERCENT: 0.3 %
BUN BLDV-MCNC: 32 MG/DL (ref 7–20)
CALCIUM SERPL-MCNC: 8.6 MG/DL (ref 8.3–10.6)
CHLORIDE BLD-SCNC: 107 MMOL/L (ref 99–110)
CO2: 26 MMOL/L (ref 21–32)
CREAT SERPL-MCNC: 1.1 MG/DL (ref 0.6–1.2)
EOSINOPHILS ABSOLUTE: 0.1 K/UL (ref 0–0.6)
EOSINOPHILS RELATIVE PERCENT: 1.7 %
GFR AFRICAN AMERICAN: >60
GFR NON-AFRICAN AMERICAN: 50
GLUCOSE BLD-MCNC: 92 MG/DL (ref 70–99)
GLUCOSE BLD-MCNC: 93 MG/DL (ref 70–99)
GLUCOSE BLD-MCNC: 93 MG/DL (ref 70–99)
HCT VFR BLD CALC: 23.3 % (ref 36–48)
HEMOGLOBIN: 7.5 G/DL (ref 12–16)
LYMPHOCYTES ABSOLUTE: 1.5 K/UL (ref 1–5.1)
LYMPHOCYTES RELATIVE PERCENT: 20 %
MAGNESIUM: 2 MG/DL (ref 1.8–2.4)
MCH RBC QN AUTO: 26.4 PG (ref 26–34)
MCHC RBC AUTO-ENTMCNC: 32.1 G/DL (ref 31–36)
MCV RBC AUTO: 82.1 FL (ref 80–100)
MONOCYTES ABSOLUTE: 0.6 K/UL (ref 0–1.3)
MONOCYTES RELATIVE PERCENT: 8 %
NEUTROPHILS ABSOLUTE: 5.3 K/UL (ref 1.7–7.7)
NEUTROPHILS RELATIVE PERCENT: 70 %
PDW BLD-RTO: 17 % (ref 12.4–15.4)
PERFORMED ON: NORMAL
PERFORMED ON: NORMAL
PHOSPHORUS: 3 MG/DL (ref 2.5–4.9)
PLATELET # BLD: 160 K/UL (ref 135–450)
PMV BLD AUTO: 7.9 FL (ref 5–10.5)
POTASSIUM SERPL-SCNC: 4.2 MMOL/L (ref 3.5–5.1)
RBC # BLD: 2.83 M/UL (ref 4–5.2)
SODIUM BLD-SCNC: 135 MMOL/L (ref 136–145)
WBC # BLD: 7.6 K/UL (ref 4–11)

## 2021-03-24 PROCEDURE — 97530 THERAPEUTIC ACTIVITIES: CPT

## 2021-03-24 PROCEDURE — 36592 COLLECT BLOOD FROM PICC: CPT

## 2021-03-24 PROCEDURE — 97116 GAIT TRAINING THERAPY: CPT

## 2021-03-24 PROCEDURE — 6370000000 HC RX 637 (ALT 250 FOR IP): Performed by: CLINICAL NURSE SPECIALIST

## 2021-03-24 PROCEDURE — 85025 COMPLETE CBC W/AUTO DIFF WBC: CPT

## 2021-03-24 PROCEDURE — 94761 N-INVAS EAR/PLS OXIMETRY MLT: CPT

## 2021-03-24 PROCEDURE — 36415 COLL VENOUS BLD VENIPUNCTURE: CPT

## 2021-03-24 PROCEDURE — 6370000000 HC RX 637 (ALT 250 FOR IP): Performed by: THORACIC SURGERY (CARDIOTHORACIC VASCULAR SURGERY)

## 2021-03-24 PROCEDURE — 80069 RENAL FUNCTION PANEL: CPT

## 2021-03-24 PROCEDURE — 2580000003 HC RX 258: Performed by: THORACIC SURGERY (CARDIOTHORACIC VASCULAR SURGERY)

## 2021-03-24 PROCEDURE — 83735 ASSAY OF MAGNESIUM: CPT

## 2021-03-24 RX ORDER — FUROSEMIDE 40 MG/1
40 TABLET ORAL DAILY
Qty: 10 TABLET | Refills: 0 | Status: SHIPPED | OUTPATIENT
Start: 2021-03-25 | End: 2021-03-31 | Stop reason: SDUPTHER

## 2021-03-24 RX ORDER — CLOPIDOGREL BISULFATE 75 MG/1
75 TABLET ORAL DAILY
Qty: 30 TABLET | Refills: 3 | Status: SHIPPED | OUTPATIENT
Start: 2021-03-25 | End: 2021-08-27

## 2021-03-24 RX ORDER — POTASSIUM CHLORIDE 750 MG/1
10 TABLET, EXTENDED RELEASE ORAL DAILY
Qty: 10 TABLET | Refills: 0 | Status: SHIPPED | OUTPATIENT
Start: 2021-03-25 | End: 2021-04-29 | Stop reason: ALTCHOICE

## 2021-03-24 RX ORDER — PANTOPRAZOLE SODIUM 40 MG/1
40 TABLET, DELAYED RELEASE ORAL DAILY
Qty: 30 TABLET | Refills: 0 | Status: SHIPPED | OUTPATIENT
Start: 2021-03-24 | End: 2021-05-12 | Stop reason: ALTCHOICE

## 2021-03-24 RX ORDER — GABAPENTIN 300 MG/1
300 CAPSULE ORAL 3 TIMES DAILY
Qty: 90 CAPSULE | Refills: 0 | Status: SHIPPED | OUTPATIENT
Start: 2021-03-24 | End: 2021-05-12 | Stop reason: ALTCHOICE

## 2021-03-24 RX ORDER — INSULIN DETEMIR 100 [IU]/ML
18 INJECTION, SOLUTION SUBCUTANEOUS NIGHTLY
Qty: 30 ML | Refills: 1 | Status: SHIPPED | OUTPATIENT
Start: 2021-03-24 | End: 2021-05-18

## 2021-03-24 RX ADMIN — Medication 10 ML: at 08:49

## 2021-03-24 RX ADMIN — DOCUSATE SODIUM 50 MG AND SENNOSIDES 8.6 MG 1 TABLET: 8.6; 5 TABLET, FILM COATED ORAL at 08:39

## 2021-03-24 RX ADMIN — CLOPIDOGREL BISULFATE 75 MG: 75 TABLET ORAL at 08:39

## 2021-03-24 RX ADMIN — PANTOPRAZOLE SODIUM 40 MG: 40 TABLET, DELAYED RELEASE ORAL at 05:36

## 2021-03-24 RX ADMIN — METFORMIN HYDROCHLORIDE 1000 MG: 500 TABLET ORAL at 08:38

## 2021-03-24 RX ADMIN — MAGNESIUM OXIDE TAB 400 MG (240 MG ELEMENTAL MG) 400 MG: 400 (240 MG) TAB at 08:38

## 2021-03-24 RX ADMIN — GABAPENTIN 300 MG: 300 CAPSULE ORAL at 15:40

## 2021-03-24 RX ADMIN — POTASSIUM CHLORIDE 10 MEQ: 750 TABLET, EXTENDED RELEASE ORAL at 08:38

## 2021-03-24 RX ADMIN — GABAPENTIN 300 MG: 300 CAPSULE ORAL at 08:38

## 2021-03-24 RX ADMIN — PANTOPRAZOLE SODIUM 40 MG: 40 TABLET, DELAYED RELEASE ORAL at 17:43

## 2021-03-24 RX ADMIN — FUROSEMIDE 40 MG: 40 TABLET ORAL at 08:38

## 2021-03-24 RX ADMIN — ASPIRIN 325 MG: 325 TABLET, COATED ORAL at 08:39

## 2021-03-24 RX ADMIN — ACETAMINOPHEN 1000 MG: 500 TABLET, COATED ORAL at 13:35

## 2021-03-24 RX ADMIN — DULOXETINE HYDROCHLORIDE 30 MG: 30 CAPSULE, DELAYED RELEASE ORAL at 08:39

## 2021-03-24 ASSESSMENT — PAIN SCALES - GENERAL
PAINLEVEL_OUTOF10: 0
PAINLEVEL_OUTOF10: 2
PAINLEVEL_OUTOF10: 0
PAINLEVEL_OUTOF10: 0

## 2021-03-24 NOTE — PROGRESS NOTES
CLINICAL PHARMACY NOTE: MEDS TO [address] Select Patient?: No  Total # of Prescriptions Filled: 5   The following medications were delivered to the patient:  · On file  Total # of Interventions Completed: 1  Time Spent (min): 30    Additional Documentation:

## 2021-03-24 NOTE — PROGRESS NOTES
Pt resting quietly with eyes closed. Vitals stable. Voiding every two hours. Fall and sternal precautions in place. Requires minimal assistance. Will continue to monitor.

## 2021-03-24 NOTE — PROGRESS NOTES
Pt. Showered with minimal assistance. Meds purchased from pharmacy and brought to patient's room. All access removed. Awaiting on transport home.

## 2021-03-24 NOTE — CONSULTS
Patient Name: Torrie Kenney                                                    Primary Physician: Millicent Dubon MD  Admitting Dx: Aortic valve stenosis, etiology of cardiac valve disease unspecified [I35.0]  Bicuspid cardiac valve [Q23.1]  Aortic stenosis due to bicuspid aortic valve [Q23.0, Q23.1]    Nephrology Hospital Progress Note  [facility] Nephrology  [email or web address] [employer]                                          Interval Plan:     · SCr improved better than reported baseline (1.3) at 1.1. Vanc level 22 but not significantly. · Holding ACEi and received fluids and BP much better 115/77. · UOP 0.9 L with LR discontinued. No edema and breathing excellent so hold on diuretics. She was on HCTZ and Cozaar at home on hold now. · Chest tube remains in place and draining only 20 cc yesterday. Assessment:     EVA w/ CKD 2  · Baseline SCr of 1.3 prior to admission though this is up from 1.0 in 1/2021 and may be related to CTA done on 2/2021. · Holding ACEi though VERY low dose but also received 3 high doses of Vanc and checking Vanc trough now. · CTA on 2/5/21 showed normal renal arteries. Rt Kidney shows 1 cm dense lesion / complex cyst and requires further evaluation likely MRI. · Urine studies ordered. · Cautious use of Neruontin in renal insufficiency 300 mg tid is large dose causing altered mental status: obtundation, lethargy, confusion, etc.    S/P AVR  · Surgery following. Hypotensive  · Holding Lisinopril and Lopressor for now. Hypocalcemia  · Check Vit D and iCa. · Replaced IV. Please call our office at [phone] or [facility] with any questions or contact me directly. Subjective:     CC / Reason for Consult: EVA post CABG    HPI / PMHx:  This is a consult for Torrie Pablito  requested by Millicent Dubon MD for the reason of  EVA w/ CKD 2.   Torrie Kenney is a 76 y.o. female admitted for AVR with PMHx of CKD 2, HLD, HTN, DM type 2, Anxiety, Anemia, GERD, Thyroid nodule, Vit D Deficiency. Baseline SCr of 1.0 in 2021 and  though was 0.8 in 2018 suggesting CKD and now admitted with SCr of 1.3 on 3/8/21 and 3/19/21 now up to 2.5. Received IVF in surgery and +ve 4.6 L with diuresis of 2 L yesterday. BP remains low in 90s/60s. She was on very low dose of Lisinopril 2.5 mg now stopped. Significant home meds include Losartan, HCTZ but denies NSAID use. Was on Vanc 1.5 gm x 3 doses. CTA done on 21    I thank Dr. John Yun MD for consulting [facility] [address] [place] [address] Nephrology in the care of your patient. Please call with any questions or concerns. [phone]. [place] Sides    Objective:     SocHx: NO hx of smoking and not drinking currently. FamHx: Parents . Hx of Cancer (Colon and Breast), DM, HTN.       Current Medications:  Scheduled Medications:  furosemide, 40 mg, Daily  potassium chloride, 10 mEq, Daily  insulin glargine, 18 Units, Nightly  lidocaine, 1 patch, Daily  insulin lispro, 0-12 Units, 4x Daily AC & HS  sodium chloride flush, 10 mL, 2 times per day  aspirin, 325 mg, Daily  clopidogrel, 75 mg, Daily  chlorhexidine, 15 mL, BID  magnesium oxide, 400 mg, BID  sennosides-docusate sodium, 1 tablet, BID  pantoprazole, 40 mg, BID AC  polyethylene glycol, 17 g, Daily  acetaminophen, 1,000 mg, Q6H  gabapentin, 300 mg, TID  DULoxetine, 30 mg, Daily  metFORMIN, 1,000 mg, BID WC  rosuvastatin, 20 mg, Nightly       IV Meds:  dextrose  dexmedetomidine (PRECEDEX) IV infusion, Last Rate: 0.02 mcg/kg/hr (21 1110)       PRN Medications:  traMADol, 50 mg, Q4H PRN  albuterol, 2.5 mg, Q4H PRN  sodium chloride flush, 10 mL, PRN  ondansetron, 4 mg, Q8H PRN  metoclopramide, 5 mg, Q6H PRN  hydrALAZINE, 5 mg, Q5 Min PRN  metoprolol, 2.5 mg, Q10 Min PRN  polyethylene glycol, 17 g, Daily PRN  magnesium sulfate, 2,000 mg, PRN  glucose, 15 g, PRN  dextrose, 12.5 g, PRN  glucagon (rDNA), 1 mg, PRN  dextrose, 100 mL/hr, PRN        Allergies: No known allergies    ROS: Positives in BOLD     GEN:   fevers, chills, sweats, fatigue and weight loss     HEENT:    nasal congestion, sore mouth and sore throat     Resp:    cough, hemoptysis, pneumonia or dyspnea on exertion     Card:  chest pain, chest pressure/discomfort,palpitations,    edema in hands     GI:   nausea, vomiting, diarrhea, constipation and abdominal pain     :  dysuria, nocturia, urinary incontinence, hesitancy and hematuria     Derm:   rash, skin lesion(s), pruritus and dryness     Neuro:   headaches, dizziness, seizures, gait problems, tremor and weakness     MS:  myalgias, arthralgias, neck pain and back pain     Endo:    nephropathy and cardiovascular disease    PE:      Gen: alert, well appearing, and in no distress and oriented to person, place, and time     HEENT:pupils equal and reactive, extraocular eye movements intact      Neuro: alert, oriented, normal speech, no focal findings or movement disorder noted     Neck:  supple, no significant adenopathy     Cardio: normal rate, regular rhythm, normal S1, S2, no murmurs, rubs, clicks or gallops      Resp: clear to auscultation, no wheezes, rales or rhonchi, symmetric air entry. GI:  soft, nontender, nondistended, no masses or organomegaly. Ext: no pedal edema minimal       MS: no joint tenderness, deformity or swelling      DERM: normal coloration and turgor, no rashesor bruising.        Vitals:   Patient Vitals for the past 8 hrs:   BP Temp Temp src Pulse Resp SpO2 Weight   03/24/21 0800 115/77 98 °F (36.7 °C) Oral 88 20 97 % --   03/24/21 0600 121/72 -- -- 84 14 98 % --   03/24/21 0535 -- -- -- -- -- -- 222 lb 14.2 oz (101.1 kg)   03/24/21 0500 116/65 -- -- 84 15 96 % --   03/24/21 0400 112/73 -- -- 83 16 97 % --   03/24/21 0300 111/78 98.6 °F (37 °C) Oral 82 17 98 % --   03/24/21 0200 106/68 -- -- 86 16 96 % --   03/24/21 0100 91/65 -- -- 90 16 96 % --          I/Os:     Intake/Output Summary (Last 24 hours) at 3/24/2021 5609  Last data filed at 3/24/2021 0730  Gross per 24 hour   Intake 750 ml   Output 1225 ml   Net -475 ml       LABS:    Lab Results   Component Value Date    CREATININE 1.1 03/24/2021    BUN 32 03/24/2021     03/24/2021    K 4.2 03/24/2021     03/24/2021    CO2 26 03/24/2021     No results found for: [id number]   Lab Results   Component Value Date    WBC 7.6 03/24/2021    HGB 7.5 03/24/2021    HCT 23.3 03/24/2021    MCV 82.1 03/24/2021     03/24/2021      Lab Results   Component Value Date    IRON 39 03/03/2021    TIBC 326 03/03/2021      No results found for: Veronica Patel  Lab Results   Component Value Date    CALCIUM 8.6 03/24/2021    CAION 1.16 03/21/2021    PHOS 3.0 03/24/2021

## 2021-03-24 NOTE — PROGRESS NOTES
Discharge paperwork thoroughly discussed with patient. All questions answered. Instructed to call with any questions.
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
## 2021-03-24 NOTE — CARE COORDINATION
[facility]     Patient aware and agreeable to services. Faxed orders to [facility].     David Stone LPN  Care Transition Nurse  [facility]  [phone]

## 2021-03-25 ENCOUNTER — CARE COORDINATION (OUTPATIENT)
Dept: CASE MANAGEMENT | Age: 66
End: 2021-03-25

## 2021-03-25 NOTE — CARE COORDINATION
Patient contacted regarding Crystal Torres. Care Transition Nurse contacted the patient by telephone to perform post discharge assessment. Call within 2 business days of discharge: Yes. Verified name and  with patient as identifiers. Provided introduction to self, and explanation of the CTN/ACM role, and reason for call due to risk factors for infection and/or exposure to COVID-19. Symptoms reviewed with patient who verbalized the following symptoms: no new symptoms and no worsening symptoms. Due to no new or worsening symptoms encounter was not routed to provider for escalation. Discussed follow-up appointments. If no appointment was previously scheduled, appointment scheduling offered: Yes  [facility] follow up appointment(s):   Future Appointments   Date Time Provider Digna Mcbride   3[facility]31[facility]2021  3:[facility]   2021  8:15 AM MD МАРИНА Alberts [facility]     [facility] follow up appointment(s): 2021    Non-face-to-face services provided:  Obtained and reviewed discharge summary and/or continuity of care documents  Communication with home health agencies or other community services the patient is currently [facility]  Education of patient/family/caregiver/guardian to support self-management-. Assessment and support for treatment adherence and medication management-. Advance Care Planning:   Does patient have an Advance Directive:  not on file. Patient has following risk factors of: diabetes and Aorti Valve Syndrome. CTN reviewed discharge instructions, medical action plan and red flags such as increased shortness of breath, increasing fever and signs of decompensation with patient who verbalized understanding. Discussed exposure protocols and quarantine with CDC Guidelines What to do if you are sick with coronavirus disease .  Patient was given an opportunity for questions and concerns.  The patient agrees to contact the [facility] exposure line

## 2021-03-27 NOTE — OP NOTE
[address]               [address] [place], [address]                                OPERATIVE REPORT    PATIENT NAME: Veda Preston                     :        1955  MED REC NO:   [id number]                          ROOM:       [id number]  ACCOUNT NO:   [de-identified]                           ADMIT DATE: 2021  PROVIDER:     Rhonda Mcallister MD    DATE OF PROCEDURE:  2021    PREOPERATIVE DIAGNOSES:  Severely stenotic and mildly insufficient  bicuspid aortic valve with dilated ascending aorta; chronic  hypertension; type 2 diabetes mellitus. POSTOPERATIVE DIAGNOSES:  Severely stenotic and mildly insufficient  bicuspid aortic valve with dilated ascending aorta; chronic  hypertension; type 2 diabetes mellitus; acute blood loss anemia; left  ventricular hypertrophy. PROCEDURES:  Transesophageal echocardiography; total cardiopulmonary  bypass; aortic valve replacement with 21-mm Weir Inspiris Resilia  bovine pericardial bioprosthesis; ascending aortoplasty. SURGEON:  Rhonda Mcallister MD    ASSISTANTS:  Mk Boland MD; and SERGIO Zarate    ANESTHESIA:  General endotracheal.    INDICATIONS:  The patient is a 29-year-old woman with known aortic valve  stenosis. She was initially evaluated for transcatheter aortic valve  replacement and was found to have a dilated ascending aorta. In  deference to this, she was referred for surgical repair of both  problems. She was brought to the operating room today on elective basis  for this purpose. FINDINGS AT SURGERY:  Of note is that the patient had a congenitally  bicuspid aortic valve that was intensely calcified. It was a Jai  type 0 with a vertical opening. There was intense leaflet and annular  calcification. Postprocedure ROSETTA showed a well-functioning  bioprosthesis with 0 paravalvular leakage and retained normal left  ventricular contractility.     DESCRIPTION OF PROCEDURE: After obtaining adequate general endotracheal  anesthesia and administration of appropriate preoperative prophylactic  antibiotics, the patient's anterior chest, abdomen, and legs were all  meticulously prepped and draped in a sterile manner. A standard timeout  procedure was completed. Because of the complexity of this procedure involving both the ascending  aorta and aortic valve, the participation of both Dr. Katya Neff and Ms. Zoe Batista was essential for the safe completion of this procedure. The patient's chest was entered via a limited skin incision median  sternotomy, passing the incision a bit cephalad in deference to the need  to operate on the aortic valve and the ascending aorta. With the chest  open, the pericardium was incised and suspended. Routine cannulation of  the very distal ascending aorta and right atrium was completed. The  patient had been systemically heparinized, and once adequate ACT was  noted, perfusion was initiated. A vent catheter was placed in the right superior pulmonary vein. A  retrograde cardioplegic cannula was placed in the coronary sinus via  pursestring in the right atrium. The pericardial wall was flushed  throughout the procedure with 5 liters per minute of carbon dioxide. With all preparations for the procedure being made and the patient  stable from cardiopulmonary bypass, the second mini timeout was  completed to make sure that everything was available for the procedure. With this being the case, the aorta was cross-clamped as distally in the  ascending aorta as possible beveling the clamp upon the aortic arch as  much as we could. I did this to maximize the length of the aortoplasty. Cardioplegia was delivered only in a retrograde route. A total of  approximately 1500 mL was given initially. It took until about 600 mL  of retrograde cardioplegia before we got electrical arrest.  Thorough  myocardial cooling was in evidence.   Once cardioplegia had been delivered, the aorta was then opened in a curvilinear fashion with the  incision being parallel to the curve of the ascending aorta. The aorta was held open with two 4-0 Prolene stay sutures. Superb  visibility of the aortic valve was evident. The valve opening was  vertical.  The leaflets and the annulus were intensely calcified. Essentially, she had a fixed heavily calcified shelf for aortic valve  leaflets. The leaflets were then carefully excised making sure to evacuate any  fragmented debris of calcium. The annulus was then decalcified with a  pituitary rongeur. Once all calcium had been removed from the entirety  of the aortic root, the root was sized and found to readily accommodate  a 21-mm Weir Inspiris Resilia bovine pericardial bioprosthesis. Circumferential 2-0 Ethibond sutures were then placed in a mildly  supra-annular location. I did this so that the majority of the sewing  ring of the new valve would be tucked up into the lower portions of the  coronary sinus to keep them as much out of ostium as possible. The same  sutures were then passed through the sewing ring of the valve. The  valve was lowered into position, and all sutures were secured with  Cor-Knots. Excellent circumferential tissue coaptation was seen at the  completion of this maneuver. Thereafter, the ascending aortoplasty was completed. The amount of  closure was directed by the first layer of running horizontal mattress  sutures. These were placed using a strip of Homero felt on each side. Once this suture line had been snugged down, the excess area was trimmed  and sent for pathologic examination. A second layer of running 4-0  Prolene over and over was completed. The resulting reduction in the  aortic diameter took this portion of the ascending aorta down to the  same diameter as the rest of the ascending aorta and proximal arch.     A vented antegrade cardioplegia cannula was placed a bit to the left of the distal end of the suture line in the aorta. The aortic cross-clamp  was then slowly released after thorough rewarming of the heart had been  completed. With the aortic cross-clamp now removed, sinus rhythm returned almost  immediately. She had been rewarmed during a lot of portions of the  procedure and came off cardiopulmonary bypass in just a few minutes and  remained hemodynamically stable thereafter. Once off cardiopulmonary bypass, the vent catheter in the superior  pulmonary vein was removed as was the right atrial cannula. The  patient's heparin was then reversed with protamine sulfate. Once all  protamine had been given, the aortic cannula was removed, and the site  was secured and then oversewn with a 5-0 Prolene over-and-over suture. Meticulous attention was given for hemostasis throughout the entirety of  the operative field. This being achieved, a single bipolar ventricular  pacing wire was placed in the anterior surface of the right ventricle. The pericardium was then tacked together with a total of five simple 0  Vicryl sutures. This provided complete tissue coverage of the front of  the heart. A single 36-Malay chest tube was placed anterior to the  pericardium. The sternum was then reapproximated with multiple  figure-of-eight #6 stainless steel wire sutures. Subcutaneous tissues  were copiously irrigated with antibiotic-containing saline solution. Subcutaneous tissues and skin were then closed in multiple layers with  running absorbable monofilament suture. A Prineo dressing was applied. The patient was then prepared for transfer to the ICU for ongoing care.         Jean Liang MD    D: 03/26/2021 14:08:36       T: 03/26/2021 16:05:48     [email or web address]  Job#: [id number]     Doc#: [id number]    CC:

## 2021-03-28 ENCOUNTER — TELEPHONE (OUTPATIENT)
Dept: CARDIOTHORACIC SURGERY | Age: 66
End: 2021-03-28

## 2021-03-28 LAB
ALBUMIN SERPL-MCNC: 3.3 G/DL (ref 3.5–5.7)
ANION GAP SERPL CALCULATED.3IONS-SCNC: 8 MMOL/L (ref 6–18)
BUN BLDV-MCNC: 16 MG/DL (ref 8–26)
CALCIUM SERPL-MCNC: 8.5 MG/DL (ref 8.5–10.4)
CHLORIDE BLD-SCNC: 102 MEQ/L (ref 98–111)
CO2: 29 MMOL/L (ref 21–31)
CREAT SERPL-MCNC: 1.14 MG/DL (ref 0.6–1.2)
GFR AFRICAN AMERICAN: 57 ML/MIN/1.73 M2
GFR NON-AFRICAN AMERICAN: 49 ML/MIN/1.73 M2
GLUCOSE BLD-MCNC: 148 MG/DL (ref 70–99)
PHOSPHORUS: 3.7 MG/DL (ref 2.5–4.5)
POTASSIUM SERPL-SCNC: 4.8 MEQ/L (ref 3.6–5.1)
SODIUM BLD-SCNC: 139 MEQ/L (ref 135–145)

## 2021-03-28 NOTE — TELEPHONE ENCOUNTER
Patient called because she gained 2 pounds from yesterday to today and isn't urinating much, but is still above her pre-op weight. I told her she may have 2 glasses of water with each meal today and to see if this helps with urine output. She has already taken her morning lasix. She is amenable to this plan. She should call with any new questions, or concerns.

## 2021-03-29 ENCOUNTER — TELEPHONE (OUTPATIENT)
Dept: CARDIOTHORACIC SURGERY | Age: 66
End: 2021-03-29

## 2021-03-29 NOTE — TELEPHONE ENCOUNTER
Spoke w/pt, states weight today =  219 #  Denies SOB, no fever. + IS. States 'I feel thirsty'. States 'I think I am urinating enough'. Reiterated to pt , per dr Miguel Angel Bell:    may have 2 glasses of water with each meal today and to see if this helps with urine output    Pt states she has not had any water to drink yet today. Encouraged adequate hydration as noted above. Encouraged daily weight and record as well as daily BP. Requested call back in 24 hours w/update. Verb understanding.

## 2021-03-30 ENCOUNTER — TELEPHONE (OUTPATIENT)
Dept: CARDIOTHORACIC SURGERY | Age: 66
End: 2021-03-30

## 2021-03-31 ENCOUNTER — OFFICE VISIT (OUTPATIENT)
Dept: CARDIOTHORACIC SURGERY | Age: 66
End: 2021-03-31

## 2021-03-31 VITALS
TEMPERATURE: 98.2 F | DIASTOLIC BLOOD PRESSURE: 68 MMHG | SYSTOLIC BLOOD PRESSURE: 120 MMHG | OXYGEN SATURATION: 98 % | HEART RATE: 88 BPM | BODY MASS INDEX: 36.65 KG/M2 | WEIGHT: 220 LBS | HEIGHT: 65 IN

## 2021-03-31 DIAGNOSIS — Z09 FOLLOW-UP SURGERY CARE: Primary | ICD-10-CM

## 2021-03-31 PROCEDURE — 99024 POSTOP FOLLOW-UP VISIT: CPT | Performed by: THORACIC SURGERY (CARDIOTHORACIC VASCULAR SURGERY)

## 2021-03-31 RX ORDER — FUROSEMIDE 40 MG/1
40 TABLET ORAL DAILY
Qty: 15 TABLET | Refills: 0 | Status: SHIPPED | OUTPATIENT
Start: 2021-03-31 | End: 2021-04-29 | Stop reason: ALTCHOICE

## 2021-03-31 RX ORDER — DIPHENHYDRAMINE HCL 25 MG
25 TABLET ORAL NIGHTLY PRN
Status: ON HOLD | COMMUNITY
End: 2021-09-17

## 2021-03-31 RX ORDER — ACETAMINOPHEN 325 MG/1
650 TABLET ORAL EVERY 6 HOURS PRN
COMMUNITY

## 2021-03-31 NOTE — PROGRESS NOTES
Mrs. Burtis Closs returns the office today now at a week after discharge from her aortic valve replacement coupled with an ascending aortoplasty. He looks well. She is walking on comfortably in her friend's home. She has only been outside once since going home and was very comfortable in doing so. She was hoping she would be able to return to her own home and independent living today. Her only complaint is of some challenge with urination. She feels as though sometimes she has to urinate and will sit in the toilet and nothing will happen. Other times she sits down to urinate and is able to void normally. Does not have any feeling of urinary urgency. On exam she looks well. Her lungs are thoroughly clear in all fields bilaterally. Of note is that she has no cardiac murmurs. More specifically I hear no aortic murmur either systolic or diastolic. She has no jugular venous distention. She did have does have some mild edema in both ankles but it is significantly less than when she had left the hospital.  Going to continue her diuretics for an additional 2 weeks. I have asked her to talk with her primary care physician about her urinary symptoms to see if anything that can be done to help this. It is a bit out of my range. She will be seeing Dr. Ranjan Lisa in the next week or 2. I have asked her to talk with him about getting involved in cardiac rehab. I would like to see her back in my office in 6 weeks for a final visit. She knows to call or return at any time prior to this should any new problems, questions or concerns arise.

## 2021-04-02 ENCOUNTER — CARE COORDINATION (OUTPATIENT)
Dept: CASE MANAGEMENT | Age: 66
End: 2021-04-02

## 2021-04-02 NOTE — DISCHARGE SUMMARY
DISCHARGE SUMMARY    Patient ID: José Miguel Levi  MRN:  [id number]  YOB: 1955      Admission Date:  3/19/2021  5:58 AM  Discharge Date:  3/24/2021  5:51 PM     Principle Diagnosis:  <principal problem not specified>    Secondary Diagnosis:  Active Problems: Aortic stenosis due to bicuspid aortic valve  Resolved Problems:    * No resolved hospital problems. *      Procedure:  Transesophageal echocardiography; total cardiopulmonary bypass; aortic valve replacement with 21-mm Weir Inspiris Resilia bovine pericardial bioprosthesis; ascending aortoplasty. History:  The patient is a 76 y.o. female with known aortic valve stenosis. She was initially evaluated for transcatheter aortic valve replacement and was found to have a dilated ascending aorta. In deference to this, she was referred for surgical repair of both problems. Hospital Course: The patient underwent elective AVR + ascending aortoplasty on March 19, 2021. In surgery, the patient had a congenitally bicuspid aortic valve that was intensely calcified. It was a Jai type 0 with a vertical opening. There was intense leaflet and annular calcification. Postprocedure ROSETTA showed a well-functioning bioprosthesis with 0 paravalvular leakage and retained normal left ventricular contractility. Her operative course was uncomplicated and she transferred to ICU for ongoing care. She was extubated the day of surgery. She transitioned to progressive care on the first postoperative day. On POD2, she developed acute kidney injury, probably from her pain medication, and nephrology was consulted. Her creatinine returned to normal the next day. The remainder of her postop course followed the usual clinical pathway. Her bladder and bowels were functioning normally. She ambulated around the ICU having a normal conversation. She was deemed medically ready for discharge on POD5.     Consults:  IP CONSULT TO CARDIAC REHAB  IP CONSULT TO CASE MANAGEMENT  IP CONSULT TO SOCIAL WORK  IP CONSULT TO DIETITIAN  IP CONSULT TO NEPHROLOGY  IP CONSULT TO HOME CARE NEEDS     Significant Diagnostic Studies:   Chest X-ray  EKG    Treatments: IV hydration, antibiotics: Ancef and vancomycin, cardiac meds: metoprolol and furosemide, anticoagulation: ASA and Plavix and therapies: PT and OT    LABS:  Lab Results   Component Value Date    WBC 7.6 03/24/2021    HGB 7.5 (L) 03/24/2021    HCT 23.3 (L) 03/24/2021    MCV 82.1 03/24/2021     03/24/2021     Lab Results   Component Value Date     03/28/2021    K 4.8 03/28/2021     03/28/2021    CO2 29 03/28/2021    BUN 16 03/28/2021    CREATININE 1.14 03/28/2021    GLUCOSE 148 03/28/2021    CALCIUM 8.5 03/28/2021        Condition at discharge: Stable     Disposition:  Home with home healthcare    Physical Exam on discharge day:   /77   Pulse 88   Temp 98 °F (36.7 °C) (Oral)   Resp 21   Ht 5' 5\" (1.651 m)   Wt 220 lb 0.3 oz (99.8 kg)   LMP 01/02/2013   SpO2 97%   BMI 36.61 kg/m²     euro: awake, alert and oriented x 3  CV:   Sinus  Pulm:  Diminished, normal work of breathing  Abd:  soft, non-tender; bowel sounds normal; passing flatus, +BM  Wounds: clean, dry and intact  Ext: warm, + edema    Discharge Medications:      Medication List      START taking these medications    clopidogrel 75 MG tablet  Commonly known as: PLAVIX  Take 1 tablet by mouth daily     gabapentin 300 MG capsule  Commonly known as: NEURONTIN  Take 1 capsule by mouth 3 times daily for 30 days.      pantoprazole 40 MG tablet  Commonly known as: PROTONIX  Take 1 tablet by mouth daily     potassium chloride 10 MEQ extended release tablet  Commonly known as: KLOR-CON M  Take 1 tablet by mouth daily for 10 days        CHANGE how you take these medications    aspirin 325 MG EC tablet  Take 1 tablet by mouth daily  What changed:   · medication strength  · how much to take     Levemir FlexTouch 100 UNIT/ML injection pen  Generic drug: insulin detemir  Inject 18 Units into the skin nightly  What changed: how much to take        CONTINUE taking these medications    B-D ULTRAFINE III SHORT PEN 31G X 8 MM Misc  Generic drug: Insulin Pen Needle  USE AS DIRECTED     DULoxetine 30 MG extended release capsule  Commonly known as: CYMBALTA  Take 1 capsule by mouth daily     metFORMIN 1000 MG tablet  Commonly known as: GLUCOPHAGE  Take 1 tablet by mouth 2 times daily (with meals)     MULTIPLE VITAMIN PO     rosuvastatin 20 MG tablet  Commonly known as: CRESTOR  Take 1 tablet by mouth nightly     vitamin B-12 500 MCG tablet  Commonly known as: CYANOCOBALAMIN  Take 2 tablets by mouth daily     vitamin C 500 MG tablet  Commonly known as: ASCORBIC ACID     VITAMIN D PO        STOP taking these medications    dapagliflozin 10 MG tablet  Commonly known as: Farxiga     hydroCHLOROthiazide 25 MG tablet  Commonly known as: HYDRODIURIL     losartan 100 MG tablet  Commonly known as: COZAAR     omeprazole 40 MG delayed release capsule  Commonly known as: PRILOSEC     Trulicity 4.97 OO/7.9UN Sopn  Generic drug: Dulaglutide     verapamil 120 MG extended release tablet  Commonly known as: CALAN SR           Where to Get Your Medications      You can get these medications from any pharmacy    Bring a paper prescription for each of these medications  · aspirin 325 MG EC tablet  · clopidogrel 75 MG tablet  · gabapentin 300 MG capsule  · Levemir FlexTouch 100 UNIT/ML injection pen  · pantoprazole 40 MG tablet  · potassium chloride 10 MEQ extended release tablet       Allergies   Allergen Reactions    No Known Allergies        Discharge Instructions: Activity: No heavy lifting greater than 5 pounds for 6 weeks post-op. Do not use arms to get up from a seated position. Instead rock in chair to give yourself momentum to sit up. No driving  Diet: cardiac diet and diabetic diet  Wound Care: keep wound clean and dry and open to air.  Use antibacterial soap and clean washcloth to cleanse incisional wounds daily. Follow up Visits:  Dr Damita Cheadle in 1-2 weeks  Dr Casimiro Rivera in 2-3 weeks  Dr Marisel Dove in 3-4 weeks      Referred to cardiac rehab for Phase II and will follow up as an outpatient.         Mercedes Perry, LIZZIE  4/2/2021  4:40 PM

## 2021-04-02 NOTE — CARE COORDINATION
Patient contacted regarding COVID-19 risk and screening. Care Transition Nurse contacted the patient by telephone to perform follow-up assessment. Verified name and  with patient as identifiers. Patient has following risk factors of: diabetes and Aortic Aneurysm. Symptoms reviewed with patient who verbalized the following symptoms: pain or aching joints. Patient states she is still having lot's of back pain, but states it is better. No other issues or concerns, no new or changed medications at this time. Was patient discharged with a pulse oximeter? Yes Discussed and confirmed pulse oximeter discharge instructions and when to notify provider or seek emergency care. Due to no new or worsening symptoms encounter was not routed to provider for escalation. Education provided regarding infection prevention, and signs and symptoms of COVID-19 and when to seek medical attention with patient who verbalized understanding. Discussed exposure protocols and quarantine from [address] you at higher risk for severe illness  and given an opportunity for questions and concerns. The patient agrees to contact the COVID-19 hotline [phone] or PCP office for questions related to their healthcare. CTN provided contact information for future reference. From CDC: Are you at higher risk for severe illness?  Wash your hands often.  Avoid close contact (6 feet, which is about two arm lengths) with people who are sick.  Put distance between yourself and other people if COVID-19 is spreading in your community.  Clean and disinfect frequently touched surfaces.  Avoid all cruise travel and non-essential air travel.  Call your healthcare professional if you have concerns about COVID-19 and your underlying condition or if you are sick.     For more information on steps you can take to protect yourself, see CDC's How to [place] for follow-up call in 5-7 days based on severity of symptoms and risk factors.     Thank Jayleen Jones RN  Care Transition Coordinator  Contact [facility]:[phone]

## 2021-04-08 ENCOUNTER — CARE COORDINATION (OUTPATIENT)
Dept: CASE MANAGEMENT | Age: 66
End: 2021-04-08

## 2021-04-09 ENCOUNTER — TELEPHONE (OUTPATIENT)
Dept: CARDIOTHORACIC SURGERY | Age: 66
End: 2021-04-09

## 2021-04-09 NOTE — TELEPHONE ENCOUNTER
Patient called to report elevated BP readings today. She reports this morning her BP was 154/101, . I called her back approximately 90 min later and again it was elevated at 157/108. Patient currently is not on BP medications. I reviewed with Dr. Mohan Iqbal and he would like for patient to start Cozaar 50 mg daily. Patient has Cozaar 100 mg tablets at home and will cut tablets in half. Patient instructed to continue to monitor BP/HR and to contact the office with low BP or continued elevated BP. She is aware and agreeable with plan.

## 2021-04-14 ENCOUNTER — CARE COORDINATION (OUTPATIENT)
Dept: CASE MANAGEMENT | Age: 66
End: 2021-04-14

## 2021-04-14 NOTE — CARE COORDINATION
Date/Time:  4/14/2021 1:51 PM  Attempted to reach patient by telephone. Call within 2 business days of discharge: Yes, Left HIPPA compliant message requesting a return call. CTN will close out COVID- 19 Monitoring episode at this time.       Thank Jayleen Jones RN  Care Transition Coordinator  Contact [facility]:[phone]

## 2021-04-14 NOTE — CARE COORDINATION
You Patient resolved from the Care Transitions episode on 04/14/2021    Patient/family has been provided the following resources and education related to COVID-19:                         Signs, symptoms and red flags related to COVID-19            CDC exposure and quarantine guidelines            [facility] exposure contact - [phone]            Contact for their local Department of Health                 Patient currently reports that the following symptoms have improved:  no new/worsening symptoms. Patient states she is doing well, reporting no fever, chills, nausea, vomiting, chest pain, SOB or cough. Patient states [address] has signed off, as she met all goals, services were no longer needed. Patient with no other issues or concerns, no new or changed medications. No further outreach scheduled with this CTN/ACM. Episode of Care resolved. Patient has this CTN/ACM contact information if future needs arise.     Thank Carlos Alberto Wakefield RN  Care Transition Coordinator  Contact [facility]:[phone]

## 2021-04-22 ENCOUNTER — OFFICE VISIT (OUTPATIENT)
Dept: PRIMARY CARE CLINIC | Age: 66
End: 2021-04-22
Payer: COMMERCIAL

## 2021-04-22 VITALS
OXYGEN SATURATION: 97 % | BODY MASS INDEX: 34.48 KG/M2 | SYSTOLIC BLOOD PRESSURE: 132 MMHG | DIASTOLIC BLOOD PRESSURE: 82 MMHG | WEIGHT: 207.2 LBS | TEMPERATURE: 98.4 F | HEART RATE: 89 BPM

## 2021-04-22 DIAGNOSIS — I10 ESSENTIAL HYPERTENSION: ICD-10-CM

## 2021-04-22 DIAGNOSIS — Z79.4 TYPE 2 DIABETES MELLITUS WITHOUT COMPLICATION, WITH LONG-TERM CURRENT USE OF INSULIN (HCC): ICD-10-CM

## 2021-04-22 DIAGNOSIS — E11.9 TYPE 2 DIABETES MELLITUS WITHOUT COMPLICATION, WITH LONG-TERM CURRENT USE OF INSULIN (HCC): Primary | ICD-10-CM

## 2021-04-22 DIAGNOSIS — K21.9 GASTROESOPHAGEAL REFLUX DISEASE, UNSPECIFIED WHETHER ESOPHAGITIS PRESENT: ICD-10-CM

## 2021-04-22 DIAGNOSIS — E11.9 TYPE 2 DIABETES MELLITUS WITHOUT COMPLICATION, WITH LONG-TERM CURRENT USE OF INSULIN (HCC): ICD-10-CM

## 2021-04-22 DIAGNOSIS — Z95.2 S/P AORTIC VALVE REPLACEMENT AND AORTOPLASTY: ICD-10-CM

## 2021-04-22 DIAGNOSIS — Z79.4 TYPE 2 DIABETES MELLITUS WITHOUT COMPLICATION, WITH LONG-TERM CURRENT USE OF INSULIN (HCC): Primary | ICD-10-CM

## 2021-04-22 DIAGNOSIS — D64.9 ANEMIA, UNSPECIFIED TYPE: ICD-10-CM

## 2021-04-22 LAB
A/G RATIO: 1.6 (ref 1.1–2.2)
ALBUMIN SERPL-MCNC: 4.5 G/DL (ref 3.4–5)
ALP BLD-CCNC: 81 U/L (ref 40–129)
ALT SERPL-CCNC: 9 U/L (ref 10–40)
ANION GAP SERPL CALCULATED.3IONS-SCNC: 13 MMOL/L (ref 3–16)
AST SERPL-CCNC: 18 U/L (ref 15–37)
BASOPHILS ABSOLUTE: 0.1 K/UL (ref 0–0.2)
BASOPHILS RELATIVE PERCENT: 1 %
BILIRUB SERPL-MCNC: 0.6 MG/DL (ref 0–1)
BUN BLDV-MCNC: 19 MG/DL (ref 7–20)
CALCIUM SERPL-MCNC: 9.6 MG/DL (ref 8.3–10.6)
CHLORIDE BLD-SCNC: 97 MMOL/L (ref 99–110)
CO2: 29 MMOL/L (ref 21–32)
CREAT SERPL-MCNC: 1 MG/DL (ref 0.6–1.2)
EOSINOPHILS ABSOLUTE: 0.1 K/UL (ref 0–0.6)
EOSINOPHILS RELATIVE PERCENT: 2.1 %
GFR AFRICAN AMERICAN: >60
GFR NON-AFRICAN AMERICAN: 55
GLOBULIN: 2.8 G/DL
GLUCOSE BLD-MCNC: 130 MG/DL (ref 70–99)
HCT VFR BLD CALC: 33.4 % (ref 36–48)
HEMOGLOBIN: 10.5 G/DL (ref 12–16)
LYMPHOCYTES ABSOLUTE: 1.2 K/UL (ref 1–5.1)
LYMPHOCYTES RELATIVE PERCENT: 17.2 %
MCH RBC QN AUTO: 25.8 PG (ref 26–34)
MCHC RBC AUTO-ENTMCNC: 31.4 G/DL (ref 31–36)
MCV RBC AUTO: 82.3 FL (ref 80–100)
MONOCYTES ABSOLUTE: 0.5 K/UL (ref 0–1.3)
MONOCYTES RELATIVE PERCENT: 7 %
NEUTROPHILS ABSOLUTE: 4.9 K/UL (ref 1.7–7.7)
NEUTROPHILS RELATIVE PERCENT: 72.7 %
PDW BLD-RTO: 18.5 % (ref 12.4–15.4)
PLATELET # BLD: 273 K/UL (ref 135–450)
PMV BLD AUTO: 8.2 FL (ref 5–10.5)
POTASSIUM SERPL-SCNC: 4.6 MMOL/L (ref 3.5–5.1)
RBC # BLD: 4.06 M/UL (ref 4–5.2)
SODIUM BLD-SCNC: 139 MMOL/L (ref 136–145)
TOTAL PROTEIN: 7.3 G/DL (ref 6.4–8.2)
WBC # BLD: 6.7 K/UL (ref 4–11)

## 2021-04-22 PROCEDURE — 1123F ACP DISCUSS/DSCN MKR DOCD: CPT | Performed by: INTERNAL MEDICINE

## 2021-04-22 PROCEDURE — 3044F HG A1C LEVEL LT 7.0%: CPT | Performed by: INTERNAL MEDICINE

## 2021-04-22 PROCEDURE — G8427 DOCREV CUR MEDS BY ELIG CLIN: HCPCS | Performed by: INTERNAL MEDICINE

## 2021-04-22 PROCEDURE — 4040F PNEUMOC VAC/ADMIN/RCVD: CPT | Performed by: INTERNAL MEDICINE

## 2021-04-22 PROCEDURE — 2022F DILAT RTA XM EVC RTNOPTHY: CPT | Performed by: INTERNAL MEDICINE

## 2021-04-22 PROCEDURE — 99214 OFFICE O/P EST MOD 30 MIN: CPT | Performed by: INTERNAL MEDICINE

## 2021-04-22 PROCEDURE — G8399 PT W/DXA RESULTS DOCUMENT: HCPCS | Performed by: INTERNAL MEDICINE

## 2021-04-22 PROCEDURE — G8417 CALC BMI ABV UP PARAM F/U: HCPCS | Performed by: INTERNAL MEDICINE

## 2021-04-22 PROCEDURE — 1111F DSCHRG MED/CURRENT MED MERGE: CPT | Performed by: INTERNAL MEDICINE

## 2021-04-22 PROCEDURE — 3017F COLORECTAL CA SCREEN DOC REV: CPT | Performed by: INTERNAL MEDICINE

## 2021-04-22 PROCEDURE — 1036F TOBACCO NON-USER: CPT | Performed by: INTERNAL MEDICINE

## 2021-04-22 PROCEDURE — 1090F PRES/ABSN URINE INCON ASSESS: CPT | Performed by: INTERNAL MEDICINE

## 2021-04-22 RX ORDER — OMEPRAZOLE 40 MG/1
40 CAPSULE, DELAYED RELEASE ORAL
Qty: 30 CAPSULE | Refills: 1 | Status: SHIPPED | OUTPATIENT
Start: 2021-04-22 | End: 2021-11-02 | Stop reason: SDUPTHER

## 2021-04-22 ASSESSMENT — ENCOUNTER SYMPTOMS
COUGH: 0
SHORTNESS OF BREATH: 1

## 2021-04-22 NOTE — PROGRESS NOTES
Phyllis Solomon   Date ofBirth:  1955    Date of Visit:  4/22/2021    Chief Complaint   Patient presents with    Follow-Up from JODY GUAJARDO     March 19th [facility] Bypass surgery       HPI  Patient was admitted 3/19-3/2421. Patient is status post aortic valve replacement and ascending aortoplasty on 3/19/2021 for aortic valve stenosis and dilated ascending aorta. Type 2 diabetes: Patient states her Levemir was changed to 18 units nightly. Patient takes Metformin 1000 mg 2 times daily. Patient states Trulicity and [place] were discontinued during her hospital admission. Patient monitors her blood sugar fasting and it averages 102. Patient decreases carbohydrates. Patient states she walks for exercise depending on how winded she gets. GERD: Patient states omeprazole was changed to Pantoprazole during her hospital admission and it doesn't work as well. Patient states she has more of the acid reflux. Patient wants to go back to Omeprazole. Patient decreases spicy foods, tomato-based foods, and caffeine. Patient states she drinks 1 cup of coffee per day but not every day. Hypertension: Patient states losartan was decreased to 50 mg once daily. Patient states HCTZ and verapamil were discontinued during her hospital admission. Patient states her blood pressure was high after hospital discharge but is better. Patient decreases salt. Anemia: Patient has postop anemia. Wt Readings from Last 3 Encounters:   04/22/21 207 lb 3.2 oz (94 kg)   03/31/21 220 lb (99.8 kg)   03/24/21 220 lb 0.3 oz (99.8 kg)             Review of Systems   Constitutional: Positive for unexpected weight change. Negative for chills, fatigue and fever. HENT: Negative for congestion, postnasal drip, rhinorrhea, sinus pressure, sore throat and trouble swallowing. Eyes: Negative for visual disturbance. Respiratory: Positive for shortness of breath (little winded with walking that is getting better).  Negative for cough, chest tightness and wheezing. Cardiovascular: Negative for chest pain, palpitations and leg swelling. Gastrointestinal: Negative for abdominal pain, blood in stool, constipation, diarrhea, nausea and vomiting. Genitourinary: Negative for dysuria, frequency and hematuria. Musculoskeletal: Negative for arthralgias and myalgias. Skin: Negative for rash and wound. Neurological: Positive for numbness (still a little in chest in area of surgery). Negative for dizziness, tremors, syncope, weakness, light-headedness and headaches. Psychiatric/Behavioral: Negative for dysphoric mood and sleep disturbance. The patient is not nervous/anxious. Allergies   Allergen Reactions    No Known Allergies      Outpatient Medications Marked as Taking for the 4/22/21 encounter (Office Visit) with Francheska Crowell MD   Medication Sig Dispense Refill    acetaminophen (TYLENOL) 325 MG tablet Take 650 mg by mouth every 6 hours as needed for Pain      aspirin 325 MG EC tablet Take 1 tablet by mouth daily 30 tablet 3    gabapentin (NEURONTIN) 300 MG capsule Take 1 capsule by mouth 3 times daily for 30 days. 90 capsule 0    clopidogrel (PLAVIX) 75 MG tablet Take 1 tablet by mouth daily 30 tablet 3    pantoprazole (PROTONIX) 40 MG tablet Take 1 tablet by mouth daily 30 tablet 0    rosuvastatin (CRESTOR) 20 MG tablet Take 1 tablet by mouth nightly 90 tablet 3    metFORMIN (GLUCOPHAGE) 1000 MG tablet Take 1 tablet by mouth 2 times daily (with meals) 180 tablet 1    DULoxetine (CYMBALTA) 30 MG extended release capsule Take 1 capsule by mouth daily 90 capsule 1    Insulin Pen Needle (B-D ULTRAFINE III SHORT PEN) 31G X 8 MM MISC USE AS DIRECTED 100 each 3    vitamin B-12 (CYANOCOBALAMIN) 500 MCG tablet Take 2 tablets by mouth daily 60 tablet 3    Ascorbic Acid (VITAMIN C) 500 MG tablet Take 500 mg by mouth daily.       Cholecalciferol (VITAMIN D PO) Take 5,000 Units by mouth daily       MULTIPLE VITAMIN PO Take  by mouth. Vitals:    04/22/21 1008   BP: 132/82   Site: Right Upper Arm   Position: Sitting   Cuff Size: Medium Adult   Pulse: 89   Temp: 98.4 °F (36.9 °C)   TempSrc: Oral   SpO2: 97%   Weight: 207 lb 3.2 oz (94 kg)     Body mass index is 34.48 kg/m². Physical Exam  Nursing note reviewed. Constitutional:       General: She is not in acute distress. Appearance: Normal appearance. She is well-developed. Eyes:      General: Lids are normal.      Extraocular Movements: Extraocular movements intact. Conjunctiva/sclera: Conjunctivae normal.      Pupils: Pupils are equal, round, and reactive to light. Neck:      Musculoskeletal: Neck supple. Thyroid: No thyromegaly. Vascular: No carotid bruit. Cardiovascular:      Rate and Rhythm: Normal rate and regular rhythm. Heart sounds: Normal heart sounds, S1 normal and S2 normal. No murmur. No friction rub. No gallop. Pulmonary:      Effort: Pulmonary effort is normal. No respiratory distress. Breath sounds: Normal breath sounds. No wheezing, rhonchi or rales. Abdominal:      General: Bowel sounds are normal. There is no distension. Palpations: Abdomen is soft. Tenderness: There is no abdominal tenderness. Musculoskeletal:      Right lower leg: No edema. Left lower leg: No edema. Lymphadenopathy:      Head:      Right side of head: No submandibular adenopathy. Left side of head: No submandibular adenopathy. Neurological:      Mental Status: She is alert and oriented to person, place, and time. Psychiatric:         Mood and Affect: Mood normal.           No results found for this visit on 04/22/21. Lab Review   No results displayed because visit has over 200 results.       Office Visit on 03/15/2021   Component Date Value    SARS-CoV-2 03/15/2021 Not Detected    Hospital Outpatient Visit on 03/08/2021   Component Date Value    WBC 03/08/2021 5.8     RBC 03/08/2021 4.41     Hemoglobin 03/08/2021 11.3*    Hematocrit 03/08/2021 36.1     MCV 03/08/2021 81.9     MCH 03/08/2021 25.5*    MCHC 03/08/2021 31.1     RDW 03/08/2021 16.4*    Platelets 43/81/6040 189     MPV 03/08/2021 8.2     Sodium 03/08/2021 139     Potassium 03/08/2021 4.5     Chloride 03/08/2021 102     CO2 03/08/2021 28     Anion Gap 03/08/2021 9     Glucose 03/08/2021 96     BUN 03/08/2021 27*    CREATININE 03/08/2021 1.3*    GFR Non- 03/08/2021 41*    GFR  03/08/2021 50*    Calcium 03/08/2021 9.6     Total Protein 03/08/2021 6.9     Albumin 03/08/2021 3.9     Albumin/Globulin Ratio 03/08/2021 1.3     Total Bilirubin 03/08/2021 0.3     Alkaline Phosphatase 03/08/2021 55     ALT 03/08/2021 12     AST 03/08/2021 20     Globulin 03/08/2021 3.0     Magnesium 03/08/2021 1.90     Protime 03/08/2021 11.2     INR 03/08/2021 0.97     aPTT 03/08/2021 20.1*    Fibrinogen 03/08/2021 207     Cholesterol, Total 03/08/2021 138     Triglycerides 03/08/2021 60     HDL 03/08/2021 49     LDL Calculated 03/08/2021 77     VLDL Cholesterol Calcula* 03/08/2021 12     Hemoglobin A1C 03/08/2021 6.8     eAG 03/08/2021 148. 5     Color, UA 03/08/2021 Yellow     Clarity, UA 03/08/2021 Clear     Glucose, Ur 03/08/2021 >=1000*    Bilirubin Urine 03/08/2021 Negative     Ketones, Urine 03/08/2021 Negative     Specific Gravity, UA 03/08/2021 1.020     Blood, Urine 03/08/2021 Negative     pH, UA 03/08/2021 6.5     Protein, UA 03/08/2021 TRACE*    Urobilinogen, Urine 03/08/2021 0.2     Nitrite, Urine 03/08/2021 POSITIVE*    Leukocyte Esterase, Urine 03/08/2021 Negative     Microscopic Examination 03/08/2021 YES     Urine Type 03/08/2021 NotGiven     Urine Culture, Routine 03/08/2021 <10,000 CFU/ml mixed skin/urogenital varun. No further workup     MRSA SCREEN RT-PCR 03/08/2021                      Value:Negative  MRSA DNA not detected.   Normal Range: Not detected      ABO/Rh 03/08/2021 A POS     Antibody Screen 03/08/2021 NEG     Ventricular Rate 03/08/2021 64     Atrial Rate 03/08/2021 64     P-R Interval 03/08/2021 120     QRS Duration 03/08/2021 136     Q-T Interval 03/08/2021 452     QTc Calculation (Bazett) 03/08/2021 466     P Axis 03/08/2021 4     R Axis 03/08/2021 -48     T Axis 03/08/2021 65     Diagnosis 03/08/2021 Normal sinus rhythmRight bundle branch blockLeft anterior fascicular block Bifascicular block Voltage criteria for left ventricular hypertrophyAbnormal ECGConfirmed by TEMI Bryant ([id number]) on 3/8/2021 3:43:31 PM     WBC, UA 03/08/2021 3-5     RBC, UA 03/08/2021 None seen     Epithelial Cells, UA 03/08/2021 2-5     Bacteria, UA 03/08/2021 1+*   Office Visit on 03/08/2021   Component Date Value    SARS-CoV-2 03/08/2021 Not Detected    Orders Only on 03/03/2021   Component Date Value    Iron 03/03/2021 39     TIBC 03/03/2021 326     Iron Saturation 03/03/2021 12*    WBC 03/03/2021 5.7     RBC 03/03/2021 4.20     Hemoglobin 03/03/2021 10.7*    Hematocrit 03/03/2021 34.5*    MCV 03/03/2021 82.2     MCH 03/03/2021 25.6*    MCHC 03/03/2021 31.1     RDW 03/03/2021 16.3*    Platelets 08/78/1436 253     MPV 03/03/2021 8.3    Office Visit on 03/03/2021   Component Date Value    Hemoglobin A1C 03/03/2021 6.6    Hospital Outpatient Visit on 01/28/2021   Component Date Value    WBC 01/28/2021 5.3     RBC 01/28/2021 3.98*    Hemoglobin 01/28/2021 10.6*    Hematocrit 01/28/2021 33.2*    MCV 01/28/2021 83.5     MCH 01/28/2021 26.7     MCHC 01/28/2021 32.0     RDW 01/28/2021 15.4     Platelets 14/16/5114 260     MPV 01/28/2021 7.7     Sodium 01/28/2021 137     Potassium 01/28/2021 4.1     Chloride 01/28/2021 102     CO2 01/28/2021 26     Anion Gap 01/28/2021 9     Glucose 01/28/2021 99     BUN 01/28/2021 28*    CREATININE 01/28/2021 1.0     GFR Non- 01/28/2021 55*    GFR  01/28/2021 >60     Calcium 01/28/2021 9. 5     Total Protein 01/28/2021 6.6     Albumin 01/28/2021 3.9     Albumin/Globulin Ratio 01/28/2021 1.4     Total Bilirubin 01/28/2021 0.4     Alkaline Phosphatase 01/28/2021 48     ALT 01/28/2021 9*    AST 01/28/2021 14*    Globulin 01/28/2021 2.7     Ventricular Rate 01/28/2021 71     Atrial Rate 01/28/2021 71     P-R Interval 01/28/2021 138     QRS Duration 01/28/2021 120     Q-T Interval 01/28/2021 446     QTc Calculation (Bazett) 01/28/2021 484     P Axis 01/28/2021 40     R Axis 01/28/2021 -55     T Axis 01/28/2021 34     Diagnosis 01/28/2021 Normal sinus rhythmRight bundle branch blockLeft anterior fascicular block Bifascicular block Voltage criteria for left ventricular hypertrophyAbnormal ECGConfirmed by PARVZI WIGGINS, Fernandez Doe ([id number]) on 1/28/2021 1:04:41 PM     Protime 01/28/2021 12.9     INR 01/28/2021 1.11    Orders Only on 01/25/2021   Component Date Value    CREATININE 01/25/2021 1.1     GFR Non- 01/25/2021 50*    GFR  01/25/2021 >60     BUN 01/25/2021 23*   Office Visit on 01/25/2021   Component Date Value    SARS-CoV-2 01/25/2021 Not Detected    Procedure visit on 01/11/2021   Component Date Value    Left Ventricular Ejectio* 01/11/2021 65     LVEF MODALITY 01/11/2021 ECHO    There may be more visits with results that are not included. Assessment/Plan     1. Type 2 diabetes mellitus without complication, with long-term current use of insulin (HCC)  - stable  -Continue same medications  -Limit carbohydrates to 45 grams with meals and 15 grams with snacks  -monitor blood sugars  -goal for blood sugar fasting or pre-meal  is   -goal for blood sugar 2 hours after a meal is less than 180  -goal for blood sugar at bedtime is less than 150  -Regular walking for exercise  - Comprehensive Metabolic Panel; Future    2.  Essential hypertension  --stable  -Continue same medications  -Low sodium diet  -Regular walking for exercise  - Comprehensive Metabolic Panel; Future    3. Gastroesophageal reflux disease, unspecified whether esophagitis present  -Flared up  -Start omeprazole (PRILOSEC) 40 MG delayed release capsule; Take 1 capsule by mouth every morning (before breakfast)  Dispense: 30 capsule; Refill: 1  -Discontinue pantoprazole  -Decrease caffeine, avoid spicy foods, avoid tomato based foods  -Eat small meals instead of large meals  -Wait 2-3 hours after eating before lying down    4. Anemia, unspecified type  -Postoperative anemia  - CBC Auto Differential; Future    5. S/P aortic valve replacement and aortoplasty  -Patient is status post aortic valve replacement and aortoplasty on 3/19/2021  -Continue care per cardiovascular surgery and cardiology      Discussed medications with patient, who voiced understanding of their use and indications. All questions answered. Return in about 4 weeks (around 5/20/2021) for GERD.

## 2021-04-22 NOTE — PATIENT INSTRUCTIONS
-Discontinue Pantoprazole  -Start Omeprazole 40mg once daily before breakfast  -Continue same medications  -Low sodium diet  -Limit carbohydrates to 45 grams with meals and 15 grams with snacks  -monitor blood sugars  -goal for blood sugar fasting or pre-meal  is   -goal for blood sugar 2 hours after a meal is less than 180  -goal for blood sugar at bedtime is less than 150  -Regular walking for exercise

## 2021-04-24 DIAGNOSIS — F33.1 MODERATE EPISODE OF RECURRENT MAJOR DEPRESSIVE DISORDER (HCC): ICD-10-CM

## 2021-04-24 DIAGNOSIS — F41.9 ANXIETY: ICD-10-CM

## 2021-04-26 RX ORDER — DULOXETIN HYDROCHLORIDE 30 MG/1
30 CAPSULE, DELAYED RELEASE ORAL DAILY
Qty: 90 CAPSULE | Refills: 1 | Status: SHIPPED | OUTPATIENT
Start: 2021-04-26 | End: 2021-09-30

## 2021-04-26 NOTE — TELEPHONE ENCOUNTER
Medication:   Requested Prescriptions     Pending Prescriptions Disp Refills    DULoxetine (CYMBALTA) 30 MG extended release capsule 90 capsule 1     Sig: Take 1 capsule by mouth daily     Last Filled: 9.14.20    Last appt: 4/22/2021   Next appt: 5/21/2021    Last OARRS: No flowsheet data found.

## 2021-04-29 PROBLEM — Z95.2 S/P AORTIC VALVE REPLACEMENT AND AORTOPLASTY: Status: ACTIVE | Noted: 2021-04-29

## 2021-04-29 ASSESSMENT — ENCOUNTER SYMPTOMS
NAUSEA: 0
SORE THROAT: 0
BLOOD IN STOOL: 0
WHEEZING: 0
CONSTIPATION: 0
SINUS PRESSURE: 0
DIARRHEA: 0
VOMITING: 0
ABDOMINAL PAIN: 0
TROUBLE SWALLOWING: 0
RHINORRHEA: 0
CHEST TIGHTNESS: 0

## 2021-04-30 ENCOUNTER — OFFICE VISIT (OUTPATIENT)
Dept: CARDIOLOGY CLINIC | Age: 66
End: 2021-04-30
Payer: COMMERCIAL

## 2021-04-30 VITALS
WEIGHT: 206.4 LBS | OXYGEN SATURATION: 98 % | BODY MASS INDEX: 34.39 KG/M2 | SYSTOLIC BLOOD PRESSURE: 136 MMHG | HEIGHT: 65 IN | HEART RATE: 95 BPM | DIASTOLIC BLOOD PRESSURE: 74 MMHG

## 2021-04-30 DIAGNOSIS — I10 ESSENTIAL HYPERTENSION: ICD-10-CM

## 2021-04-30 DIAGNOSIS — Z95.2 S/P AVR: Primary | ICD-10-CM

## 2021-04-30 DIAGNOSIS — E78.2 MIXED HYPERLIPIDEMIA: ICD-10-CM

## 2021-04-30 PROCEDURE — G8399 PT W/DXA RESULTS DOCUMENT: HCPCS | Performed by: INTERNAL MEDICINE

## 2021-04-30 PROCEDURE — 3017F COLORECTAL CA SCREEN DOC REV: CPT | Performed by: INTERNAL MEDICINE

## 2021-04-30 PROCEDURE — 1123F ACP DISCUSS/DSCN MKR DOCD: CPT | Performed by: INTERNAL MEDICINE

## 2021-04-30 PROCEDURE — G8427 DOCREV CUR MEDS BY ELIG CLIN: HCPCS | Performed by: INTERNAL MEDICINE

## 2021-04-30 PROCEDURE — 4040F PNEUMOC VAC/ADMIN/RCVD: CPT | Performed by: INTERNAL MEDICINE

## 2021-04-30 PROCEDURE — 99214 OFFICE O/P EST MOD 30 MIN: CPT | Performed by: INTERNAL MEDICINE

## 2021-04-30 PROCEDURE — G8417 CALC BMI ABV UP PARAM F/U: HCPCS | Performed by: INTERNAL MEDICINE

## 2021-04-30 PROCEDURE — 1036F TOBACCO NON-USER: CPT | Performed by: INTERNAL MEDICINE

## 2021-04-30 PROCEDURE — 1090F PRES/ABSN URINE INCON ASSESS: CPT | Performed by: INTERNAL MEDICINE

## 2021-04-30 RX ORDER — LOSARTAN POTASSIUM 100 MG/1
50 TABLET ORAL DAILY
COMMUNITY
End: 2021-08-27
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
## 2021-04-30 NOTE — PATIENT INSTRUCTIONS
-Start cardiac rehab  -Start verapamil 120 mg daily  -See Shayy Paredes in 1 month, see Dr. Damian Womack in 4 months

## 2021-05-04 NOTE — TELEPHONE ENCOUNTER
Requested Prescriptions     Pending Prescriptions Disp Refills    verapamil (CALAN SR) 120 MG extended release tablet 90 tablet 3     Sig: Take 1 tablet by mouth daily                Last Office Visit: 4/30/2021     Next Office Visit: 06/01/2021

## 2021-05-05 ENCOUNTER — TELEPHONE (OUTPATIENT)
Dept: CARDIOLOGY CLINIC | Age: 66
End: 2021-05-05

## 2021-05-05 LAB
CATARACTS: POSITIVE
DIABETIC RETINOPATHY: NEGATIVE
GLAUCOMA: NEGATIVE
INTRAOCULAR PRESSURE EYE: NORMAL
VISUAL ACUITY DISTANCE LEFT EYE: NORMAL
VISUAL ACUITY DISTANCE RIGHT EYE: NORMAL

## 2021-05-05 NOTE — TELEPHONE ENCOUNTER
Patient has not heard from cardio rehab. Griselda Avitia asked patient to call if she had not heard form cardio rehab.   Please call pt at [phone]

## 2021-05-12 ENCOUNTER — OFFICE VISIT (OUTPATIENT)
Dept: CARDIOTHORACIC SURGERY | Age: 66
End: 2021-05-12

## 2021-05-12 VITALS
WEIGHT: 212 LBS | OXYGEN SATURATION: 100 % | TEMPERATURE: 98.2 F | HEIGHT: 65 IN | BODY MASS INDEX: 35.32 KG/M2 | HEART RATE: 60 BPM | SYSTOLIC BLOOD PRESSURE: 138 MMHG | DIASTOLIC BLOOD PRESSURE: 82 MMHG

## 2021-05-12 DIAGNOSIS — Z09 FOLLOW-UP EXAMINATION FOLLOWING SURGERY: Primary | ICD-10-CM

## 2021-05-12 PROCEDURE — 99024 POSTOP FOLLOW-UP VISIT: CPT | Performed by: THORACIC SURGERY (CARDIOTHORACIC VASCULAR SURGERY)

## 2021-05-12 NOTE — PROGRESS NOTES
Arnoldo Childress returns the office today for her second checkup after her aortic valve replacement and ascending aortoplasty that was performed on March 19. Since I saw her last, she has continued to do well. She has not taken any pain medication since that last office visit. She is getting up and around normally. Feels well and is very happy with her progress. On exam I hear no cardiac murmur. Her lungs are thoroughly clear bilaterally. Her chest incision is healed beautifully. She has no peripheral edema. Overall I think Leanne's recovery has been exemplary. Given that she does a fair amount of lifting at work, I think it is best for her to take the full 12 weeks of recovery before returning to work. We talked about this in the office. She understands that if she needs documentation to return to work that we will be happy to provide this for her. She is going to be following up with Dr. Gwenn Heimlich in 3 to 4 months. At this point I am going to leave further follow-up with my office open-ended. She knows she is welcome to call or return at anytime in the future should any new problems, questions or concerns arise for which we can be of help.

## 2021-05-18 DIAGNOSIS — E11.9 TYPE 2 DIABETES MELLITUS WITHOUT COMPLICATION, WITH LONG-TERM CURRENT USE OF INSULIN (HCC): ICD-10-CM

## 2021-05-18 DIAGNOSIS — Z79.4 TYPE 2 DIABETES MELLITUS WITHOUT COMPLICATION, WITH LONG-TERM CURRENT USE OF INSULIN (HCC): ICD-10-CM

## 2021-05-18 DIAGNOSIS — E78.2 MIXED HYPERLIPIDEMIA: ICD-10-CM

## 2021-05-18 RX ORDER — INSULIN DETEMIR 100 [IU]/ML
18 INJECTION, SOLUTION SUBCUTANEOUS NIGHTLY
Qty: 30 ML | Refills: 1 | Status: SHIPPED | OUTPATIENT
Start: 2021-05-18 | End: 2021-11-02 | Stop reason: SDUPTHER

## 2021-05-18 RX ORDER — ROSUVASTATIN CALCIUM 20 MG/1
20 TABLET, COATED ORAL NIGHTLY
Qty: 90 TABLET | Refills: 3 | Status: SHIPPED | OUTPATIENT
Start: 2021-05-18 | End: 2021-11-02 | Stop reason: SDUPTHER

## 2021-05-18 RX ORDER — PEN NEEDLE, DIABETIC 31 GX5/16"
NEEDLE, DISPOSABLE MISCELLANEOUS
Qty: 100 EACH | Refills: 3 | Status: SHIPPED | OUTPATIENT
Start: 2021-05-18 | End: 2021-11-02 | Stop reason: SDUPTHER

## 2021-05-18 RX ORDER — PEN NEEDLE, DIABETIC 31 GX5/16"
NEEDLE, DISPOSABLE MISCELLANEOUS
Qty: 90 EACH | Refills: 10 | OUTPATIENT
Start: 2021-05-18

## 2021-05-18 NOTE — TELEPHONE ENCOUNTER
Medication:   Requested Prescriptions     Pending Prescriptions Disp Refills    B-D ULTRAFINE III SHORT PEN 31G X 8 MM MISC [Pharmacy Med Name: BD PEN NICOLASA UF SHORT 8MM 90S 31G5/16] 90 each 10     Sig: USE AS DIRECTED    metFORMIN (GLUCOPHAGE) 1000 MG tablet [Pharmacy Med Name: METFORMIN HCL TABS 1000MG] 180 tablet 3     Sig: TAKE 1 TABLET TWICE A DAY WITH MEALS    LEVEMIR FLEXTOUCH 100 UNIT/ML injection pen [Pharmacy Med Name: Criselda Gutierrez PEN 3ML 5'S 100U/ML] 30 mL 3     Sig: INJECT 30 UNITS UNDER THE SKIN NIGHTLY     Last Filled:  06/17/20 11/03/20 03/24/21    Last appt: 4/22/2021   Next appt: 5/18/2021    Last OARRS: No flowsheet data found.

## 2021-05-21 ENCOUNTER — OFFICE VISIT (OUTPATIENT)
Dept: PRIMARY CARE CLINIC | Age: 66
End: 2021-05-21
Payer: COMMERCIAL

## 2021-05-21 VITALS
OXYGEN SATURATION: 97 % | SYSTOLIC BLOOD PRESSURE: 136 MMHG | HEART RATE: 86 BPM | BODY MASS INDEX: 35.32 KG/M2 | HEIGHT: 65 IN | WEIGHT: 212 LBS | DIASTOLIC BLOOD PRESSURE: 72 MMHG | RESPIRATION RATE: 16 BRPM | TEMPERATURE: 98.3 F

## 2021-05-21 DIAGNOSIS — D64.9 ANEMIA, UNSPECIFIED TYPE: ICD-10-CM

## 2021-05-21 DIAGNOSIS — K21.9 GASTROESOPHAGEAL REFLUX DISEASE, UNSPECIFIED WHETHER ESOPHAGITIS PRESENT: Primary | ICD-10-CM

## 2021-05-21 LAB
HCT VFR BLD CALC: 31.4 % (ref 36–48)
HEMOGLOBIN: 9.6 G/DL (ref 12–16)
IRON SATURATION: 11 % (ref 15–50)
IRON: 33 UG/DL (ref 37–145)
MCH RBC QN AUTO: 25.3 PG (ref 26–34)
MCHC RBC AUTO-ENTMCNC: 30.5 G/DL (ref 31–36)
MCV RBC AUTO: 82.9 FL (ref 80–100)
PDW BLD-RTO: 18.7 % (ref 12.4–15.4)
PLATELET # BLD: 201 K/UL (ref 135–450)
PMV BLD AUTO: 8.4 FL (ref 5–10.5)
RBC # BLD: 3.79 M/UL (ref 4–5.2)
TOTAL IRON BINDING CAPACITY: 304 UG/DL (ref 260–445)
WBC # BLD: 5.1 K/UL (ref 4–11)

## 2021-05-21 PROCEDURE — G8417 CALC BMI ABV UP PARAM F/U: HCPCS | Performed by: INTERNAL MEDICINE

## 2021-05-21 PROCEDURE — 1036F TOBACCO NON-USER: CPT | Performed by: INTERNAL MEDICINE

## 2021-05-21 PROCEDURE — 99213 OFFICE O/P EST LOW 20 MIN: CPT | Performed by: INTERNAL MEDICINE

## 2021-05-21 PROCEDURE — G8399 PT W/DXA RESULTS DOCUMENT: HCPCS | Performed by: INTERNAL MEDICINE

## 2021-05-21 PROCEDURE — 1123F ACP DISCUSS/DSCN MKR DOCD: CPT | Performed by: INTERNAL MEDICINE

## 2021-05-21 PROCEDURE — 1090F PRES/ABSN URINE INCON ASSESS: CPT | Performed by: INTERNAL MEDICINE

## 2021-05-21 PROCEDURE — 4040F PNEUMOC VAC/ADMIN/RCVD: CPT | Performed by: INTERNAL MEDICINE

## 2021-05-21 PROCEDURE — G8427 DOCREV CUR MEDS BY ELIG CLIN: HCPCS | Performed by: INTERNAL MEDICINE

## 2021-05-21 PROCEDURE — 3017F COLORECTAL CA SCREEN DOC REV: CPT | Performed by: INTERNAL MEDICINE

## 2021-05-21 NOTE — PROGRESS NOTES
Kayleen Yip   Date ofBirth:  1955    Date of Visit:  5/21/2021    Chief Complaint   Patient presents with    Follow-up     GERD       HPI  Patient has GERD. Patient takes Omeprazole 40mg po q day. Patient's GERD is better. Patient has only had 1 episode of reflux since last visit. Patient states she doesn't eat spicy food often. Patient states she tries to stay away from tomato based food except for tomatoes with salads. Patient states she switched to decaf and only 1 cup of coffee per day. Review of Systems   Constitutional: Negative for fatigue. HENT: Negative for sore throat and trouble swallowing. Respiratory: Negative for shortness of breath and wheezing. Cardiovascular: Negative for chest pain and palpitations. Gastrointestinal: Negative for abdominal pain, blood in stool, nausea and vomiting. Endocrine: Negative for cold intolerance.        Allergies   Allergen Reactions    No Known Allergies      Outpatient Medications Marked as Taking for the 5/21/21 encounter (Office Visit) with Marley Gonzalez MD   Medication Sig Dispense Refill    insulin detemir (LEVEMIR FLEXTOUCH) 100 UNIT/ML injection pen Inject 18 Units into the skin nightly 30 mL 1    Insulin Pen Needle (B-D ULTRAFINE III SHORT PEN) 31G X 8 MM MISC USE AS DIRECTED 100 each 3    metFORMIN (GLUCOPHAGE) 1000 MG tablet Take 1 tablet by mouth 2 times daily (with meals) 180 tablet 1    rosuvastatin (CRESTOR) 20 MG tablet Take 1 tablet by mouth nightly 90 tablet 3    verapamil (CALAN SR) 120 MG extended release tablet Take 1 tablet by mouth daily 90 tablet 3    losartan (COZAAR) 100 MG tablet Take 50 mg by mouth daily      DULoxetine (CYMBALTA) 30 MG extended release capsule Take 1 capsule by mouth daily 90 capsule 1    omeprazole (PRILOSEC) 40 MG delayed release capsule Take 1 capsule by mouth every morning (before breakfast) 30 capsule 1    diphenhydrAMINE (BENADRYL) 25 MG tablet Take 25 mg by mouth nightly as needed for Itching      acetaminophen (TYLENOL) 325 MG tablet Take 650 mg by mouth every 6 hours as needed for Pain      aspirin 325 MG EC tablet Take 1 tablet by mouth daily 30 tablet 3    clopidogrel (PLAVIX) 75 MG tablet Take 1 tablet by mouth daily 30 tablet 3    vitamin B-12 (CYANOCOBALAMIN) 500 MCG tablet Take 2 tablets by mouth daily 60 tablet 3    Ascorbic Acid (VITAMIN C) 500 MG tablet Take 500 mg by mouth daily.  Cholecalciferol (VITAMIN D PO) Take 5,000 Units by mouth daily       MULTIPLE VITAMIN PO Take  by mouth. Vitals:    05/21/21 0915 05/21/21 0950   BP: (!) 145/80 136/72   Pulse: 86    Resp: 16    Temp: 98.3 °F (36.8 °C)    SpO2: 97%    Weight: 212 lb (96.2 kg)    Height: 5' 5\" (1.651 m)      Body mass index is 35.28 kg/m². Physical Exam  Nursing note reviewed. Constitutional:       General: She is not in acute distress. Appearance: Normal appearance. She is well-developed. Eyes:      Extraocular Movements: Extraocular movements intact. Pupils: Pupils are equal, round, and reactive to light. Neck:      Thyroid: No thyromegaly. Vascular: No carotid bruit. Cardiovascular:      Rate and Rhythm: Normal rate and regular rhythm. Heart sounds: Normal heart sounds, S1 normal and S2 normal. No murmur heard. Pulmonary:      Effort: Pulmonary effort is normal. No respiratory distress. Breath sounds: Normal breath sounds. Abdominal:      General: Bowel sounds are normal. There is no distension. Palpations: Abdomen is soft. Tenderness: There is no abdominal tenderness. Musculoskeletal:      Cervical back: Neck supple. Neurological:      Mental Status: She is alert. No results found for this visit on 05/21/21.   Lab Review     Lab Review   Orders Only on 04/22/2021   Component Date Value    Sodium 04/22/2021 139     Potassium 04/22/2021 4.6     Chloride 04/22/2021 97*    CO2 04/22/2021 29     Anion Gap 04/22/2021 13     Glucose 04/22/2021 130*    BUN 04/22/2021 19     CREATININE 04/22/2021 1.0     GFR Non- 04/22/2021 55*    GFR  04/22/2021 >60     Calcium 04/22/2021 9.6     Total Protein 04/22/2021 7.3     Albumin 04/22/2021 4.5     Albumin/Globulin Ratio 04/22/2021 1.6     Total Bilirubin 04/22/2021 0.6     Alkaline Phosphatase 04/22/2021 81     ALT 04/22/2021 9*    AST 04/22/2021 18     Globulin 04/22/2021 2.8     WBC 04/22/2021 6.7     RBC 04/22/2021 4.06     Hemoglobin 04/22/2021 10.5*    Hematocrit 04/22/2021 33.4*    MCV 04/22/2021 82.3     MCH 04/22/2021 25.8*    MCHC 04/22/2021 31.4     RDW 04/22/2021 18.5*    Platelets 45/35/9600 273     MPV 04/22/2021 8.2     Neutrophils % 04/22/2021 72.7     Lymphocytes % 04/22/2021 17.2     Monocytes % 04/22/2021 7.0     Eosinophils % 04/22/2021 2.1     Basophils % 04/22/2021 1.0     Neutrophils Absolute 04/22/2021 4.9     Lymphocytes Absolute 04/22/2021 1.2     Monocytes Absolute 04/22/2021 0.5     Eosinophils Absolute 04/22/2021 0.1     Basophils Absolute 04/22/2021 0.1    No results displayed because visit has over 200 results. Assessment/Plan     1. Gastroesophageal reflux disease, unspecified whether esophagitis present  -Better and stable  -Continue same medications  -Decrease caffeine, avoid spicy foods, avoid tomato based foods  -Eat small meals instead of large meals  -Wait 2-3 hours after eating before lying down     2. Anemia, unspecified type  - labs show anemia  - CBC; Future  - Iron and TIBC; Future  -May need iron if it is low on labs      Discussed medications with patient, who voiced understanding of their use and indications. All questions answered. Return in about 7 weeks (around 7/6/2021) for as previously scheduled for diabetes, hypertension, hyperlipidemia, depression, and GERD .

## 2021-05-21 NOTE — PATIENT INSTRUCTIONS
-Continue same medications  -Decrease caffeine, avoid spicy foods, avoid tomato based foods  -Eat small meals instead of large meals  -Wait 2-3 hours after eating before lying down

## 2021-05-27 ENCOUNTER — HOSPITAL ENCOUNTER (OUTPATIENT)
Dept: CARDIAC REHAB | Age: 66
Setting detail: THERAPIES SERIES
Discharge: HOME | End: 2021-05-27
Payer: COMMERCIAL

## 2021-05-27 PROBLEM — K21.9 GASTROESOPHAGEAL REFLUX DISEASE: Status: ACTIVE | Noted: 2021-05-27

## 2021-05-27 PROCEDURE — 93798 PHYS/QHP OP CAR RHAB W/ECG: CPT

## 2021-05-27 ASSESSMENT — ENCOUNTER SYMPTOMS
ABDOMINAL PAIN: 0
SHORTNESS OF BREATH: 0
WHEEZING: 0
BLOOD IN STOOL: 0
TROUBLE SWALLOWING: 0
VOMITING: 0
NAUSEA: 0
SORE THROAT: 0

## 2021-06-01 ENCOUNTER — OFFICE VISIT (OUTPATIENT)
Dept: CARDIOLOGY CLINIC | Age: 66
End: 2021-06-01
Payer: COMMERCIAL

## 2021-06-01 VITALS
BODY MASS INDEX: 35.89 KG/M2 | WEIGHT: 215.4 LBS | DIASTOLIC BLOOD PRESSURE: 74 MMHG | SYSTOLIC BLOOD PRESSURE: 136 MMHG | HEIGHT: 65 IN | HEART RATE: 103 BPM

## 2021-06-01 DIAGNOSIS — E78.2 MIXED HYPERLIPIDEMIA: ICD-10-CM

## 2021-06-01 DIAGNOSIS — I35.0 NONRHEUMATIC AORTIC VALVE STENOSIS: Primary | ICD-10-CM

## 2021-06-01 DIAGNOSIS — I10 ESSENTIAL HYPERTENSION: ICD-10-CM

## 2021-06-01 DIAGNOSIS — Z95.2 S/P AVR: ICD-10-CM

## 2021-06-01 PROCEDURE — G8399 PT W/DXA RESULTS DOCUMENT: HCPCS | Performed by: NURSE PRACTITIONER

## 2021-06-01 PROCEDURE — 1036F TOBACCO NON-USER: CPT | Performed by: NURSE PRACTITIONER

## 2021-06-01 PROCEDURE — 99214 OFFICE O/P EST MOD 30 MIN: CPT | Performed by: NURSE PRACTITIONER

## 2021-06-01 PROCEDURE — G8427 DOCREV CUR MEDS BY ELIG CLIN: HCPCS | Performed by: NURSE PRACTITIONER

## 2021-06-01 PROCEDURE — 1090F PRES/ABSN URINE INCON ASSESS: CPT | Performed by: NURSE PRACTITIONER

## 2021-06-01 PROCEDURE — 3017F COLORECTAL CA SCREEN DOC REV: CPT | Performed by: NURSE PRACTITIONER

## 2021-06-01 PROCEDURE — 4040F PNEUMOC VAC/ADMIN/RCVD: CPT | Performed by: NURSE PRACTITIONER

## 2021-06-01 PROCEDURE — G8417 CALC BMI ABV UP PARAM F/U: HCPCS | Performed by: NURSE PRACTITIONER

## 2021-06-01 PROCEDURE — 1123F ACP DISCUSS/DSCN MKR DOCD: CPT | Performed by: NURSE PRACTITIONER

## 2021-06-01 NOTE — PROGRESS NOTES
Sister     Cancer Maternal Grandmother     Cancer Paternal Uncle         colon    Cancer Paternal Cousin         colon cancer - stage 4    Bipolar Disorder Son     Depression Son      Social History     Tobacco Use    Smoking status: Never Smoker    Smokeless tobacco: Never Used   Vaping Use    Vaping Use: Never used   Substance Use Topics    Alcohol use: Not Currently    Drug use: No     Allergies:No known allergies    Review of Systems  General: No changes in weight, fatigue, or night sweats. HEENT: No blurry or decreased vision. No changes in hearing, nasal discharge or sore throat. Cardiovascular:  See HPI. Respiratory: No cough, hemoptysis, or wheezing. Gastrointestinal:  No abdominal pain, hematochezia, melana, constipation, diarrhea, or history of GI ulcers. Genito-Urinary: No dysuria or hematuria. No urgency or polyuria. Musculoskeletal:  No complaints of joint pain, joint swelling or muscular weakness/soreness. Neurological:  No dizziness, headaches, numbness/tingling, speech problems or weakness. Psychological:  No anxiety or depression. Hematological and Lymphatic: No abnormal bleeding or bruising, blood clots, jaundice or swollen lymph nodes. Endocrine:   No malaise/lethargy, palpitations, polydipsia/polyuria, temperature intolerance or unexpected weight changes  Skin:  No rashes or non-healing ulcers. Physical Exam:  Ht 5' 5\" (1.651 m)   Wt 215 lb 6.4 oz (97.7 kg)   LMP 01/02/2013   BMI 35.84 kg/m²    General (appearance):  No acute distress  Eyes: anicteric   Neck: soft, No JVD  Ears/Nose/Mouth/Thorat: No cyanosis  CV: RRR   Respiratory:  Clear, normal effort  GI: soft, non-tender, non-distended  Skin: Warm, dry. No rashes  Neuro/Psych: Alert and oriented x 3. Appropriate behavior  Ext:  No c/c.  No edema  Pulses:  2+ radial     Weight  Wt Readings from Last 3 Encounters:   06/01/21 215 lb 6.4 oz (97.7 kg)   05/21/21 212 lb (96.2 kg)   05/12/21 212 lb (96.2 kg) CBC:   Lab Results   Component Value Date    WBC 5.1 05/21/2021    HGB 9.6 (L) 05/21/2021    HCT 31.4 (L) 05/21/2021    MCV 82.9 05/21/2021     05/21/2021     BMP:  Lab Results   Component Value Date    CREATININE 1.0 04/22/2021    BUN 19 04/22/2021     04/22/2021    K 4.6 04/22/2021    CL 97 (L) 04/22/2021    CO2 29 04/22/2021     Mag:   Lab Results   Component Value Date    MG 2.00 03/24/2021     LIVER PROFILE:   Lab Results   Component Value Date    ALT 9 (L) 04/22/2021    AST 18 04/22/2021    ALKPHOS 81 04/22/2021    BILITOT 0.6 04/22/2021     PT/INR:   Lab Results   Component Value Date    INR 1.26 (H) 03/20/2021    INR 1.56 (H) 03/19/2021    INR 0.97 03/08/2021    PROTIME 14.7 (H) 03/20/2021    PROTIME 18.2 (H) 03/19/2021    PROTIME 11.2 03/08/2021     Pro-BNP No results found for: PROBNP  LIPIDS:  No components found for: CHLPL  Lab Results   Component Value Date    TRIG 59 03/19/2021    TRIG 60 03/08/2021    TRIG 65 11/03/2020     Lab Results   Component Value Date    HDL 50 03/19/2021    HDL 49 03/08/2021    HDL 56 11/03/2020     Lab Results   Component Value Date    LDLCALC 67 03/19/2021    LDLCALC 77 03/08/2021    LDLCALC 70 11/03/2020     Lab Results   Component Value Date    LABVLDL 12 03/19/2021    LABVLDL 12 03/08/2021    LABVLDL 13 11/03/2020     TSH:  Lab Results   Component Value Date    TSH 1.63 11/03/2020       IMAGING:     3/22/2021 ECG: NSR, LAD, IRBBB, LVH, non-specific tw abnl    2/5/2021 Carotid          Bilateral internal carotid arteries have a <50% stenosis based on velocity    criteria.    Bilateral vertebral arteries demonstrate normal antegrade flow.      1/2021 LHC:  Angiographic Findings:  Right dominant system  Left Main:  Normal  Left Anterior Descending:  Minimal irregular plaque  Ramus Intermedius: No obstructive plaque  Circumflex:  No obstructive plaque  Right Coronary:  <30% stenosis in the mid vessel, mildly irregular plaque    1/11/2021 Echo:   Left ventricular cavity size is normal. There is mild concentric left   ventricular hypertrophy. Left ventricular function is normal with ejection   fraction estimated at 65%. No regional wall motion abnormalities are noted. Indeterminate diastolic function. Slight thickening of leaflets of mitral valve. Mild mitral regurgitation is present. Severe aortic stenosis. Mild tricuspid regurgitation. Mild pulmonic regurgitation present. Estimated pulmonary artery systolic pressure is at 30 mmHg assuming a right atrial pressure of 3 mmHg. Medications:   Current Outpatient Medications   Medication Sig Dispense Refill    insulin detemir (LEVEMIR FLEXTOUCH) 100 UNIT/ML injection pen Inject 18 Units into the skin nightly 30 mL 1    Insulin Pen Needle (B-D ULTRAFINE III SHORT PEN) 31G X 8 MM MISC USE AS DIRECTED 100 each 3    metFORMIN (GLUCOPHAGE) 1000 MG tablet Take 1 tablet by mouth 2 times daily (with meals) 180 tablet 1    rosuvastatin (CRESTOR) 20 MG tablet Take 1 tablet by mouth nightly 90 tablet 3    verapamil (CALAN SR) 120 MG extended release tablet Take 1 tablet by mouth daily 90 tablet 3    losartan (COZAAR) 100 MG tablet Take 50 mg by mouth daily      DULoxetine (CYMBALTA) 30 MG extended release capsule Take 1 capsule by mouth daily 90 capsule 1    omeprazole (PRILOSEC) 40 MG delayed release capsule Take 1 capsule by mouth every morning (before breakfast) 30 capsule 1    diphenhydrAMINE (BENADRYL) 25 MG tablet Take 25 mg by mouth nightly as needed for Itching      acetaminophen (TYLENOL) 325 MG tablet Take 650 mg by mouth every 6 hours as needed for Pain      aspirin 325 MG EC tablet Take 1 tablet by mouth daily 30 tablet 3    clopidogrel (PLAVIX) 75 MG tablet Take 1 tablet by mouth daily 30 tablet 3    vitamin B-12 (CYANOCOBALAMIN) 500 MCG tablet Take 2 tablets by mouth daily 60 tablet 3    Ascorbic Acid (VITAMIN C) 500 MG tablet Take 500 mg by mouth daily.       Cholecalciferol (VITAMIN D PO) Take 5,000 Units by mouth daily       MULTIPLE VITAMIN PO Take  by mouth. No current facility-administered medications for this visit. Assessment:  1. Nonrheumatic aortic valve stenosis    2. S/P AVR    3. Essential hypertension    4.  Mixed hyperlipidemia          Plan:  AS s/p sAVR; stable   Asa, plavix   crestor  HTN; stable   /74   Verapamil   Losartan  HLD; stable    LDL 67   Crestor     Cardiac rehab    Follow up with Dr Jennifer Fernandes in August     Reviewed most recent: CBC, BMP, LFT, Lipids, Mag, PT/INR,  TSH  Reviewed most recent: ECG, carotid duplex, LHC, Echo     Total time spent today for this encounter:  minutes

## 2021-06-02 ENCOUNTER — HOSPITAL ENCOUNTER (OUTPATIENT)
Dept: CARDIAC REHAB | Age: 66
Setting detail: THERAPIES SERIES
Discharge: HOME | End: 2021-06-02
Payer: COMMERCIAL

## 2021-06-02 PROCEDURE — 93798 PHYS/QHP OP CAR RHAB W/ECG: CPT

## 2021-06-04 ENCOUNTER — HOSPITAL ENCOUNTER (OUTPATIENT)
Dept: CARDIAC REHAB | Age: 66
Setting detail: THERAPIES SERIES
Discharge: HOME | End: 2021-06-04
Payer: COMMERCIAL

## 2021-06-04 PROCEDURE — 93798 PHYS/QHP OP CAR RHAB W/ECG: CPT

## 2021-06-07 ENCOUNTER — HOSPITAL ENCOUNTER (OUTPATIENT)
Dept: CARDIAC REHAB | Age: 66
Setting detail: THERAPIES SERIES
Discharge: HOME | End: 2021-06-07
Payer: COMMERCIAL

## 2021-06-07 PROCEDURE — 93798 PHYS/QHP OP CAR RHAB W/ECG: CPT

## 2021-06-09 ENCOUNTER — HOSPITAL ENCOUNTER (OUTPATIENT)
Dept: CARDIAC REHAB | Age: 66
Setting detail: THERAPIES SERIES
Discharge: HOME | End: 2021-06-09
Payer: COMMERCIAL

## 2021-06-09 PROCEDURE — 93798 PHYS/QHP OP CAR RHAB W/ECG: CPT

## 2021-06-11 ENCOUNTER — HOSPITAL ENCOUNTER (OUTPATIENT)
Dept: CARDIAC REHAB | Age: 66
Setting detail: THERAPIES SERIES
Discharge: HOME | End: 2021-06-11
Payer: COMMERCIAL

## 2021-06-11 PROCEDURE — 93798 PHYS/QHP OP CAR RHAB W/ECG: CPT

## 2021-06-14 ENCOUNTER — HOSPITAL ENCOUNTER (OUTPATIENT)
Dept: CARDIAC REHAB | Age: 66
Setting detail: THERAPIES SERIES
Discharge: HOME | End: 2021-06-14
Payer: COMMERCIAL

## 2021-06-14 PROCEDURE — 93798 PHYS/QHP OP CAR RHAB W/ECG: CPT

## 2021-06-16 ENCOUNTER — HOSPITAL ENCOUNTER (OUTPATIENT)
Dept: CARDIAC REHAB | Age: 66
Setting detail: THERAPIES SERIES
Discharge: HOME | End: 2021-06-16
Payer: COMMERCIAL

## 2021-06-16 PROCEDURE — 93798 PHYS/QHP OP CAR RHAB W/ECG: CPT

## 2021-06-17 ENCOUNTER — HOSPITAL ENCOUNTER (OUTPATIENT)
Dept: ULTRASOUND IMAGING | Age: 66
Discharge: HOME | End: 2021-06-17
Payer: COMMERCIAL

## 2021-06-17 DIAGNOSIS — E04.1 THYROID NODULE: ICD-10-CM

## 2021-06-17 PROCEDURE — 76536 US EXAM OF HEAD AND NECK: CPT

## 2021-06-18 ENCOUNTER — HOSPITAL ENCOUNTER (OUTPATIENT)
Dept: CARDIAC REHAB | Age: 66
Setting detail: THERAPIES SERIES
Discharge: HOME | End: 2021-06-18
Payer: COMMERCIAL

## 2021-06-18 PROCEDURE — 93798 PHYS/QHP OP CAR RHAB W/ECG: CPT

## 2021-06-21 ENCOUNTER — HOSPITAL ENCOUNTER (OUTPATIENT)
Dept: CARDIAC REHAB | Age: 66
Setting detail: THERAPIES SERIES
Discharge: HOME | End: 2021-06-21
Payer: COMMERCIAL

## 2021-06-21 PROCEDURE — 93798 PHYS/QHP OP CAR RHAB W/ECG: CPT

## 2021-06-23 ENCOUNTER — HOSPITAL ENCOUNTER (OUTPATIENT)
Dept: CARDIAC REHAB | Age: 66
Setting detail: THERAPIES SERIES
Discharge: HOME | End: 2021-06-23
Payer: COMMERCIAL

## 2021-06-23 PROCEDURE — 93798 PHYS/QHP OP CAR RHAB W/ECG: CPT

## 2021-06-25 ENCOUNTER — HOSPITAL ENCOUNTER (OUTPATIENT)
Dept: CARDIAC REHAB | Age: 66
Setting detail: THERAPIES SERIES
Discharge: HOME | End: 2021-06-25
Payer: COMMERCIAL

## 2021-06-25 PROCEDURE — 93798 PHYS/QHP OP CAR RHAB W/ECG: CPT

## 2021-06-28 ENCOUNTER — HOSPITAL ENCOUNTER (OUTPATIENT)
Dept: CARDIAC REHAB | Age: 66
Setting detail: THERAPIES SERIES
Discharge: HOME | End: 2021-06-28
Payer: COMMERCIAL

## 2021-06-28 PROCEDURE — 93798 PHYS/QHP OP CAR RHAB W/ECG: CPT

## 2021-06-30 ENCOUNTER — HOSPITAL ENCOUNTER (OUTPATIENT)
Dept: CARDIAC REHAB | Age: 66
Setting detail: THERAPIES SERIES
Discharge: HOME | End: 2021-06-30
Payer: COMMERCIAL

## 2021-06-30 PROCEDURE — 93798 PHYS/QHP OP CAR RHAB W/ECG: CPT

## 2021-07-02 ENCOUNTER — HOSPITAL ENCOUNTER (OUTPATIENT)
Dept: CARDIAC REHAB | Age: 66
Setting detail: THERAPIES SERIES
Discharge: HOME | End: 2021-07-02
Payer: COMMERCIAL

## 2021-07-02 PROCEDURE — 93798 PHYS/QHP OP CAR RHAB W/ECG: CPT

## 2021-07-05 DIAGNOSIS — E04.1 LEFT THYROID NODULE: Primary | ICD-10-CM

## 2021-07-06 ENCOUNTER — OFFICE VISIT (OUTPATIENT)
Dept: PRIMARY CARE CLINIC | Age: 66
End: 2021-07-06
Payer: COMMERCIAL

## 2021-07-06 VITALS
OXYGEN SATURATION: 98 % | WEIGHT: 217.6 LBS | SYSTOLIC BLOOD PRESSURE: 128 MMHG | BODY MASS INDEX: 36.25 KG/M2 | DIASTOLIC BLOOD PRESSURE: 90 MMHG | TEMPERATURE: 98.2 F | HEIGHT: 65 IN | HEART RATE: 81 BPM

## 2021-07-06 DIAGNOSIS — E11.9 TYPE 2 DIABETES MELLITUS WITHOUT COMPLICATION, WITHOUT LONG-TERM CURRENT USE OF INSULIN (HCC): Primary | ICD-10-CM

## 2021-07-06 DIAGNOSIS — D50.9 IRON DEFICIENCY ANEMIA, UNSPECIFIED IRON DEFICIENCY ANEMIA TYPE: ICD-10-CM

## 2021-07-06 DIAGNOSIS — D49.2 SKIN GROWTH: ICD-10-CM

## 2021-07-06 DIAGNOSIS — K21.9 GASTROESOPHAGEAL REFLUX DISEASE, UNSPECIFIED WHETHER ESOPHAGITIS PRESENT: ICD-10-CM

## 2021-07-06 DIAGNOSIS — F41.9 ANXIETY: ICD-10-CM

## 2021-07-06 DIAGNOSIS — E04.1 LEFT THYROID NODULE: ICD-10-CM

## 2021-07-06 DIAGNOSIS — I10 ESSENTIAL HYPERTENSION: ICD-10-CM

## 2021-07-06 DIAGNOSIS — E78.2 MIXED HYPERLIPIDEMIA: ICD-10-CM

## 2021-07-06 DIAGNOSIS — F33.1 MODERATE EPISODE OF RECURRENT MAJOR DEPRESSIVE DISORDER (HCC): ICD-10-CM

## 2021-07-06 LAB — HBA1C MFR BLD: 6.8 %

## 2021-07-06 PROCEDURE — 1036F TOBACCO NON-USER: CPT | Performed by: INTERNAL MEDICINE

## 2021-07-06 PROCEDURE — 3017F COLORECTAL CA SCREEN DOC REV: CPT | Performed by: INTERNAL MEDICINE

## 2021-07-06 PROCEDURE — G8399 PT W/DXA RESULTS DOCUMENT: HCPCS | Performed by: INTERNAL MEDICINE

## 2021-07-06 PROCEDURE — 4040F PNEUMOC VAC/ADMIN/RCVD: CPT | Performed by: INTERNAL MEDICINE

## 2021-07-06 PROCEDURE — 1123F ACP DISCUSS/DSCN MKR DOCD: CPT | Performed by: INTERNAL MEDICINE

## 2021-07-06 PROCEDURE — G8417 CALC BMI ABV UP PARAM F/U: HCPCS | Performed by: INTERNAL MEDICINE

## 2021-07-06 PROCEDURE — G8427 DOCREV CUR MEDS BY ELIG CLIN: HCPCS | Performed by: INTERNAL MEDICINE

## 2021-07-06 PROCEDURE — 1090F PRES/ABSN URINE INCON ASSESS: CPT | Performed by: INTERNAL MEDICINE

## 2021-07-06 PROCEDURE — 83036 HEMOGLOBIN GLYCOSYLATED A1C: CPT | Performed by: INTERNAL MEDICINE

## 2021-07-06 PROCEDURE — 3044F HG A1C LEVEL LT 7.0%: CPT | Performed by: INTERNAL MEDICINE

## 2021-07-06 PROCEDURE — 2022F DILAT RTA XM EVC RTNOPTHY: CPT | Performed by: INTERNAL MEDICINE

## 2021-07-06 PROCEDURE — 99214 OFFICE O/P EST MOD 30 MIN: CPT | Performed by: INTERNAL MEDICINE

## 2021-07-06 RX ORDER — FERROUS SULFATE 325(65) MG
325 TABLET ORAL 2 TIMES DAILY WITH MEALS
Qty: 60 TABLET | Refills: 1 | Status: SHIPPED | OUTPATIENT
Start: 2021-07-06 | End: 2022-01-10

## 2021-07-06 SDOH — ECONOMIC STABILITY: FOOD INSECURITY: WITHIN THE PAST 12 MONTHS, YOU WORRIED THAT YOUR FOOD WOULD RUN OUT BEFORE YOU GOT MONEY TO BUY MORE.: NEVER TRUE

## 2021-07-06 SDOH — ECONOMIC STABILITY: FOOD INSECURITY: WITHIN THE PAST 12 MONTHS, THE FOOD YOU BOUGHT JUST DIDN'T LAST AND YOU DIDN'T HAVE MONEY TO GET MORE.: NEVER TRUE

## 2021-07-06 ASSESSMENT — SOCIAL DETERMINANTS OF HEALTH (SDOH): HOW HARD IS IT FOR YOU TO PAY FOR THE VERY BASICS LIKE FOOD, HOUSING, MEDICAL CARE, AND HEATING?: NOT VERY HARD

## 2021-07-06 NOTE — PATIENT INSTRUCTIONS
Hemoglobin A1c of 6.8% shows diabetes is controlled  -Low sodium diet  -Low fat, low cholesterol diet  -Limit carbohydrates to 45 grams with meals and 15 grams with snacks  -monitor blood sugars  -goal for blood sugar fasting or pre-meal  is   -goal for blood sugar 2 hours after a meal is less than 180  -goal for blood sugar at bedtime is less than 150  -Regular aerobic exercise

## 2021-07-06 NOTE — PROGRESS NOTES
Haley Elkins   Date ofBirth:  1955    Date of Visit:  7/6/2021    Chief Complaint   Patient presents with    Depression    Hypertension    Hyperlipidemia    Diabetes    Gastroesophageal Reflux    Finger Pain     dark spot in the midle finger on the right left hand       HPI  Patient has Type 2 diabetes. Patient takes Levemir 18 units nightly and Metformin 1000mg 2 times daily. Patient monitors her blood sugar fasting and it averages 112. Patient's most recent blood sugars from 6/2/21-6/30/21 have been mostly 80's-90's and only 3 sugars greater than 100 at 101, 102,  and 139. Patient does a low carbohydrate diet. Patient does cardiac rehab 3 times per week. Patient has hypertension. Patient takes Losartan 50mg once daily and Verapamil ER 120mg once daily. Patient decreases salt. Patient states her blood pressure has been pretty good. Patient states her blood pressure is monitored at [facility]. Patient provided a copy of her blood pressure log and her blood pressure has been normal.    Patient has GERD. Patient takes Omeprazole 40mg once daily. Patient states she had heartburn and reflux a couple times with eating a barbecue sauce and otherwise not symptoms. Patient decreases spicy food. Patient states she rarely eats tomato based foods. Patient drinks decaffeinated coffee and gingerale once in awhile. Patient has anemia. Patient's iron is low. Patient is not taking a supplement. Patient eats green leafy vegetables and salads. Patient complains of fatigue. Patient denies cold intolerance. Patient has depression and anxiety. Patient takes Cymbalta 30mg once daily and states it works ok. Patient has hyperlipidemia. Patient takes Rosuvastatin 20mg nightly. Patient does a low fat, low cholesterol diet. Patient does cardiac rehab 3 times per week. Patient complains of dark growth left middle finger x 6 months. Patient states it doesn't hurt.  Patient states it may be a little bigger. Review of Systems   Constitutional: Positive for fatigue. Negative for chills and fever. HENT: Negative for congestion, postnasal drip, rhinorrhea, sinus pressure, sore throat and trouble swallowing. Eyes: Negative for visual disturbance. Respiratory: Negative for cough, chest tightness, shortness of breath and wheezing. Cardiovascular: Positive for leg swelling (occasional leg and ankle swelling with the heat). Negative for chest pain and palpitations. Gastrointestinal: Negative for abdominal pain, blood in stool, constipation, diarrhea, nausea and vomiting. Genitourinary: Negative for dysuria, frequency and hematuria. Musculoskeletal: Negative for arthralgias and myalgias. Skin: Negative for rash and wound. Neurological: Negative for dizziness, tremors, syncope, weakness, light-headedness, numbness and headaches. Psychiatric/Behavioral: Positive for dysphoric mood. Negative for sleep disturbance. The patient is nervous/anxious.         Allergies   Allergen Reactions    No Known Allergies      Outpatient Medications Marked as Taking for the 7/6/21 encounter (Office Visit) with Tonya Soto MD   Medication Sig Dispense Refill    insulin detemir (LEVEMIR FLEXTOUCH) 100 UNIT/ML injection pen Inject 18 Units into the skin nightly 30 mL 1    Insulin Pen Needle (B-D ULTRAFINE III SHORT PEN) 31G X 8 MM MISC USE AS DIRECTED 100 each 3    metFORMIN (GLUCOPHAGE) 1000 MG tablet Take 1 tablet by mouth 2 times daily (with meals) 180 tablet 1    rosuvastatin (CRESTOR) 20 MG tablet Take 1 tablet by mouth nightly 90 tablet 3    verapamil (CALAN SR) 120 MG extended release tablet Take 1 tablet by mouth daily 90 tablet 3    losartan (COZAAR) 100 MG tablet Take 50 mg by mouth daily      DULoxetine (CYMBALTA) 30 MG extended release capsule Take 1 capsule by mouth daily 90 capsule 1    omeprazole (PRILOSEC) 40 MG delayed release capsule Take 1 capsule by mouth every morning (before breakfast) 30 capsule 1    acetaminophen (TYLENOL) 325 MG tablet Take 650 mg by mouth every 6 hours as needed for Pain      aspirin 325 MG EC tablet Take 1 tablet by mouth daily 30 tablet 3    clopidogrel (PLAVIX) 75 MG tablet Take 1 tablet by mouth daily 30 tablet 3    vitamin B-12 (CYANOCOBALAMIN) 500 MCG tablet Take 2 tablets by mouth daily 60 tablet 3    Ascorbic Acid (VITAMIN C) 500 MG tablet Take 500 mg by mouth daily.  Cholecalciferol (VITAMIN D PO) Take 5,000 Units by mouth daily       MULTIPLE VITAMIN PO Take  by mouth. Vitals:    07/06/21 0827 07/06/21 0834   BP: (!) 132/92 (!) 128/90   Site: Right Upper Arm Right Upper Arm   Position: Sitting Sitting   Cuff Size: Medium Adult Medium Adult   Pulse: 81    Temp: 98.2 °F (36.8 °C)    TempSrc: Oral    SpO2: 98%    Weight: 217 lb 9.6 oz (98.7 kg)    Height: 5' 5\" (1.651 m)      Body mass index is 36.21 kg/m². Physical Exam  Nursing note reviewed. Constitutional:       General: She is not in acute distress. Appearance: Normal appearance. She is well-developed. Eyes:      General: Lids are normal.      Extraocular Movements: Extraocular movements intact. Conjunctiva/sclera: Conjunctivae normal.      Pupils: Pupils are equal, round, and reactive to light. Neck:      Thyroid: No thyromegaly. Vascular: No carotid bruit. Cardiovascular:      Rate and Rhythm: Normal rate and regular rhythm. Heart sounds: Normal heart sounds, S1 normal and S2 normal. No murmur heard. No friction rub. No gallop. Pulmonary:      Effort: Pulmonary effort is normal. No respiratory distress. Breath sounds: Normal breath sounds. No wheezing, rhonchi or rales. Abdominal:      General: Bowel sounds are normal. There is no distension. Palpations: Abdomen is soft. Tenderness: There is no abdominal tenderness. Musculoskeletal:      Cervical back: Neck supple. Right lower leg: No edema. Left lower leg: No edema. Lymphadenopathy:      Head:      Right side of head: No submandibular adenopathy. Left side of head: No submandibular adenopathy. Skin:     Comments: Dark brown round raised 3mm x 3mm skin growth left distal middle finger, nontender   Neurological:      Mental Status: She is alert and oriented to person, place, and time.    Psychiatric:         Mood and Affect: Mood normal.           Results for POC orders placed in visit on 07/06/21   POCT glycosylated hemoglobin (Hb A1C)   Result Value Ref Range    Hemoglobin A1C 6.8 %     Lab Review   Orders Only on 05/21/2021   Component Date Value    Iron 05/21/2021 33*    TIBC 05/21/2021 304     Iron Saturation 05/21/2021 11*    WBC 05/21/2021 5.1     RBC 05/21/2021 3.79*    Hemoglobin 05/21/2021 9.6*    Hematocrit 05/21/2021 31.4*    MCV 05/21/2021 82.9     MCH 05/21/2021 25.3*    MCHC 05/21/2021 30.5*    RDW 05/21/2021 18.7*    Platelets 45/17/9722 201     MPV 05/21/2021 8.4    Orders Only on 04/22/2021   Component Date Value    Sodium 04/22/2021 139     Potassium 04/22/2021 4.6     Chloride 04/22/2021 97*    CO2 04/22/2021 29     Anion Gap 04/22/2021 13     Glucose 04/22/2021 130*    BUN 04/22/2021 19     CREATININE 04/22/2021 1.0     GFR Non- 04/22/2021 55*    GFR  04/22/2021 >60     Calcium 04/22/2021 9.6     Total Protein 04/22/2021 7.3     Albumin 04/22/2021 4.5     Albumin/Globulin Ratio 04/22/2021 1.6     Total Bilirubin 04/22/2021 0.6     Alkaline Phosphatase 04/22/2021 81     ALT 04/22/2021 9*    AST 04/22/2021 18     Globulin 04/22/2021 2.8     WBC 04/22/2021 6.7     RBC 04/22/2021 4.06     Hemoglobin 04/22/2021 10.5*    Hematocrit 04/22/2021 33.4*    MCV 04/22/2021 82.3     MCH 04/22/2021 25.8*    MCHC 04/22/2021 31.4     RDW 04/22/2021 18.5*    Platelets 53/70/5230 273     MPV 04/22/2021 8.2     Neutrophils % 04/22/2021 72.7     Lymphocytes % 04/22/2021 17.2     Monocytes % 03/08/2021 1.020     Blood, Urine 03/08/2021 Negative     pH, UA 03/08/2021 6.5     Protein, UA 03/08/2021 TRACE*    Urobilinogen, Urine 03/08/2021 0.2     Nitrite, Urine 03/08/2021 POSITIVE*    Leukocyte Esterase, Urine 03/08/2021 Negative     Microscopic Examination 03/08/2021 YES     Urine Type 03/08/2021 NotGiven     Urine Culture, Routine 03/08/2021 <10,000 CFU/ml mixed skin/urogenital varun. No further workup     MRSA SCREEN RT-PCR 03/08/2021                      Value:Negative  MRSA DNA not detected.   Normal Range: Not detected      ABO/Rh 03/08/2021 A POS     Antibody Screen 03/08/2021 NEG     Ventricular Rate 03/08/2021 64     Atrial Rate 03/08/2021 64     P-R Interval 03/08/2021 120     QRS Duration 03/08/2021 136     Q-T Interval 03/08/2021 452     QTc Calculation (Bazett) 03/08/2021 466     P Axis 03/08/2021 4     R Axis 03/08/2021 -48     T Axis 03/08/2021 65     Diagnosis 03/08/2021 Normal sinus rhythmRight bundle branch blockLeft anterior fascicular block Bifascicular block Voltage criteria for left ventricular hypertrophyAbnormal ECGConfirmed by Michelle Cummins ([id number]) on 3/8/2021 3:43:31 PM     WBC, UA 03/08/2021 3-5     RBC, UA 03/08/2021 None seen     Epithelial Cells, UA 03/08/2021 2-5     Bacteria, UA 03/08/2021 1+*   Hospital Outpatient Visit on 03/08/2021   Component Date Value    Visual Acuity Distance R* 05/05/2021 20/20     Visual Acuity Distance L* 05/05/2021 20/30     Intraocular Pressure Eye 05/05/2021                      Value:17  18      Diabetic Retinopathy 05/05/2021 NEGATIVE     Cataracts 05/05/2021 POSITIVE     Glaucoma 05/05/2021 NEGATIVE    Office Visit on 03/08/2021   Component Date Value    SARS-CoV-2 03/08/2021 Not Detected    Orders Only on 03/03/2021   Component Date Value    Iron 03/03/2021 39     TIBC 03/03/2021 326     Iron Saturation 03/03/2021 12*    WBC 03/03/2021 5.7     RBC 03/03/2021 4.20     Hemoglobin 03/03/2021 10.7*    Hematocrit 03/03/2021 34.5*    MCV 03/03/2021 82.2     MCH 03/03/2021 25.6*    MCHC 03/03/2021 31.1     RDW 03/03/2021 16.3*    Platelets 02/83/5668 253     MPV 03/03/2021 8.3    Office Visit on 03/03/2021   Component Date Value    Hemoglobin A1C 03/03/2021 6.6        Thyroid ultrasound  6/17/21       HISTORY: Thyroid nodule.       The right lobe measures 49 x 22 x 18 mm       The left lobe measures 53 x 20 x 25 mm       The isthmus measures 4 mm.       Right lobe nodules: None       Left lobe nodules: Solid nodule noted inferior pole 20 x 20 x 18 mm mildly hypoechoic.           Impression       BI-RADS Category 4-moderately suspicious 2 cm nodule left lobe of thyroid       Recommendation: Ultrasound-guided biopsy. Assessment/Plan     1. Type 2 diabetes mellitus without complication, without long-term current use of insulin (HCC)  -Hemoglobin A1c of 6.8% shows diabetes   -Continue same medications  -Limit carbohydrates to 45 grams with meals and 15 grams with snacks  -monitor blood sugars  -goal for blood sugar fasting or pre-meal  is   -goal for blood sugar 2 hours after a meal is less than 180  -goal for blood sugar at bedtime is less than 150  -Regular aerobic exercise  - POCT glycosylated hemoglobin (Hb A1C)    2. Essential hypertension  -stable  -Continue same medications  -Low sodium diet  -Regular aerobic exercise    3. Mixed hyperlipidemia  -Continue same medications  -Low fat, low cholesterol diet  -Regular aerobic exercise    4. Gastroesophageal reflux disease, unspecified whether esophagitis present  -stable  -Continue same medications  -Decrease caffeine, avoid spicy foods, avoid tomato based foods  -Eat small meals instead of large meals  -Wait 2-3 hours after eating before lying down     5. Moderate episode of recurrent major depressive disorder (HCC)  -stable  -Continue same medications    6. Anxiety  -stable  -Continue same medications    7.  Iron deficiency anemia, unspecified iron deficiency anemia type  - start ferrous sulfate (IRON 325) 325 (65 Fe) MG tablet; Take 1 tablet by mouth 2 times daily (with meals)  Dispense: 60 tablet; Refill: 1  - CBC; Future  - Iron and TIBC; Future  - Ferritin; Future    8. Left thyroid nodule  -Moderately suspicious nodule of the left thyroid seen on thyroid ultrasound  -Ultrasound biopsy of the thyroid  -Referral to Praveena Roger MD, Endocrinology, [facility]    9. Skin growth  -Skin growth on left middle finger  - External Referral To Dermatology      Discussed medications with patient, who voiced understanding of their use and indications. All questions answered. Return in about 4 weeks (around 8/3/2021) for iron deficiency and blood pressure.

## 2021-07-07 ENCOUNTER — HOSPITAL ENCOUNTER (OUTPATIENT)
Dept: CARDIAC REHAB | Age: 66
Setting detail: THERAPIES SERIES
Discharge: HOME | End: 2021-07-07
Payer: COMMERCIAL

## 2021-07-07 PROCEDURE — 93798 PHYS/QHP OP CAR RHAB W/ECG: CPT

## 2021-07-07 NOTE — PROGRESS NOTES
Cardiopulmonary Rehabilitation Nutrition Education:    Patient requested to meet with RD regarding requests for ideas on how to meal plan and how to eat nutritiously but simply, especially throughout the week when she is working. She works 10-6 every weekday and reports that she is tired when she comes home and is often not of the mindset to cook dinner. Discussion on ways/tips for meal prepping and planning ahead of time. Sample menu/suggested shopping list for meal prepping. Handouts given that give suggestions on 'meal alternatives'-simple, but nutritious and heart healthy items to keep on hand that can serve as healthy small meals or snacks on evenings where she is too tired to cook. Patient established goals of writing down meal ideas and grocery shopping on the weekend, prepping healthy foods such as fruit and fresh vegetables to have readily on hand for snacks or to be easily thrown into a salad for dinner. Patient agreeable to purchase of ready-made, nutritious foods that can serve as quick foods on busy weeknights. Patient expressed interest in making smoothies as meal alternatives. Handouts provided: Tips to Increase Fruit and Vegetable Intake, Smoothie Recipes, Meal Prep Tips, Plant-Based Foods, Savor the Spectrum-Reasons to [employer] duration: 30 minutes    RENETTA García MS, RDN, LD, RN 7/7/21

## 2021-07-09 ENCOUNTER — HOSPITAL ENCOUNTER (OUTPATIENT)
Dept: CARDIAC REHAB | Age: 66
Setting detail: THERAPIES SERIES
Discharge: HOME | End: 2021-07-09
Payer: COMMERCIAL

## 2021-07-09 PROCEDURE — 93798 PHYS/QHP OP CAR RHAB W/ECG: CPT

## 2021-07-12 ENCOUNTER — HOSPITAL ENCOUNTER (OUTPATIENT)
Dept: ULTRASOUND IMAGING | Age: 66
Discharge: HOME | End: 2021-07-12
Payer: COMMERCIAL

## 2021-07-12 DIAGNOSIS — E04.1 LEFT THYROID NODULE: ICD-10-CM

## 2021-07-12 PROBLEM — D49.2 SKIN GROWTH: Status: ACTIVE | Noted: 2021-07-12

## 2021-07-12 PROCEDURE — 88173 CYTOPATH EVAL FNA REPORT: CPT

## 2021-07-12 PROCEDURE — 88305 TISSUE EXAM BY PATHOLOGIST: CPT

## 2021-07-12 PROCEDURE — 10005 FNA BX W/US GDN 1ST LES: CPT

## 2021-07-12 ASSESSMENT — ENCOUNTER SYMPTOMS
TROUBLE SWALLOWING: 0
WHEEZING: 0
SHORTNESS OF BREATH: 0
SINUS PRESSURE: 0
BLOOD IN STOOL: 0
SORE THROAT: 0
CONSTIPATION: 0
ABDOMINAL PAIN: 0
CHEST TIGHTNESS: 0
DIARRHEA: 0
COUGH: 0
VOMITING: 0
NAUSEA: 0
RHINORRHEA: 0

## 2021-07-14 ENCOUNTER — HOSPITAL ENCOUNTER (OUTPATIENT)
Dept: CARDIAC REHAB | Age: 66
Setting detail: THERAPIES SERIES
Discharge: HOME | End: 2021-07-14
Payer: COMMERCIAL

## 2021-07-14 PROCEDURE — 93798 PHYS/QHP OP CAR RHAB W/ECG: CPT

## 2021-07-15 ENCOUNTER — TELEPHONE (OUTPATIENT)
Dept: PRIMARY CARE CLINIC | Age: 66
End: 2021-07-15

## 2021-07-15 NOTE — TELEPHONE ENCOUNTER
Pt stated that her results are back for her Biopsy and she needs to have a call from Shonna to go over them she can understand them please call her at [phone]

## 2021-07-19 ENCOUNTER — HOSPITAL ENCOUNTER (OUTPATIENT)
Dept: CARDIAC REHAB | Age: 66
Setting detail: THERAPIES SERIES
Discharge: HOME | End: 2021-07-19
Payer: COMMERCIAL

## 2021-07-19 DIAGNOSIS — E04.1 LEFT THYROID NODULE: Primary | ICD-10-CM

## 2021-07-19 DIAGNOSIS — R89.9 ABNORMAL THYROID BIOPSY: ICD-10-CM

## 2021-07-19 PROCEDURE — 93798 PHYS/QHP OP CAR RHAB W/ECG: CPT

## 2021-07-19 NOTE — TELEPHONE ENCOUNTER
PT is scheduled for Dr. Chrissie Howard as a NP and she is calling our office to have results for a recent Biopsy. Please call patient.

## 2021-07-21 ENCOUNTER — HOSPITAL ENCOUNTER (OUTPATIENT)
Dept: CARDIAC REHAB | Age: 66
Setting detail: THERAPIES SERIES
Discharge: HOME | End: 2021-07-21
Payer: COMMERCIAL

## 2021-07-21 PROCEDURE — 93798 PHYS/QHP OP CAR RHAB W/ECG: CPT

## 2021-07-22 ENCOUNTER — HOSPITAL ENCOUNTER (OUTPATIENT)
Dept: MRI IMAGING | Age: 66
Discharge: HOME | End: 2021-07-22
Payer: COMMERCIAL

## 2021-07-22 DIAGNOSIS — N28.1 ACQUIRED CYST OF KIDNEY: ICD-10-CM

## 2021-07-22 PROCEDURE — 74183 MRI ABD W/O CNTR FLWD CNTR: CPT

## 2021-07-22 PROCEDURE — 6360000004 HC RX CONTRAST MEDICATION: Performed by: INTERNAL MEDICINE

## 2021-07-22 PROCEDURE — A9579 GAD-BASE MR CONTRAST NOS,1ML: HCPCS | Performed by: INTERNAL MEDICINE

## 2021-07-22 RX ADMIN — GADOTERIDOL 20 ML: 279.3 INJECTION, SOLUTION INTRAVENOUS at 12:34

## 2021-07-23 ENCOUNTER — HOSPITAL ENCOUNTER (OUTPATIENT)
Dept: CARDIAC REHAB | Age: 66
Setting detail: THERAPIES SERIES
Discharge: HOME | End: 2021-07-23
Payer: COMMERCIAL

## 2021-07-23 PROCEDURE — 93798 PHYS/QHP OP CAR RHAB W/ECG: CPT

## 2021-07-26 ENCOUNTER — OFFICE VISIT (OUTPATIENT)
Dept: ENT CLINIC | Age: 66
End: 2021-07-26
Payer: COMMERCIAL

## 2021-07-26 ENCOUNTER — HOSPITAL ENCOUNTER (OUTPATIENT)
Dept: CARDIAC REHAB | Age: 66
Setting detail: THERAPIES SERIES
Discharge: HOME | End: 2021-07-26
Payer: COMMERCIAL

## 2021-07-26 VITALS
HEIGHT: 65 IN | HEART RATE: 90 BPM | TEMPERATURE: 97.2 F | WEIGHT: 219 LBS | SYSTOLIC BLOOD PRESSURE: 132 MMHG | BODY MASS INDEX: 36.49 KG/M2 | DIASTOLIC BLOOD PRESSURE: 86 MMHG

## 2021-07-26 DIAGNOSIS — E04.1 LEFT THYROID NODULE: Primary | ICD-10-CM

## 2021-07-26 PROCEDURE — G8427 DOCREV CUR MEDS BY ELIG CLIN: HCPCS | Performed by: OTOLARYNGOLOGY

## 2021-07-26 PROCEDURE — 4040F PNEUMOC VAC/ADMIN/RCVD: CPT | Performed by: OTOLARYNGOLOGY

## 2021-07-26 PROCEDURE — 1090F PRES/ABSN URINE INCON ASSESS: CPT | Performed by: OTOLARYNGOLOGY

## 2021-07-26 PROCEDURE — G8399 PT W/DXA RESULTS DOCUMENT: HCPCS | Performed by: OTOLARYNGOLOGY

## 2021-07-26 PROCEDURE — 1123F ACP DISCUSS/DSCN MKR DOCD: CPT | Performed by: OTOLARYNGOLOGY

## 2021-07-26 PROCEDURE — 93798 PHYS/QHP OP CAR RHAB W/ECG: CPT

## 2021-07-26 PROCEDURE — 3017F COLORECTAL CA SCREEN DOC REV: CPT | Performed by: OTOLARYNGOLOGY

## 2021-07-26 PROCEDURE — G8417 CALC BMI ABV UP PARAM F/U: HCPCS | Performed by: OTOLARYNGOLOGY

## 2021-07-26 PROCEDURE — 99204 OFFICE O/P NEW MOD 45 MIN: CPT | Performed by: OTOLARYNGOLOGY

## 2021-07-26 PROCEDURE — 1036F TOBACCO NON-USER: CPT | Performed by: OTOLARYNGOLOGY

## 2021-07-26 RX ORDER — FUROSEMIDE 20 MG/1
20 TABLET ORAL PRN
COMMUNITY
Start: 2021-07-16

## 2021-07-26 ASSESSMENT — ENCOUNTER SYMPTOMS
SHORTNESS OF BREATH: 0
SINUS PRESSURE: 0
EYE REDNESS: 0
COUGH: 0
RHINORRHEA: 0
DIARRHEA: 0
EYE ITCHING: 0
TROUBLE SWALLOWING: 0
FACIAL SWELLING: 0
SORE THROAT: 0
CHOKING: 0
NAUSEA: 0
VOICE CHANGE: 0
SINUS PAIN: 0
EYE PAIN: 0

## 2021-07-26 NOTE — LETTER
[address] [place] [address]  Phone: [phone]  Fax: [phone] Marietta Bustamante MD      July 26, 2021     Patient: Eboni Chairez   MR Number: [id number]   YOB: 1955   Date of Visit: 7/26/2021       Dear Dr. Alla Barillas: Thank you for referring Danelle Tripathi to me for evaluation/treatment. Below are the relevant portions of my assessment and plan of care. Diagnosis Orders   1. Left thyroid nodule           Medical Decision Making: The following items were considered in medical decision making:  Independent review of images Neck ultrasound and nodule left  Review / order clinical lab tests TSH levels as above  Review / order radiology tests none  Decision to obtain old records Dr. Rich White  Discussion of pathophysiology with patient and/or family none  Decision to use of endoscopy of paranasal sinuses or larynx for diagnosis or chronic disease management. none  Review and summation of old records as accessed through [facility] (a summary of my findings in these old records: NA)     Schedule for Thursday for US guided FNA for Afirma testing. If you have questions, please do not hesitate to call me. I look forward to following Margoth Zhang along with you.     Sincerely,        Elena Sue MD    CC providers:  Shirley Jiménez MD  [address] [facility] [address] [place] [address]  [address] [facility] [address]

## 2021-07-26 NOTE — PROGRESS NOTES
Subjective:      Patient ID: Clau Steen is a 76 y.o. female. HPI    History of Present Illness  Head/Neck    CC: thyroid nodule    Comments: Anshul Mccormick is a(n) 76 y.o. female who presents left thyroid nodule. FNA with atypia of unknown significance. Here for evaluation. Had cardiac surgery. On plavix. Stopping soon.    Pain no  Location: Left neck  Onset:  unknown  Timing: unknown  Otalgia:  unknown  Odynophagia:  no  Dysphagia:  no  Dyspnea:  no  Voice Changes:  no clear  Modifying Factors:  Family history of thyroid disease Yes     Family history of thyroid cancer Not sure     Personal history of neck radiation No  Patient denies the following symptoms: fatigue, weight gain, weight loss, cold intolerance, heat intolerance, hair loss, dry skin, constipation, diarrhea, edema, anxiety, tremor, palpitations and dysphagia        Lab Studies:  Lab Results   Component Value Date    WBC 4.9 07/09/2021    HGB 9.5 (L) 07/09/2021    HCT 30.6 (L) 07/09/2021    MCV 79.3 (L) 07/09/2021     07/09/2021     Lab Results   Component Value Date    GLUCOSE 100 (H) 07/09/2021    BUN 18 07/09/2021    CREATININE 0.9 07/09/2021    K 4.7 07/09/2021     07/09/2021     07/09/2021    CALCIUM 9.6 07/09/2021     Lab Results   Component Value Date    MG 2.00 03/24/2021     Lab Results   Component Value Date    PHOS 4.1 07/09/2021     Lab Results   Component Value Date    ALKPHOS 49 07/09/2021    ALT 10 07/09/2021    AST 15 07/09/2021    BILITOT 0.3 07/09/2021    PROT 6.5 07/09/2021     Lab Results   Component Value Date    TSH 1.63 11/03/2020       Patient Active Problem List   Diagnosis    Type II or unspecified type diabetes mellitus without mention of complication, not stated as uncontrolled    Essential hypertension    Urinary incontinence    Urinary urgency    GERD (gastroesophageal reflux disease)    Depression    Aortic valve calcification    Epigastric abdominal pain    Fatigue    Vitamin D deficiency    Type 2 diabetes mellitus without complication (HCC)    Vitamin B 12 deficiency    Hyperlipidemia    Anxiety    Type 2 diabetes mellitus without complication, without long-term current use of insulin (HCC)    Upper respiratory tract infection    Metallic taste    Type 2 diabetes mellitus without complication, with long-term current use of insulin (HCC)    Moderate episode of recurrent major depressive disorder (HCC)    Murmur    Moderate to severe aortic stenosis    Anemia    Thyroid nodule    Right renal mass    Aortic stenosis due to bicuspid aortic valve    S/P aortic valve replacement and aortoplasty    Gastroesophageal reflux disease    Left thyroid nodule    Skin growth     Past Surgical History:   Procedure Laterality Date    AORTIC VALVE REPLACEMENT N/A 3/19/2021    ROSETTA; TCPB; AVR with 21 mm Weir Inspiris resilia bovine pericardial bioprosthesis; ascending aortoplasty performed by Dinesh Dowling MD at [place] [address]  12/16/2016    Alonzo WIGGINS    COLONOSCOPY  6/1/2020    COLONOSCOPY WITH BIOPSY performed by Boom Villalobos MD at [address]      UPPER GASTROINTESTINAL ENDOSCOPY N/A 6/1/2020    EGD BIOPSY performed by Boom Villalobos MD at [address] [place] [address]       Family History   Problem Relation Age of Onset    Breast Cancer Mother     Cancer Mother 52        breast     Diabetes Father     Hypertension Father     Colon Cancer Father     Cancer Father 72        colon    Breast Cancer Other     Cancer Other     Cancer Maternal Aunt         x 2 breast cancer    Diabetes Sister     Cancer Maternal Grandmother     Cancer Paternal Uncle         colon    Cancer Paternal Cousin         colon cancer - stage 4    Bipolar Disorder Son     Depression Son      Social History     Socioeconomic History    Marital status: Single Spouse name: Not on file    Number of children: Not on file    Years of education: Not on file    Highest education level: Not on file   Occupational History    Occupation: [employer]    Occupation: [employer]   Tobacco Use    Smoking status: Never Smoker    Smokeless tobacco: Never Used   Vaping Use    Vaping Use: Never used   Substance and Sexual Activity    Alcohol use: Not Currently    Drug use: No    Sexual activity: Not on file   Other Topics Concern    Not on file   Social History Narrative    Not on file     Social Determinants of Health     Financial Resource Strain: Low Risk     Difficulty of Paying Living Expenses: Not very hard   Food Insecurity: No Food Insecurity    Worried About Running Out of Food in the Last Year: Never true    [place] of Food in the Last Year: Never true   Transportation Needs:     Lack of Transportation (Medical):  Lack of Transportation (Non-Medical):    Physical Activity:     Days of Exercise per Week:     Minutes of Exercise per Session:    Stress:     Feeling of Stress :    Social Connections:     Frequency of Communication with Friends and Family:     Frequency of Social Gatherings with Friends and Family:     Attends Rastafari Services:     Active Member of Clubs or Organizations:     Attends Club or Organization Meetings:     Marital Status:    Intimate Partner Violence:     Fear of Current or Ex-Partner:     Emotionally Abused:     Physically Abused:     Sexually Abused:        DRUG/FOOD ALLERGIES: No known allergies    CURRENT MEDICATIONS  Prior to Admission medications    Medication Sig Start Date End Date Taking?  Authorizing Provider   furosemide (LASIX) 20 MG tablet Take 20 mg by mouth Every Monday, Wednesday, and Friday 7/16/21  Yes Historical Provider, MD   ferrous sulfate (IRON 325) 325 (65 Fe) MG tablet Take 1 tablet by mouth 2 times daily (with meals) 7/6/21  Yes Scott Akins MD   insulin detemir (LEVEMIR FLEXTOUCH) 100 UNIT/ML injection pen Inject 18 Units into the skin nightly 5/18/21  Yes Tom Downs MD   Insulin Pen Needle (B-D ULTRAFINE III SHORT PEN) 31G X 8 MM MISC USE AS DIRECTED 5/18/21  Yes Tom Downs MD   metFORMIN (GLUCOPHAGE) 1000 MG tablet Take 1 tablet by mouth 2 times daily (with meals) 5/18/21  Yes Tom Downs MD   rosuvastatin (CRESTOR) 20 MG tablet Take 1 tablet by mouth nightly 5/18/21  Yes LIZZIE Angel CNP   verapamil (CALAN SR) 120 MG extended release tablet Take 1 tablet by mouth daily 5/4/21  Yes LIZZIE Angel CNP   losartan (COZAAR) 100 MG tablet Take 50 mg by mouth daily   Yes Historical Provider, MD   DULoxetine (CYMBALTA) 30 MG extended release capsule Take 1 capsule by mouth daily 4/26/21  Yes Tom Downs MD   omeprazole (PRILOSEC) 40 MG delayed release capsule Take 1 capsule by mouth every morning (before breakfast) 4/22/21  Yes Tom Downs MD   diphenhydrAMINE (BENADRYL) 25 MG tablet Take 25 mg by mouth nightly as needed for Itching    Yes Historical Provider, MD   acetaminophen (TYLENOL) 325 MG tablet Take 650 mg by mouth every 6 hours as needed for Pain   Yes Historical Provider, MD   aspirin 325 MG EC tablet Take 1 tablet by mouth daily 3/25/21  Yes LIZZIE Osorio CNP   clopidogrel (PLAVIX) 75 MG tablet Take 1 tablet by mouth daily 3/25/21  Yes LIZZIE Osorio CNP   vitamin B-12 (CYANOCOBALAMIN) 500 MCG tablet Take 2 tablets by mouth daily 11/23/15  Yes Tom Downs MD   Ascorbic Acid (VITAMIN C) 500 MG tablet Take 500 mg by mouth daily. Yes Historical Provider, MD   Cholecalciferol (VITAMIN D PO) Take 5,000 Units by mouth daily    Yes Historical Provider, MD   MULTIPLE VITAMIN PO Take  by mouth. Yes Historical Provider, MD     Review of Systems   Constitutional: Negative for activity change, appetite change, chills, fatigue and fever.    HENT: Negative for congestion, ear discharge, ear pain, facial swelling, hearing loss, Thyroid ultrasound.       HISTORY: Thyroid nodule.       The right lobe measures 49 x 22 x 18 mm       The left lobe measures 53 x 20 x 25 mm       The isthmus measures 4 mm.       Right lobe nodules: None       Left lobe nodules: Solid nodule noted inferior pole 20 x 20 x 18 mm mildly hypoechoic.           Impression       BI-RADS Category 4-moderately suspicious 2 cm nodule left lobe of thyroid       Recommendation: Ultrasound-guided biopsy.           Assessment:       Diagnosis Orders   1. Left thyroid nodule             Plan:      Medical Decision Making: The following items were considered in medical decision making:  Independent review of images Neck ultrasound and nodule left  Review / order clinical lab tests TSH levels as above  Review / order radiology tests none  Decision to obtain old records Dr. Cody Burr  Discussion of pathophysiology with patient and/or family none  Decision to use of endoscopy of paranasal sinuses or larynx for diagnosis or chronic disease management. none  Review and summation of old records as accessed through [facility] (a summary of my findings in these old records: NA)     Schedule for Thursday for US guided FNA for Afirma testing.            Dell Aquino MD

## 2021-07-28 ENCOUNTER — HOSPITAL ENCOUNTER (OUTPATIENT)
Dept: CARDIAC REHAB | Age: 66
Setting detail: THERAPIES SERIES
Discharge: HOME | End: 2021-07-28
Payer: COMMERCIAL

## 2021-07-28 PROCEDURE — 93798 PHYS/QHP OP CAR RHAB W/ECG: CPT

## 2021-07-29 ENCOUNTER — PROCEDURE VISIT (OUTPATIENT)
Dept: ENT CLINIC | Age: 66
End: 2021-07-29
Payer: COMMERCIAL

## 2021-07-29 VITALS
SYSTOLIC BLOOD PRESSURE: 115 MMHG | HEART RATE: 79 BPM | BODY MASS INDEX: 35.99 KG/M2 | DIASTOLIC BLOOD PRESSURE: 81 MMHG | TEMPERATURE: 97.3 F | HEIGHT: 65 IN | WEIGHT: 216 LBS

## 2021-07-29 DIAGNOSIS — E04.1 LEFT THYROID NODULE: Primary | ICD-10-CM

## 2021-07-29 PROCEDURE — 10005 FNA BX W/US GDN 1ST LES: CPT | Performed by: OTOLARYNGOLOGY

## 2021-07-29 NOTE — PROGRESS NOTES
Here for FNA thyroid nodule for Afirma genetic testing. NECK ULTRASOUND    Pre-op Diagnosis: left thyroid nodule  Anesthesia: None  Complications: none  Estimated Blood Loss: none  Indications: Suspicious cytopathology. New specimen for genetic analysis. Procedure: neck US    The patient was placed in a semi-recumbent position with mild neck extension. Real time B-mode ultrasound on the neck was performed in transverse/ axial and longitudinal/ sagittal planes. Findings:   Left lobe: 53 x 20 x 25 mm  Right Lobe: 49 x 22 x 18 mm  Isthmus: 4mm    Nodules/Cysts: 2.0cm nodule left inferior pole. Ultrasound guided fine needle aspiration was on two passes. .     The patient tolerated the procedure well and without side effects. I attest that I was present for and did the entire procedure myself.

## 2021-07-30 ENCOUNTER — HOSPITAL ENCOUNTER (OUTPATIENT)
Dept: CARDIAC REHAB | Age: 66
Setting detail: THERAPIES SERIES
Discharge: HOME | End: 2021-07-30
Payer: COMMERCIAL

## 2021-07-30 PROCEDURE — 93798 PHYS/QHP OP CAR RHAB W/ECG: CPT

## 2021-08-02 ENCOUNTER — HOSPITAL ENCOUNTER (OUTPATIENT)
Dept: CARDIAC REHAB | Age: 66
Setting detail: THERAPIES SERIES
Discharge: HOME | End: 2021-08-02
Payer: COMMERCIAL

## 2021-08-02 PROCEDURE — 93798 PHYS/QHP OP CAR RHAB W/ECG: CPT

## 2021-08-03 ENCOUNTER — OFFICE VISIT (OUTPATIENT)
Dept: PRIMARY CARE CLINIC | Age: 66
End: 2021-08-03
Payer: COMMERCIAL

## 2021-08-03 VITALS
BODY MASS INDEX: 35.85 KG/M2 | HEART RATE: 76 BPM | SYSTOLIC BLOOD PRESSURE: 134 MMHG | TEMPERATURE: 98.1 F | HEIGHT: 65 IN | WEIGHT: 215.2 LBS | DIASTOLIC BLOOD PRESSURE: 84 MMHG | OXYGEN SATURATION: 99 %

## 2021-08-03 DIAGNOSIS — I10 ESSENTIAL HYPERTENSION: ICD-10-CM

## 2021-08-03 DIAGNOSIS — D50.9 IRON DEFICIENCY ANEMIA, UNSPECIFIED IRON DEFICIENCY ANEMIA TYPE: Primary | ICD-10-CM

## 2021-08-03 PROCEDURE — 4040F PNEUMOC VAC/ADMIN/RCVD: CPT | Performed by: INTERNAL MEDICINE

## 2021-08-03 PROCEDURE — 1123F ACP DISCUSS/DSCN MKR DOCD: CPT | Performed by: INTERNAL MEDICINE

## 2021-08-03 PROCEDURE — G8399 PT W/DXA RESULTS DOCUMENT: HCPCS | Performed by: INTERNAL MEDICINE

## 2021-08-03 PROCEDURE — 99213 OFFICE O/P EST LOW 20 MIN: CPT | Performed by: INTERNAL MEDICINE

## 2021-08-03 PROCEDURE — G8427 DOCREV CUR MEDS BY ELIG CLIN: HCPCS | Performed by: INTERNAL MEDICINE

## 2021-08-03 PROCEDURE — 1036F TOBACCO NON-USER: CPT | Performed by: INTERNAL MEDICINE

## 2021-08-03 PROCEDURE — 3017F COLORECTAL CA SCREEN DOC REV: CPT | Performed by: INTERNAL MEDICINE

## 2021-08-03 PROCEDURE — 1090F PRES/ABSN URINE INCON ASSESS: CPT | Performed by: INTERNAL MEDICINE

## 2021-08-03 PROCEDURE — G8417 CALC BMI ABV UP PARAM F/U: HCPCS | Performed by: INTERNAL MEDICINE

## 2021-08-03 SDOH — ECONOMIC STABILITY: HOUSING INSECURITY
IN THE LAST 12 MONTHS, WAS THERE A TIME WHEN YOU DID NOT HAVE A STEADY PLACE TO SLEEP OR SLEPT IN A SHELTER (INCLUDING NOW)?: NO

## 2021-08-03 SDOH — ECONOMIC STABILITY: INCOME INSECURITY: IN THE LAST 12 MONTHS, WAS THERE A TIME WHEN YOU WERE NOT ABLE TO PAY THE MORTGAGE OR RENT ON TIME?: NO

## 2021-08-03 SDOH — HEALTH STABILITY: PHYSICAL HEALTH: ON AVERAGE, HOW MANY DAYS PER WEEK DO YOU ENGAGE IN MODERATE TO STRENUOUS EXERCISE (LIKE A BRISK WALK)?: 3 DAYS

## 2021-08-03 SDOH — ECONOMIC STABILITY: HOUSING INSECURITY: IN THE LAST 12 MONTHS, HOW MANY PLACES HAVE YOU LIVED?: 1

## 2021-08-03 SDOH — HEALTH STABILITY: PHYSICAL HEALTH: ON AVERAGE, HOW MANY MINUTES DO YOU ENGAGE IN EXERCISE AT THIS LEVEL?: 60 MIN

## 2021-08-03 ASSESSMENT — SOCIAL DETERMINANTS OF HEALTH (SDOH)
HOW OFTEN DO YOU ATTEND CHURCH OR RELIGIOUS SERVICES?: 1 TO 4 TIMES PER YEAR
ARE YOU MARRIED, WIDOWED, DIVORCED, SEPARATED, NEVER MARRIED, OR LIVING WITH A PARTNER?: NEVER MARRIED
DO YOU BELONG TO ANY CLUBS OR ORGANIZATIONS SUCH AS CHURCH GROUPS UNIONS, FRATERNAL OR ATHLETIC GROUPS, OR SCHOOL GROUPS?: NO
HOW OFTEN DO YOU GET TOGETHER WITH FRIENDS OR RELATIVES?: MORE THAN THREE TIMES A WEEK
IN A TYPICAL WEEK, HOW MANY TIMES DO YOU TALK ON THE PHONE WITH FAMILY, FRIENDS, OR NEIGHBORS?: MORE THAN THREE TIMES A WEEK
HOW OFTEN DO YOU ATTENT MEETINGS OF THE CLUB OR ORGANIZATION YOU BELONG TO?: NEVER

## 2021-08-03 ASSESSMENT — PATIENT HEALTH QUESTIONNAIRE - PHQ9
1. LITTLE INTEREST OR PLEASURE IN DOING THINGS: NOT AT ALL
2. FEELING DOWN, DEPRESSED OR HOPELESS: NOT AT ALL
DEPRESSION UNABLE TO ASSESS: NO
DEPRESSION UNABLE TO ASSESS: YES
SUM OF ALL RESPONSES TO PHQ9 QUESTIONS 1 & 2: 0

## 2021-08-03 ASSESSMENT — LIFESTYLE VARIABLES: HOW OFTEN DO YOU HAVE A DRINK CONTAINING ALCOHOL: NEVER

## 2021-08-03 NOTE — PROGRESS NOTES
Analia Hernandez   Date ofBirth:  1955    Date of Visit:  8/3/2021    Chief Complaint   Patient presents with    Blood Pressure Check    Other     iron deficiency        HPI  Patient has iron deficiency anemia. Patient takes ferrous sulfate 325 mg 2 times daily. Patient states she has been eating green leafy vegetables but not much red meat. Patient states her fatigue is a little better. Patient states once she gets started she is ok. Patient has hypertension. Patient takes Losartan 50mg once daily and Verapamil ER 120mg once daily. Patient states her blood pressure has been 116-120/70 at Cardiac rehab. Patient decreases salt. Patient does cardiac rehab exercise. Patient states she is off Plavix. Review of Systems   Constitutional: Positive for fatigue. Eyes: Negative for visual disturbance. Respiratory: Negative for chest tightness and shortness of breath. Cardiovascular: Positive for leg swelling (legs swell, patient takes Furosemide). Negative for chest pain and palpitations. Endocrine: Negative for cold intolerance. Genitourinary: Negative for frequency and hematuria. Neurological: Negative for dizziness, syncope, light-headedness and headaches.        Allergies   Allergen Reactions    No Known Allergies      Outpatient Medications Marked as Taking for the 8/3/21 encounter (Office Visit) with Angie Fortune MD   Medication Sig Dispense Refill    furosemide (LASIX) 20 MG tablet Take 20 mg by mouth Every Monday, Wednesday, and Friday      ferrous sulfate (IRON 325) 325 (65 Fe) MG tablet Take 1 tablet by mouth 2 times daily (with meals) 60 tablet 1    insulin detemir (LEVEMIR FLEXTOUCH) 100 UNIT/ML injection pen Inject 18 Units into the skin nightly 30 mL 1    Insulin Pen Needle (B-D ULTRAFINE III SHORT PEN) 31G X 8 MM MISC USE AS DIRECTED 100 each 3    metFORMIN (GLUCOPHAGE) 1000 MG tablet Take 1 tablet by mouth 2 times daily (with meals) 180 tablet 1    rosuvastatin (CRESTOR) 20 MG tablet Take 1 tablet by mouth nightly 90 tablet 3    verapamil (CALAN SR) 120 MG extended release tablet Take 1 tablet by mouth daily 90 tablet 3    losartan (COZAAR) 100 MG tablet Take 50 mg by mouth daily      DULoxetine (CYMBALTA) 30 MG extended release capsule Take 1 capsule by mouth daily 90 capsule 1    omeprazole (PRILOSEC) 40 MG delayed release capsule Take 1 capsule by mouth every morning (before breakfast) 30 capsule 1    diphenhydrAMINE (BENADRYL) 25 MG tablet Take 25 mg by mouth nightly as needed for Itching       acetaminophen (TYLENOL) 325 MG tablet Take 650 mg by mouth every 6 hours as needed for Pain      aspirin 325 MG EC tablet Take 1 tablet by mouth daily 30 tablet 3    vitamin B-12 (CYANOCOBALAMIN) 500 MCG tablet Take 2 tablets by mouth daily 60 tablet 3    Ascorbic Acid (VITAMIN C) 500 MG tablet Take 500 mg by mouth daily.  Cholecalciferol (VITAMIN D PO) Take 5,000 Units by mouth daily       MULTIPLE VITAMIN PO Take  by mouth. Vitals:    08/03/21 0936   BP: 134/84   Site: Right Upper Arm   Position: Sitting   Cuff Size: Large Adult   Pulse: 76   Temp: 98.1 °F (36.7 °C)   TempSrc: Oral   SpO2: 99%   Weight: 215 lb 3.2 oz (97.6 kg)   Height: 5' 5\" (1.651 m)     Body mass index is 35.81 kg/m². Physical Exam  Nursing note reviewed. Constitutional:       General: She is not in acute distress. Appearance: Normal appearance. She is well-developed. Eyes:      Extraocular Movements: Extraocular movements intact. Pupils: Pupils are equal, round, and reactive to light. Neck:      Thyroid: No thyromegaly. Vascular: No carotid bruit. Cardiovascular:      Rate and Rhythm: Normal rate and regular rhythm. Heart sounds: Normal heart sounds, S1 normal and S2 normal. No murmur heard. Pulmonary:      Effort: Pulmonary effort is normal.      Breath sounds: Normal breath sounds. Musculoskeletal:      Cervical back: Neck supple. Right lower leg: No edema. Left lower leg: No edema. Neurological:      Mental Status: She is alert. No results found for this visit on 08/03/21.   Lab Review   Orders Only on 07/09/2021   Component Date Value    Vit D, 25-Hydroxy 07/09/2021 76.3    Orders Only on 07/09/2021   Component Date Value    PTH 07/09/2021 41.5    Orders Only on 07/09/2021   Component Date Value    Phosphorus 07/09/2021 4.1    Orders Only on 07/09/2021   Component Date Value    Sodium 07/09/2021 140     Potassium 07/09/2021 4.7     Chloride 07/09/2021 105     CO2 07/09/2021 25     Anion Gap 07/09/2021 10     Glucose 07/09/2021 100*    BUN 07/09/2021 18     CREATININE 07/09/2021 0.9     GFR Non- 07/09/2021 >60     GFR  07/09/2021 >60     Calcium 07/09/2021 9.6     Total Protein 07/09/2021 6.5     Albumin 07/09/2021 4.1     Albumin/Globulin Ratio 07/09/2021 1.7     Total Bilirubin 07/09/2021 0.3     Alkaline Phosphatase 07/09/2021 49     ALT 07/09/2021 10     AST 07/09/2021 15     Globulin 07/09/2021 2.4    Orders Only on 07/09/2021   Component Date Value    WBC 07/09/2021 4.9     RBC 07/09/2021 3.86*    Hemoglobin 07/09/2021 9.5*    Hematocrit 07/09/2021 30.6*    MCV 07/09/2021 79.3*    MCH 07/09/2021 24.7*    MCHC 07/09/2021 31.1     RDW 07/09/2021 18.6*    Platelets 85/50/2874 235     MPV 07/09/2021 8.2     Neutrophils % 07/09/2021 65.7     Lymphocytes % 07/09/2021 26.0     Monocytes % 07/09/2021 5.8     Eosinophils % 07/09/2021 1.2     Basophils % 07/09/2021 1.3     Neutrophils Absolute 07/09/2021 3.2     Lymphocytes Absolute 07/09/2021 1.3     Monocytes Absolute 07/09/2021 0.3     Eosinophils Absolute 07/09/2021 0.1     Basophils Absolute 07/09/2021 0.1    Orders Only on 07/09/2021   Component Date Value    Protein, Ur 07/09/2021 7.00     Creatinine, Ur 07/09/2021 59.9     Protein/Creat Ratio 07/09/2021 0.1    Orders Only on 07/09/2021 Component Date Value    Color, UA 07/09/2021 YELLOW     Clarity, UA 07/09/2021 Clear     Glucose, Ur 07/09/2021 Negative     Bilirubin Urine 07/09/2021 Negative     Ketones, Urine 07/09/2021 Negative     Specific Gravity, UA 07/09/2021 1.013     Blood, Urine 07/09/2021 Negative     pH, UA 07/09/2021 6.0     Protein, UA 07/09/2021 Negative     Urobilinogen, Urine 07/09/2021 0.2     Nitrite, Urine 07/09/2021 Negative     Leukocyte Esterase, Urine 07/09/2021 Negative     Microscopic Examination 07/09/2021 Not Indicated     Urine Type 07/09/2021 Other    Office Visit on 07/06/2021   Component Date Value    Hemoglobin A1C 07/06/2021 6.8    Orders Only on 05/21/2021   Component Date Value    Iron 05/21/2021 33*    TIBC 05/21/2021 304     Iron Saturation 05/21/2021 11*    WBC 05/21/2021 5.1     RBC 05/21/2021 3.79*    Hemoglobin 05/21/2021 9.6*    Hematocrit 05/21/2021 31.4*    MCV 05/21/2021 82.9     MCH 05/21/2021 25.3*    MCHC 05/21/2021 30.5*    RDW 05/21/2021 18.7*    Platelets 99/91/5029 201     MPV 05/21/2021 8.4    Orders Only on 04/22/2021   Component Date Value    Sodium 04/22/2021 139     Potassium 04/22/2021 4.6     Chloride 04/22/2021 97*    CO2 04/22/2021 29     Anion Gap 04/22/2021 13     Glucose 04/22/2021 130*    BUN 04/22/2021 19     CREATININE 04/22/2021 1.0     GFR Non- 04/22/2021 55*    GFR  04/22/2021 >60     Calcium 04/22/2021 9.6     Total Protein 04/22/2021 7.3     Albumin 04/22/2021 4.5     Albumin/Globulin Ratio 04/22/2021 1.6     Total Bilirubin 04/22/2021 0.6     Alkaline Phosphatase 04/22/2021 81     ALT 04/22/2021 9*    AST 04/22/2021 18     Globulin 04/22/2021 2.8     WBC 04/22/2021 6.7     RBC 04/22/2021 4.06     Hemoglobin 04/22/2021 10.5*    Hematocrit 04/22/2021 33.4*    MCV 04/22/2021 82.3     MCH 04/22/2021 25.8*    MCHC 04/22/2021 31.4     RDW 04/22/2021 18.5*    Platelets 42/01/8734 273     MPV 04/22/2021 8.2     Neutrophils % 04/22/2021 72.7     Lymphocytes % 04/22/2021 17.2     Monocytes % 04/22/2021 7.0     Eosinophils % 04/22/2021 2.1     Basophils % 04/22/2021 1.0     Neutrophils Absolute 04/22/2021 4.9     Lymphocytes Absolute 04/22/2021 1.2     Monocytes Absolute 04/22/2021 0.5     Eosinophils Absolute 04/22/2021 0.1     Basophils Absolute 04/22/2021 0.1    There may be more visits with results that are not included. Assessment/Plan     1. Iron deficiency anemia, unspecified iron deficiency anemia type  -Continue Ferrous Sulfate 325mg 2 times daily  -iron rich diet  - Iron and TIBC; Future  - CBC; Future    2. Essential hypertension  -stable  -Continue same medications  -Low sodium diet  -Continue Cardiac Rehab as scheduled    Discussed medications with patient, who voiced understanding of their use and indications. All questions answered.       Return in about 3 months (around 11/3/2021) for annual physical exam.

## 2021-08-04 ENCOUNTER — HOSPITAL ENCOUNTER (OUTPATIENT)
Dept: CARDIAC REHAB | Age: 66
Setting detail: THERAPIES SERIES
Discharge: HOME | End: 2021-08-04
Payer: COMMERCIAL

## 2021-08-04 DIAGNOSIS — D50.9 IRON DEFICIENCY ANEMIA, UNSPECIFIED IRON DEFICIENCY ANEMIA TYPE: ICD-10-CM

## 2021-08-04 LAB
HCT VFR BLD CALC: 35 % (ref 36–48)
HEMOGLOBIN: 10.7 G/DL (ref 12–16)
IRON SATURATION: 37 % (ref 15–50)
IRON: 116 UG/DL (ref 37–145)
MCH RBC QN AUTO: 25 PG (ref 26–34)
MCHC RBC AUTO-ENTMCNC: 30.6 G/DL (ref 31–36)
MCV RBC AUTO: 81.7 FL (ref 80–100)
PDW BLD-RTO: 22.9 % (ref 12.4–15.4)
PLATELET # BLD: 197 K/UL (ref 135–450)
PMV BLD AUTO: 8 FL (ref 5–10.5)
RBC # BLD: 4.28 M/UL (ref 4–5.2)
TOTAL IRON BINDING CAPACITY: 317 UG/DL (ref 260–445)
WBC # BLD: 5 K/UL (ref 4–11)

## 2021-08-04 PROCEDURE — 93798 PHYS/QHP OP CAR RHAB W/ECG: CPT

## 2021-08-06 ENCOUNTER — HOSPITAL ENCOUNTER (OUTPATIENT)
Dept: CARDIAC REHAB | Age: 66
Setting detail: THERAPIES SERIES
Discharge: HOME | End: 2021-08-06
Payer: COMMERCIAL

## 2021-08-06 PROCEDURE — 93798 PHYS/QHP OP CAR RHAB W/ECG: CPT

## 2021-08-09 ENCOUNTER — HOSPITAL ENCOUNTER (OUTPATIENT)
Dept: CARDIAC REHAB | Age: 66
Setting detail: THERAPIES SERIES
Discharge: HOME | End: 2021-08-09
Payer: COMMERCIAL

## 2021-08-09 PROCEDURE — 93798 PHYS/QHP OP CAR RHAB W/ECG: CPT

## 2021-08-10 ENCOUNTER — TELEPHONE (OUTPATIENT)
Dept: ENT CLINIC | Age: 66
End: 2021-08-10

## 2021-08-10 ASSESSMENT — ENCOUNTER SYMPTOMS
SHORTNESS OF BREATH: 0
CHEST TIGHTNESS: 0

## 2021-08-11 ENCOUNTER — HOSPITAL ENCOUNTER (OUTPATIENT)
Dept: CARDIAC REHAB | Age: 66
Setting detail: THERAPIES SERIES
Discharge: HOME | End: 2021-08-11
Payer: COMMERCIAL

## 2021-08-11 PROCEDURE — 93798 PHYS/QHP OP CAR RHAB W/ECG: CPT

## 2021-08-13 ENCOUNTER — HOSPITAL ENCOUNTER (OUTPATIENT)
Dept: CARDIAC REHAB | Age: 66
Setting detail: THERAPIES SERIES
Discharge: HOME | End: 2021-08-13
Payer: COMMERCIAL

## 2021-08-13 PROCEDURE — 93798 PHYS/QHP OP CAR RHAB W/ECG: CPT

## 2021-08-16 ENCOUNTER — HOSPITAL ENCOUNTER (OUTPATIENT)
Dept: CARDIAC REHAB | Age: 66
Setting detail: THERAPIES SERIES
Discharge: HOME | End: 2021-08-16
Payer: COMMERCIAL

## 2021-08-16 PROCEDURE — 93798 PHYS/QHP OP CAR RHAB W/ECG: CPT

## 2021-08-18 ENCOUNTER — HOSPITAL ENCOUNTER (OUTPATIENT)
Dept: CARDIAC REHAB | Age: 66
Setting detail: THERAPIES SERIES
Discharge: HOME | End: 2021-08-18
Payer: COMMERCIAL

## 2021-08-18 PROCEDURE — 93798 PHYS/QHP OP CAR RHAB W/ECG: CPT

## 2021-08-20 ENCOUNTER — HOSPITAL ENCOUNTER (OUTPATIENT)
Dept: CARDIAC REHAB | Age: 66
Setting detail: THERAPIES SERIES
Discharge: HOME | End: 2021-08-20
Payer: COMMERCIAL

## 2021-08-20 PROCEDURE — 93798 PHYS/QHP OP CAR RHAB W/ECG: CPT

## 2021-08-23 ENCOUNTER — TELEPHONE (OUTPATIENT)
Dept: ENT CLINIC | Age: 66
End: 2021-08-23

## 2021-08-25 ENCOUNTER — HOSPITAL ENCOUNTER (OUTPATIENT)
Dept: CARDIAC REHAB | Age: 66
Setting detail: THERAPIES SERIES
Discharge: HOME | End: 2021-08-25
Payer: COMMERCIAL

## 2021-08-25 PROCEDURE — 93798 PHYS/QHP OP CAR RHAB W/ECG: CPT

## 2021-08-26 ENCOUNTER — OFFICE VISIT (OUTPATIENT)
Dept: ENT CLINIC | Age: 66
End: 2021-08-26
Payer: COMMERCIAL

## 2021-08-26 VITALS — HEART RATE: 82 BPM | TEMPERATURE: 97.3 F | SYSTOLIC BLOOD PRESSURE: 133 MMHG | DIASTOLIC BLOOD PRESSURE: 88 MMHG

## 2021-08-26 DIAGNOSIS — E04.1 LEFT THYROID NODULE: Primary | ICD-10-CM

## 2021-08-26 PROCEDURE — G8399 PT W/DXA RESULTS DOCUMENT: HCPCS | Performed by: OTOLARYNGOLOGY

## 2021-08-26 PROCEDURE — 1090F PRES/ABSN URINE INCON ASSESS: CPT | Performed by: OTOLARYNGOLOGY

## 2021-08-26 PROCEDURE — 1036F TOBACCO NON-USER: CPT | Performed by: OTOLARYNGOLOGY

## 2021-08-26 PROCEDURE — 99214 OFFICE O/P EST MOD 30 MIN: CPT | Performed by: OTOLARYNGOLOGY

## 2021-08-26 PROCEDURE — 1123F ACP DISCUSS/DSCN MKR DOCD: CPT | Performed by: OTOLARYNGOLOGY

## 2021-08-26 PROCEDURE — 4040F PNEUMOC VAC/ADMIN/RCVD: CPT | Performed by: OTOLARYNGOLOGY

## 2021-08-26 PROCEDURE — G8427 DOCREV CUR MEDS BY ELIG CLIN: HCPCS | Performed by: OTOLARYNGOLOGY

## 2021-08-26 PROCEDURE — 3017F COLORECTAL CA SCREEN DOC REV: CPT | Performed by: OTOLARYNGOLOGY

## 2021-08-26 PROCEDURE — G8417 CALC BMI ABV UP PARAM F/U: HCPCS | Performed by: OTOLARYNGOLOGY

## 2021-08-26 NOTE — LETTER
[facility]    Surgery Schedule Request Form: 08/26/21  [address] [place], [address]      DATE OF SURGERY: ###    TIME OF SURGERY:  ###            CONF #: ____________________       Patient Information:    Patient name: Francoise Rain    YOB: 1955 Age/Sex:65 y.o./female    SS #:[id number]    Wt Readings from Last 1 Encounters:   08/03/21 215 lb 3.2 oz (97.6 kg)       Telephone Information:   Mobile [phone]     Home [phone]     Surgeon & Procedure Information:     Lead surgeon: Kaye Fregoso Co-Surgeon: zainab  Phone: [phone] Fax: [phone]  PCP: Akiko Bustillo MD    Diagnosis: Left thyroid nodule    Procedure name/CPT: Hemithyroidectomy: left (17230), Possible Total (97380)    Procedure length: 90 minutes Anesthesia: General    Special Equipment: yes - Laryngeal nerve monitor    Patient Status: SDS (OP)    Primary Payor Plan: ###  Member ID: ###   Subscriber name: ###    [] Implement attached clinical orders for patient.       Electronically signed by Dameon Nelson MD on 8/26/2021 at 9:22 AM

## 2021-08-26 NOTE — PROGRESS NOTES
Subjective:      Patient ID: Marvin Dumas is a 76 y.o. female. HPI    History of Present Illness  Head/Neck    CC: thyroid nodule    Comments: Griselda Tate is a(n) 76 y.o. female who presents left thyroid nodule. FNA with atypia of unknown significance. Here for evaluation. Had cardiac surgery. On plavix. Stopping soon. Pain no  Location: Left neck  Onset:  unknown  Timing: unknown  Otalgia:  unknown  Odynophagia:  no  Dysphagia:  no  Dyspnea:  no  Voice Changes:  no clear  Modifying Factors:  Family history of thyroid disease Yes     Family history of thyroid cancer Not sure     Personal history of neck radiation No  Patient denies the following symptoms: fatigue, weight gain, weight loss, cold intolerance, heat intolerance, hair loss, dry skin, constipation, diarrhea, edema, anxiety, tremor, palpitations and dysphagia    Discussed pathology findings, interpreted results and discussed plan. Patient agreed to move forward with a left jett-thyroidecotmy. Discussed risks and benefits. Answered questions.          Lab Studies:  Lab Results   Component Value Date    WBC 4.9 07/09/2021    HGB 9.5 (L) 07/09/2021    HCT 30.6 (L) 07/09/2021    MCV 79.3 (L) 07/09/2021     07/09/2021     Lab Results   Component Value Date    GLUCOSE 100 (H) 07/09/2021    BUN 18 07/09/2021    CREATININE 0.9 07/09/2021    K 4.7 07/09/2021     07/09/2021     07/09/2021    CALCIUM 9.6 07/09/2021     Lab Results   Component Value Date    MG 2.00 03/24/2021     Lab Results   Component Value Date    PHOS 4.1 07/09/2021     Lab Results   Component Value Date    ALKPHOS 49 07/09/2021    ALT 10 07/09/2021    AST 15 07/09/2021    BILITOT 0.3 07/09/2021    PROT 6.5 07/09/2021     Lab Results   Component Value Date    TSH 1.63 11/03/2020       Patient Active Problem List   Diagnosis    Type II or unspecified type diabetes mellitus without mention of complication, not stated as uncontrolled    Essential hypertension    Urinary incontinence    Urinary urgency    GERD (gastroesophageal reflux disease)    Depression    Aortic valve calcification    Epigastric abdominal pain    Fatigue    Vitamin D deficiency    Type 2 diabetes mellitus without complication (HCC)    Vitamin B 12 deficiency    Hyperlipidemia    Anxiety    Type 2 diabetes mellitus without complication, without long-term current use of insulin (HCC)    Upper respiratory tract infection    Metallic taste    Type 2 diabetes mellitus without complication, with long-term current use of insulin (HCC)    Moderate episode of recurrent major depressive disorder (HCC)    Murmur    Moderate to severe aortic stenosis    Anemia    Thyroid nodule    Right renal mass    Aortic stenosis due to bicuspid aortic valve    S/P aortic valve replacement and aortoplasty    Gastroesophageal reflux disease    Left thyroid nodule    Skin growth     Past Surgical History:   Procedure Laterality Date    AORTIC VALVE REPLACEMENT N/A 3/19/2021    ROSETTA; TCPB; AVR with 21 mm Weir Inspiris resilia bovine pericardial bioprosthesis; ascending aortoplasty performed by Bing Rangel MD at [facility] COLONOSCOPY  12/16/2016    Alonzo WIGGINS    COLONOSCOPY  6/1/2020    COLONOSCOPY WITH BIOPSY performed by Tori Juares MD at [address]      UPPER GASTROINTESTINAL ENDOSCOPY N/A 6/1/2020    EGD BIOPSY performed by Tori Juares MD at [address] [place] [address]       Family History   Problem Relation Age of Onset    Breast Cancer Mother     Cancer Mother 52        breast     Diabetes Father     Hypertension Father     Colon Cancer Father     Cancer Father 72        colon    Breast Cancer Other     Cancer Other     Cancer Maternal Aunt         x 2 breast cancer    Diabetes Sister     Cancer Maternal Grandmother     Cancer Paternal Uncle colon    Cancer Paternal Cousin         colon cancer - stage 4    Bipolar Disorder Son     Depression Son      Social History     Socioeconomic History    Marital status: Single     Spouse name: Not on file    Number of children: Not on file    Years of education: Not on file    Highest education level: Not on file   Occupational History    Occupation: [employer]    Occupation: [employer]   Tobacco Use    Smoking status: Never Smoker    Smokeless tobacco: Never Used   Vaping Use    Vaping Use: Never used   Substance and Sexual Activity    Alcohol use: Not Currently    Drug use: No    Sexual activity: Not on file   Other Topics Concern    Not on file   Social History Narrative    Not on file     Social Determinants of Health     Financial Resource Strain: Low Risk     Difficulty of Paying Living Expenses: Not very hard   Food Insecurity: No Food Insecurity    Worried About Running Out of Food in the Last Year: Never true    [place] of Food in the Last Year: Never true   Transportation Needs:     Lack of Transportation (Medical):  Lack of Transportation (Non-Medical):    Physical Activity:     Days of Exercise per Week:     Minutes of Exercise per Session:    Stress:     Feeling of Stress :    Social Connections:     Frequency of Communication with Friends and Family:     Frequency of Social Gatherings with Friends and Family:     Attends Gnosticism Services:     Active Member of Clubs or Organizations:     Attends Club or Organization Meetings:     Marital Status:    Intimate Partner Violence:     Fear of Current or Ex-Partner:     Emotionally Abused:     Physically Abused:     Sexually Abused:        DRUG/FOOD ALLERGIES: No known allergies    CURRENT MEDICATIONS  Prior to Admission medications    Medication Sig Start Date End Date Taking?  Authorizing Provider   furosemide (LASIX) 20 MG tablet Take 20 mg by mouth Every Monday, Wednesday, and Friday 7/16/21  Yes Historical Provider, MD   ferrous sulfate (IRON 325) 325 (65 Fe) MG tablet Take 1 tablet by mouth 2 times daily (with meals) 7/6/21  Yes Nelson Patel MD   insulin detemir (LEVEMIR FLEXTOUCH) 100 UNIT/ML injection pen Inject 18 Units into the skin nightly 5/18/21  Yes Nelson Patel MD   Insulin Pen Needle (B-D ULTRAFINE III SHORT PEN) 31G X 8 MM MISC USE AS DIRECTED 5/18/21  Yes Nelson Patel MD   metFORMIN (GLUCOPHAGE) 1000 MG tablet Take 1 tablet by mouth 2 times daily (with meals) 5/18/21  Yes Nelson Patel MD   rosuvastatin (CRESTOR) 20 MG tablet Take 1 tablet by mouth nightly 5/18/21  Yes LIZZIE Nettles CNP   verapamil (CALAN SR) 120 MG extended release tablet Take 1 tablet by mouth daily 5/4/21  Yes LIZZIE Nettles CNP   losartan (COZAAR) 100 MG tablet Take 50 mg by mouth daily   Yes Historical Provider, MD   DULoxetine (CYMBALTA) 30 MG extended release capsule Take 1 capsule by mouth daily 4/26/21  Yes Nelson Patel MD   omeprazole (PRILOSEC) 40 MG delayed release capsule Take 1 capsule by mouth every morning (before breakfast) 4/22/21  Yes Nelson Patel MD   diphenhydrAMINE (BENADRYL) 25 MG tablet Take 25 mg by mouth nightly as needed for Itching    Yes Historical Provider, MD   acetaminophen (TYLENOL) 325 MG tablet Take 650 mg by mouth every 6 hours as needed for Pain   Yes Historical Provider, MD   aspirin 325 MG EC tablet Take 1 tablet by mouth daily 3/25/21  Yes LIZZIE Sheriff CNP   clopidogrel (PLAVIX) 75 MG tablet Take 1 tablet by mouth daily 3/25/21  Yes LIZZIE Sheriff CNP   vitamin B-12 (CYANOCOBALAMIN) 500 MCG tablet Take 2 tablets by mouth daily 11/23/15  Yes Nelson Patel MD   Ascorbic Acid (VITAMIN C) 500 MG tablet Take 500 mg by mouth daily. Yes Historical Provider, MD   Cholecalciferol (VITAMIN D PO) Take 5,000 Units by mouth daily    Yes Historical Provider, MD   MULTIPLE VITAMIN PO Take  by mouth.    Yes Historical Provider, MD     Review of Systems   Constitutional: Negative for activity change, appetite change, chills, fatigue and fever. HENT: Negative for congestion, ear discharge, ear pain, facial swelling, hearing loss, nosebleeds, postnasal drip, rhinorrhea, sinus pressure, sinus pain, sneezing, sore throat, tinnitus, trouble swallowing and voice change. Eyes: Negative for pain, redness, itching and visual disturbance. Respiratory: Negative for cough, choking and shortness of breath. Gastrointestinal: Negative for diarrhea and nausea. Endocrine: Negative for cold intolerance and heat intolerance. Objective:   Physical Exam  Constitutional:       General: She is not in acute distress. Appearance: She is well-developed. HENT:      Head: Not macrocephalic and not microcephalic. No abrasion or contusion. Mouth/Throat:      Lips: No lesions. Mouth: No oral lesions. Dentition: Normal dentition. Tongue: No lesions. Palate: No mass. Pharynx: No oropharyngeal exudate, posterior oropharyngeal erythema or uvula swelling. Tonsils: No tonsillar abscesses. Neck:      Thyroid: No thyroid mass or thyromegaly. Trachea: No tracheal tenderness or tracheal deviation. Cardiovascular:      Rate and Rhythm: Normal rate and regular rhythm. Pulmonary:      Breath sounds: No stridor. Musculoskeletal:      Cervical back: Normal range of motion and neck supple. No edema or erythema. No muscular tenderness. Lymphadenopathy:      Head:      Right side of head: No submental, submandibular, tonsillar, preauricular, posterior auricular or occipital adenopathy. Left side of head: No submental, submandibular, tonsillar, preauricular or occipital adenopathy. Cervical: No cervical adenopathy. Right cervical: No superficial, deep or posterior cervical adenopathy. Left cervical: No superficial, deep or posterior cervical adenopathy. Skin:     General: Skin is warm and dry.       Findings: No calcium and vitamin D replacement which may be permanent. Patient expectations during and after surgery including post-operative care and pain control were described. Questions were answered and the patient agreed to proceed with surgery which will be scheduled in an appropriate time frame in line with the severity of disease. The patient was counseled at length about the risks of beba Covid-19 in the anne-operative and post-operative states including the recovery window of their procedure. The patient was made aware that beba Covid-19 after a surgical procedure may worsen their prognosis for recovering from the virus and lend to a higher morbidity and or mortality risk. The patient was given the options of postponing their procedure. All of the risks, benefits, and alternatives were discussed. The patient does wish to proceed with the procedure.                  Adarsh Moyer MD

## 2021-08-27 ENCOUNTER — OFFICE VISIT (OUTPATIENT)
Dept: CARDIOLOGY CLINIC | Age: 66
End: 2021-08-27
Payer: COMMERCIAL

## 2021-08-27 ENCOUNTER — HOSPITAL ENCOUNTER (OUTPATIENT)
Dept: CARDIAC REHAB | Age: 66
Setting detail: THERAPIES SERIES
Discharge: HOME | End: 2021-08-27
Payer: COMMERCIAL

## 2021-08-27 VITALS
OXYGEN SATURATION: 99 % | WEIGHT: 222.4 LBS | DIASTOLIC BLOOD PRESSURE: 84 MMHG | HEART RATE: 86 BPM | HEIGHT: 65 IN | SYSTOLIC BLOOD PRESSURE: 136 MMHG | BODY MASS INDEX: 37.05 KG/M2

## 2021-08-27 DIAGNOSIS — E78.2 MIXED HYPERLIPIDEMIA: ICD-10-CM

## 2021-08-27 DIAGNOSIS — Z95.2 S/P AVR: Primary | ICD-10-CM

## 2021-08-27 DIAGNOSIS — R00.2 PALPITATIONS: ICD-10-CM

## 2021-08-27 DIAGNOSIS — I10 ESSENTIAL HYPERTENSION: ICD-10-CM

## 2021-08-27 DIAGNOSIS — R53.83 FATIGUE, UNSPECIFIED TYPE: ICD-10-CM

## 2021-08-27 PROCEDURE — 1090F PRES/ABSN URINE INCON ASSESS: CPT | Performed by: INTERNAL MEDICINE

## 2021-08-27 PROCEDURE — G8399 PT W/DXA RESULTS DOCUMENT: HCPCS | Performed by: INTERNAL MEDICINE

## 2021-08-27 PROCEDURE — 4040F PNEUMOC VAC/ADMIN/RCVD: CPT | Performed by: INTERNAL MEDICINE

## 2021-08-27 PROCEDURE — G8417 CALC BMI ABV UP PARAM F/U: HCPCS | Performed by: INTERNAL MEDICINE

## 2021-08-27 PROCEDURE — 93798 PHYS/QHP OP CAR RHAB W/ECG: CPT

## 2021-08-27 PROCEDURE — 1123F ACP DISCUSS/DSCN MKR DOCD: CPT | Performed by: INTERNAL MEDICINE

## 2021-08-27 PROCEDURE — G8427 DOCREV CUR MEDS BY ELIG CLIN: HCPCS | Performed by: INTERNAL MEDICINE

## 2021-08-27 PROCEDURE — 99214 OFFICE O/P EST MOD 30 MIN: CPT | Performed by: INTERNAL MEDICINE

## 2021-08-27 PROCEDURE — 3017F COLORECTAL CA SCREEN DOC REV: CPT | Performed by: INTERNAL MEDICINE

## 2021-08-27 PROCEDURE — 1036F TOBACCO NON-USER: CPT | Performed by: INTERNAL MEDICINE

## 2021-08-27 RX ORDER — AMOXICILLIN 500 MG/1
2000 CAPSULE ORAL ONCE
Qty: 4 CAPSULE | Refills: 0 | Status: SHIPPED | OUTPATIENT
Start: 2021-08-27 | End: 2021-08-27

## 2021-08-27 RX ORDER — LOSARTAN POTASSIUM 100 MG/1
100 TABLET ORAL DAILY
Qty: 90 TABLET | Refills: 3 | Status: SHIPPED | OUTPATIENT
Start: 2021-08-27 | End: 2021-11-02 | Stop reason: SDUPTHER

## 2021-08-27 NOTE — PATIENT INSTRUCTIONS
-Take amoxicillin 2,000 mg once 30-60 mins before your dental work  -Tell the thyroid surgeon about your artificial heart valve.  You will need an antibiotic before surgery.  -See Dr. Genny Thomas in 6-8 mo
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
## 2021-08-30 ENCOUNTER — HOSPITAL ENCOUNTER (OUTPATIENT)
Dept: CARDIAC REHAB | Age: 66
Setting detail: THERAPIES SERIES
Discharge: HOME | End: 2021-08-30
Payer: COMMERCIAL

## 2021-08-30 PROCEDURE — 93798 PHYS/QHP OP CAR RHAB W/ECG: CPT

## 2021-09-13 NOTE — PROGRESS NOTES
[address] [facility] patients having surgery or anesthesia are required to be Covid tested OR to have been vaccinated at least 14 days prior to your procedure. It is very important to return our call to [phone] and notify the staff of your last vaccination date otherwise you will be required to complete Covid PCR test within the 5-6 days prior to surgery & quarantine. The results will need to be faxed to PreAdmission Testing at [phone]. PRIOR TO PROCEDURE DATE:        1. PLEASE FOLLOW ANY  GUIDELINES/ INSTRUCTIONS PRIOR TO YOUR PROCEDURE AS ADVISED BY YOUR SURGEON. 2. Arrange for someone to drive you home and be with you for the first 24 hours after discharge for your safety after your procedure for which you received sedation. Ensure it is someone we can share information with regarding your discharge. 3. You must contact your surgeon for instructions IF:   You are taking any blood thinners, aspirin, anti-inflammatory or vitamin E.   There is a change in your physical condition such as a cold, fever, rash, cuts, sores or any other infection, especially near your surgical site. 4. Do not drink alcohol the day before or day of your procedure. 5. A Pre-op History and Physical for surgery MUST be completed by your Physician or Urgent Care within 30 days of your procedure date. Please bring a copy with you on the day of your procedure and along with any other testing performed. THE DAY OF YOUR PROCEDURE:  1. Follow instructions for ARRIVAL TIME as DIRECTED BY YOUR SURGEON. 2. Enter the MAIN entrance from [employer] and follow the signs to the free [email or web address] or [employer] parking (offered free of charge 6am-5pm). 3. Enter the Main Entrance of the hospital (do not enter from the lower level of the parking garage). Upon entrance, check in with the  at the main desk on your left. them while you are in surgery. 10. If you use a CPAP, please bring it with you on the day of your procedure. 11. We recommend that valuable personal  belongings such as cash, cell phones, e-tablets or jewelry, be left at home during your stay. The hospital will not be responsible for valuables that are not secured in the hospital safe. However, if your insurance requires a co-pay, you may want to bring a method of payment, i.e. Check or credit card, if you wish to pay your co-pay the day of surgery. 12. If you are to stay overnight, you may bring a bag with personal items. Please have any large items you may need brought in by your family after your arrival to your hospital room. 15. If you have a Living Will or Durable Power of , please bring a copy on the day of your procedure. 15. With your permission, one family member may accompany you while you are being prepared for surgery. Once you are ready, additional family members may join you. HOW WE KEEP YOU SAFE and WORK TO PREVENT SURGICAL SITE INFECTIONS:  1. Health care workers should always check your ID bracelet to verify your name and birth date. You will be asked many times to state your name, date of birth, and allergies. 2. Health care workers should always clean their hands with soap or alcohol gel before providing care to you. It is okay to ask anyone if they cleaned their hands before they touch you. 3. You will be actively involved in verifying the type of procedure you are having and ensuring the correct surgical site. This will be confirmed multiple times prior to your procedure. Do NOT tiff your surgery site UNLESS instructed to by your surgeon. 4. Do not shave or wax for 72 hours prior to procedure near your operative site. Shaving with a razor can irritate your skin and make it easier to develop an infection.  On the day of your procedure, any hair that needs to be removed near the surgical site will be clipped by a healthcare worker using a special clippers designed to avoid skin irritation. 5. When you are in the operating room, your surgical site will be cleansed with a special soap, and in most cases, you will be given an antibiotic before the surgery begins. What to expect AFTER YOUR PROCEDURE:  1. Immediately following your procedure, your will be taken to the PACU for the first phase of your recovery. Your nurse will help you recover from any potential side effects of anesthesia, such as extreme drowsiness, changes in your vital signs or breathing patterns. Nausea, headache, muscle aches, or sore throat may also occur after anesthesia. Your nurse will help you manage these potential side effects. 2. For comfort and safety, arrange to have someone at home with you for the first 24 hours after discharge. 3. You and your family will be given written instructions about your diet, activity, dressing care, medications, and return visits. 4. Once at home, should issues with nausea, pain, or bleeding occur, or should you notice any signs of infection, you should call your surgeon. 5. Always clean your hands before and after caring for your wound. Do not let your family touch your surgery site without cleaning their hands. 6. Narcotic pain medications can cause significant constipation. You may want to add a stool softener to your postoperative medication schedule or speak to your surgeon on how best to manage this SIDE EFFECT. SPECIAL INSTRUCTIONS ask PCP about adjusting insulin dose night before surgery  Thank you for allowing us to care for you. We strive to exceed your expectations in the delivery of care and service provided to you and your family. If you need to contact the [place] [address] staff for any reason, please call us at [phone]    Instructions reviewed with patient during preadmission testing phone interview.   Cj Sosa RN.9/13/2021 .11:04 AM      ADDITIONAL EDUCATIONAL INFORMATION REVIEWED PER PHONE WITH YOU AND/OR YOUR FAMILY:     Yes Antibacterial Soap

## 2021-09-14 ENCOUNTER — OFFICE VISIT (OUTPATIENT)
Dept: PRIMARY CARE CLINIC | Age: 66
End: 2021-09-14
Payer: COMMERCIAL

## 2021-09-14 ENCOUNTER — TELEPHONE (OUTPATIENT)
Dept: CARDIOLOGY CLINIC | Age: 66
End: 2021-09-14

## 2021-09-14 VITALS
RESPIRATION RATE: 16 BRPM | HEIGHT: 65 IN | OXYGEN SATURATION: 98 % | DIASTOLIC BLOOD PRESSURE: 78 MMHG | WEIGHT: 221.6 LBS | SYSTOLIC BLOOD PRESSURE: 118 MMHG | BODY MASS INDEX: 36.92 KG/M2 | HEART RATE: 86 BPM | TEMPERATURE: 98.1 F

## 2021-09-14 DIAGNOSIS — F33.1 MODERATE EPISODE OF RECURRENT MAJOR DEPRESSIVE DISORDER (HCC): ICD-10-CM

## 2021-09-14 DIAGNOSIS — D50.9 IRON DEFICIENCY ANEMIA, UNSPECIFIED IRON DEFICIENCY ANEMIA TYPE: ICD-10-CM

## 2021-09-14 DIAGNOSIS — E55.9 VITAMIN D DEFICIENCY: ICD-10-CM

## 2021-09-14 DIAGNOSIS — Z79.4 TYPE 2 DIABETES MELLITUS WITHOUT COMPLICATION, WITH LONG-TERM CURRENT USE OF INSULIN (HCC): ICD-10-CM

## 2021-09-14 DIAGNOSIS — E11.9 TYPE 2 DIABETES MELLITUS WITHOUT COMPLICATION, WITH LONG-TERM CURRENT USE OF INSULIN (HCC): ICD-10-CM

## 2021-09-14 DIAGNOSIS — E78.2 MIXED HYPERLIPIDEMIA: ICD-10-CM

## 2021-09-14 DIAGNOSIS — K21.9 GASTROESOPHAGEAL REFLUX DISEASE, UNSPECIFIED WHETHER ESOPHAGITIS PRESENT: ICD-10-CM

## 2021-09-14 DIAGNOSIS — E04.1 LEFT THYROID NODULE: ICD-10-CM

## 2021-09-14 DIAGNOSIS — F41.9 ANXIETY: ICD-10-CM

## 2021-09-14 DIAGNOSIS — Z01.818 PREOP EXAMINATION: Primary | ICD-10-CM

## 2021-09-14 DIAGNOSIS — I10 ESSENTIAL HYPERTENSION: ICD-10-CM

## 2021-09-14 PROCEDURE — 93000 ELECTROCARDIOGRAM COMPLETE: CPT | Performed by: INTERNAL MEDICINE

## 2021-09-14 PROCEDURE — G8417 CALC BMI ABV UP PARAM F/U: HCPCS | Performed by: INTERNAL MEDICINE

## 2021-09-14 PROCEDURE — 1090F PRES/ABSN URINE INCON ASSESS: CPT | Performed by: INTERNAL MEDICINE

## 2021-09-14 PROCEDURE — 99243 OFF/OP CNSLTJ NEW/EST LOW 30: CPT | Performed by: INTERNAL MEDICINE

## 2021-09-14 PROCEDURE — G8427 DOCREV CUR MEDS BY ELIG CLIN: HCPCS | Performed by: INTERNAL MEDICINE

## 2021-09-14 PROCEDURE — 2022F DILAT RTA XM EVC RTNOPTHY: CPT | Performed by: INTERNAL MEDICINE

## 2021-09-14 NOTE — TELEPHONE ENCOUNTER
Sandra Batista, 1955    Cardiac Risk Assessment    What type of procedure are you having?: Theroid Surgery     When is your procedure scheduled for?: 09/17/21    What physician is performing your procedure?: Dr. Malika Johnson    Phone Number: [phone]    Fax number to send the letter: [phone]    Cardiologist: Dr. Mulugeta Lozoya     Last Appointment: 08/27/21    Next Appointment: No upcoming appointment     Are you on any blood thinners?: None     History of Coronary Artery Disease:    Last stress test:     Last echo: 01/01/21    Device Type and :
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
## 2021-09-15 ENCOUNTER — TELEPHONE (OUTPATIENT)
Dept: PRIMARY CARE CLINIC | Age: 66
End: 2021-09-15

## 2021-09-15 ASSESSMENT — ENCOUNTER SYMPTOMS
TROUBLE SWALLOWING: 0
COUGH: 0
SHORTNESS OF BREATH: 0
ABDOMINAL PAIN: 0
CHEST TIGHTNESS: 0
DIARRHEA: 0
NAUSEA: 0
RHINORRHEA: 0
BLOOD IN STOOL: 0
WHEEZING: 0
CONSTIPATION: 0
SINUS PRESSURE: 0
SORE THROAT: 0
VOMITING: 0

## 2021-09-15 NOTE — PROGRESS NOTES
PCP office called in regards to unsigned/incomplete H&P and abnormal EKG. Asked if pt being cleared or is in need of further testing prior to surgery on 9-17. Message being sent to MD. Kerry Rodriguez    9-15-21 @ 4629 - Received call from Dr. Cristy Bolaños, states she will have pre-op signed by end of day.  Cardiac Clearance letter in Epic from Dr. Andrzej Valdez

## 2021-09-15 NOTE — TELEPHONE ENCOUNTER
[facility] calling about patient pre op that was done, wanted to know if Dr Alla Barillas will clear her for surgery or will do additional testing since the visit was not signed

## 2021-09-15 NOTE — TELEPHONE ENCOUNTER
Call [facility] Preoperative Testing. The preop H&P is signed and in [facility] Patient is cleared for surgery scheduled for 9/17/21.

## 2021-09-16 ENCOUNTER — ANESTHESIA EVENT (OUTPATIENT)
Dept: OPERATING ROOM | Age: 66
End: 2021-09-16
Payer: COMMERCIAL

## 2021-09-17 ENCOUNTER — ANESTHESIA (OUTPATIENT)
Dept: OPERATING ROOM | Age: 66
End: 2021-09-17
Payer: COMMERCIAL

## 2021-09-17 ENCOUNTER — HOSPITAL ENCOUNTER (OUTPATIENT)
Age: 66
Setting detail: OUTPATIENT SURGERY
Discharge: HOME | End: 2021-09-17
Attending: OTOLARYNGOLOGY | Admitting: OTOLARYNGOLOGY
Payer: COMMERCIAL

## 2021-09-17 VITALS
DIASTOLIC BLOOD PRESSURE: 109 MMHG | SYSTOLIC BLOOD PRESSURE: 180 MMHG | OXYGEN SATURATION: 100 % | RESPIRATION RATE: 22 BRPM

## 2021-09-17 VITALS
SYSTOLIC BLOOD PRESSURE: 125 MMHG | HEART RATE: 75 BPM | BODY MASS INDEX: 33.32 KG/M2 | OXYGEN SATURATION: 96 % | RESPIRATION RATE: 16 BRPM | WEIGHT: 200 LBS | DIASTOLIC BLOOD PRESSURE: 86 MMHG | TEMPERATURE: 96.7 F | HEIGHT: 65 IN

## 2021-09-17 DIAGNOSIS — E04.1 LEFT THYROID NODULE: Primary | ICD-10-CM

## 2021-09-17 DIAGNOSIS — E04.1 THYROID NODULE: ICD-10-CM

## 2021-09-17 LAB
GLUCOSE BLD-MCNC: 196 MG/DL (ref 70–99)
GLUCOSE BLD-MCNC: 92 MG/DL (ref 70–99)
PERFORMED ON: ABNORMAL
PERFORMED ON: NORMAL

## 2021-09-17 PROCEDURE — 7100000011 HC PHASE II RECOVERY - ADDTL 15 MIN: Performed by: OTOLARYNGOLOGY

## 2021-09-17 PROCEDURE — 2580000003 HC RX 258: Performed by: ANESTHESIOLOGY

## 2021-09-17 PROCEDURE — 2580000003 HC RX 258: Performed by: OTOLARYNGOLOGY

## 2021-09-17 PROCEDURE — 7100000000 HC PACU RECOVERY - FIRST 15 MIN: Performed by: OTOLARYNGOLOGY

## 2021-09-17 PROCEDURE — 2500000003 HC RX 250 WO HCPCS: Performed by: NURSE ANESTHETIST, CERTIFIED REGISTERED

## 2021-09-17 PROCEDURE — 6360000002 HC RX W HCPCS: Performed by: NURSE ANESTHETIST, CERTIFIED REGISTERED

## 2021-09-17 PROCEDURE — 3600000004 HC SURGERY LEVEL 4 BASE: Performed by: OTOLARYNGOLOGY

## 2021-09-17 PROCEDURE — 3700000001 HC ADD 15 MINUTES (ANESTHESIA): Performed by: OTOLARYNGOLOGY

## 2021-09-17 PROCEDURE — 2709999900 HC NON-CHARGEABLE SUPPLY: Performed by: OTOLARYNGOLOGY

## 2021-09-17 PROCEDURE — 6360000002 HC RX W HCPCS: Performed by: OTOLARYNGOLOGY

## 2021-09-17 PROCEDURE — 88307 TISSUE EXAM BY PATHOLOGIST: CPT

## 2021-09-17 PROCEDURE — 7100000001 HC PACU RECOVERY - ADDTL 15 MIN: Performed by: OTOLARYNGOLOGY

## 2021-09-17 PROCEDURE — 3700000000 HC ANESTHESIA ATTENDED CARE: Performed by: OTOLARYNGOLOGY

## 2021-09-17 PROCEDURE — 3600000014 HC SURGERY LEVEL 4 ADDTL 15MIN: Performed by: OTOLARYNGOLOGY

## 2021-09-17 PROCEDURE — 2720000010 HC SURG SUPPLY STERILE: Performed by: OTOLARYNGOLOGY

## 2021-09-17 PROCEDURE — 2500000003 HC RX 250 WO HCPCS: Performed by: OTOLARYNGOLOGY

## 2021-09-17 PROCEDURE — 7100000010 HC PHASE II RECOVERY - FIRST 15 MIN: Performed by: OTOLARYNGOLOGY

## 2021-09-17 PROCEDURE — 60220 PARTIAL REMOVAL OF THYROID: CPT | Performed by: OTOLARYNGOLOGY

## 2021-09-17 RX ORDER — SODIUM CHLORIDE 0.9 % (FLUSH) 0.9 %
5-40 SYRINGE (ML) INJECTION PRN
Status: DISCONTINUED | OUTPATIENT
Start: 2021-09-17 | End: 2021-09-17 | Stop reason: HOSPADM

## 2021-09-17 RX ORDER — MEPERIDINE HYDROCHLORIDE 25 MG/ML
12.5 INJECTION INTRAMUSCULAR; INTRAVENOUS; SUBCUTANEOUS EVERY 5 MIN PRN
Status: DISCONTINUED | OUTPATIENT
Start: 2021-09-17 | End: 2021-09-17 | Stop reason: HOSPADM

## 2021-09-17 RX ORDER — OXYCODONE HYDROCHLORIDE AND ACETAMINOPHEN 5; 325 MG/1; MG/1
2 TABLET ORAL PRN
Status: DISCONTINUED | OUTPATIENT
Start: 2021-09-17 | End: 2021-09-17 | Stop reason: HOSPADM

## 2021-09-17 RX ORDER — FENTANYL CITRATE 50 UG/ML
INJECTION, SOLUTION INTRAMUSCULAR; INTRAVENOUS PRN
Status: DISCONTINUED | OUTPATIENT
Start: 2021-09-17 | End: 2021-09-17 | Stop reason: SDUPTHER

## 2021-09-17 RX ORDER — SUCCINYLCHOLINE/SOD CL,ISO/PF 200MG/10ML
SYRINGE (ML) INTRAVENOUS PRN
Status: DISCONTINUED | OUTPATIENT
Start: 2021-09-17 | End: 2021-09-17 | Stop reason: SDUPTHER

## 2021-09-17 RX ORDER — LIDOCAINE HYDROCHLORIDE 10 MG/ML
1 INJECTION, SOLUTION EPIDURAL; INFILTRATION; INTRACAUDAL; PERINEURAL
Status: DISCONTINUED | OUTPATIENT
Start: 2021-09-17 | End: 2021-09-17 | Stop reason: HOSPADM

## 2021-09-17 RX ORDER — OXYCODONE HYDROCHLORIDE AND ACETAMINOPHEN 5; 325 MG/1; MG/1
1 TABLET ORAL PRN
Status: DISCONTINUED | OUTPATIENT
Start: 2021-09-17 | End: 2021-09-17 | Stop reason: HOSPADM

## 2021-09-17 RX ORDER — OXYCODONE HYDROCHLORIDE 5 MG/1
5 TABLET ORAL EVERY 6 HOURS PRN
Qty: 10 TABLET | Refills: 0 | Status: SHIPPED | OUTPATIENT
Start: 2021-09-17 | End: 2021-09-20

## 2021-09-17 RX ORDER — MIDAZOLAM HYDROCHLORIDE 1 MG/ML
INJECTION INTRAMUSCULAR; INTRAVENOUS PRN
Status: DISCONTINUED | OUTPATIENT
Start: 2021-09-17 | End: 2021-09-17 | Stop reason: SDUPTHER

## 2021-09-17 RX ORDER — LABETALOL HYDROCHLORIDE 5 MG/ML
5 INJECTION, SOLUTION INTRAVENOUS EVERY 10 MIN PRN
Status: DISCONTINUED | OUTPATIENT
Start: 2021-09-17 | End: 2021-09-17 | Stop reason: HOSPADM

## 2021-09-17 RX ORDER — HYDRALAZINE HYDROCHLORIDE 20 MG/ML
INJECTION INTRAMUSCULAR; INTRAVENOUS PRN
Status: DISCONTINUED | OUTPATIENT
Start: 2021-09-17 | End: 2021-09-17 | Stop reason: SDUPTHER

## 2021-09-17 RX ORDER — ONDANSETRON 2 MG/ML
INJECTION INTRAMUSCULAR; INTRAVENOUS PRN
Status: DISCONTINUED | OUTPATIENT
Start: 2021-09-17 | End: 2021-09-17 | Stop reason: SDUPTHER

## 2021-09-17 RX ORDER — PROPOFOL 10 MG/ML
INJECTION, EMULSION INTRAVENOUS PRN
Status: DISCONTINUED | OUTPATIENT
Start: 2021-09-17 | End: 2021-09-17 | Stop reason: SDUPTHER

## 2021-09-17 RX ORDER — FENTANYL CITRATE 50 UG/ML
25 INJECTION, SOLUTION INTRAMUSCULAR; INTRAVENOUS EVERY 5 MIN PRN
Status: DISCONTINUED | OUTPATIENT
Start: 2021-09-17 | End: 2021-09-17 | Stop reason: HOSPADM

## 2021-09-17 RX ORDER — DIPHENHYDRAMINE HYDROCHLORIDE 50 MG/ML
12.5 INJECTION INTRAMUSCULAR; INTRAVENOUS
Status: DISCONTINUED | OUTPATIENT
Start: 2021-09-17 | End: 2021-09-17 | Stop reason: HOSPADM

## 2021-09-17 RX ORDER — FENTANYL CITRATE 50 UG/ML
50 INJECTION, SOLUTION INTRAMUSCULAR; INTRAVENOUS EVERY 5 MIN PRN
Status: DISCONTINUED | OUTPATIENT
Start: 2021-09-17 | End: 2021-09-17 | Stop reason: HOSPADM

## 2021-09-17 RX ORDER — ONDANSETRON 2 MG/ML
4 INJECTION INTRAMUSCULAR; INTRAVENOUS
Status: DISCONTINUED | OUTPATIENT
Start: 2021-09-17 | End: 2021-09-17 | Stop reason: HOSPADM

## 2021-09-17 RX ORDER — LIDOCAINE HCL/PF 100 MG/5ML
SYRINGE (ML) INJECTION PRN
Status: DISCONTINUED | OUTPATIENT
Start: 2021-09-17 | End: 2021-09-17 | Stop reason: SDUPTHER

## 2021-09-17 RX ORDER — LIDOCAINE HYDROCHLORIDE AND EPINEPHRINE 10; 10 MG/ML; UG/ML
INJECTION, SOLUTION INFILTRATION; PERINEURAL PRN
Status: DISCONTINUED | OUTPATIENT
Start: 2021-09-17 | End: 2021-09-17 | Stop reason: ALTCHOICE

## 2021-09-17 RX ORDER — MAGNESIUM HYDROXIDE 1200 MG/15ML
LIQUID ORAL CONTINUOUS PRN
Status: COMPLETED | OUTPATIENT
Start: 2021-09-17 | End: 2021-09-17

## 2021-09-17 RX ORDER — SODIUM CHLORIDE 9 MG/ML
25 INJECTION, SOLUTION INTRAVENOUS PRN
Status: DISCONTINUED | OUTPATIENT
Start: 2021-09-17 | End: 2021-09-17 | Stop reason: HOSPADM

## 2021-09-17 RX ORDER — DEXAMETHASONE SODIUM PHOSPHATE 4 MG/ML
INJECTION, SOLUTION INTRA-ARTICULAR; INTRALESIONAL; INTRAMUSCULAR; INTRAVENOUS; SOFT TISSUE PRN
Status: DISCONTINUED | OUTPATIENT
Start: 2021-09-17 | End: 2021-09-17 | Stop reason: SDUPTHER

## 2021-09-17 RX ORDER — HYDRALAZINE HYDROCHLORIDE 20 MG/ML
5 INJECTION INTRAMUSCULAR; INTRAVENOUS EVERY 10 MIN PRN
Status: DISCONTINUED | OUTPATIENT
Start: 2021-09-17 | End: 2021-09-17 | Stop reason: HOSPADM

## 2021-09-17 RX ORDER — SODIUM CHLORIDE, SODIUM LACTATE, POTASSIUM CHLORIDE, CALCIUM CHLORIDE 600; 310; 30; 20 MG/100ML; MG/100ML; MG/100ML; MG/100ML
INJECTION, SOLUTION INTRAVENOUS CONTINUOUS
Status: DISCONTINUED | OUTPATIENT
Start: 2021-09-17 | End: 2021-09-17 | Stop reason: HOSPADM

## 2021-09-17 RX ORDER — NEOSTIGMINE METHYLSULFATE 1 MG/ML
INJECTION, SOLUTION INTRAVENOUS PRN
Status: DISCONTINUED | OUTPATIENT
Start: 2021-09-17 | End: 2021-09-17 | Stop reason: SDUPTHER

## 2021-09-17 RX ORDER — GLYCOPYRROLATE 0.2 MG/ML
INJECTION INTRAMUSCULAR; INTRAVENOUS PRN
Status: DISCONTINUED | OUTPATIENT
Start: 2021-09-17 | End: 2021-09-17 | Stop reason: SDUPTHER

## 2021-09-17 RX ORDER — SODIUM CHLORIDE 0.9 % (FLUSH) 0.9 %
5-40 SYRINGE (ML) INJECTION EVERY 12 HOURS SCHEDULED
Status: DISCONTINUED | OUTPATIENT
Start: 2021-09-17 | End: 2021-09-17 | Stop reason: HOSPADM

## 2021-09-17 RX ORDER — PROCHLORPERAZINE EDISYLATE 5 MG/ML
5 INJECTION INTRAMUSCULAR; INTRAVENOUS
Status: DISCONTINUED | OUTPATIENT
Start: 2021-09-17 | End: 2021-09-17 | Stop reason: HOSPADM

## 2021-09-17 RX ADMIN — SODIUM CHLORIDE, POTASSIUM CHLORIDE, SODIUM LACTATE AND CALCIUM CHLORIDE: 600; 310; 30; 20 INJECTION, SOLUTION INTRAVENOUS at 08:51

## 2021-09-17 RX ADMIN — CEFAZOLIN 2000 MG: 10 INJECTION, POWDER, FOR SOLUTION INTRAVENOUS at 09:49

## 2021-09-17 RX ADMIN — GLYCOPYRROLATE 1 MG: 0.2 INJECTION INTRAMUSCULAR; INTRAVENOUS at 10:59

## 2021-09-17 RX ADMIN — SODIUM CHLORIDE, POTASSIUM CHLORIDE, SODIUM LACTATE AND CALCIUM CHLORIDE: 600; 310; 30; 20 INJECTION, SOLUTION INTRAVENOUS at 13:20

## 2021-09-17 RX ADMIN — HYDRALAZINE HYDROCHLORIDE 5 MG: 20 INJECTION INTRAMUSCULAR; INTRAVENOUS at 10:48

## 2021-09-17 RX ADMIN — SUGAMMADEX 200 MG: 100 INJECTION, SOLUTION INTRAVENOUS at 11:05

## 2021-09-17 RX ADMIN — Medication 100 MG: at 09:43

## 2021-09-17 RX ADMIN — PROPOFOL 150 MG: 10 INJECTION, EMULSION INTRAVENOUS at 09:43

## 2021-09-17 RX ADMIN — DEXAMETHASONE SODIUM PHOSPHATE 4 MG: 4 INJECTION, SOLUTION INTRAMUSCULAR; INTRAVENOUS at 10:04

## 2021-09-17 RX ADMIN — NEOSTIGMINE METHYLSULFATE 5 MG: 1 INJECTION INTRAVENOUS at 10:59

## 2021-09-17 RX ADMIN — PROPOFOL 50 MG: 10 INJECTION, EMULSION INTRAVENOUS at 09:57

## 2021-09-17 RX ADMIN — PROPOFOL 50 MG: 10 INJECTION, EMULSION INTRAVENOUS at 09:52

## 2021-09-17 RX ADMIN — MIDAZOLAM HYDROCHLORIDE 2 MG: 2 INJECTION, SOLUTION INTRAMUSCULAR; INTRAVENOUS at 09:26

## 2021-09-17 RX ADMIN — FENTANYL CITRATE 100 MCG: 50 INJECTION, SOLUTION INTRAMUSCULAR; INTRAVENOUS at 09:35

## 2021-09-17 RX ADMIN — Medication 140 MG: at 09:43

## 2021-09-17 RX ADMIN — ONDANSETRON 4 MG: 2 INJECTION INTRAMUSCULAR; INTRAVENOUS at 10:40

## 2021-09-17 ASSESSMENT — PULMONARY FUNCTION TESTS
PIF_VALUE: 20
PIF_VALUE: 21
PIF_VALUE: 20
PIF_VALUE: 2
PIF_VALUE: 20
PIF_VALUE: 20
PIF_VALUE: 21
PIF_VALUE: 21
PIF_VALUE: 20
PIF_VALUE: 21
PIF_VALUE: 20
PIF_VALUE: 0
PIF_VALUE: 21
PIF_VALUE: 21
PIF_VALUE: 1
PIF_VALUE: 0
PIF_VALUE: 21
PIF_VALUE: 20
PIF_VALUE: 0
PIF_VALUE: 21
PIF_VALUE: 20
PIF_VALUE: 20
PIF_VALUE: 21
PIF_VALUE: 7
PIF_VALUE: 20
PIF_VALUE: 21
PIF_VALUE: 6
PIF_VALUE: 20
PIF_VALUE: 21
PIF_VALUE: 20
PIF_VALUE: 21
PIF_VALUE: 22
PIF_VALUE: 20
PIF_VALUE: 20
PIF_VALUE: 22
PIF_VALUE: 20
PIF_VALUE: 20
PIF_VALUE: 0
PIF_VALUE: 20
PIF_VALUE: 20
PIF_VALUE: 21
PIF_VALUE: 9
PIF_VALUE: 3
PIF_VALUE: 21
PIF_VALUE: 20
PIF_VALUE: 1
PIF_VALUE: 20
PIF_VALUE: 21
PIF_VALUE: 3
PIF_VALUE: 0
PIF_VALUE: 21
PIF_VALUE: 20
PIF_VALUE: 22
PIF_VALUE: 2
PIF_VALUE: 20
PIF_VALUE: 20
PIF_VALUE: 21
PIF_VALUE: 8
PIF_VALUE: 21
PIF_VALUE: 21
PIF_VALUE: 20
PIF_VALUE: 20
PIF_VALUE: 10
PIF_VALUE: 2
PIF_VALUE: 21
PIF_VALUE: 3
PIF_VALUE: 21
PIF_VALUE: 20
PIF_VALUE: 7
PIF_VALUE: 20
PIF_VALUE: 21
PIF_VALUE: 1
PIF_VALUE: 8
PIF_VALUE: 20
PIF_VALUE: 20
PIF_VALUE: 21
PIF_VALUE: 17
PIF_VALUE: 20
PIF_VALUE: 20
PIF_VALUE: 21
PIF_VALUE: 20
PIF_VALUE: 20

## 2021-09-17 ASSESSMENT — PAIN - FUNCTIONAL ASSESSMENT: PAIN_FUNCTIONAL_ASSESSMENT: 0-10

## 2021-09-17 ASSESSMENT — PAIN SCALES - GENERAL
PAINLEVEL_OUTOF10: 0
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
## 2021-09-17 NOTE — PROGRESS NOTES
Daughter is here now-  PT left per w/c to go home with her. DC instructions reviewed with pt and daughter. Verbalized understanding.   Left with 1 Rx. t denied any nausea or pain

## 2021-09-17 NOTE — ANESTHESIA POSTPROCEDURE EVALUATION
Department of Anesthesiology  Postprocedure Note    Patient: Analia Hernandez  MRN: [id number]  YOB: 1955  Date of evaluation: 9/17/2021  Time:  4:14 PM     Procedure Summary     Date: 09/17/21 Room / Location: [address]    Anesthesia Start: 4148 Anesthesia Stop: 1120    Procedure: LEFT HEMITHYROIDECTOMY (Left ) Diagnosis:       Thyroid nodule      (Left Thyroid nodule [E04.1])    Surgeons: Nicole Gibson MD Responsible Provider: Jo Ann Mcclendon MD    Anesthesia Type: general ASA Status: 4          Anesthesia Type: general    Perez Phase I: Perez Score: 10    Perez Phase II: Perez Score: 9    Last vitals: Reviewed and per EMR flowsheets.        Anesthesia Post Evaluation    Patient location during evaluation: PACU  Patient participation: complete - patient participated  Level of consciousness: awake  Pain score: 2  Airway patency: patent  Nausea & Vomiting: no nausea and no vomiting  Complications: no  Cardiovascular status: blood pressure returned to baseline  Respiratory status: acceptable  Hydration status: euvolemic

## 2021-09-17 NOTE — PROGRESS NOTES
PACU Transfer to hospitals    Vitals:    09/17/21 1215   BP: (!) 149/87   Pulse: 76   Resp: 18   Temp: 96.9 °F (36.1 °C)   SpO2:    PO = 96   BP within 20% baseline    Intake/Output Summary (Last 24 hours) at 9/17/2021 1225  Last data filed at 9/17/2021 1121  Gross per 24 hour   Intake 700 ml   Output --   Net 700 ml       Pain assessment:   Pain Level: 0    Patient transferred to care of DERREK RN.    9/17/2021 12:25 PM

## 2021-09-17 NOTE — OP NOTE
Operative Note    Patient Name: Marie Jimenez  YOB: 1955  Medical Record Number:  [id number]  Billing Number:  [id number]  Date of Procedure: 9/17/2021  Time: 7216      DATE OF SURGERY:  9/17/2021     ATTENDING SURGEON:  Beverly García MD PHD  ASSISTANT: Circulator: Giselle Espinoza RN  Scrub Person First: Polina Daniels  Resident: Laina Guerrero MD    PREOPERATIVE DIAGNOSES:  left Thyroid nodule [E04.1]     POSTOPERATIVE DIAGNOSES:  same as pre-op    PROCEDURE(S) PERFORMED:  Procedure(s):  left hemithyroidectomy (97357) (comp 45321)  Continuous Laryngeal Nerve Integrity Monitoring (49580)    ESTIMATED BLOOD LOSS:  less than 25 mL     SPECIMENS:    ID  Type  Source  Tests  Collected by  Time  Destination    A : Left Thyroid  Tissue  Thyroid  SURGICAL PATHOLOGY EXAM  Beverly García MD [facility]  0/19/0512          COMPLICATIONS:  None. ANESTHESIA:  General            Indications: This is a 76 y.o. female with a history of left Thyroid nodule [E04.1]. After a discussion of risks, benefits, and alternate treatment options, the patient elected to proceed with left jett- possible total thyroidectomy. Informed consent obtained. DETAILS OF PROCEDURE(S):        The patient was brought to the operating room,    placed in a supine position, and induced and intubated per anesthesia. The    patient was intubated with a  Xomed nerve integrity monitoring    endotracheal tube. Direct laryngoscopy confirmed accurate placement of the    EMG contact electrodes to the vocalis muscles of the endolarynx bilaterally. Subdermal ground electrodes were placed and all electrodes hooked up to the    nerve monitor, which was turned on and set for continuous laryngeal nerve    monitoring for the remainder of the  procedure. Only a short-acting    muscle relaxant was used for intubation, no further muscle relaxant given,    and no topical laryngeal anesthetic was used.         After confirming the correct patient, procedure, and site using standard    timeout technique, the neck was prepped and draped in standard sterile    fashion. A horizontal skin incision was made through a previously identified    skin crease. Dissection was carried down to the subplatysmal plane. The    strap muscles were  in the midline and retracted laterally on the    left. The left  thyroid gland was mobilized medially. Attention was turned to    the superior pole, with identification and preservation of the superior    laryngeal nerve and cricothyroid muscle medially. The integrity of the nerve    was confirmed with the nerve stimulator and monitor. The mid and inferior thyroid was dissected in a capsulr plane. The inferior and    superior parathyroid glands were preserved along with their blood supply. The recurrent nerve was identified and integrity confirmed with the nerve stimulator and monitor. It was followed to its insertion near the cricothyroid    membrane, Lawrence's ligament was sharply transected, and the thyroid lobe was sent for permanent histologic diagnosis. The recurrent laryngeal nerve did stimulate prior to closure. Hemostasis was obtained. The sponge and needle counts were correct. The wound was closed in layers. The patient was returned to anesthesia care, awakened, extubated, and taken to the recovery room in good condition. I attest that I was present for a performed the key points of the procedure myself.          Electronically signed by Jared Hernandez MD on 9/17/2021 at 10:44 AM

## 2021-09-17 NOTE — BRIEF OP NOTE
Brief Postoperative Note      Patient: Meridith Canavan  YOB: 1955  MRN: [id number]    Date of Procedure: 9/17/2021    Pre-Op Diagnosis: Left Thyroid nodule [E04.1]    Post-Op Diagnosis: Same       Procedure(s):  LEFT HEMITHYROIDECTOMY, POSSIBLE TOTAL    Surgeon(s):  Samantha Brown MD    Assistant:  Resident: Eliazar Pham MD    Anesthesia: General    Estimated Blood Loss (mL): 25    Complications: None    Specimens:   ID Type Source Tests Collected by Time Destination   A : A.  LEFT THYROID Tissue Tissue SURGICAL PATHOLOGY Samantha Brown MD 9/17/2021 1040        Implants:  * No implants in log *      Drains: * No LDAs found *    Findings: Left thyroid nodule    Electronically signed by Mansi Barrera MD on 9/17/2021 at 10:44 AM
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
## 2021-09-22 ENCOUNTER — TELEPHONE (OUTPATIENT)
Dept: PRIMARY CARE CLINIC | Age: 66
End: 2021-09-22

## 2021-09-23 ENCOUNTER — OFFICE VISIT (OUTPATIENT)
Dept: ENT CLINIC | Age: 66
End: 2021-09-23

## 2021-09-23 DIAGNOSIS — Z48.89 POSTOPERATIVE VISIT: Primary | ICD-10-CM

## 2021-09-23 PROCEDURE — 99024 POSTOP FOLLOW-UP VISIT: CPT | Performed by: OTOLARYNGOLOGY

## 2021-09-23 NOTE — PROGRESS NOTES
Status post jett thyroidectomy. Doing well. No pain. Voice is good. Pathology benign. Pe: Incision clean, dry and intact. Mirror exam was performed. The nasopharynx, larynx,or hypopharynx were examined. Vocal fold motion was examined. Pertinent findings include: normal laryngeal motion    A/P: Follow up in 5 weeks for incision check and TSH levels.

## 2021-09-30 DIAGNOSIS — F33.1 MODERATE EPISODE OF RECURRENT MAJOR DEPRESSIVE DISORDER (HCC): ICD-10-CM

## 2021-09-30 DIAGNOSIS — F41.9 ANXIETY: ICD-10-CM

## 2021-09-30 RX ORDER — DULOXETIN HYDROCHLORIDE 30 MG/1
30 CAPSULE, DELAYED RELEASE ORAL DAILY
Qty: 90 CAPSULE | Refills: 1 | OUTPATIENT
Start: 2021-09-30

## 2021-10-20 ENCOUNTER — TELEPHONE (OUTPATIENT)
Dept: ENT CLINIC | Age: 66
End: 2021-10-20

## 2021-10-20 DIAGNOSIS — E04.1 THYROID NODULE: Primary | ICD-10-CM

## 2021-10-20 NOTE — TELEPHONE ENCOUNTER
Please let patient know there is no charge for this post-operative visit but I have placed a blood work order for her thyroid if she prefer not to come to the office.    SHERI

## 2021-10-20 NOTE — TELEPHONE ENCOUNTER
Informed her of global period and how it works and also let her know that lab orders have been placed if she chooses not to be seen in office. Patient states she will keep her appointment on 10/28/21.

## 2021-10-22 DIAGNOSIS — E04.1 THYROID NODULE: ICD-10-CM

## 2021-10-22 LAB — TSH REFLEX: 1.99 UIU/ML (ref 0.27–4.2)

## 2021-10-25 ENCOUNTER — TELEPHONE (OUTPATIENT)
Dept: ENT CLINIC | Age: 66
End: 2021-10-25

## 2021-10-25 NOTE — TELEPHONE ENCOUNTER
----- Message from Kyleigh Dahl MD sent at 10/25/2021  9:28 AM EDT -----  Please let [facility]  Tierra Alen know that her thyroid labs were normal.   SHERI

## 2021-10-28 ENCOUNTER — OFFICE VISIT (OUTPATIENT)
Dept: ENT CLINIC | Age: 66
End: 2021-10-28

## 2021-10-28 VITALS
WEIGHT: 221.6 LBS | TEMPERATURE: 98.7 F | HEIGHT: 65 IN | DIASTOLIC BLOOD PRESSURE: 86 MMHG | BODY MASS INDEX: 36.92 KG/M2 | HEART RATE: 84 BPM | SYSTOLIC BLOOD PRESSURE: 136 MMHG

## 2021-10-28 DIAGNOSIS — Z48.89 POSTOPERATIVE VISIT: Primary | ICD-10-CM

## 2021-10-28 PROCEDURE — 99024 POSTOP FOLLOW-UP VISIT: CPT | Performed by: OTOLARYNGOLOGY

## 2021-10-28 NOTE — PROGRESS NOTES
Status post total/partial thyroidectomy. Doing well. No pain. Voice is good. TSH normal.     Pe: Incision clean, dry and intact. Mirror exam was performed. The nasopharynx, larynx,or hypopharynx were examined. Vocal fold motion was examined. Pertinent findings include: none    A/P: Follow up as needed.

## 2021-11-02 ENCOUNTER — OFFICE VISIT (OUTPATIENT)
Dept: PRIMARY CARE CLINIC | Age: 66
End: 2021-11-02
Payer: MEDICARE

## 2021-11-02 VITALS
HEART RATE: 70 BPM | WEIGHT: 220.2 LBS | SYSTOLIC BLOOD PRESSURE: 122 MMHG | OXYGEN SATURATION: 99 % | TEMPERATURE: 97.9 F | HEIGHT: 65 IN | RESPIRATION RATE: 16 BRPM | BODY MASS INDEX: 36.69 KG/M2 | DIASTOLIC BLOOD PRESSURE: 74 MMHG

## 2021-11-02 DIAGNOSIS — Z00.00 ROUTINE GENERAL MEDICAL EXAMINATION AT A HEALTH CARE FACILITY: Primary | ICD-10-CM

## 2021-11-02 DIAGNOSIS — D50.9 IRON DEFICIENCY ANEMIA, UNSPECIFIED IRON DEFICIENCY ANEMIA TYPE: ICD-10-CM

## 2021-11-02 DIAGNOSIS — I10 ESSENTIAL HYPERTENSION: ICD-10-CM

## 2021-11-02 DIAGNOSIS — F41.9 ANXIETY: ICD-10-CM

## 2021-11-02 DIAGNOSIS — E78.2 MIXED HYPERLIPIDEMIA: ICD-10-CM

## 2021-11-02 DIAGNOSIS — E55.9 VITAMIN D DEFICIENCY: ICD-10-CM

## 2021-11-02 DIAGNOSIS — E11.9 TYPE 2 DIABETES MELLITUS WITHOUT COMPLICATION, WITH LONG-TERM CURRENT USE OF INSULIN (HCC): ICD-10-CM

## 2021-11-02 DIAGNOSIS — Z79.4 TYPE 2 DIABETES MELLITUS WITHOUT COMPLICATION, WITH LONG-TERM CURRENT USE OF INSULIN (HCC): ICD-10-CM

## 2021-11-02 DIAGNOSIS — K21.9 GASTROESOPHAGEAL REFLUX DISEASE, UNSPECIFIED WHETHER ESOPHAGITIS PRESENT: ICD-10-CM

## 2021-11-02 DIAGNOSIS — F33.1 MODERATE EPISODE OF RECURRENT MAJOR DEPRESSIVE DISORDER (HCC): ICD-10-CM

## 2021-11-02 LAB
ALBUMIN SERPL-MCNC: 4.4 G/DL (ref 3.4–5)
ALP BLD-CCNC: 58 U/L (ref 40–129)
ALT SERPL-CCNC: 10 U/L (ref 10–40)
AST SERPL-CCNC: 17 U/L (ref 15–37)
BASOPHILS ABSOLUTE: 0.1 K/UL (ref 0–0.2)
BASOPHILS RELATIVE PERCENT: 1.2 %
BILIRUB SERPL-MCNC: 0.4 MG/DL (ref 0–1)
BILIRUBIN DIRECT: <0.2 MG/DL (ref 0–0.3)
BILIRUBIN, INDIRECT: NORMAL MG/DL (ref 0–1)
CHOLESTEROL, TOTAL: 143 MG/DL (ref 0–199)
EOSINOPHILS ABSOLUTE: 0.1 K/UL (ref 0–0.6)
EOSINOPHILS RELATIVE PERCENT: 2.4 %
HBA1C MFR BLD: 7.6 %
HCT VFR BLD CALC: 37.2 % (ref 36–48)
HDLC SERPL-MCNC: 64 MG/DL (ref 40–60)
HEMOGLOBIN: 11.9 G/DL (ref 12–16)
IRON SATURATION: 20 % (ref 15–50)
IRON: 62 UG/DL (ref 37–145)
LDL CHOLESTEROL CALCULATED: 64 MG/DL
LYMPHOCYTES ABSOLUTE: 1.3 K/UL (ref 1–5.1)
LYMPHOCYTES RELATIVE PERCENT: 24.8 %
MCH RBC QN AUTO: 27.4 PG (ref 26–34)
MCHC RBC AUTO-ENTMCNC: 32 G/DL (ref 31–36)
MCV RBC AUTO: 85.7 FL (ref 80–100)
MONOCYTES ABSOLUTE: 0.3 K/UL (ref 0–1.3)
MONOCYTES RELATIVE PERCENT: 6 %
NEUTROPHILS ABSOLUTE: 3.4 K/UL (ref 1.7–7.7)
NEUTROPHILS RELATIVE PERCENT: 65.6 %
PDW BLD-RTO: 17.6 % (ref 12.4–15.4)
PLATELET # BLD: 159 K/UL (ref 135–450)
PMV BLD AUTO: 8.7 FL (ref 5–10.5)
RBC # BLD: 4.34 M/UL (ref 4–5.2)
TOTAL IRON BINDING CAPACITY: 313 UG/DL (ref 260–445)
TOTAL PROTEIN: 6.9 G/DL (ref 6.4–8.2)
TRIGL SERPL-MCNC: 74 MG/DL (ref 0–150)
VITAMIN D 25-HYDROXY: 61.4 NG/ML
VLDLC SERPL CALC-MCNC: 15 MG/DL
WBC # BLD: 5.1 K/UL (ref 4–11)

## 2021-11-02 PROCEDURE — 3051F HG A1C>EQUAL 7.0%<8.0%: CPT | Performed by: INTERNAL MEDICINE

## 2021-11-02 PROCEDURE — 83036 HEMOGLOBIN GLYCOSYLATED A1C: CPT | Performed by: INTERNAL MEDICINE

## 2021-11-02 PROCEDURE — 4040F PNEUMOC VAC/ADMIN/RCVD: CPT | Performed by: INTERNAL MEDICINE

## 2021-11-02 PROCEDURE — G0402 INITIAL PREVENTIVE EXAM: HCPCS | Performed by: INTERNAL MEDICINE

## 2021-11-02 PROCEDURE — 1123F ACP DISCUSS/DSCN MKR DOCD: CPT | Performed by: INTERNAL MEDICINE

## 2021-11-02 PROCEDURE — 3017F COLORECTAL CA SCREEN DOC REV: CPT | Performed by: INTERNAL MEDICINE

## 2021-11-02 RX ORDER — DULOXETIN HYDROCHLORIDE 30 MG/1
CAPSULE, DELAYED RELEASE ORAL
Qty: 90 CAPSULE | Refills: 3 | Status: SHIPPED | OUTPATIENT
Start: 2021-11-02 | End: 2021-12-20 | Stop reason: SDUPTHER

## 2021-11-02 RX ORDER — ROSUVASTATIN CALCIUM 20 MG/1
20 TABLET, COATED ORAL NIGHTLY
Qty: 90 TABLET | Refills: 3 | Status: SHIPPED | OUTPATIENT
Start: 2021-11-02 | End: 2022-07-28

## 2021-11-02 RX ORDER — PEN NEEDLE, DIABETIC 31 GX5/16"
NEEDLE, DISPOSABLE MISCELLANEOUS
Qty: 100 EACH | Refills: 3 | Status: SHIPPED | OUTPATIENT
Start: 2021-11-02 | End: 2022-10-01

## 2021-11-02 RX ORDER — LOSARTAN POTASSIUM 100 MG/1
100 TABLET ORAL DAILY
Qty: 90 TABLET | Refills: 3 | Status: SHIPPED | OUTPATIENT
Start: 2021-11-02 | End: 2022-10-01

## 2021-11-02 RX ORDER — INSULIN DETEMIR 100 [IU]/ML
20 INJECTION, SOLUTION SUBCUTANEOUS NIGHTLY
Qty: 30 ML | Refills: 3 | Status: SHIPPED | OUTPATIENT
Start: 2021-11-02 | End: 2022-02-28

## 2021-11-02 RX ORDER — OMEPRAZOLE 40 MG/1
40 CAPSULE, DELAYED RELEASE ORAL
Qty: 30 CAPSULE | Refills: 1 | Status: SHIPPED | OUTPATIENT
Start: 2021-11-02 | End: 2022-04-11

## 2021-11-02 ASSESSMENT — PATIENT HEALTH QUESTIONNAIRE - PHQ9
SUM OF ALL RESPONSES TO PHQ QUESTIONS 1-9: 0
SUM OF ALL RESPONSES TO PHQ9 QUESTIONS 1 & 2: 0
2. FEELING DOWN, DEPRESSED OR HOPELESS: 0
SUM OF ALL RESPONSES TO PHQ QUESTIONS 1-9: 0
SUM OF ALL RESPONSES TO PHQ QUESTIONS 1-9: 0
1. LITTLE INTEREST OR PLEASURE IN DOING THINGS: 0

## 2021-11-02 ASSESSMENT — LIFESTYLE VARIABLES: HOW OFTEN DO YOU HAVE A DRINK CONTAINING ALCOHOL: 0

## 2021-11-02 NOTE — PROGRESS NOTES
Medicare Annual Wellness Visit  Name: Tung Sultana Date: 2021   MRN: [id number] Sex: Female   Age: 76 y.o. Ethnicity: Non- / Non    : 1955 Race: Black /       Thomas Roa is here for Medicare AWV    Screenings for behavioral, psychosocial and functional/safety risks, and cognitive dysfunction are all negative except as indicated below. These results, as well as other patient data from the [address] form, are documented in Flowsheets linked to this Encounter. Patient has type 2 diabetes. Patient takes Levemir insulin 18 units nightly and Metformin 1000 mg 2 times daily. Patient states she has been eating sweets and not decreasing carbohydrates as much. Patient states she was drinking caramel iced coffee in the past 3 months and gave it up in September. Patient monitors her blood sugar fasting and it averages 100.     Patient has hypertension. Patient takes losartan 100 mg once daily and verapamil  mg once daily. Patient decreases salt. Patient is not exercising.      Patient has hyperlipidemia. Patient takes rosuvastatin 20 mg nightly. Patient does a low-fat, low-cholesterol diet.     Patient has depression and anxiety. Patient takes Cymbalta 30 mg once daily. Patient states her anxiety and depression have been ok.      Patient has GERD. Patient takes omeprazole 40 mg once daily. Patient decreases spicy foods, tomato-based foods, and caffeine. Patient states she drinks decaf coffee.      Patient has iron deficiency anemia. Patient takes ferrous sulfate 325 mg once daily. Patient eats green leafy vegetables. Patient states she eats red meat sometimes.      Patient has vitamin D deficiency. Patient takes vitamin D 5,000 IU once daily. Allergies   Allergen Reactions    No Known Allergies          Prior to Visit Medications    Medication Sig Taking?  Authorizing Provider   DULoxetine (CYMBALTA) 30 MG extended release Obstetrician (Obstetrics & Gynecology)  Rao Connor MD as Consulting Physician (Gastroenterology)  Linden Cerna MD as Surgeon (General Surgery)    Wt Readings from Last 3 Encounters:   11/02/21 220 lb 3.2 oz (99.9 kg)   10/28/21 221 lb 9.6 oz (100.5 kg)   09/17/21 200 lb (90.7 kg)     Vitals:    11/02/21 0841   BP: 122/74   Pulse: 70   Resp: 16   Temp: 97.9 °F (36.6 °C)   SpO2: 99%   Weight: 220 lb 3.2 oz (99.9 kg)   Height: 5' 5\" (1.651 m)     Body mass index is 36.64 kg/m². Based upon direct observation of the patient, evaluation of cognition reveals recent and remote memory intact. General Appearance: alert and oriented to person, place and time, well-developed and well-nourished, in no acute distress  Skin: no foot ulcers  Head: normocephalic and atraumatic  Eyes: pupils equal, round, and reactive to light, extraocular eye movements intact, conjunctivae normal  ENT: tympanic membrane, external ear and ear canal normal bilaterally, oropharynx clear and moist with normal mucous membranes  Neck: neck supple and non tender without mass, no thyromegaly or thyroid nodules, no cervical lymphadenopathy   Pulmonary/Chest: clear to auscultation bilaterally- no wheezes, rales or rhonchi, normal air movement, no respiratory distress  Cardiovascular: normal rate, regular rhythm, normal S1 and S2, no murmurs and no carotid bruits  Abdomen: soft, non-tender, non-distended, normal bowel sounds, no masses or organomegaly  Extremities: no edema  Neurologic: gait and coordination normal and speech normal, bilateral foot monofilament exam negative    Patient's complete Health Risk Assessment and screening values have been reviewed and are found in Flowsheets. The following problems were reviewed today and where indicated follow up appointments were made and/or referrals ordered.     Positive Risk Factor Screenings with Interventions:            General Health and ACP:  General  In general, how would you say your health is?: Good  In the past 7 days, have you experienced any of the following? New or Increased Pain, New or Increased Fatigue, Loneliness, Social Isolation, Stress or Anger?: (!) New or Increased Pain  Do you get the social and emotional support that you need?: Yes  Do you have a Living Will?: (!) No  Advance Directives     Power of [facility] Will ACP-Advance Directive ACP-Power of     Not on File Not on File Not on File Not on File      General Health Risk Interventions:  · Pain issues: patient states she has right leg pain with standing on hard concrete for hours at her job. Pt states pain is gone with taking 2-3 Tylenol. Pt states she plans to wear support hose. · No Living Will: Advance Care Planning addressed with patient today    Health Habits/Nutrition:  Health Habits/Nutrition  Do you exercise for at least 20 minutes 2-3 times per week?: (!) No  Have you lost any weight without trying in the past 3 months?: No  Do you eat only one meal per day?: No  Have you seen the dentist within the past year?: Yes  Body mass index: (!) 36.64  Health Habits/Nutrition Interventions:  · Inadequate physical activity:  patient agrees to increase physical activity as follows: patient plans to check out [employer]neakers for exercise. · Nutritional issues:  patient plans to eat better and exercise.        Personalized Preventive Plan   Current Health Maintenance Status  Immunization History   Administered Date(s) Administered    COVID-19, Moderna, Primary or Immunocompromised, PF, 100mcg/0.5mL 05/01/2021, 05/29/2021    Influenza 10/12/2012    Influenza Vaccine, unspecified formulation 09/20/2016, 09/19/2017    Influenza Virus Vaccine 09/29/2013, 09/25/2015, 10/09/2019, 09/14/2020    Influenza Whole 11/14/2011    Influenza, MDCK, Preservative free 10/01/2014    Influenza, Quadv, IM, (6 mo and older Fluzone, Flulaval, Fluarix and 3 yrs and older Afluria) 10/01/2016, 09/01/2018    Influenza, Quadv, IM, PF (6 mo and older Fluzone, Flulaval, Fluarix, and 3 yrs and older Afluria) 09/07/2018    Influenza, Quadv, adjuvanted, 65 yrs +, IM, PF (Fluad) 09/06/2021    Pneumococcal Conjugate 13-valent ([id number]) 09/20/2017    Pneumococcal Polysaccharide ([id number]) 12/15/2000, 09/29/2013, 09/07/2018    Tdap (Boostrix, Adacel) 09/01/2016    Tetanus 05/17/2010    Zoster Live (Zostavax) 02/14/2015    Zoster Recombinant (Shingrix) 07/29/2020, 11/03/2020        Health Maintenance   Topic Date Due    Annual Wellness Visit (AWV)  Never done    Diabetic microalbuminuria test  11/03/2021    COVID-19 Vaccine (3 - Booster for Moderna series) 11/29/2021    Potassium monitoring  10/13/2022    Creatinine monitoring  10/13/2022    Diabetic foot exam  11/02/2022    A1C test (Diabetic or Prediabetic)  11/02/2022    Lipid screen  11/02/2022    Breast cancer screen  11/09/2022    Diabetic retinal exam  05/05/2023    Pneumococcal 65+ years Vaccine (2 of 2 - PPSV23) 09/07/2023    DTaP/Tdap/Td vaccine (2 - Td or Tdap) 09/01/2026    Colon cancer screen colonoscopy  06/01/2030    DEXA (modify frequency per FRAX score)  Completed    Flu vaccine  Completed    Shingles Vaccine  Completed    Hepatitis C screen  Completed    Hepatitis A vaccine  Aged Out    Hib vaccine  Aged Out    Meningococcal (ACWY) vaccine  Aged Out     Recommendations for [employer] Due: see orders and patient instructions/AVS.  .   Recommended screening schedule for the next 5-10 years is provided to the patient in written form: see Patient Instructions/AVS.    Lab Review     Results for POC orders placed in visit on 11/02/21   POCT glycosylated hemoglobin (Hb A1C)   Result Value Ref Range    Hemoglobin A1C 7.6 %     Orders Only on 10/22/2021   Component Date Value    TSH 10/22/2021 1.99    Orders Only on 10/13/2021   Component Date Value    Sodium 10/13/2021 141     Potassium 10/13/2021 4.5     Chloride 10/13/2021 103     CO2 10/13/2021 26     Anion Gap 10/13/2021 12     Glucose 10/13/2021 131*    BUN 10/13/2021 20     CREATININE 10/13/2021 0.9     GFR Non- 10/13/2021 >60     GFR  10/13/2021 >60     Calcium 10/13/2021 9.2    Admission on 09/17/2021, Discharged on 09/17/2021   Component Date Value    POC Glucose 09/17/2021 92     Performed on 09/17/2021 ACCU-CHEK     POC Glucose 09/17/2021 196*    Performed on 09/17/2021 ACCU-CHEK    Orders Only on 09/14/2021   Component Date Value    Iron 09/14/2021 99     TIBC 09/14/2021 304     Iron Saturation 09/14/2021 33     WBC 09/14/2021 6.1     RBC 09/14/2021 4.07     Hemoglobin 09/14/2021 10.7*    Hematocrit 09/14/2021 34.0*    MCV 09/14/2021 83.6     MCH 09/14/2021 26.3     MCHC 09/14/2021 31.5     RDW 09/14/2021 21.4*    Platelets 29/80/6539 181     MPV 09/14/2021 8.7    Orders Only on 09/08/2021   Component Date Value    Sodium 09/08/2021 139     Potassium 09/08/2021 4.8     Chloride 09/08/2021 103     CO2 09/08/2021 22     Anion Gap 09/08/2021 14     Glucose 09/08/2021 168*    BUN 09/08/2021 21*    CREATININE 09/08/2021 0.9     GFR Non- 09/08/2021 >60     GFR  09/08/2021 >60     Calcium 09/08/2021 9.2    Orders Only on 08/04/2021   Component Date Value    WBC 08/04/2021 5.0     RBC 08/04/2021 4.28     Hemoglobin 08/04/2021 10.7*    Hematocrit 08/04/2021 35.0*    MCV 08/04/2021 81.7     MCH 08/04/2021 25.0*    MCHC 08/04/2021 30.6*    RDW 08/04/2021 22.9*    Platelets 97/36/1137 197     MPV 08/04/2021 8.0     Iron 08/04/2021 116     TIBC 08/04/2021 317     Iron Saturation 08/04/2021 37    Orders Only on 07/09/2021   Component Date Value    Vit D, 25-Hydroxy 07/09/2021 76.3    Orders Only on 07/09/2021   Component Date Value    PTH 07/09/2021 41.5    Orders Only on 07/09/2021   Component Date Value    Phosphorus 07/09/2021 4.1    Orders Only on 07/09/2021   Component Date Value    Sodium 07/09/2021 140     Potassium 07/09/2021 4.7     Chloride 07/09/2021 105     CO2 07/09/2021 25     Anion Gap 07/09/2021 10     Glucose 07/09/2021 100*    BUN 07/09/2021 18     CREATININE 07/09/2021 0.9     GFR Non- 07/09/2021 >60     GFR  07/09/2021 >60     Calcium 07/09/2021 9.6     Total Protein 07/09/2021 6.5     Albumin 07/09/2021 4.1     Albumin/Globulin Ratio 07/09/2021 1.7     Total Bilirubin 07/09/2021 0.3     Alkaline Phosphatase 07/09/2021 49     ALT 07/09/2021 10     AST 07/09/2021 15     Globulin 07/09/2021 2.4    There may be more visits with results that are not included. Magnus Hernandez was seen today for medicare awv. Diagnoses and all orders for this visit:    1. Routine general medical examination at a health care facility      2. Type 2 diabetes mellitus without complication, with long-term current use of insulin ([facility])    - POCT glycosylated hemoglobin (Hb A1C)  -  DIABETES FOOT EXAM  - Microalbumin / Creatinine Urine Ratio; Future  - metFORMIN (GLUCOPHAGE) 1000 MG tablet; Take 1 tablet by mouth 2 times daily (with meals)  Dispense: 180 tablet; Refill: 3  - insulin detemir (LEVEMIR FLEXTOUCH) 100 UNIT/ML injection pen; Inject 20 Units into the skin nightly  Dispense: 30 mL; Refill: 3    3. Essential hypertension      4. Mixed hyperlipidemia    - Lipid Panel; Future  - Hepatic Function Panel; Future  - rosuvastatin (CRESTOR) 20 MG tablet; Take 1 tablet by mouth nightly  Dispense: 90 tablet; Refill: 3    5. Iron deficiency anemia, unspecified iron deficiency anemia type    - CBC Auto Differential; Future  - Iron and TIBC; Future    6. Gastroesophageal reflux disease, unspecified whether esophagitis present    - omeprazole (PRILOSEC) 40 MG delayed release capsule; Take 1 capsule by mouth every morning (before breakfast)  Dispense: 30 capsule; Refill: 1    7. Vitamin D deficiency    - Vitamin D 25 Hydroxy; Future    8.  Moderate episode of recurrent major depressive disorder (HCC)    - DULoxetine (CYMBALTA) 30 MG extended release capsule; TAKE 1 CAPSULE DAILY  Dispense: 90 capsule; Refill: 3    9. Anxiety  - DULoxetine (CYMBALTA) 30 MG extended release capsule; TAKE 1 CAPSULE DAILY  Dispense: 90 capsule; Refill: 3      Return in 3 months (on 2/2/2022) for diabetes, hypertension, hyperlipidemia, GERD, depression, and anxiety.

## 2021-11-02 NOTE — PATIENT INSTRUCTIONS
Learning About Living Marylen Plough  What is a living will? A living will, also called a declaration, is a legal form. It tells your family and your doctor your wishes when you can't speak for yourself. It's used by the health professionals who will treat you as you near the end of your life or if you get seriously hurt or ill. If you put your wishes in writing, your loved ones and others will know what kind of care you want. They won't need to guess. This can ease your mind and be helpful to others. And you can change or cancel your living will at any time. A living will is not the same as an estate or property will. An estate will explains what you want to happen with your money and property after you die. How do you use it? A living will is used to describe the kinds of treatment or life support you want as you near the end of your life or if you get seriously hurt or ill. Keep these facts in mind about living [place]. · Your living will is used only if you can't speak or make decisions for yourself. Most often, one or more doctors must certify that you can't speak or decide for yourself before your living will takes effect. · If you get better and can speak for yourself again, you can accept or refuse any treatment. It doesn't matter what you said in your living will. · Some states may limit your right to refuse treatment in certain cases. For example, you may need to clearly state in your living will that you don't want artificial hydration and nutrition, such as being fed through a tube. Is a living will a legal document? A living will is a legal document. Each state has its own laws about living [place]. And a living will may be called something else in your state. Here are some things to know about living [place]. · You don't need an  to complete a living will. But legal advice can be helpful if your state's laws are unclear.  It can also help if your health history is complicated or your family can't agree on what should be in your living will. · You can change your living will at any time. Some people find that their wishes about end-of-life care change as their health changes. If you make big changes to your living will, complete a new form. · If you move to another state, make sure that your living will is legal in the state where you now live. In most cases, doctors will respect your wishes even if you have a form from a different state. · You might use a universal form that has been approved by many states. This kind of form can sometimes be filled out and stored online. Your digital copy will then be available wherever you have a connection to the internet. The doctors and nurses who need to treat you can find it right away. · Your state may offer an online registry. This is another place where you can store your living will online. · It's a good idea to get your living will notarized. This means using a person called a  to watch two people sign, or witness, your living will. What should you know when you create a living will? Here are some questions to ask yourself as you make your living will:  · Do you know enough about life support methods that might be used? If not, talk to your doctor so you know what might be done if you can't breathe on your own, your heart stops, or you can't swallow. · What things would you still want to be able to do after you receive life-support methods? Would you want to be able to walk? To speak? To eat on your own? To live without the help of machines? · Do you want certain Congregational practices performed if you become very ill? · If you have a choice, where do you want to be cared for? In your home? At a hospital or nursing home? · If you have a choice at the end of your life, where would you prefer to die? At home? In a hospital or nursing home? Somewhere else? · Would you prefer to be buried or cremated?   · Do you want your organs to be donated after you die? What should you do with your living will? · Make sure that your family members and your health care agent have copies of your living will (also called a declaration). · Give your doctor a copy of your living will. Ask him or her to keep it as part of your medical record. If you have more than one doctor, make sure that each one has a copy. · Put a copy of your living will where it can be easily found. For example, some people may put a copy on their refrigerator door. If you are using a digital copy, be sure your doctor, family members, and health care agent know how to find and access it. Where can you learn more? Go to https://[employer]2Win Indiaeweb.Tinypass. org and sign in to your [employer] account. Enter O241 in the [facility] box to learn more about \"Learning About Living Andreea. \"     If you do not have an account, please click on the \"Sign Up Now\" link. Current as of: March 17, 2021               Content Version: 13.0  © 1906-0878 Healthwise, [employer]. Care instructions adapted under license by [facility] ([facility]). If you have questions about a medical condition or this instruction, always ask your healthcare professional. [place] [address] any warranty or liability for your use of this information. Personalized Preventive Plan for Duglas Chpaarro - 11/2/2021  Medicare offers a range of preventive health benefits. Some of the tests and screenings are paid in full while other may be subject to a deductible, co-insurance, and/or copay. Some of these benefits include a comprehensive review of your medical history including lifestyle, illnesses that may run in your family, and various assessments and screenings as appropriate. After reviewing your medical record and screening and assessments performed today your provider may have ordered immunizations, labs, imaging, and/or referrals for you.   A list of these orders (if applicable) as well as your Preventive Care list are included within your After Visit Summary for your review. Other Preventive Recommendations:    · A preventive eye exam performed by an eye specialist is recommended every 1-2 years to screen for glaucoma; cataracts, macular degeneration, and other eye disorders. · A preventive dental visit is recommended every 6 months. · Try to get at least 150 minutes of exercise per week or 10,000 steps per day on a pedometer . · Order or download the FREE \"Exercise & Physical Activity: Your Everyday Guide\" from [employer]. Call 7-663.486.5837 or search [employer] online. · You need 7698-2022 mg of calcium and 1063-7730 IU of vitamin D per day. It is possible to meet your calcium requirement with diet alone, but a vitamin D supplement is usually necessary to meet this goal.  · When exposed to the sun, use a sunscreen that protects against both UVA and UVB radiation with an SPF of 30 or greater. Reapply every 2 to 3 hours or after sweating, drying off with a towel, or swimming. · Always wear a seat belt when traveling in a car. Always wear a helmet when riding a bicycle or motorcycle.

## 2021-11-15 DIAGNOSIS — Z79.4 TYPE 2 DIABETES MELLITUS WITHOUT COMPLICATION, WITH LONG-TERM CURRENT USE OF INSULIN (HCC): Primary | ICD-10-CM

## 2021-11-15 DIAGNOSIS — E11.9 TYPE 2 DIABETES MELLITUS WITHOUT COMPLICATION, WITH LONG-TERM CURRENT USE OF INSULIN (HCC): Primary | ICD-10-CM

## 2021-11-15 RX ORDER — BLOOD-GLUCOSE METER
EACH MISCELLANEOUS
Qty: 1 KIT | Refills: 0 | Status: SHIPPED | OUTPATIENT
Start: 2021-11-15 | End: 2022-11-04

## 2021-11-15 NOTE — TELEPHONE ENCOUNTER
Medication:   Requested Prescriptions     Pending Prescriptions Disp Refills    Blood Glucose Monitoring Suppl (ACCU-CHEK GUIDE) w/Device KIT       Sig: by Does not apply route     Last Filled:     Last appt: 11/2/2021   Next appt: 2/2/2022    Last OARRS: No flowsheet data found.

## 2021-11-17 DIAGNOSIS — Z79.4 TYPE 2 DIABETES MELLITUS WITHOUT COMPLICATION, WITH LONG-TERM CURRENT USE OF INSULIN (HCC): Primary | ICD-10-CM

## 2021-11-17 DIAGNOSIS — E11.9 TYPE 2 DIABETES MELLITUS WITHOUT COMPLICATION, WITH LONG-TERM CURRENT USE OF INSULIN (HCC): Primary | ICD-10-CM

## 2021-11-17 RX ORDER — LANCETS
1 EACH MISCELLANEOUS DAILY
Qty: 100 EACH | Refills: 3 | Status: SHIPPED | OUTPATIENT
Start: 2021-11-17 | End: 2022-11-04

## 2021-11-17 RX ORDER — BLOOD SUGAR DIAGNOSTIC
1 STRIP MISCELLANEOUS DAILY
Qty: 100 EACH | Refills: 3 | Status: SHIPPED | OUTPATIENT
Start: 2021-11-17 | End: 2022-11-04

## 2021-11-17 NOTE — TELEPHONE ENCOUNTER
Medication:   Requested Prescriptions     Pending Prescriptions Disp Refills    blood glucose test strips (ACCU-CHEK GUIDE) strip 100 each 3     Si each by In Vitro route daily As needed.  Accu-Chek Softclix Lancets MISC 100 each 3     Sig: by Does not apply route 4 times daily       Last appt: 2021   Next appt: 2022    Last OARRS: No flowsheet data found.

## 2021-12-17 DIAGNOSIS — F41.9 ANXIETY: ICD-10-CM

## 2021-12-17 DIAGNOSIS — F33.1 MODERATE EPISODE OF RECURRENT MAJOR DEPRESSIVE DISORDER (HCC): ICD-10-CM

## 2021-12-20 RX ORDER — DULOXETIN HYDROCHLORIDE 30 MG/1
CAPSULE, DELAYED RELEASE ORAL
Qty: 90 CAPSULE | Refills: 1 | Status: SHIPPED | OUTPATIENT
Start: 2021-12-20 | End: 2022-02-28 | Stop reason: DRUGHIGH

## 2021-12-20 NOTE — TELEPHONE ENCOUNTER
Medication:   Requested Prescriptions     Pending Prescriptions Disp Refills    DULoxetine (CYMBALTA) 30 MG extended release capsule 90 capsule 3     Sig: TAKE 1 CAPSULE DAILY     Last Filled: 11.2.21    Last appt: 11/2/2021   Next appt: 2/2/2022    Last OARRS: No flowsheet data found.

## 2022-01-10 DIAGNOSIS — D50.9 IRON DEFICIENCY ANEMIA, UNSPECIFIED IRON DEFICIENCY ANEMIA TYPE: ICD-10-CM

## 2022-01-10 RX ORDER — FERROUS SULFATE 325(65) MG
TABLET ORAL
Qty: 60 TABLET | Refills: 1 | Status: SHIPPED | OUTPATIENT
Start: 2022-01-10

## 2022-01-10 NOTE — TELEPHONE ENCOUNTER
Medication:   Requested Prescriptions     Pending Prescriptions Disp Refills    FEROSUL 325 (65 Fe) MG tablet [Pharmacy Med Name: Mariaelena Campa 325 MG TABLET] 60 tablet 1     Sig: TAKE ONE TABLET BY MOUTH TWICE A DAY WITH MEALS     Last Filled: 7.6.21    Last appt: 11/2/2021   Next appt: 2/2/2022    Last OARRS: No flowsheet data found.

## 2022-01-27 RX ORDER — ISOPROPYL ALCOHOL 0.75 G/1
1 SWAB TOPICAL DAILY
Qty: 100 EACH | Refills: 3 | Status: SHIPPED | OUTPATIENT
Start: 2022-01-27

## 2022-01-27 NOTE — TELEPHONE ENCOUNTER
Medication:   Requested Prescriptions     Pending Prescriptions Disp Refills    Alcohol Swabs (B-D SINGLE USE SWABS REGULAR) PADS  0     Sig: by Does not apply route       Last appt: 11/2/2021   Next appt: 2/14/2022    Last OARRS: No flowsheet data found.

## 2022-02-18 DIAGNOSIS — E11.9 TYPE 2 DIABETES MELLITUS WITHOUT COMPLICATION, WITH LONG-TERM CURRENT USE OF INSULIN (HCC): Primary | ICD-10-CM

## 2022-02-18 DIAGNOSIS — Z79.4 TYPE 2 DIABETES MELLITUS WITHOUT COMPLICATION, WITH LONG-TERM CURRENT USE OF INSULIN (HCC): Primary | ICD-10-CM

## 2022-02-18 NOTE — TELEPHONE ENCOUNTER
Received a fax requesting new meter and test strips be sent to [facility]  Orders pended

## 2022-02-21 DIAGNOSIS — E11.9 TYPE 2 DIABETES MELLITUS WITHOUT COMPLICATION, WITH LONG-TERM CURRENT USE OF INSULIN (HCC): ICD-10-CM

## 2022-02-21 DIAGNOSIS — Z79.4 TYPE 2 DIABETES MELLITUS WITHOUT COMPLICATION, WITH LONG-TERM CURRENT USE OF INSULIN (HCC): ICD-10-CM

## 2022-02-21 RX ORDER — BLOOD-GLUCOSE METER
EACH MISCELLANEOUS
Qty: 1 EACH | Refills: 0 | Status: SHIPPED | OUTPATIENT
Start: 2022-02-21

## 2022-02-21 RX ORDER — CALCIUM CITRATE/VITAMIN D3 200MG-6.25
1 TABLET ORAL DAILY
Qty: 100 EACH | Refills: 3 | Status: SHIPPED | OUTPATIENT
Start: 2022-02-21

## 2022-02-21 NOTE — TELEPHONE ENCOUNTER
Rx's for glucometer and test strips sent to [facility] mail order pharmacy.

## 2022-02-22 RX ORDER — BLOOD-GLUCOSE METER
EACH MISCELLANEOUS
Qty: 1 KIT | Refills: 0 | OUTPATIENT
Start: 2022-02-22

## 2022-02-28 ENCOUNTER — OFFICE VISIT (OUTPATIENT)
Dept: PRIMARY CARE CLINIC | Age: 67
End: 2022-02-28
Payer: MEDICARE

## 2022-02-28 ENCOUNTER — TELEPHONE (OUTPATIENT)
Dept: PRIMARY CARE CLINIC | Age: 67
End: 2022-02-28

## 2022-02-28 VITALS
RESPIRATION RATE: 16 BRPM | HEART RATE: 76 BPM | WEIGHT: 222.4 LBS | DIASTOLIC BLOOD PRESSURE: 78 MMHG | TEMPERATURE: 97 F | BODY MASS INDEX: 37.01 KG/M2 | OXYGEN SATURATION: 100 % | SYSTOLIC BLOOD PRESSURE: 126 MMHG

## 2022-02-28 DIAGNOSIS — F33.1 MODERATE EPISODE OF RECURRENT MAJOR DEPRESSIVE DISORDER (HCC): ICD-10-CM

## 2022-02-28 DIAGNOSIS — E66.01 SEVERE OBESITY (BMI 35.0-39.9) WITH COMORBIDITY (HCC): ICD-10-CM

## 2022-02-28 DIAGNOSIS — I77.810 THORACIC AORTIC ECTASIA (HCC): ICD-10-CM

## 2022-02-28 DIAGNOSIS — E55.9 VITAMIN D DEFICIENCY: ICD-10-CM

## 2022-02-28 DIAGNOSIS — E78.2 MIXED HYPERLIPIDEMIA: ICD-10-CM

## 2022-02-28 DIAGNOSIS — E11.9 TYPE 2 DIABETES MELLITUS WITHOUT COMPLICATION, WITH LONG-TERM CURRENT USE OF INSULIN (HCC): Primary | ICD-10-CM

## 2022-02-28 DIAGNOSIS — I10 ESSENTIAL HYPERTENSION: ICD-10-CM

## 2022-02-28 DIAGNOSIS — M79.672 BILATERAL FOOT PAIN: ICD-10-CM

## 2022-02-28 DIAGNOSIS — Z79.4 TYPE 2 DIABETES MELLITUS WITHOUT COMPLICATION, WITH LONG-TERM CURRENT USE OF INSULIN (HCC): Primary | ICD-10-CM

## 2022-02-28 DIAGNOSIS — D50.9 IRON DEFICIENCY ANEMIA, UNSPECIFIED IRON DEFICIENCY ANEMIA TYPE: ICD-10-CM

## 2022-02-28 DIAGNOSIS — M79.671 BILATERAL FOOT PAIN: ICD-10-CM

## 2022-02-28 DIAGNOSIS — F41.9 ANXIETY: ICD-10-CM

## 2022-02-28 DIAGNOSIS — R20.0 NUMBNESS OF TOES: ICD-10-CM

## 2022-02-28 DIAGNOSIS — K21.9 GASTROESOPHAGEAL REFLUX DISEASE, UNSPECIFIED WHETHER ESOPHAGITIS PRESENT: ICD-10-CM

## 2022-02-28 DIAGNOSIS — Z95.2 S/P AORTIC VALVE REPLACEMENT AND AORTOPLASTY: Primary | ICD-10-CM

## 2022-02-28 DIAGNOSIS — Z95.2 S/P AORTIC VALVE REPLACEMENT AND AORTOPLASTY: ICD-10-CM

## 2022-02-28 PROBLEM — J06.9 UPPER RESPIRATORY TRACT INFECTION: Status: RESOLVED | Noted: 2017-05-09 | Resolved: 2022-02-28

## 2022-02-28 LAB — HBA1C MFR BLD: 7.2 %

## 2022-02-28 PROCEDURE — G8417 CALC BMI ABV UP PARAM F/U: HCPCS | Performed by: INTERNAL MEDICINE

## 2022-02-28 PROCEDURE — 3017F COLORECTAL CA SCREEN DOC REV: CPT | Performed by: INTERNAL MEDICINE

## 2022-02-28 PROCEDURE — 1090F PRES/ABSN URINE INCON ASSESS: CPT | Performed by: INTERNAL MEDICINE

## 2022-02-28 PROCEDURE — 99214 OFFICE O/P EST MOD 30 MIN: CPT | Performed by: INTERNAL MEDICINE

## 2022-02-28 PROCEDURE — G8399 PT W/DXA RESULTS DOCUMENT: HCPCS | Performed by: INTERNAL MEDICINE

## 2022-02-28 PROCEDURE — 83036 HEMOGLOBIN GLYCOSYLATED A1C: CPT | Performed by: INTERNAL MEDICINE

## 2022-02-28 PROCEDURE — 4040F PNEUMOC VAC/ADMIN/RCVD: CPT | Performed by: INTERNAL MEDICINE

## 2022-02-28 PROCEDURE — 2022F DILAT RTA XM EVC RTNOPTHY: CPT | Performed by: INTERNAL MEDICINE

## 2022-02-28 PROCEDURE — 1123F ACP DISCUSS/DSCN MKR DOCD: CPT | Performed by: INTERNAL MEDICINE

## 2022-02-28 PROCEDURE — 3051F HG A1C>EQUAL 7.0%<8.0%: CPT | Performed by: INTERNAL MEDICINE

## 2022-02-28 PROCEDURE — 1036F TOBACCO NON-USER: CPT | Performed by: INTERNAL MEDICINE

## 2022-02-28 PROCEDURE — G8484 FLU IMMUNIZE NO ADMIN: HCPCS | Performed by: INTERNAL MEDICINE

## 2022-02-28 PROCEDURE — G8427 DOCREV CUR MEDS BY ELIG CLIN: HCPCS | Performed by: INTERNAL MEDICINE

## 2022-02-28 RX ORDER — DULOXETIN HYDROCHLORIDE 60 MG/1
60 CAPSULE, DELAYED RELEASE ORAL DAILY
Qty: 90 CAPSULE | Refills: 0 | Status: SHIPPED | OUTPATIENT
Start: 2022-02-28 | End: 2022-05-16

## 2022-02-28 RX ORDER — MELOXICAM 7.5 MG/1
7.5 TABLET ORAL DAILY
Qty: 30 TABLET | Refills: 0 | Status: SHIPPED | OUTPATIENT
Start: 2022-02-28 | End: 2022-03-29 | Stop reason: SDUPTHER

## 2022-02-28 RX ORDER — INSULIN DETEMIR 100 [IU]/ML
22 INJECTION, SOLUTION SUBCUTANEOUS NIGHTLY
COMMUNITY
Start: 2022-02-28 | End: 2022-06-30

## 2022-02-28 ASSESSMENT — ENCOUNTER SYMPTOMS
CHEST TIGHTNESS: 0
SHORTNESS OF BREATH: 0
SORE THROAT: 0
TROUBLE SWALLOWING: 0
ABDOMINAL PAIN: 0
BLOOD IN STOOL: 0
SINUS PAIN: 0
DIARRHEA: 1
NAUSEA: 0
VOMITING: 0
RHINORRHEA: 0
CONSTIPATION: 0
COUGH: 0
WHEEZING: 0
SINUS PRESSURE: 0

## 2022-02-28 NOTE — TELEPHONE ENCOUNTER
Pt needs other referral the one you gave her is out of network can you do another refferal does know     Dr Ruby Reese

## 2022-02-28 NOTE — PROGRESS NOTES
Kate Parrish   Date ofBirth:  1955    Date of Visit:  2/28/2022    Chief Complaint   Patient presents with    Diabetes    Hyperlipidemia    Hypertension    Anxiety    Depression    Gastroesophageal Reflux       HPI  Patient has type 2 diabetes.  Patient takes Levemir insulin 20 units nightly and Metformin 1000 mg 2 times daily. Patient decreases carbohydrates. Patient monitors her blood sugar 2 times daily. Patient monitors fasting and either 2 hours postprandial or bedtime. Fasting averages 110, 2 hours postprandial averages 130, and bedtime averages 120. Patient states walks and dances for exercise.      Patient has hypertension.  Patient takes losartan 100 mg once daily and verapamil  mg once daily.  Patient decreases salt.       Patient has hyperlipidemia.  Patient takes rosuvastatin 20 mg nightly. Karla Rueda does a low fat, low cholesterol diet.     Patient has depression and anxiety.  Patient takes Cymbalta 30 mg once daily. Karla Rueda states her anxiety and depression have been. Patient states her depression has been a bit up. Patient states things aren't going well. Patient is in the process of downsizing and going through things in her house and things are overwhelming. Patient states she had crying spells a couple of times in January. Patient feels sad, down, and overwhelmed. Patient denies feeling nervous and jittery.     Patient has GERD. Patient takes omeprazole 40 mg once daily. Patient denies heartburn and reflux. Patient decreases spicy foods and tomato-based foods. Patient states she drinks decaf coffee most of the time. Patient states she occasionally drinks regular coffee but not often. Patient has iron deficiency anemia.  Patient takes ferrous sulfate 325 mg once daily.  Patient eats green leafy vegetables and red meat.      Patient has vitamin D deficiency.  Patient takes vitamin D 5,000 IU once daily.     Patient complains of 2 month history of both feet but most right foot. Patient states it feels like someone is squeezing her toes and ball of her right foot hurts. Patient denies injury. Patient states her feet are toes are numb in the mornings. Patient states sometimes she limps due to right foot pain. Patient states she need another Cardiologist because her Cardiologist left [facility] and doesn't take her insurance. Severe Obesity ([address] Patient states she plans to go to [employer] to exercise. Patient states her diet is ok. Review of Systems   Constitutional: Positive for appetite change (sometimes doesn't feel like eating off and on for a long time). Negative for activity change, chills, fatigue, fever and unexpected weight change. HENT: Negative for congestion, postnasal drip, rhinorrhea, sinus pressure, sinus pain, sore throat and trouble swallowing. Eyes: Negative for visual disturbance. Respiratory: Negative for cough, chest tightness, shortness of breath and wheezing. Cardiovascular: Negative for chest pain, palpitations and leg swelling. Gastrointestinal: Positive for diarrhea (occasional). Negative for abdominal pain, blood in stool, constipation, nausea and vomiting. Genitourinary: Negative for dysuria, frequency, hematuria and urgency. Musculoskeletal: Positive for arthralgias and gait problem. Negative for joint swelling and myalgias. Skin: Negative for rash and wound. Neurological: Positive for numbness. Negative for dizziness, tremors, syncope, weakness, light-headedness and headaches. Psychiatric/Behavioral: Positive for dysphoric mood. Negative for decreased concentration and sleep disturbance. The patient is nervous/anxious.         Allergies   Allergen Reactions    No Known Allergies      Outpatient Medications Marked as Taking for the 2/28/22 encounter (Office Visit) with Arturo Kirby MD   Medication Sig Dispense Refill    Blood Glucose Monitoring Suppl (TRUE METRIX METER) KAM Use to monitor blood sugar 1 each 0  blood glucose test strips (TRUE METRIX BLOOD GLUCOSE TEST) strip 1 each by In Vitro route daily As needed. 100 each 3    Alcohol Swabs (B-D SINGLE USE SWABS REGULAR) PADS 1 each by Does not apply route daily 100 each 3    FEROSUL 325 (65 Fe) MG tablet TAKE ONE TABLET BY MOUTH TWICE A DAY WITH MEALS (Patient taking differently: Take 325 mg by mouth daily (with breakfast) ) 60 tablet 1    DULoxetine (CYMBALTA) 30 MG extended release capsule TAKE 1 CAPSULE DAILY 90 capsule 1    blood glucose test strips (ACCU-CHEK GUIDE) strip 1 each by In Vitro route daily As needed. 100 each 3    Accu-Chek Softclix Lancets MISC 1 each by Does not apply route daily 100 each 3    Blood Glucose Monitoring Suppl (ACCU-CHEK GUIDE) w/Device KIT Use to monitor blood sugar daily 1 kit 0    metFORMIN (GLUCOPHAGE) 1000 MG tablet Take 1 tablet by mouth 2 times daily (with meals) 180 tablet 3    insulin detemir (LEVEMIR FLEXTOUCH) 100 UNIT/ML injection pen Inject 20 Units into the skin nightly 30 mL 3    losartan (COZAAR) 100 MG tablet Take 1 tablet by mouth daily 90 tablet 3    Insulin Pen Needle (B-D ULTRAFINE III SHORT PEN) 31G X 8 MM MISC USE AS DIRECTED 100 each 3    rosuvastatin (CRESTOR) 20 MG tablet Take 1 tablet by mouth nightly 90 tablet 3    verapamil (CALAN SR) 120 MG extended release tablet Take 1 tablet by mouth daily 90 tablet 3    omeprazole (PRILOSEC) 40 MG delayed release capsule Take 1 capsule by mouth every morning (before breakfast) 30 capsule 1    furosemide (LASIX) 20 MG tablet Take 20 mg by mouth as needed       acetaminophen (TYLENOL) 325 MG tablet Take 650 mg by mouth every 6 hours as needed for Pain      aspirin 325 MG EC tablet Take 1 tablet by mouth daily 30 tablet 3    vitamin B-12 (CYANOCOBALAMIN) 500 MCG tablet Take 2 tablets by mouth daily 60 tablet 3    Ascorbic Acid (VITAMIN C) 500 MG tablet Take 500 mg by mouth daily.       Cholecalciferol (VITAMIN D PO) Take 5,000 Units by mouth daily  MULTIPLE VITAMIN PO Take  by mouth. Vitals:    02/28/22 0728   BP: 126/78   Pulse: 76   Resp: 16   Temp: 97 °F (36.1 °C)   SpO2: 100%   Weight: 222 lb 6.4 oz (100.9 kg)     Body mass index is 37.01 kg/m². Physical Exam  Nursing note reviewed. Constitutional:       General: She is not in acute distress. Appearance: Normal appearance. She is well-developed. Eyes:      General: Lids are normal.      Extraocular Movements: Extraocular movements intact. Conjunctiva/sclera: Conjunctivae normal.      Pupils: Pupils are equal, round, and reactive to light. Neck:      Thyroid: No thyromegaly. Vascular: No carotid bruit. Cardiovascular:      Rate and Rhythm: Normal rate and regular rhythm. Heart sounds: Normal heart sounds, S1 normal and S2 normal. No murmur heard. No friction rub. No gallop. Pulmonary:      Effort: Pulmonary effort is normal. No respiratory distress. Breath sounds: Normal breath sounds. No wheezing, rhonchi or rales. Abdominal:      General: Bowel sounds are normal. There is no distension. Palpations: Abdomen is soft. Tenderness: There is no abdominal tenderness. There is no rebound. Musculoskeletal:      Cervical back: Neck supple. Right lower leg: No edema. Left lower leg: No edema. Lymphadenopathy:      Head:      Right side of head: No submandibular adenopathy. Left side of head: No submandibular adenopathy. Skin:     Comments: No foot ulcers, small calluses ball of foot    Neurological:      Mental Status: She is alert and oriented to person, place, and time.       Comments: Bilateral foot monofilament exam normal   Psychiatric:         Mood and Affect: Mood normal.           Results for POC orders placed in visit on 02/28/22   POCT glycosylated hemoglobin (Hb A1C)   Result Value Ref Range    Hemoglobin A1C 7.2 %     Lab Review   Orders Only on 11/02/2021   Component Date Value    Iron 11/02/2021 62     TIBC 11/02/2021 313     Iron Saturation 11/02/2021 20     Total Protein 11/02/2021 6.9     Albumin 11/02/2021 4.4     Alkaline Phosphatase 11/02/2021 58     ALT 11/02/2021 10     AST 11/02/2021 17     Total Bilirubin 11/02/2021 0.4     Bilirubin, Direct 11/02/2021 <0.2     Bilirubin, Indirect 11/02/2021 see below     Vit D, 25-Hydroxy 11/02/2021 61.4     Cholesterol, Total 11/02/2021 143     Triglycerides 11/02/2021 74     HDL 11/02/2021 64*    LDL Calculated 11/02/2021 64     VLDL Cholesterol Calcula* 11/02/2021 15     WBC 11/02/2021 5.1     RBC 11/02/2021 4.34     Hemoglobin 11/02/2021 11.9*    Hematocrit 11/02/2021 37.2     MCV 11/02/2021 85.7     MCH 11/02/2021 27.4     MCHC 11/02/2021 32.0     RDW 11/02/2021 17.6*    Platelets 28/70/9840 159     MPV 11/02/2021 8.7     Neutrophils % 11/02/2021 65.6     Lymphocytes % 11/02/2021 24.8     Monocytes % 11/02/2021 6.0     Eosinophils % 11/02/2021 2.4     Basophils % 11/02/2021 1.2     Neutrophils Absolute 11/02/2021 3.4     Lymphocytes Absolute 11/02/2021 1.3     Monocytes Absolute 11/02/2021 0.3     Eosinophils Absolute 11/02/2021 0.1     Basophils Absolute 11/02/2021 0.1    Office Visit on 11/02/2021   Component Date Value    Hemoglobin A1C 11/02/2021 7.6    Orders Only on 10/22/2021   Component Date Value    TSH 10/22/2021 1.99    Orders Only on 10/13/2021   Component Date Value    Sodium 10/13/2021 141     Potassium 10/13/2021 4.5     Chloride 10/13/2021 103     CO2 10/13/2021 26     Anion Gap 10/13/2021 12     Glucose 10/13/2021 131*    BUN 10/13/2021 20     CREATININE 10/13/2021 0.9     GFR Non- 10/13/2021 >60     GFR  10/13/2021 >60     Calcium 10/13/2021 9.2    Admission on 09/17/2021, Discharged on 09/17/2021   Component Date Value    POC Glucose 09/17/2021 92     Performed on 09/17/2021 ACCU-CHEK     POC Glucose 09/17/2021 196*    Performed on 09/17/2021 ACCU-CHEK    Orders Only on 09/14/2021   Component Date Value    Iron 09/14/2021 99     TIBC 09/14/2021 304     Iron Saturation 09/14/2021 33     WBC 09/14/2021 6.1     RBC 09/14/2021 4.07     Hemoglobin 09/14/2021 10.7*    Hematocrit 09/14/2021 34.0*    MCV 09/14/2021 83.6     MCH 09/14/2021 26.3     MCHC 09/14/2021 31.5     RDW 09/14/2021 21.4*    Platelets 34/04/4435 181     MPV 09/14/2021 8.7    Orders Only on 09/08/2021   Component Date Value    Sodium 09/08/2021 139     Potassium 09/08/2021 4.8     Chloride 09/08/2021 103     CO2 09/08/2021 22     Anion Gap 09/08/2021 14     Glucose 09/08/2021 168*    BUN 09/08/2021 21*    CREATININE 09/08/2021 0.9     GFR Non- 09/08/2021 >60     GFR  09/08/2021 >60     Calcium 09/08/2021 9.2          Assessment/Plan     1. Type 2 diabetes mellitus without complication, with long-term current use of insulin ([facility])  - Hemoglobin A1c of 7.2% shows diabetes needs a little better control  -Continue same medications  -Increase insulin detemir (LEVEMIR FLEXTOUCH) 100 UNIT/ML injection pen; Inject 22 Units into the skin nightly  -Limit carbohydrates to 45 grams with meals and 15 grams with snacks  -monitor blood sugars  -goal for blood sugar fasting or pre-meal  is   -goal for blood sugar 2 hours after a meal is less than 180  -goal for blood sugar at bedtime is less than 150  -Regular aerobic exercise  -POCT glycosylated hemoglobin (Hb A1C)    2. Essential hypertension  -stable  -Continue  losartan 100 mg once daily   - Continue verapamil  mg once daily  -Low sodium diet  -Regular aerobic exercise    3. Mixed hyperlipidemia  -stable  -Continue rosuvastatin 20 mg nightly  -Low fat, low cholesterol diet  -Regular aerobic exercise    4. Moderate episode of recurrent major depressive disorder (HCC)  -flared up  -Increase DULoxetine (CYMBALTA) 60 MG extended release capsule; Take 1 capsule by mouth daily  Dispense: 90 capsule; Refill: 0    5. Anxiety  -stable  -Increase DULoxetine (CYMBALTA) 60 MG extended release capsule; Take 1 capsule by mouth daily  Dispense: 90 capsule; Refill: 0    6. Gastroesophageal reflux disease, unspecified whether esophagitis present  -stable  -Continue  omeprazole 40 mg once daily  -Decrease caffeine, avoid spicy foods, avoid tomato based foods  -Eat small meals instead of large meals  -Wait 2-3 hours after eating before lying down     7. Iron deficiency anemia, unspecified iron deficiency anemia type  -stable  -continue Ferrous Sulfate 325mg once daily    8. Vitamin D deficiency  -stable  -continue vitamin D 5,000 IU once daily    9. Thoracic aortic ectasia ([facility]  -managed by Cardiology  -continue care per Cardiology    10. S/P aortic valve replacement and aortoplasty  -[facility]    11. Bilateral foot pain  - EMG; Future  - start meloxicam (MOBIC) 7.5 MG tablet; Take 1 tablet by mouth daily  Dispense: 30 tablet; Refill: 0  -Referral to   [facility], JAYY, [facility]an [place]    12. Numbness of toes  - EMG; Future  - Referral to  [facility], JAYY, Podiatry, Kannan    13. Severe obesity (BMI 35.0-39. 9) with comorbidity ([facility]  - patient plans to go to [employer] to exercise  - continue a low carbohydrate diet        Discussed medications with patient, who voiced understanding of their use and indications. All questions answered. Return in about 4 weeks (around 3/28/2022) for depression, foot pain, and foot numbness.

## 2022-02-28 NOTE — TELEPHONE ENCOUNTER
Spoke with patient she was requesting new referral for cardiology, new one placed and faxed to [address] Sami Montoya MD  [address], [address]

## 2022-02-28 NOTE — PATIENT INSTRUCTIONS
-Low sodium diet  -Low fat, low cholesterol diet  -Limit carbohydrates to 45 grams with meals and 15 grams with snacks  -monitor blood sugars  -goal for blood sugar fasting or pre-meal  is   -goal for blood sugar 2 hours after a meal is less than 180  -goal for blood sugar at bedtime is less than 150  -Regular aerobic exercise  -Decrease caffeine, avoid spicy foods, avoid tomato based foods  -Eat small meals instead of large meals  -Wait 2-3 hours after eating before lying down

## 2022-03-03 DIAGNOSIS — I71.21 ASCENDING AORTIC ANEURYSM: Primary | ICD-10-CM

## 2022-03-09 ENCOUNTER — TELEPHONE (OUTPATIENT)
Dept: CARDIOTHORACIC SURGERY | Age: 67
End: 2022-03-09

## 2022-03-09 NOTE — TELEPHONE ENCOUNTER
Spoke with patient regarding schedule of CT chest without contrast on 3/21/2022 at 8:00 am with arrival time of 7:45 am at [place], [facility]. for assessment of her ascending aortic aneurysm. Patient has been instructed to avoid metal items such as buttons, snaps, jewelry or under wires.

## 2022-03-21 ENCOUNTER — HOSPITAL ENCOUNTER (OUTPATIENT)
Dept: CT IMAGING | Age: 67
Discharge: HOME | End: 2022-03-21
Payer: MEDICARE

## 2022-03-21 DIAGNOSIS — I71.21 ASCENDING AORTIC ANEURYSM: ICD-10-CM

## 2022-03-21 PROCEDURE — 71250 CT THORAX DX C-: CPT

## 2022-03-24 ENCOUNTER — TELEPHONE (OUTPATIENT)
Dept: CARDIOTHORACIC SURGERY | Age: 67
End: 2022-03-24

## 2022-03-24 NOTE — TELEPHONE ENCOUNTER
Patient is in surveillance for ascending aortic aneurysm, S/p AVR, ascending aortoplasty 3/19/21. Dr. Mary Chester reviewed CT chest w/o contrast performed on 3/21/22 and states that the aorta is stable and he would like to repeat the same study in 1 year. Reiterated SBP goal of <120 and asked that patient spot check BP and work with PCP to meet this goal if needed. She is aware and agreeable with plan.

## 2022-03-29 ENCOUNTER — OFFICE VISIT (OUTPATIENT)
Dept: PRIMARY CARE CLINIC | Age: 67
End: 2022-03-29
Payer: MEDICARE

## 2022-03-29 VITALS
OXYGEN SATURATION: 97 % | RESPIRATION RATE: 16 BRPM | TEMPERATURE: 97.2 F | SYSTOLIC BLOOD PRESSURE: 120 MMHG | HEART RATE: 88 BPM | DIASTOLIC BLOOD PRESSURE: 84 MMHG | BODY MASS INDEX: 37.38 KG/M2 | WEIGHT: 224.6 LBS

## 2022-03-29 DIAGNOSIS — E11.9 TYPE 2 DIABETES MELLITUS WITHOUT COMPLICATION, WITH LONG-TERM CURRENT USE OF INSULIN (HCC): Primary | ICD-10-CM

## 2022-03-29 DIAGNOSIS — R20.0 NUMBNESS OF TOES: ICD-10-CM

## 2022-03-29 DIAGNOSIS — M79.672 BILATERAL FOOT PAIN: ICD-10-CM

## 2022-03-29 DIAGNOSIS — M79.671 BILATERAL FOOT PAIN: ICD-10-CM

## 2022-03-29 DIAGNOSIS — F33.1 MODERATE EPISODE OF RECURRENT MAJOR DEPRESSIVE DISORDER (HCC): Primary | ICD-10-CM

## 2022-03-29 DIAGNOSIS — Z79.4 TYPE 2 DIABETES MELLITUS WITHOUT COMPLICATION, WITH LONG-TERM CURRENT USE OF INSULIN (HCC): Primary | ICD-10-CM

## 2022-03-29 PROCEDURE — G8484 FLU IMMUNIZE NO ADMIN: HCPCS | Performed by: INTERNAL MEDICINE

## 2022-03-29 PROCEDURE — 4040F PNEUMOC VAC/ADMIN/RCVD: CPT | Performed by: INTERNAL MEDICINE

## 2022-03-29 PROCEDURE — G8427 DOCREV CUR MEDS BY ELIG CLIN: HCPCS | Performed by: INTERNAL MEDICINE

## 2022-03-29 PROCEDURE — 3017F COLORECTAL CA SCREEN DOC REV: CPT | Performed by: INTERNAL MEDICINE

## 2022-03-29 PROCEDURE — G8417 CALC BMI ABV UP PARAM F/U: HCPCS | Performed by: INTERNAL MEDICINE

## 2022-03-29 PROCEDURE — 99213 OFFICE O/P EST LOW 20 MIN: CPT | Performed by: INTERNAL MEDICINE

## 2022-03-29 PROCEDURE — 1123F ACP DISCUSS/DSCN MKR DOCD: CPT | Performed by: INTERNAL MEDICINE

## 2022-03-29 PROCEDURE — G8399 PT W/DXA RESULTS DOCUMENT: HCPCS | Performed by: INTERNAL MEDICINE

## 2022-03-29 PROCEDURE — 1036F TOBACCO NON-USER: CPT | Performed by: INTERNAL MEDICINE

## 2022-03-29 PROCEDURE — 1090F PRES/ABSN URINE INCON ASSESS: CPT | Performed by: INTERNAL MEDICINE

## 2022-03-29 RX ORDER — MELOXICAM 7.5 MG/1
7.5 TABLET ORAL DAILY
Qty: 30 TABLET | Refills: 1 | Status: SHIPPED | OUTPATIENT
Start: 2022-03-29 | End: 2022-11-04 | Stop reason: ALTCHOICE

## 2022-03-29 ASSESSMENT — ENCOUNTER SYMPTOMS
CHEST TIGHTNESS: 0
NAUSEA: 0
VOMITING: 0
SHORTNESS OF BREATH: 0

## 2022-03-29 NOTE — PATIENT INSTRUCTIONS
1. Moderate episode of recurrent major depressive disorder (HCC)  -improved and stable  -Continue Cymbalta 60mg once daily  - Amb External Referral To Counseling Services    2. Bilateral foot pain  - better  - Continue meloxicam (MOBIC) 7.5 MG tablet; Take 1 tablet by mouth daily  Dispense: 30 tablet; Refill: 1  -Continue care per Podiatry  -Diabetic shoes    3.  Numbness of toes  -little better  -continue care per Podiatry  -EMG  -Diabetic shoes

## 2022-03-29 NOTE — PROGRESS NOTES
Marcy Montejo   Date ofBirth:  1955    Date of Visit:  3/29/2022    Chief Complaint   Patient presents with    Depression    Foot Pain    Other     Foot numbness       HPI  Patient has depression. Patient has increased Cymbalta to 60mg once daily. Patient states it has helped. Patient states she is not as sad or down or crying anymore. Patient has motivation and energy. Patient states her appetite is ok but sometimes she doesn't feel like eating. Patient states sleep is ok. Patient has foot pain and foot numbness. Patient saw Podiatry. Patient states she was told she had a pinched nerve in her right foot. Patient states she was told to use ice and wear [place] shoes. Patient states she was told to go to [place] to try on shoes for walking. Patient states her foot pain is much better. Patient takes Meloxicam 7.5mg once daily. Patient states she was told to use Voltaren gel on her feet also. Patient states she has a little numbness in either foot after waking up. Patient hasn't had the EMG done. Patient states the Podiatrist said she could wait before getting an EMG. Patient states she has a follow up with Podiatry on 3/31/22. Review of Systems   Constitutional: Negative for appetite change, fatigue and unexpected weight change. Respiratory: Negative for chest tightness and shortness of breath. Cardiovascular: Negative for chest pain. Gastrointestinal: Negative for nausea and vomiting. Musculoskeletal: Positive for arthralgias. Negative for gait problem. Neurological: Positive for numbness. Psychiatric/Behavioral: Positive for dysphoric mood. Negative for decreased concentration and sleep disturbance. The patient is not nervous/anxious.         Allergies   Allergen Reactions    No Known Allergies      Outpatient Medications Marked as Taking for the 3/29/22 encounter (Office Visit) with Jeaneth Moore MD   Medication Sig Dispense Refill    meloxicam (MOBIC) 7.5 MG tablet Take 1 tablet by mouth daily 30 tablet 0    DULoxetine (CYMBALTA) 60 MG extended release capsule Take 1 capsule by mouth daily 90 capsule 0    insulin detemir (LEVEMIR FLEXTOUCH) 100 UNIT/ML injection pen Inject 22 Units into the skin nightly      Blood Glucose Monitoring Suppl (TRUE METRIX METER) KAM Use to monitor blood sugar 1 each 0    blood glucose test strips (TRUE METRIX BLOOD GLUCOSE TEST) strip 1 each by In Vitro route daily As needed. 100 each 3    Alcohol Swabs (B-D SINGLE USE SWABS REGULAR) PADS 1 each by Does not apply route daily 100 each 3    FEROSUL 325 (65 Fe) MG tablet TAKE ONE TABLET BY MOUTH TWICE A DAY WITH MEALS (Patient taking differently: Take 325 mg by mouth daily (with breakfast) ) 60 tablet 1    blood glucose test strips (ACCU-CHEK GUIDE) strip 1 each by In Vitro route daily As needed.  100 each 3    Accu-Chek Softclix Lancets MISC 1 each by Does not apply route daily 100 each 3    Blood Glucose Monitoring Suppl (ACCU-CHEK GUIDE) w/Device KIT Use to monitor blood sugar daily 1 kit 0    metFORMIN (GLUCOPHAGE) 1000 MG tablet Take 1 tablet by mouth 2 times daily (with meals) 180 tablet 3    losartan (COZAAR) 100 MG tablet Take 1 tablet by mouth daily 90 tablet 3    Insulin Pen Needle (B-D ULTRAFINE III SHORT PEN) 31G X 8 MM MISC USE AS DIRECTED 100 each 3    rosuvastatin (CRESTOR) 20 MG tablet Take 1 tablet by mouth nightly 90 tablet 3    verapamil (CALAN SR) 120 MG extended release tablet Take 1 tablet by mouth daily 90 tablet 3    omeprazole (PRILOSEC) 40 MG delayed release capsule Take 1 capsule by mouth every morning (before breakfast) 30 capsule 1    furosemide (LASIX) 20 MG tablet Take 20 mg by mouth as needed       acetaminophen (TYLENOL) 325 MG tablet Take 650 mg by mouth every 6 hours as needed for Pain      aspirin 325 MG EC tablet Take 1 tablet by mouth daily 30 tablet 3    vitamin B-12 (CYANOCOBALAMIN) 500 MCG tablet Take 2 tablets by mouth daily 60 tablet 3    Ascorbic Acid (VITAMIN C) 500 MG tablet Take 500 mg by mouth daily.  Cholecalciferol (VITAMIN D PO) Take 5,000 Units by mouth daily       MULTIPLE VITAMIN PO Take 1 tablet by mouth daily            Vitals:    03/29/22 0750   BP: 120/84   Pulse: 88   Resp: 16   Temp: 97.2 °F (36.2 °C)   SpO2: 97%   Weight: 224 lb 9.6 oz (101.9 kg)     Body mass index is 37.38 kg/m². Physical Exam  Nursing note reviewed. Constitutional:       General: She is not in acute distress. Appearance: Normal appearance. She is well-developed. Eyes:      Extraocular Movements: Extraocular movements intact. Pupils: Pupils are equal, round, and reactive to light. Neck:      Thyroid: No thyromegaly. Cardiovascular:      Rate and Rhythm: Normal rate and regular rhythm. Heart sounds: Normal heart sounds, S1 normal and S2 normal. No murmur heard. Pulmonary:      Effort: Pulmonary effort is normal. No respiratory distress. Breath sounds: Normal breath sounds. Musculoskeletal:      Cervical back: Neck supple. Right lower leg: No edema. Left lower leg: No edema. Right foot: No swelling or tenderness. Left foot: No swelling or tenderness. Skin:     Comments: Dry skin on heels of both feet   Neurological:      Mental Status: She is alert. Psychiatric:         Mood and Affect: Mood normal.           No results found for this visit on 03/29/22.   Lab Review   Office Visit on 02/28/2022   Component Date Value    Hemoglobin A1C 02/28/2022 7.2    Orders Only on 11/02/2021   Component Date Value    Iron 11/02/2021 62     TIBC 11/02/2021 313     Iron Saturation 11/02/2021 20     Total Protein 11/02/2021 6.9     Albumin 11/02/2021 4.4     Alkaline Phosphatase 11/02/2021 58     ALT 11/02/2021 10     AST 11/02/2021 17     Total Bilirubin 11/02/2021 0.4     Bilirubin, Direct 11/02/2021 <0.2     Bilirubin, Indirect 11/02/2021 see below     Vit D, 25-Hydroxy 11/02/2021 61.4  Cholesterol, Total 11/02/2021 143     Triglycerides 11/02/2021 74     HDL 11/02/2021 64*    LDL Calculated 11/02/2021 64     VLDL Cholesterol Calcula* 11/02/2021 15     WBC 11/02/2021 5.1     RBC 11/02/2021 4.34     Hemoglobin 11/02/2021 11.9*    Hematocrit 11/02/2021 37.2     MCV 11/02/2021 85.7     MCH 11/02/2021 27.4     MCHC 11/02/2021 32.0     RDW 11/02/2021 17.6*    Platelets 17/45/6904 159     MPV 11/02/2021 8.7     Neutrophils % 11/02/2021 65.6     Lymphocytes % 11/02/2021 24.8     Monocytes % 11/02/2021 6.0     Eosinophils % 11/02/2021 2.4     Basophils % 11/02/2021 1.2     Neutrophils Absolute 11/02/2021 3.4     Lymphocytes Absolute 11/02/2021 1.3     Monocytes Absolute 11/02/2021 0.3     Eosinophils Absolute 11/02/2021 0.1     Basophils Absolute 11/02/2021 0.1    Office Visit on 11/02/2021   Component Date Value    Hemoglobin A1C 11/02/2021 7.6    Orders Only on 10/22/2021   Component Date Value    TSH 10/22/2021 1.99    Orders Only on 10/13/2021   Component Date Value    Sodium 10/13/2021 141     Potassium 10/13/2021 4.5     Chloride 10/13/2021 103     CO2 10/13/2021 26     Anion Gap 10/13/2021 12     Glucose 10/13/2021 131*    BUN 10/13/2021 20     CREATININE 10/13/2021 0.9     GFR Non- 10/13/2021 >60     GFR  10/13/2021 >60     Calcium 10/13/2021 9.2          Assessment/Plan     1. Moderate episode of recurrent major depressive disorder (HCC)  -improved and stable  -Continue Cymbalta 60mg once daily  - Amb External Referral To Counseling Services    2. Bilateral foot pain  - better  - Continue meloxicam (MOBIC) 7.5 MG tablet; Take 1 tablet by mouth daily  Dispense: 30 tablet; Refill: 1  -Continue care per Podiatry  -Diabetic shoes    3. Numbness of toes  -little better  -continue care per Podiatry  -EMG  -Diabetic shoes      Discussed medications with patient, who voiced understanding of their use and indications.  All questions answered. Return in about 3 months (around 6/29/2022) for diabetes, hypertension, hyperlipidemia, depression, anxiety, and GERD, .

## 2022-04-11 DIAGNOSIS — K21.9 GASTROESOPHAGEAL REFLUX DISEASE, UNSPECIFIED WHETHER ESOPHAGITIS PRESENT: ICD-10-CM

## 2022-04-11 RX ORDER — OMEPRAZOLE 40 MG/1
40 CAPSULE, DELAYED RELEASE ORAL
Qty: 90 CAPSULE | Refills: 1 | Status: SHIPPED | OUTPATIENT
Start: 2022-04-11 | End: 2022-07-28

## 2022-04-11 NOTE — TELEPHONE ENCOUNTER
Medication:   Requested Prescriptions     Pending Prescriptions Disp Refills    omeprazole (PRILOSEC) 40 MG delayed release capsule [Pharmacy Med Name: OMEPRAZOLE 40 MG Capsule Delayed Release] 60 capsule      Sig: TAKE 1 CAPSULE BY MOUTH EVERY MORNING (BEFORE BREAKFAST)     Last Filled: 11.2.21    Last appt: 2/28/2022   Next appt: 6/30/2022    Last OARRS: No flowsheet data found.

## 2022-05-16 DIAGNOSIS — F41.9 ANXIETY: ICD-10-CM

## 2022-05-16 DIAGNOSIS — F33.1 MODERATE EPISODE OF RECURRENT MAJOR DEPRESSIVE DISORDER (HCC): ICD-10-CM

## 2022-05-16 RX ORDER — DULOXETIN HYDROCHLORIDE 60 MG/1
CAPSULE, DELAYED RELEASE ORAL
Qty: 90 CAPSULE | Refills: 1 | Status: SHIPPED | OUTPATIENT
Start: 2022-05-16

## 2022-05-16 NOTE — TELEPHONE ENCOUNTER
Medication:   Requested Prescriptions     Pending Prescriptions Disp Refills    DULoxetine (CYMBALTA) 60 MG extended release capsule [Pharmacy Med Name: DULOXETINE HYDROCHLORIDE 60 MG Capsule Delayed Release Particles] 90 capsule 0     Sig: TAKE 1 CAPSULE EVERY DAY     Last Filled: 2.28.22    Last appt: 3/29/2022   Next appt: 6/30/2022    Last OARRS: No flowsheet data found.

## 2022-05-17 RX ORDER — BLOOD-GLUCOSE CONTROL, LOW
EACH MISCELLANEOUS
Qty: 1 EACH | Refills: 0 | Status: SHIPPED | OUTPATIENT
Start: 2022-05-17

## 2022-05-17 RX ORDER — GLUCOSAM/CHON-MSM1/C/MANG/BOSW 500-416.6
1 TABLET ORAL DAILY
Qty: 100 EACH | Refills: 3 | Status: SHIPPED | OUTPATIENT
Start: 2022-05-17

## 2022-06-09 DIAGNOSIS — D50.9 IRON DEFICIENCY ANEMIA, UNSPECIFIED IRON DEFICIENCY ANEMIA TYPE: ICD-10-CM

## 2022-06-09 LAB
FERRITIN: 17.7 NG/ML (ref 15–150)
HCT VFR BLD CALC: 37.7 % (ref 36–48)
HEMOGLOBIN: 12.1 G/DL (ref 12–16)
IRON SATURATION: 16 % (ref 15–50)
IRON: 52 UG/DL (ref 37–145)
MCH RBC QN AUTO: 28.5 PG (ref 26–34)
MCHC RBC AUTO-ENTMCNC: 32.2 G/DL (ref 31–36)
MCV RBC AUTO: 88.6 FL (ref 80–100)
PDW BLD-RTO: 16.3 % (ref 12.4–15.4)
PLATELET # BLD: 155 K/UL (ref 135–450)
PMV BLD AUTO: 8.5 FL (ref 5–10.5)
RBC # BLD: 4.25 M/UL (ref 4–5.2)
TOTAL IRON BINDING CAPACITY: 322 UG/DL (ref 260–445)
WBC # BLD: 4.8 K/UL (ref 4–11)

## 2022-06-30 ENCOUNTER — OFFICE VISIT (OUTPATIENT)
Dept: PRIMARY CARE CLINIC | Age: 67
End: 2022-06-30
Payer: MEDICARE

## 2022-06-30 VITALS
HEART RATE: 81 BPM | RESPIRATION RATE: 16 BRPM | TEMPERATURE: 97.3 F | SYSTOLIC BLOOD PRESSURE: 126 MMHG | BODY MASS INDEX: 38.24 KG/M2 | DIASTOLIC BLOOD PRESSURE: 84 MMHG | WEIGHT: 229.8 LBS | OXYGEN SATURATION: 97 %

## 2022-06-30 DIAGNOSIS — F33.1 MODERATE EPISODE OF RECURRENT MAJOR DEPRESSIVE DISORDER (HCC): ICD-10-CM

## 2022-06-30 DIAGNOSIS — K21.9 GASTROESOPHAGEAL REFLUX DISEASE, UNSPECIFIED WHETHER ESOPHAGITIS PRESENT: ICD-10-CM

## 2022-06-30 DIAGNOSIS — E11.9 TYPE 2 DIABETES MELLITUS WITHOUT COMPLICATION, WITH LONG-TERM CURRENT USE OF INSULIN (HCC): Primary | ICD-10-CM

## 2022-06-30 DIAGNOSIS — E78.2 MIXED HYPERLIPIDEMIA: ICD-10-CM

## 2022-06-30 DIAGNOSIS — I10 ESSENTIAL HYPERTENSION: ICD-10-CM

## 2022-06-30 DIAGNOSIS — Z79.4 TYPE 2 DIABETES MELLITUS WITHOUT COMPLICATION, WITH LONG-TERM CURRENT USE OF INSULIN (HCC): Primary | ICD-10-CM

## 2022-06-30 DIAGNOSIS — F41.9 ANXIETY: ICD-10-CM

## 2022-06-30 DIAGNOSIS — L81.9 SKIN HYPOPIGMENTATION: ICD-10-CM

## 2022-06-30 PROBLEM — L81.6 SKIN HYPOPIGMENTATION: Status: ACTIVE | Noted: 2022-06-30

## 2022-06-30 LAB
CHOLESTEROL, TOTAL: 138 MG/DL (ref 0–199)
HBA1C MFR BLD: 7 %
HDLC SERPL-MCNC: 63 MG/DL (ref 40–60)
LDL CHOLESTEROL CALCULATED: 66 MG/DL
TRIGL SERPL-MCNC: 43 MG/DL (ref 0–150)
VLDLC SERPL CALC-MCNC: 9 MG/DL

## 2022-06-30 PROCEDURE — 99214 OFFICE O/P EST MOD 30 MIN: CPT | Performed by: INTERNAL MEDICINE

## 2022-06-30 PROCEDURE — 2022F DILAT RTA XM EVC RTNOPTHY: CPT | Performed by: INTERNAL MEDICINE

## 2022-06-30 PROCEDURE — 3017F COLORECTAL CA SCREEN DOC REV: CPT | Performed by: INTERNAL MEDICINE

## 2022-06-30 PROCEDURE — G8399 PT W/DXA RESULTS DOCUMENT: HCPCS | Performed by: INTERNAL MEDICINE

## 2022-06-30 PROCEDURE — 3051F HG A1C>EQUAL 7.0%<8.0%: CPT | Performed by: INTERNAL MEDICINE

## 2022-06-30 PROCEDURE — 1090F PRES/ABSN URINE INCON ASSESS: CPT | Performed by: INTERNAL MEDICINE

## 2022-06-30 PROCEDURE — G8427 DOCREV CUR MEDS BY ELIG CLIN: HCPCS | Performed by: INTERNAL MEDICINE

## 2022-06-30 PROCEDURE — 83036 HEMOGLOBIN GLYCOSYLATED A1C: CPT | Performed by: INTERNAL MEDICINE

## 2022-06-30 PROCEDURE — 1036F TOBACCO NON-USER: CPT | Performed by: INTERNAL MEDICINE

## 2022-06-30 PROCEDURE — 1123F ACP DISCUSS/DSCN MKR DOCD: CPT | Performed by: INTERNAL MEDICINE

## 2022-06-30 PROCEDURE — G8417 CALC BMI ABV UP PARAM F/U: HCPCS | Performed by: INTERNAL MEDICINE

## 2022-06-30 RX ORDER — INSULIN DETEMIR 100 [IU]/ML
23 INJECTION, SOLUTION SUBCUTANEOUS NIGHTLY
COMMUNITY
Start: 2022-06-30 | End: 2022-10-01

## 2022-06-30 ASSESSMENT — ENCOUNTER SYMPTOMS
SINUS PRESSURE: 0
COUGH: 0
RHINORRHEA: 0
SHORTNESS OF BREATH: 0
TROUBLE SWALLOWING: 0
WHEEZING: 0
VOMITING: 0
ABDOMINAL PAIN: 0
SINUS PAIN: 0
BLOOD IN STOOL: 0
CONSTIPATION: 0
COLOR CHANGE: 1
DIARRHEA: 0
SORE THROAT: 0
CHEST TIGHTNESS: 0
NAUSEA: 0

## 2022-06-30 ASSESSMENT — PATIENT HEALTH QUESTIONNAIRE - PHQ9
SUM OF ALL RESPONSES TO PHQ QUESTIONS 1-9: 0
1. LITTLE INTEREST OR PLEASURE IN DOING THINGS: 0
SUM OF ALL RESPONSES TO PHQ QUESTIONS 1-9: 0
SUM OF ALL RESPONSES TO PHQ QUESTIONS 1-9: 0
3. TROUBLE FALLING OR STAYING ASLEEP: 0
9. THOUGHTS THAT YOU WOULD BE BETTER OFF DEAD, OR OF HURTING YOURSELF: 0
SUM OF ALL RESPONSES TO PHQ QUESTIONS 1-9: 0
10. IF YOU CHECKED OFF ANY PROBLEMS, HOW DIFFICULT HAVE THESE PROBLEMS MADE IT FOR YOU TO DO YOUR WORK, TAKE CARE OF THINGS AT HOME, OR GET ALONG WITH OTHER PEOPLE: 0
6. FEELING BAD ABOUT YOURSELF - OR THAT YOU ARE A FAILURE OR HAVE LET YOURSELF OR YOUR FAMILY DOWN: 0
2. FEELING DOWN, DEPRESSED OR HOPELESS: 0
SUM OF ALL RESPONSES TO PHQ9 QUESTIONS 1 & 2: 0
8. MOVING OR SPEAKING SO SLOWLY THAT OTHER PEOPLE COULD HAVE NOTICED. OR THE OPPOSITE, BEING SO FIGETY OR RESTLESS THAT YOU HAVE BEEN MOVING AROUND A LOT MORE THAN USUAL: 0
4. FEELING TIRED OR HAVING LITTLE ENERGY: 0
5. POOR APPETITE OR OVEREATING: 0
7. TROUBLE CONCENTRATING ON THINGS, SUCH AS READING THE NEWSPAPER OR WATCHING TELEVISION: 0

## 2022-06-30 NOTE — PROGRESS NOTES
Crystal Ontiveros   Date ofBirth:  1955    Date of Visit:  6/30/2022    Chief Complaint   Patient presents with    Diabetes    Hypertension    Cholesterol Problem    Depression    Anxiety    Gastroesophageal Reflux       HPI  Patient has Type 2 diabetes. Patient takes Levemir insulin 22 units nightly and Metformin 1000 mg 2 times daily. Patient decreases carbohydrates. Patient monitors her blood sugar 1-2 times daily fasting or 2 hours after a meal. Patient states her fasting blood sugar averages 110 and 2 hours postprandial blood sugar averages 128-130. Patient walks a block 2 times per week. Patient has hypertension. Patient takes losartan 100 mg once daily and verapamil  mg once daily. Patient decreases salt. Patient walks a block 2 times per week.      Patient has hyperlipidemia. Cristy Rice takes rosuvastatin 20 mg nightly. Cristy Rice does a low fat, low cholesterol diet.     Patient has depression and anxiety.  Patient takes Cymbalta 60 mg nightly and it is working well.  Patient states her anxiety and depression have been. pretty good with the increase in Cymbalta and counseling. Patient states she finally found a Therapist and started counseling once a month.     Patient has GERD. Patient takes omeprazole 40 mg once daily. Patient denies heartburn and reflux. Patient decreases spicy foods and tomato based foods.  Patient states she drinks decaffeinated coffee. Patient complains of 1.5 month history of skin discoloration with light color top of foot with streak of lighter skin top of foot to lower right leg. Patient would like to see Dermatology. Patient states she did have a steroid injection in her right foot early part of this year. Review of Systems   Constitutional: Positive for appetite change (doesn't feel like eating if she is outside doing something but eats anyway). Negative for activity change, chills, fatigue, fever and unexpected weight change.    HENT: Negative for congestion, postnasal drip, rhinorrhea, sinus pressure, sinus pain, sore throat and trouble swallowing. Eyes: Negative for visual disturbance. Respiratory: Negative for cough, chest tightness, shortness of breath and wheezing. Cardiovascular: Negative for chest pain, palpitations and leg swelling. Gastrointestinal: Negative for abdominal pain, blood in stool, constipation, diarrhea, nausea and vomiting. Genitourinary: Negative for dysuria, frequency, hematuria and urgency. Musculoskeletal: Negative for arthralgias (foot pain resolved), joint swelling and myalgias. Skin: Positive for color change. Negative for rash and wound. Neurological: Negative for dizziness, tremors, syncope, weakness, light-headedness, numbness (patient states numbness resolved) and headaches. Psychiatric/Behavioral: Positive for dysphoric mood. Negative for decreased concentration and sleep disturbance. The patient is nervous/anxious.         Allergies   Allergen Reactions    No Known Allergies      Outpatient Medications Marked as Taking for the 6/30/22 encounter (Office Visit) with Jcarlos Alves MD   Medication Sig Dispense Refill    TRUEplus Lancets 30G MISC 1 each by Does not apply route daily 100 each 3    Blood Glucose Calibration (TRUE METRIX LEVEL 1) Low SOLN Use as directed for calibration 1 each 0    DULoxetine (CYMBALTA) 60 MG extended release capsule TAKE 1 CAPSULE EVERY DAY 90 capsule 1    omeprazole (PRILOSEC) 40 MG delayed release capsule TAKE 1 CAPSULE BY MOUTH EVERY MORNING (BEFORE BREAKFAST) 90 capsule 1    meloxicam (MOBIC) 7.5 MG tablet Take 1 tablet by mouth daily (Patient taking differently: Take 7.5 mg by mouth as needed ) 30 tablet 1    insulin detemir (LEVEMIR FLEXTOUCH) 100 UNIT/ML injection pen Inject 22 Units into the skin nightly      Blood Glucose Monitoring Suppl (TRUE METRIX METER) KAM Use to monitor blood sugar 1 each 0    blood glucose test strips (TRUE METRIX BLOOD GLUCOSE TEST) strip 1 each by In Vitro route daily As needed. 100 each 3    Alcohol Swabs (B-D SINGLE USE SWABS REGULAR) PADS 1 each by Does not apply route daily 100 each 3    FEROSUL 325 (65 Fe) MG tablet TAKE ONE TABLET BY MOUTH TWICE A DAY WITH MEALS (Patient taking differently: Take 325 mg by mouth daily (with breakfast) ) 60 tablet 1    Accu-Chek Softclix Lancets MISC 1 each by Does not apply route daily 100 each 3    metFORMIN (GLUCOPHAGE) 1000 MG tablet Take 1 tablet by mouth 2 times daily (with meals) 180 tablet 3    losartan (COZAAR) 100 MG tablet Take 1 tablet by mouth daily 90 tablet 3    Insulin Pen Needle (B-D ULTRAFINE III SHORT PEN) 31G X 8 MM MISC USE AS DIRECTED 100 each 3    rosuvastatin (CRESTOR) 20 MG tablet Take 1 tablet by mouth nightly 90 tablet 3    verapamil (CALAN SR) 120 MG extended release tablet Take 1 tablet by mouth daily 90 tablet 3    furosemide (LASIX) 20 MG tablet Take 20 mg by mouth as needed       acetaminophen (TYLENOL) 325 MG tablet Take 650 mg by mouth every 6 hours as needed for Pain      aspirin 325 MG EC tablet Take 1 tablet by mouth daily 30 tablet 3    vitamin B-12 (CYANOCOBALAMIN) 500 MCG tablet Take 2 tablets by mouth daily 60 tablet 3    Ascorbic Acid (VITAMIN C) 500 MG tablet Take 500 mg by mouth daily.  Cholecalciferol (VITAMIN D PO) Take 5,000 Units by mouth daily       MULTIPLE VITAMIN PO Take 1 tablet by mouth daily            Vitals:    06/30/22 0733   BP: 126/84   Pulse: 81   Resp: 16   Temp: 97.3 °F (36.3 °C)   SpO2: 97%   Weight: 229 lb 12.8 oz (104.2 kg)     Body mass index is 38.24 kg/m². Physical Exam  Nursing note reviewed. Constitutional:       General: She is not in acute distress. Appearance: Normal appearance. She is well-developed. Eyes:      General: Lids are normal.      Extraocular Movements: Extraocular movements intact.       Conjunctiva/sclera: Conjunctivae normal.      Pupils: Pupils are equal, round, and reactive to light.   Neck:      Thyroid: No thyromegaly. Vascular: No carotid bruit. Cardiovascular:      Rate and Rhythm: Normal rate and regular rhythm. Heart sounds: Normal heart sounds, S1 normal and S2 normal. No murmur heard. No friction rub. No gallop. Pulmonary:      Effort: Pulmonary effort is normal. No respiratory distress. Breath sounds: Normal breath sounds. No wheezing, rhonchi or rales. Abdominal:      General: Bowel sounds are normal. There is no distension. Palpations: Abdomen is soft. Tenderness: There is no abdominal tenderness. Musculoskeletal:      Cervical back: Neck supple. Right lower leg: No edema. Left lower leg: No edema. Lymphadenopathy:      Head:      Right side of head: No submandibular adenopathy. Left side of head: No submandibular adenopathy. Skin:     Comments: Skin hypopigmentation dorsum of right foot and liner area of hypopigmentation extending from center of dorsum of foot to right lower leg   Neurological:      Mental Status: She is alert and oriented to person, place, and time.    Psychiatric:         Mood and Affect: Mood normal.           Results for POC orders placed in visit on 06/30/22   POCT glycosylated hemoglobin (Hb A1C)   Result Value Ref Range    Hemoglobin A1C 7.0 %     Lab Review   Orders Only on 06/09/2022   Component Date Value    Vit D, 25-Hydroxy 06/09/2022 75.5    Orders Only on 06/09/2022   Component Date Value    PTH 06/09/2022 52.9    Orders Only on 06/09/2022   Component Date Value    Sodium 06/09/2022 140     Potassium 06/09/2022 5.0     Chloride 06/09/2022 102     CO2 06/09/2022 26     Anion Gap 06/09/2022 12     Glucose 06/09/2022 100*    BUN 06/09/2022 18     CREATININE 06/09/2022 1.1     GFR Non- 06/09/2022 49*    GFR  06/09/2022 60*    Calcium 06/09/2022 9.3    Orders Only on 06/09/2022   Component Date Value    WBC 06/09/2022 4.9     RBC 06/09/2022 4.27     Hemoglobin 06/09/2022 12.1     Hematocrit 06/09/2022 37.6     MCV 06/09/2022 88.0     MCH 06/09/2022 28.4     MCHC 06/09/2022 32.3     RDW 06/09/2022 16.7*    Platelets 80/09/1223 161     MPV 06/09/2022 8.5    Orders Only on 06/09/2022   Component Date Value    Ferritin 06/09/2022 17.7     Iron 06/09/2022 52     TIBC 06/09/2022 322     Iron Saturation 06/09/2022 16     WBC 06/09/2022 4.8     RBC 06/09/2022 4.25     Hemoglobin 06/09/2022 12.1     Hematocrit 06/09/2022 37.7     MCV 06/09/2022 88.6     MCH 06/09/2022 28.5     MCHC 06/09/2022 32.2     RDW 06/09/2022 16.3*    Platelets 81/23/8571 155     MPV 06/09/2022 8.5    Office Visit on 02/28/2022   Component Date Value    Hemoglobin A1C 02/28/2022 7.2        Lab Results   Component Value Date    CHOL 143 11/02/2021    TRIG 74 11/02/2021    HDL 64 (H) 11/02/2021    LDLCALC 64 11/02/2021         Assessment/Plan     1. Type 2 diabetes mellitus without complication, with long-term current use of insulin (HCC)  -Hemoglobin A1c of 7.0% is near goal   -Continue Metformin 1000 mg 2 times daily  -Increase Levemir insulin to 23 units nightly  -Limit carbohydrates to 45 grams with meals and 15 grams with snacks  -monitor blood sugars  -goal for blood sugar fasting or pre-meal  is   -goal for blood sugar 2 hours after a meal is less than 180  -goal for blood sugar at bedtime is less than 150  -Regular aerobic exercise  - POCT glycosylated hemoglobin (Hb A1C)    2. Essential hypertension  -stable  -Continue  losartan 100 mg once daily   - Continue verapamil  mg once daily  -Low sodium diet  -Regular aerobic exercise    3. Mixed hyperlipidemia  -stable  -Continue rosuvastatin 20 mg nightly  -Low fat, low cholesterol diet  -Regular aerobic exercise  - Lipid Panel; Future    4. Moderate episode of recurrent major depressive disorder (HCC)  -stable  -Continue Cymbalta 60mg once daily  -continue counseling    5.  Anxiety  -stable  -Continue Cymbalta 60mg once daily  -continue counseling    6. Gastroesophageal reflux disease, unspecified whether esophagitis present  -stable  -Continue  omeprazole 40 mg once daily  -Decrease caffeine, avoid spicy foods, avoid tomato based foods  -Eat small meals instead of large meals  -Wait 2-3 hours after eating before lying down     7. Skin hypopigmentation  - skin hypopigmentation right foot and right lower leg  -may be due to prior steroid injection  -Referral to Kayleigh Tan MD, Dermatology, [facility]        Discussed medications with patient, who voiced understanding of their use and indications. All questions answered. Return in about 18 weeks (around 11/3/2022) for Medicare AWV.

## 2022-07-28 DIAGNOSIS — E78.2 MIXED HYPERLIPIDEMIA: ICD-10-CM

## 2022-07-28 DIAGNOSIS — Z79.4 TYPE 2 DIABETES MELLITUS WITHOUT COMPLICATION, WITH LONG-TERM CURRENT USE OF INSULIN (HCC): ICD-10-CM

## 2022-07-28 DIAGNOSIS — K21.9 GASTROESOPHAGEAL REFLUX DISEASE, UNSPECIFIED WHETHER ESOPHAGITIS PRESENT: ICD-10-CM

## 2022-07-28 DIAGNOSIS — E11.9 TYPE 2 DIABETES MELLITUS WITHOUT COMPLICATION, WITH LONG-TERM CURRENT USE OF INSULIN (HCC): ICD-10-CM

## 2022-07-28 RX ORDER — OMEPRAZOLE 40 MG/1
40 CAPSULE, DELAYED RELEASE ORAL
Qty: 90 CAPSULE | Refills: 1 | Status: SHIPPED | OUTPATIENT
Start: 2022-07-28

## 2022-07-28 RX ORDER — ROSUVASTATIN CALCIUM 20 MG/1
TABLET, COATED ORAL
Qty: 90 TABLET | Refills: 1 | Status: SHIPPED | OUTPATIENT
Start: 2022-07-28

## 2022-07-28 NOTE — TELEPHONE ENCOUNTER
Medication:   Requested Prescriptions     Pending Prescriptions Disp Refills    metFORMIN (GLUCOPHAGE) 1000 MG tablet [Pharmacy Med Name: METFORMIN HYDROCHLORIDE 1000 MG Tablet] 180 tablet 3     Sig: TAKE 1 TABLET TWICE DAILY WITH MEALS    rosuvastatin (CRESTOR) 20 MG tablet [Pharmacy Med Name: ROSUVASTATIN CALCIUM 20 MG Tablet] 90 tablet 3     Sig: TAKE 1 TABLET EVERY NIGHT    omeprazole (PRILOSEC) 40 MG delayed release capsule [Pharmacy Med Name: OMEPRAZOLE 40 MG Capsule Delayed Release] 90 capsule 1     Sig: TAKE 1 CAPSULE BY MOUTH EVERY MORNING (BEFORE BREAKFAST)     Last Filled: 11.2.21 11.2.21 4.11.22    Last appt: 6/30/2022   Next appt: 11/4/2022    Last OARRS: No flowsheet data found.

## 2022-09-28 DIAGNOSIS — Z79.4 TYPE 2 DIABETES MELLITUS WITHOUT COMPLICATION, WITH LONG-TERM CURRENT USE OF INSULIN (HCC): ICD-10-CM

## 2022-09-28 DIAGNOSIS — E11.9 TYPE 2 DIABETES MELLITUS WITHOUT COMPLICATION, WITH LONG-TERM CURRENT USE OF INSULIN (HCC): ICD-10-CM

## 2022-09-29 NOTE — TELEPHONE ENCOUNTER
Medication:   Requested Prescriptions     Pending Prescriptions Disp Refills    LEVEMIR FLEXTOUCH 100 UNIT/ML injection pen [Pharmacy Med Name: Ruth Forrest 100 UNIT/ML Solution Pen-injector] 30 mL      Sig: INJECT 20 UNITS INTO THE SKIN NIGHTLY    Insulin Pen Needle (DROPLET PEN NEEDLES) 31G X 8 MM MISC [Pharmacy Med Name: DROPLET PEN NEEDLES [id number] 31G X 8 MM] 100 each 3     Sig: USE AS DIRECTED    losartan (COZAAR) 100 MG tablet [Pharmacy Med Name: LOSARTAN POTASSIUM 100 MG Tablet] 90 tablet 3     Sig: TAKE 1 TABLET EVERY DAY     Last Filled:  06/30/22, 11/02/21, 11/02/21    Last appt: 6/30/2022   Next appt: 11/4/2022    Last Labs DM:   Lab Results   Component Value Date/Time    LABA1C 7.0 06/30/2022 07:47 AM

## 2022-10-01 RX ORDER — LOSARTAN POTASSIUM 100 MG/1
TABLET ORAL
Qty: 90 TABLET | Refills: 3 | Status: SHIPPED | OUTPATIENT
Start: 2022-10-01

## 2022-10-01 RX ORDER — INSULIN DETEMIR 100 [IU]/ML
INJECTION, SOLUTION SUBCUTANEOUS
Qty: 30 ML | Refills: 2 | Status: SHIPPED | OUTPATIENT
Start: 2022-10-01 | End: 2022-11-04

## 2022-10-01 RX ORDER — PEN NEEDLE, DIABETIC 31 GX5/16"
NEEDLE, DISPOSABLE MISCELLANEOUS
Qty: 100 EACH | Refills: 3 | Status: SHIPPED | OUTPATIENT
Start: 2022-10-01

## 2022-10-25 SDOH — HEALTH STABILITY: PHYSICAL HEALTH: ON AVERAGE, HOW MANY MINUTES DO YOU ENGAGE IN EXERCISE AT THIS LEVEL?: 20 MIN

## 2022-10-25 SDOH — HEALTH STABILITY: PHYSICAL HEALTH: ON AVERAGE, HOW MANY DAYS PER WEEK DO YOU ENGAGE IN MODERATE TO STRENUOUS EXERCISE (LIKE A BRISK WALK)?: 2 DAYS

## 2022-10-25 ASSESSMENT — PATIENT HEALTH QUESTIONNAIRE - PHQ9
SUM OF ALL RESPONSES TO PHQ QUESTIONS 1-9: 12
8. MOVING OR SPEAKING SO SLOWLY THAT OTHER PEOPLE COULD HAVE NOTICED. OR THE OPPOSITE, BEING SO FIGETY OR RESTLESS THAT YOU HAVE BEEN MOVING AROUND A LOT MORE THAN USUAL: 0
SUM OF ALL RESPONSES TO PHQ QUESTIONS 1-9: 12
SUM OF ALL RESPONSES TO PHQ9 QUESTIONS 1 & 2: 3
7. TROUBLE CONCENTRATING ON THINGS, SUCH AS READING THE NEWSPAPER OR WATCHING TELEVISION: 0
6. FEELING BAD ABOUT YOURSELF - OR THAT YOU ARE A FAILURE OR HAVE LET YOURSELF OR YOUR FAMILY DOWN: 1
SUM OF ALL RESPONSES TO PHQ QUESTIONS 1-9: 12
SUM OF ALL RESPONSES TO PHQ QUESTIONS 1-9: 12
9. THOUGHTS THAT YOU WOULD BE BETTER OFF DEAD, OR OF HURTING YOURSELF: 0
10. IF YOU CHECKED OFF ANY PROBLEMS, HOW DIFFICULT HAVE THESE PROBLEMS MADE IT FOR YOU TO DO YOUR WORK, TAKE CARE OF THINGS AT HOME, OR GET ALONG WITH OTHER PEOPLE: 1
1. LITTLE INTEREST OR PLEASURE IN DOING THINGS: 2
3. TROUBLE FALLING OR STAYING ASLEEP: 3
2. FEELING DOWN, DEPRESSED OR HOPELESS: 1
4. FEELING TIRED OR HAVING LITTLE ENERGY: 3
5. POOR APPETITE OR OVEREATING: 2

## 2022-10-25 ASSESSMENT — LIFESTYLE VARIABLES
HOW MANY STANDARD DRINKS CONTAINING ALCOHOL DO YOU HAVE ON A TYPICAL DAY: PATIENT DOES NOT DRINK
HOW MANY STANDARD DRINKS CONTAINING ALCOHOL DO YOU HAVE ON A TYPICAL DAY: 0
HOW OFTEN DO YOU HAVE SIX OR MORE DRINKS ON ONE OCCASION: 1

## 2022-11-04 ENCOUNTER — OFFICE VISIT (OUTPATIENT)
Dept: PRIMARY CARE CLINIC | Age: 67
End: 2022-11-04
Payer: MEDICARE

## 2022-11-04 VITALS
WEIGHT: 236 LBS | TEMPERATURE: 97 F | DIASTOLIC BLOOD PRESSURE: 88 MMHG | SYSTOLIC BLOOD PRESSURE: 138 MMHG | RESPIRATION RATE: 16 BRPM | OXYGEN SATURATION: 100 % | BODY MASS INDEX: 39.32 KG/M2 | HEART RATE: 81 BPM | HEIGHT: 65 IN

## 2022-11-04 DIAGNOSIS — E53.8 VITAMIN B 12 DEFICIENCY: ICD-10-CM

## 2022-11-04 DIAGNOSIS — Z79.4 TYPE 2 DIABETES MELLITUS WITHOUT COMPLICATION, WITH LONG-TERM CURRENT USE OF INSULIN (HCC): ICD-10-CM

## 2022-11-04 DIAGNOSIS — D50.9 IRON DEFICIENCY ANEMIA, UNSPECIFIED IRON DEFICIENCY ANEMIA TYPE: ICD-10-CM

## 2022-11-04 DIAGNOSIS — R53.83 FATIGUE, UNSPECIFIED TYPE: ICD-10-CM

## 2022-11-04 DIAGNOSIS — K21.9 GASTROESOPHAGEAL REFLUX DISEASE, UNSPECIFIED WHETHER ESOPHAGITIS PRESENT: ICD-10-CM

## 2022-11-04 DIAGNOSIS — E55.9 VITAMIN D DEFICIENCY: ICD-10-CM

## 2022-11-04 DIAGNOSIS — I10 ESSENTIAL HYPERTENSION: ICD-10-CM

## 2022-11-04 DIAGNOSIS — E78.2 MIXED HYPERLIPIDEMIA: ICD-10-CM

## 2022-11-04 DIAGNOSIS — E11.9 TYPE 2 DIABETES MELLITUS WITHOUT COMPLICATION, WITH LONG-TERM CURRENT USE OF INSULIN (HCC): ICD-10-CM

## 2022-11-04 DIAGNOSIS — Z23 NEED FOR INFLUENZA VACCINATION: ICD-10-CM

## 2022-11-04 DIAGNOSIS — F41.9 ANXIETY: ICD-10-CM

## 2022-11-04 DIAGNOSIS — Z00.00 INITIAL MEDICARE ANNUAL WELLNESS VISIT: Primary | ICD-10-CM

## 2022-11-04 DIAGNOSIS — R15.9 INCONTINENCE OF FECES, UNSPECIFIED FECAL INCONTINENCE TYPE: ICD-10-CM

## 2022-11-04 DIAGNOSIS — F33.1 MODERATE EPISODE OF RECURRENT MAJOR DEPRESSIVE DISORDER (HCC): ICD-10-CM

## 2022-11-04 LAB
A/G RATIO: 1.8 (ref 1.1–2.2)
ALBUMIN SERPL-MCNC: 4.3 G/DL (ref 3.4–5)
ALP BLD-CCNC: 59 U/L (ref 40–129)
ALT SERPL-CCNC: 12 U/L (ref 10–40)
ANION GAP SERPL CALCULATED.3IONS-SCNC: 11 MMOL/L (ref 3–16)
AST SERPL-CCNC: 18 U/L (ref 15–37)
BASOPHILS ABSOLUTE: 0 K/UL (ref 0–0.2)
BASOPHILS RELATIVE PERCENT: 0.7 %
BILIRUB SERPL-MCNC: 0.5 MG/DL (ref 0–1)
BUN BLDV-MCNC: 19 MG/DL (ref 7–20)
CALCIUM SERPL-MCNC: 9.3 MG/DL (ref 8.3–10.6)
CHLORIDE BLD-SCNC: 99 MMOL/L (ref 99–110)
CHOLESTEROL, TOTAL: 142 MG/DL (ref 0–199)
CO2: 28 MMOL/L (ref 21–32)
CREAT SERPL-MCNC: 1.1 MG/DL (ref 0.6–1.2)
CREATININE URINE: 69.7 MG/DL (ref 28–259)
EOSINOPHILS ABSOLUTE: 0.1 K/UL (ref 0–0.6)
EOSINOPHILS RELATIVE PERCENT: 2 %
GFR SERPL CREATININE-BSD FRML MDRD: 55 ML/MIN/{1.73_M2}
GLUCOSE BLD-MCNC: 123 MG/DL (ref 70–99)
HBA1C MFR BLD: 7.3 %
HCT VFR BLD CALC: 36.4 % (ref 36–48)
HDLC SERPL-MCNC: 59 MG/DL (ref 40–60)
HEMOGLOBIN: 12 G/DL (ref 12–16)
IRON SATURATION: 25 % (ref 15–50)
IRON: 71 UG/DL (ref 37–145)
LDL CHOLESTEROL CALCULATED: 69 MG/DL
LYMPHOCYTES ABSOLUTE: 1.1 K/UL (ref 1–5.1)
LYMPHOCYTES RELATIVE PERCENT: 19.1 %
MCH RBC QN AUTO: 28.7 PG (ref 26–34)
MCHC RBC AUTO-ENTMCNC: 33.1 G/DL (ref 31–36)
MCV RBC AUTO: 87 FL (ref 80–100)
MICROALBUMIN UR-MCNC: <1.2 MG/DL
MICROALBUMIN/CREAT UR-RTO: NORMAL MG/G (ref 0–30)
MONOCYTES ABSOLUTE: 0.3 K/UL (ref 0–1.3)
MONOCYTES RELATIVE PERCENT: 5.8 %
NEUTROPHILS ABSOLUTE: 4.3 K/UL (ref 1.7–7.7)
NEUTROPHILS RELATIVE PERCENT: 72.4 %
PDW BLD-RTO: 15.2 % (ref 12.4–15.4)
PLATELET # BLD: 182 K/UL (ref 135–450)
PMV BLD AUTO: 8.7 FL (ref 5–10.5)
POTASSIUM SERPL-SCNC: 4.7 MMOL/L (ref 3.5–5.1)
RBC # BLD: 4.18 M/UL (ref 4–5.2)
SODIUM BLD-SCNC: 138 MMOL/L (ref 136–145)
TOTAL IRON BINDING CAPACITY: 289 UG/DL (ref 260–445)
TOTAL PROTEIN: 6.7 G/DL (ref 6.4–8.2)
TRIGL SERPL-MCNC: 70 MG/DL (ref 0–150)
TSH SERPL DL<=0.05 MIU/L-ACNC: 3.83 UIU/ML (ref 0.27–4.2)
VITAMIN B-12: 863 PG/ML (ref 211–911)
VITAMIN D 25-HYDROXY: 67 NG/ML
VLDLC SERPL CALC-MCNC: 14 MG/DL
WBC # BLD: 5.9 K/UL (ref 4–11)

## 2022-11-04 PROCEDURE — G0438 PPPS, INITIAL VISIT: HCPCS | Performed by: INTERNAL MEDICINE

## 2022-11-04 PROCEDURE — G8484 FLU IMMUNIZE NO ADMIN: HCPCS | Performed by: INTERNAL MEDICINE

## 2022-11-04 PROCEDURE — 1123F ACP DISCUSS/DSCN MKR DOCD: CPT | Performed by: INTERNAL MEDICINE

## 2022-11-04 PROCEDURE — 3017F COLORECTAL CA SCREEN DOC REV: CPT | Performed by: INTERNAL MEDICINE

## 2022-11-04 PROCEDURE — 83036 HEMOGLOBIN GLYCOSYLATED A1C: CPT | Performed by: INTERNAL MEDICINE

## 2022-11-04 PROCEDURE — 3078F DIAST BP <80 MM HG: CPT | Performed by: INTERNAL MEDICINE

## 2022-11-04 PROCEDURE — 90694 VACC AIIV4 NO PRSRV 0.5ML IM: CPT | Performed by: INTERNAL MEDICINE

## 2022-11-04 PROCEDURE — 3051F HG A1C>EQUAL 7.0%<8.0%: CPT | Performed by: INTERNAL MEDICINE

## 2022-11-04 PROCEDURE — 3074F SYST BP LT 130 MM HG: CPT | Performed by: INTERNAL MEDICINE

## 2022-11-04 PROCEDURE — G0008 ADMIN INFLUENZA VIRUS VAC: HCPCS | Performed by: INTERNAL MEDICINE

## 2022-11-04 RX ORDER — INSULIN DETEMIR 100 [IU]/ML
24 INJECTION, SOLUTION SUBCUTANEOUS NIGHTLY
COMMUNITY
Start: 2022-11-04

## 2022-11-04 SDOH — ECONOMIC STABILITY: FOOD INSECURITY: WITHIN THE PAST 12 MONTHS, YOU WORRIED THAT YOUR FOOD WOULD RUN OUT BEFORE YOU GOT MONEY TO BUY MORE.: NEVER TRUE

## 2022-11-04 SDOH — ECONOMIC STABILITY: FOOD INSECURITY: WITHIN THE PAST 12 MONTHS, THE FOOD YOU BOUGHT JUST DIDN'T LAST AND YOU DIDN'T HAVE MONEY TO GET MORE.: NEVER TRUE

## 2022-11-04 ASSESSMENT — SOCIAL DETERMINANTS OF HEALTH (SDOH): HOW HARD IS IT FOR YOU TO PAY FOR THE VERY BASICS LIKE FOOD, HOUSING, MEDICAL CARE, AND HEATING?: NOT HARD AT ALL

## 2022-11-04 NOTE — PATIENT INSTRUCTIONS
Learning About Living Palom[place]olly Chatterjee  What is a living will? A living will, also called a declaration, is a legal form. It tells your family and your doctor your wishes when you can't speak for yourself. It's used by the health professionals who will treat you as you near the end of your life or if you get seriously hurt or ill. If you put your wishes in writing, your loved ones and others will know what kind of care you want. They won't need to guess. This can ease your mind and be helpful to others. And you can change or cancel your living will at any time. A living will is not the same as an estate or property will. An estate will explains what you want to happen with your money and property after you die. How do you use it? Keep these facts in mind about how a living will is used. Your living will is used only if you can't speak or make decisions for yourself. Most often, one or more doctors must certify that you can't speak or decide for yourself before your living will takes effect. If you get better and can speak for yourself again, you can accept or refuse any treatment. It doesn't matter what you said in your living will. Some states may limit your right to refuse treatment in certain cases. For example, you may need to clearly state in your living will that you don't want artificial hydration and nutrition, such as being fed through a tube. Is a living will a legal document? A living will is a legal document. Each state has its own laws about living [place]. And a living will may be called something else in your state. Here are some things to know about living [place]. You don't need an  to complete a living will. But legal advice can be helpful if your state's laws are unclear. It can also help if your health history is complicated or your family can't agree on what should be in your living will. You can change your living will at any time.  Some people find that their wishes about end-of-life care change as their health changes. If you make big changes to your living will, complete a new form. If you move to another state, make sure that your living will is legal in the state where you now live. In most cases, doctors will respect your wishes even if you have a form from a different state. You might use a universal form that has been approved by many states. This kind of form can sometimes be filled out and stored online. Your digital copy will then be available wherever you have a connection to the internet. The doctors and nurses who need to treat you can find it right away. Your state may offer an online registry. This is another place where you can store your living will online. It's a good idea to get your living will notarized. This means using a person called a [place] to watch two people sign, or witness, your living will. What should you know when you create a living will? Here are some questions to ask yourself as you make your living will. Do you know enough about life support methods that might be used? If not, talk to your doctor so you know what might be done if you can't breathe on your own, your heart stops, or you can't swallow. What things would you still want to be able to do after you receive life-support methods? Would you want to be able to walk? To speak? To eat on your own? To live without the help of machines? Do you want certain Voodoo practices performed if you become very ill? If you have a choice, where do you want to be cared for? In your home? At a hospital or nursing home? If you have a choice at the end of your life, where would you prefer to die? At home? In a hospital or nursing home? Somewhere else? Would you prefer to be buried or cremated? Do you want your organs to be donated after you die? What should you do with your living will?   Make sure that your family members and your health care agent have copies of your living will (also called a declaration). Give your doctor a copy of your living will. Ask to have it kept as part of your medical record. If you have more than one doctor, make sure that each one has a copy. Put a copy of your living will where it can be easily found. For example, some people may put a copy on their refrigerator door. If you are using a digital copy, be sure your doctor, family members, and health care agent know how to find and access it. Where can you learn more? Go to [email or web address] and sign in to your [employer] account. Enter X933 in the [facility] box to learn more about \"Learning About Living [place]. \"     If you do not have an account, please click on the \"Sign Up Now\" link. Current as of: June 16, 2022               Content Version: 13.4  © 2521-3643 [employer]wise, [employer]. Care instructions adapted under license by [facility] ([facility]). If you have questions about a medical condition or this instruction, always ask your healthcare professional. [address] any warranty or liability for your use of this information. Personalized Preventive Plan for Dayne Tucker - 11/4/2022  Medicare offers a range of preventive health benefits. Some of the tests and screenings are paid in full while other may be subject to a deductible, co-insurance, and/or copay. Some of these benefits include a comprehensive review of your medical history including lifestyle, illnesses that may run in your family, and various assessments and screenings as appropriate. After reviewing your medical record and screening and assessments performed today your provider may have ordered immunizations, labs, imaging, and/or referrals for you. A list of these orders (if applicable) as well as your Preventive Care list are included within your After Visit Summary for your review.     Other Preventive Recommendations:    A preventive eye exam performed by an eye specialist is

## 2022-11-04 NOTE — PROGRESS NOTES
Medicare Annual Wellness Visit    Lennox Sleek is here for Medicare AWV    Assessment & Plan   1. Initial Medicare annual wellness visit  -Medicare AWV done  -Mammogram done on 10/29/22  -Colonoscopy done on 6/1/20  -Covid-19 vaccine done on 5/1/21, 5/29/21, and booster done on 1/10/22  -Pneumovax 23 done on 9/7/18  -Tdap done on 9/1/16  - Shingrix vaccine done on 7/29/20 and 11/3/20  - Flu shot done today  - Dexa scan done on 9/28/20    2. Type 2 diabetes mellitus without complication, with long-term current use of insulin ([facility])  -Hemoglobin A1c of 7.3% shows diabetes needs a little better control  -Increase insulin detemir (LEVEMIR FLEXTOUCH) 100 UNIT/ML injection pen; Inject 24 Units into the skin nightly  -Continue Metformin 1000 mg 2 times daily  -Limit carbohydrates to 45 grams with meals and 15 grams with snacks  -monitor blood sugars  -goal for blood sugar fasting or pre-meal  is   -goal for blood sugar 2 hours after a meal is less than 180  -goal for blood sugar at bedtime is less than 150  -Regular aerobic exercise  - POCT glycosylated hemoglobin (Hb A1C)  - Comprehensive Metabolic Panel; Future  - Microalbumin / Creatinine Urine Ratio; Future  -  DIABETES FOOT EXAM  - insulin detemir (LEVEMIR FLEXTOUCH) 100 UNIT/ML injection pen; Inject 24 Units into the skin nightly    3. Essential hypertension  -stable  -Continue  losartan 100 mg once daily   - Continue verapamil  mg once daily  -Low sodium diet  -Regular aerobic exercise  - Comprehensive Metabolic Panel; Future    4. Mixed hyperlipidemia  -stable  -Continue rosuvastatin 20 mg nightly  -Low fat, low cholesterol diet  -Regular aerobic exercise  - Comprehensive Metabolic Panel; Future  - Lipid Panel; Future    5.  Gastroesophageal reflux disease, unspecified whether esophagitis present  -stable  -Continue  omeprazole 40 mg once daily  -Decrease caffeine, avoid spicy foods, avoid tomato based foods  -Eat small meals instead of large meals  -Wait 2-3 hours after eating before lying down     6. Moderate episode of recurrent major depressive disorder (HCC)  -stable  -Continue Cymbalta 60mg once daily  -continue counseling    7. Anxiety  -stable  -Continue Cymbalta 60mg once daily  -continue counseling     8. Iron deficiency anemia, unspecified iron deficiency anemia type  -stable  -Continue ferrous sulfate 325 mg once daily with breakfast  - Iron and TIBC; Future    9. Vitamin D deficiency  -stable  -Continue vitamin D 5000 IU once daily  - Vitamin D 25 Hydroxy; Future    10. Vitamin B 12 deficiency  -Stable  -Continue vitamin B12 500 mcg once daily  - Vitamin B12; Future    11. Need for influenza vaccination  - Influenza, FLUAD, (age 72 y+), IM, Preservative Free, 0.5 mL given    12. Fatigue, unspecified type  - Comprehensive Metabolic Panel; Future  - CBC with Auto Differential; Future  - TSH; Future  -Multivitamin once daily  -Regular aerobic exercise    13. Incontinence of feces, unspecified fecal incontinence type  -Referral to Jl Jean Baptiste MD, Gastroenterology, Jayla Mac          Recommendations for Preventive Services Due: see orders and patient instructions/AVS.  Recommended screening schedule for the next 5-10 years is provided to the patient in written form: see Patient Instructions/AVS.       Return in 3 months (on 2/4/2023) for diabetes, hypertension, hyperlipidemia, depression, anxiety, fatigue, and GERD. Subjective   The following acute and/or chronic problems were also addressed today:    Patient has Type 2 diabetes. Patient takes Levemir insulin 23 units nightly and Metformin 1000 mg 2 times daily. Patient decreases carbohydrates. Patient monitors her blood sugar 1-2 times daily fasting and pre-dinner. Fasting averages 94 and pre-dinner averages 100. Patient walks 20-30 minutes 2 times per week. Patient has hypertension. Patient takes losartan 100 mg once daily and verapamil  mg once daily. Patient takes Furosemide 20mg once 3 times per week for ankle and foot swelling. Patient decreases salt. Patient has hyperlipidemia. Patient takes rosuvastatin 20 mg nightly. Patient does a low fat, low cholesterol diet. Patient has depression and anxiety. Patient takes Cymbalta 60 mg nightly. Patient states she was going to counseling regularly until last month and now goes as needed. Patient has GERD. Patient takes omeprazole 40 mg once daily. Patient denies heartburn and reflux. Patient decreases spicy foods and tomato based foods. Patient states she drinks decaffeinated coffee. Patient has iron deficiency anemia. Patient complains of  fatigue. Patient denies cold intolerance. Patient has vitamin B12 deficiency. Patient takes vitamin B12 1000 mcg once daily. Patient has vitamin D deficiency. Patient takes vitamin D 5,000 IU once daily. Patient complains of fecal incontinence randomly for 3 months off and on. Patient states her stomach with hurt and stool slips out. Patient states she has pressure before stool comes out. Patient's complete Health Risk Assessment and screening values have been reviewed and are found in Flowsheets. The following problems were reviewed today and where indicated follow up appointments were made and/or referrals ordered. Positive Risk Factor Screenings with Interventions:       Depression Interventions:  Patient declines any further evaluation/treatment for this issue  Patient takes Cymbalta and goes to counseling.           General Health and ACP:  General  In general, how would you say your health is?: Fair  In the past 7 days, have you experienced any of the following: New or Increased Pain, New or Increased Fatigue, Loneliness, Social Isolation, Stress or Anger?: (!) Yes  Select all that apply: (!) New or Increased Fatigue  Do you get the social and emotional support that you need?: Yes  Do you have a Living Will?: (!) No    Advance Directives       Power of  Living Will ACP-Advance Directive ACP-Power of     Not on File Not on File Not on File Not on \[facility]\"" Risk Interventions:  Fatigue: labs ordered- Patient complains of fatigue. Patient states she takes a multivitamin. Patient states she has gained weight. Patient states she is only exercising a little. Patient denies difficulty sleeping. Patient states she can sleep 7 hours and still wakes up tired. No Living Will: Advance Care Planning addressed with patient today    Health Habits/Nutrition:  Physical Activity: Insufficiently Active    Days of Exercise per Week: 2 days    Minutes of Exercise per Session: 20 min     Have you lost any weight without trying in the past 3 months?: No  Body mass index: (!) 39.27  Have you seen the dentist within the past year?: Yes  Health Habits/Nutrition Interventions:  Nutritional issues:  Patient states she is going to do a better low carbohydrate diet or look into Weight Watchers. Objective   Vitals:    11/04/22 0753   BP: 138/88   Pulse: 81   Resp: 16   Temp: 97 °F (36.1 °C)   SpO2: 100%   Weight: 236 lb (107 kg)   Height: 5' 5\" (1.651 m)      Body mass index is 39.27 kg/m².       General Appearance: alert and oriented to person, place and time, well-developed and well-nourished, in no acute distress  Skin: warm and dry, no rash or erythema  Head: normocephalic and atraumatic  Eyes: pupils equal, round, and reactive to light, extraocular eye movements intact, conjunctivae normal  Neck: neck supple and non tender without mass, no thyromegaly or thyroid nodules, no cervical lymphadenopathy   Pulmonary/Chest: clear to auscultation bilaterally- no wheezes, rales or rhonchi, normal air movement, no respiratory distress  Cardiovascular: normal rate, regular rhythm, normal S1 and S2, no murmurs, and no carotid bruits  Abdomen: soft, non-tender, non-distended, normal bowel sounds, no masses or organomegaly  Extremities: no edema and foot exam- normal color and temperature, no large calluses, ulcers or wounds  Neurologic: gait and coordination normal, speech normal, and bilateral foot monofilament exam normal       Allergies   Allergen Reactions    No Known Allergies      Prior to Visit Medications    Medication Sig Taking? Authorizing Provider   TURMERIC PO Take by mouth Yes Historical Provider, MD   insulin detemir (LEVEMIR FLEXTOUCH) 100 UNIT/ML injection pen INJECT 20 UNITS INTO THE SKIN NIGHTLY  Patient taking differently: Inject 23 units nightly Yes Nate Nair MD   Insulin Pen Needle (DROPLET PEN NEEDLES) 31G X 8 MM MISC USE AS DIRECTED Yes Nate Nair MD   losartan (COZAAR) 100 MG tablet TAKE 1 TABLET EVERY DAY Yes Nate Nair MD   metFORMIN (GLUCOPHAGE) 1000 MG tablet TAKE 1 TABLET TWICE DAILY WITH MEALS Yes Nate Nair MD   rosuvastatin (CRESTOR) 20 MG tablet TAKE 1 TABLET EVERY NIGHT Yes Nate Nair MD   omeprazole (PRILOSEC) 40 MG delayed release capsule TAKE 1 CAPSULE BY MOUTH EVERY MORNING (BEFORE BREAKFAST) Yes Nate Nair MD   TRUEplus Lancets 30G MISC 1 each by Does not apply route daily Yes Nate Nair MD   Blood Glucose Calibration (TRUE METRIX LEVEL 1) Low SOLN Use as directed for calibration Yes Nate Nair MD   DULoxetine (CYMBALTA) 60 MG extended release capsule TAKE 1 CAPSULE EVERY DAY Yes Nate Nair MD   Blood Glucose Monitoring Suppl (TRUE METRIX METER) KAM Use to monitor blood sugar Yes Nate Nair MD   blood glucose test strips (TRUE METRIX BLOOD GLUCOSE TEST) strip 1 each by In Vitro route daily As needed.  Yes Nate Nair MD   Alcohol Swabs (B-D SINGLE USE SWABS REGULAR) PADS 1 each by Does not apply route daily Yes Nate Nair MD   FEROSUL 325 (65 Fe) MG tablet TAKE ONE TABLET BY MOUTH TWICE A DAY WITH MEALS  Patient taking differently: Take 325 mg by mouth daily (with breakfast) Yes Maryjane TOSCANO Steve Carcamo MD   verapamil (CALAN SR) 120 MG extended release tablet Take 1 tablet by mouth daily Yes Addis Olvera MD   furosemide (LASIX) 20 MG tablet Take 20 mg by mouth as needed  Yes Historical Provider, MD   acetaminophen (TYLENOL) 325 MG tablet Take 650 mg by mouth every 6 hours as needed for Pain Yes Historical Provider, MD   aspirin 325 MG EC tablet Take 1 tablet by mouth daily Yes Marcelo Banuelos APRN - CNP   vitamin B-12 (CYANOCOBALAMIN) 500 MCG tablet Take 2 tablets by mouth daily Yes Addis Olvera MD   Ascorbic Acid (VITAMIN C) 500 MG tablet Take 500 mg by mouth daily.  Yes Historical Provider, MD   Cholecalciferol (VITAMIN D PO) Take 5,000 Units by mouth daily  Yes Historical Provider, MD   MULTIPLE VITAMIN PO Take 1 tablet by mouth daily  Yes Historical Provider, MD         Hemoglobin A1C   Date Value Ref Range Status   11/04/2022 7.3 % Final     Lab Review   Orders Only on 07/18/2022   Component Date Value    Visual Acuity Distance R* 07/18/2022 20/20     Visual Acuity Distance L* 07/18/2022 20/20     Intraocular Pressure Eye 07/18/2022                      Value:18  18      Diabetic Retinopathy 07/18/2022 NEGATIVE     Cataracts 07/18/2022 POSITIVE     Glaucoma 07/18/2022 POSITIVE    Orders Only on 06/30/2022   Component Date Value    Cholesterol, Total 06/30/2022 138     Triglycerides 06/30/2022 43     HDL 06/30/2022 63 (A)     LDL Calculated 06/30/2022 66     VLDL Cholesterol Calcula* 06/30/2022 9    Office Visit on 06/30/2022   Component Date Value    Hemoglobin A1C 06/30/2022 7.0    Orders Only on 06/09/2022   Component Date Value    Vit D, 25-Hydroxy 06/09/2022 75.5    Orders Only on 06/09/2022   Component Date Value    PTH 06/09/2022 52.9    Orders Only on 06/09/2022   Component Date Value    Sodium 06/09/2022 140     Potassium 06/09/2022 5.0     Chloride 06/09/2022 102     CO2 06/09/2022 26     Anion Gap 06/09/2022 12     Glucose 06/09/2022 100 (A)     BUN 06/09/2022 18     Creatinine 06/09/2022 1.1     GFR Non- 06/09/2022 49 (A)     GFR  06/09/2022 60 (A)     Calcium 06/09/2022 9.3    Orders Only on 06/09/2022   Component Date Value    WBC 06/09/2022 4.9     RBC 06/09/2022 4.27     Hemoglobin 06/09/2022 12.1     Hematocrit 06/09/2022 37.6     MCV 06/09/2022 88.0     MCH 06/09/2022 28.4     MCHC 06/09/2022 32.3     RDW 06/09/2022 16.7 (A)     Platelets 31/83/2650 161     MPV 06/09/2022 8.5    Orders Only on 06/09/2022   Component Date Value    Ferritin 06/09/2022 17.7     Iron 06/09/2022 52     TIBC 06/09/2022 322     Iron Saturation 06/09/2022 16     WBC 06/09/2022 4.8     RBC 06/09/2022 4.25     Hemoglobin 06/09/2022 12.1     Hematocrit 06/09/2022 37.7     MCV 06/09/2022 88.6     MCH 06/09/2022 28.5     MCHC 06/09/2022 32.2     RDW 06/09/2022 16.3 (A)     Platelets 24/10/5590 155     MPV 06/09/2022 8.5        CareTeam (Including outside providers/suppliers regularly involved in providing care):   Patient Care Team:  Akiko Clark MD as PCP - [facility]  Akiko Clark MD as PCP - [facility] Empaneled Provider  Katlin Pressley MD as Obstetrician (Obstetrics & Gynecology)  Jacklyn Lazo MD as Consulting Physician (Gastroenterology)  Leon Wood MD as Surgeon (General Surgery)     Reviewed and updated this visit:  Tobacco  Allergies  Meds  Med Hx  Surg Hx  Soc Hx  Fam Hx

## 2022-11-20 PROBLEM — R15.9 INCONTINENCE OF FECES: Status: ACTIVE | Noted: 2022-11-20

## 2022-12-05 RX ORDER — BLOOD-GLUCOSE CONTROL, LOW
EACH MISCELLANEOUS
Qty: 1 EACH | Refills: 0 | Status: SHIPPED | OUTPATIENT
Start: 2022-12-05

## 2022-12-05 NOTE — TELEPHONE ENCOUNTER
Medication:   Requested Prescriptions     Pending Prescriptions Disp Refills    Blood Glucose Calibration (TRUE METRIX LEVEL 1) Low SOLN [Pharmacy Med Name: TRUE METRIX CONTROL SOLUTION LEVEL 1 Low  Solution] 1 each 0     Sig: USE AS DIRECTED WITH GLUCOSE METER FOR CALIBRATION       Last Filled:      Patient Phone Number: [phone] (home) [phone] (work)    Last appt: 11/4/2022   Next appt: 2/6/2023    Last Labs DM:   Lab Results   Component Value Date/Time    LABA1C 7.3 11/04/2022 08:11 AM       Last OARRS: No flowsheet data found. Preferred Pharmacy:   [id number] [facility] [place] [phone]  [address] [place] [address] [address]  Phone: [phone] Fax: [phone]    [facility] Mail Delivery - [employer] [address], [employer] [phone] Oliva Espinosa [phone]  [address] [place] [address]  Phone: [phone] Fax: [phone]  Medication:   Requested Prescriptions     Pending Prescriptions Disp Refills    Blood Glucose Calibration (TRUE METRIX LEVEL 1) Low SOLN [Pharmacy Med Name: TRUE METRIX CONTROL SOLUTION LEVEL 1 Low  Solution] 1 each 0     Sig: USE AS DIRECTED WITH GLUCOSE METER FOR CALIBRATION       Last Filled:      Patient Phone Number: [phone] (home) [phone] (work)    Last appt: 11/4/2022   Next appt: 2/6/2023    Last Labs DM:   Lab Results   Component Value Date/Time    LABA1C 7.3 11/04/2022 08:11 AM       Last OARRS: No flowsheet data found.     Preferred Pharmacy:   [facility] [id number] [facility] Oliva Espinosa [phone]  [address] [place] [address] [address]  Phone: [phone] Fax: [phone]    [facility] [address] Mail Delivery - [employer] [address], [place] [phone] - [phone]  [address]  [place] [id number]  Phone: [phone] Fax: [phone]

## 2022-12-08 NOTE — PROGRESS NOTES
ENDOSCOPY PREOP INSTRUCTIONS      You are scheduled for a procedure at [facility] on 12-12 @ 930. You will need to arrival by: 800 (at least an hour & a half prior to planned start time)  Report to the MAIN entrance on [address] and register at the surgery center on the left-hand side of the lobby  You will need your insurance card and photo id and a list of all medications taken on a regular basis. Please include the dose/frequency. For your procedure:     PLEASE FOLLOW ALL INSTRUCTIONS & PREPS GIVEN TO YOU BY YOUR DOCTOR'S OFFICE. If you have not received these instructions yet, please call the office immediately. Make sure to read them as soon as received. If you are taking blood thinners, Aspirin or diabetic medication, make sure to call your doctor as soon as possible for instructions prior to your procedure. Please dress comfortably and do not wear any lotion, powders or jewelry  Arrange for someone to be with you and sign you out & drive you home after your procedure. THIS PERSON MUST WAIT AT HOSPITAL THE ENTIRE TIME. We allow 2 adult visitors with you in the hospital & masks are strongly recommended.     WOMEN ONLY OF CHILDBEARING AGE: Please make sure to be able to give a urine sample on arrival      If you have further questions, you may contact your Endoscopist's office or Pre Admission Testing staff at [phone]

## 2022-12-12 ENCOUNTER — HOSPITAL ENCOUNTER (OUTPATIENT)
Age: 67
Setting detail: OUTPATIENT SURGERY
Discharge: HOME | End: 2022-12-12
Attending: INTERNAL MEDICINE | Admitting: INTERNAL MEDICINE
Payer: MEDICARE

## 2022-12-12 VITALS
RESPIRATION RATE: 16 BRPM | HEART RATE: 73 BPM | OXYGEN SATURATION: 100 % | TEMPERATURE: 97.7 F | SYSTOLIC BLOOD PRESSURE: 170 MMHG | BODY MASS INDEX: 38.82 KG/M2 | WEIGHT: 233 LBS | DIASTOLIC BLOOD PRESSURE: 100 MMHG | HEIGHT: 65 IN

## 2022-12-12 DIAGNOSIS — K22.70 BARRETT'S ESOPHAGUS WITHOUT DYSPLASIA: ICD-10-CM

## 2022-12-12 LAB
GLUCOSE BLD-MCNC: 121 MG/DL (ref 70–99)
PERFORMED ON: ABNORMAL

## 2022-12-12 PROCEDURE — 2709999900 HC NON-CHARGEABLE SUPPLY: Performed by: INTERNAL MEDICINE

## 2022-12-12 PROCEDURE — 3609010300 HC COLONOSCOPY W/BIOPSY SINGLE/MULTIPLE: Performed by: INTERNAL MEDICINE

## 2022-12-12 PROCEDURE — 6370000000 HC RX 637 (ALT 250 FOR IP): Performed by: INTERNAL MEDICINE

## 2022-12-12 PROCEDURE — 88305 TISSUE EXAM BY PATHOLOGIST: CPT

## 2022-12-12 PROCEDURE — 3609012400 HC EGD TRANSORAL BIOPSY SINGLE/MULTIPLE: Performed by: INTERNAL MEDICINE

## 2022-12-12 PROCEDURE — 6360000002 HC RX W HCPCS: Performed by: INTERNAL MEDICINE

## 2022-12-12 PROCEDURE — 7100000010 HC PHASE II RECOVERY - FIRST 15 MIN: Performed by: INTERNAL MEDICINE

## 2022-12-12 PROCEDURE — 7100000011 HC PHASE II RECOVERY - ADDTL 15 MIN: Performed by: INTERNAL MEDICINE

## 2022-12-12 RX ORDER — SODIUM CHLORIDE, SODIUM LACTATE, POTASSIUM CHLORIDE, CALCIUM CHLORIDE 600; 310; 30; 20 MG/100ML; MG/100ML; MG/100ML; MG/100ML
INJECTION, SOLUTION INTRAVENOUS CONTINUOUS
Status: CANCELLED | OUTPATIENT
Start: 2022-12-12

## 2022-12-12 RX ORDER — FENTANYL CITRATE 50 UG/ML
INJECTION, SOLUTION INTRAMUSCULAR; INTRAVENOUS PRN
Status: DISCONTINUED | OUTPATIENT
Start: 2022-12-12 | End: 2022-12-12 | Stop reason: ALTCHOICE

## 2022-12-12 RX ORDER — MIDAZOLAM HYDROCHLORIDE 1 MG/ML
INJECTION INTRAMUSCULAR; INTRAVENOUS PRN
Status: DISCONTINUED | OUTPATIENT
Start: 2022-12-12 | End: 2022-12-12 | Stop reason: ALTCHOICE

## 2022-12-12 ASSESSMENT — PAIN - FUNCTIONAL ASSESSMENT
PAIN_FUNCTIONAL_ASSESSMENT: WONG-BAKER FACES
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
## 2022-12-12 NOTE — DISCHARGE INSTRUCTIONS
excellent care while you are here. You may receive a survey in the mail regarding your care. We would appreciate you taking a few minutes of your time to complete this survey.  Again, thank you for choosing the [facility]

## 2022-12-12 NOTE — PROGRESS NOTES
Patient stated she will see Dr. Erik Das tomorrow, noticing elevations with daily assessments in AM She agreed to take these reading with her to report to the MD. Discussed sodium intake, which patient demonstrated understanding of the influence this would have on the BP.
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
## 2023-01-03 RX ORDER — ISOPROPYL ALCOHOL 70 ML/100ML
SWAB TOPICAL
Qty: 100 EACH | Refills: 3 | Status: SHIPPED | OUTPATIENT
Start: 2023-01-03

## 2023-01-03 NOTE — TELEPHONE ENCOUNTER
Medication:   Requested Prescriptions     Pending Prescriptions Disp Refills    Alcohol Swabs (DROPSAFE ALCOHOL PREP) 70 % PADS [Pharmacy Med Name: [address] [place] [address] 70 % Pad]       Sig: USE DAILY     Last Filled:  1/27/22    Last appt: 11/4/2022   Next appt: 2/6/2023    Last OARRS: No flowsheet data found.

## 2023-02-06 ENCOUNTER — OFFICE VISIT (OUTPATIENT)
Dept: PRIMARY CARE CLINIC | Age: 68
End: 2023-02-06

## 2023-02-06 VITALS
BODY MASS INDEX: 38.65 KG/M2 | DIASTOLIC BLOOD PRESSURE: 74 MMHG | SYSTOLIC BLOOD PRESSURE: 112 MMHG | HEART RATE: 90 BPM | WEIGHT: 232 LBS | HEIGHT: 65 IN | OXYGEN SATURATION: 96 %

## 2023-02-06 DIAGNOSIS — E66.01 SEVERE OBESITY (BMI 35.0-39.9) WITH COMORBIDITY (HCC): ICD-10-CM

## 2023-02-06 DIAGNOSIS — E11.9 TYPE 2 DIABETES MELLITUS WITHOUT COMPLICATION, WITH LONG-TERM CURRENT USE OF INSULIN (HCC): Primary | ICD-10-CM

## 2023-02-06 DIAGNOSIS — K21.9 GASTROESOPHAGEAL REFLUX DISEASE, UNSPECIFIED WHETHER ESOPHAGITIS PRESENT: ICD-10-CM

## 2023-02-06 DIAGNOSIS — I10 ESSENTIAL HYPERTENSION: ICD-10-CM

## 2023-02-06 DIAGNOSIS — I77.810 THORACIC AORTIC ECTASIA (HCC): ICD-10-CM

## 2023-02-06 DIAGNOSIS — F41.9 ANXIETY: ICD-10-CM

## 2023-02-06 DIAGNOSIS — F33.1 MODERATE EPISODE OF RECURRENT MAJOR DEPRESSIVE DISORDER (HCC): ICD-10-CM

## 2023-02-06 DIAGNOSIS — E78.2 MIXED HYPERLIPIDEMIA: ICD-10-CM

## 2023-02-06 DIAGNOSIS — Z79.4 TYPE 2 DIABETES MELLITUS WITHOUT COMPLICATION, WITH LONG-TERM CURRENT USE OF INSULIN (HCC): Primary | ICD-10-CM

## 2023-02-06 LAB — HBA1C MFR BLD: 8.4 %

## 2023-02-06 RX ORDER — HYDROCHLOROTHIAZIDE 25 MG/1
TABLET ORAL
COMMUNITY
Start: 2023-01-09

## 2023-02-06 RX ORDER — AMLODIPINE BESYLATE 5 MG/1
TABLET ORAL
COMMUNITY
Start: 2023-01-17

## 2023-02-06 RX ORDER — INSULIN DETEMIR 100 [IU]/ML
26 INJECTION, SOLUTION SUBCUTANEOUS NIGHTLY
COMMUNITY
Start: 2023-02-06

## 2023-02-06 RX ORDER — SEMAGLUTIDE 1.34 MG/ML
0.25 INJECTION, SOLUTION SUBCUTANEOUS WEEKLY
Qty: 1.5 ML | Refills: 1 | Status: SHIPPED | OUTPATIENT
Start: 2023-02-06

## 2023-02-06 SDOH — ECONOMIC STABILITY: INCOME INSECURITY: HOW HARD IS IT FOR YOU TO PAY FOR THE VERY BASICS LIKE FOOD, HOUSING, MEDICAL CARE, AND HEATING?: NOT HARD AT ALL

## 2023-02-06 SDOH — ECONOMIC STABILITY: FOOD INSECURITY: WITHIN THE PAST 12 MONTHS, THE FOOD YOU BOUGHT JUST DIDN'T LAST AND YOU DIDN'T HAVE MONEY TO GET MORE.: NEVER TRUE

## 2023-02-06 SDOH — ECONOMIC STABILITY: FOOD INSECURITY: WITHIN THE PAST 12 MONTHS, YOU WORRIED THAT YOUR FOOD WOULD RUN OUT BEFORE YOU GOT MONEY TO BUY MORE.: NEVER TRUE

## 2023-02-06 ASSESSMENT — PATIENT HEALTH QUESTIONNAIRE - PHQ9
1. LITTLE INTEREST OR PLEASURE IN DOING THINGS: 2
8. MOVING OR SPEAKING SO SLOWLY THAT OTHER PEOPLE COULD HAVE NOTICED. OR THE OPPOSITE, BEING SO FIGETY OR RESTLESS THAT YOU HAVE BEEN MOVING AROUND A LOT MORE THAN USUAL: 0
2. FEELING DOWN, DEPRESSED OR HOPELESS: 0
10. IF YOU CHECKED OFF ANY PROBLEMS, HOW DIFFICULT HAVE THESE PROBLEMS MADE IT FOR YOU TO DO YOUR WORK, TAKE CARE OF THINGS AT HOME, OR GET ALONG WITH OTHER PEOPLE: 0
3. TROUBLE FALLING OR STAYING ASLEEP: 1
SUM OF ALL RESPONSES TO PHQ9 QUESTIONS 1 & 2: 2
SUM OF ALL RESPONSES TO PHQ QUESTIONS 1-9: 5
SUM OF ALL RESPONSES TO PHQ QUESTIONS 1-9: 5
7. TROUBLE CONCENTRATING ON THINGS, SUCH AS READING THE NEWSPAPER OR WATCHING TELEVISION: 0
SUM OF ALL RESPONSES TO PHQ QUESTIONS 1-9: 5
5. POOR APPETITE OR OVEREATING: 1
6. FEELING BAD ABOUT YOURSELF - OR THAT YOU ARE A FAILURE OR HAVE LET YOURSELF OR YOUR FAMILY DOWN: 0
4. FEELING TIRED OR HAVING LITTLE ENERGY: 1
SUM OF ALL RESPONSES TO PHQ QUESTIONS 1-9: 5
9. THOUGHTS THAT YOU WOULD BE BETTER OFF DEAD, OR OF HURTING YOURSELF: 0

## 2023-02-06 NOTE — PATIENT INSTRUCTIONS
-Limit carbohydrates to 45 grams with meals and 15 grams with snacks  -Low sodium diet  -Low fat, low cholesterol diet  -Regular aerobic exercise  -goal for blood sugar fasting or pre-meal  is   -goal for blood sugar 2 hours after a meal is less than 180  -goal for blood sugar at bedtime is less than 150

## 2023-02-06 NOTE — PROGRESS NOTES
Saurabh Quesada   Date ofBirth:  1955    Date of Visit:  2/6/2023    Chief Complaint   Patient presents with    Diabetes     3 month follow up     Hypertension     3 month follow up    Hyperlipidemia     3 month follow up    Anxiety     3 month follow up    Depression     3 month follow up    Gastroesophageal Reflux     3 month follow up       HPI  Patient has Type 2 diabetes. Patient takes Levemir insulin 24 units nightly and Metformin 1000 mg 2 times daily. Patient decreases carbohydrates and sugars. Patient monitors her blood sugar 2 times daily fasting and 2 hours after dinner. Fasting averages 120 and 2 hours postprandial averages 200. Patient states she is not exercising at present. Patient states she tries to park further away from place and dance a little at home. Patient has hypertension. Patient states her blood pressure medications were changed by Cardiology since last visit. Patient states she still takes losartan 100 mg once daily and verapamil  mg once daily. Patient states she was started on Amlodipine 5mg once daily and HCTZ 25mg once daily. Patient states Furosemide has been discontinued. Patient decreases salt. Patient has hyperlipidemia. Patient takes rosuvastatin 20 mg nightly. Patient does a low fat, low cholesterol diet. Patient has depression and anxiety. Patient takes Cymbalta 60 mg nightly. Patient states her depression and anxiety have been ok. Patient is not in counseling at present. Patient has GERD. Patient takes omeprazole 40 mg once daily. Patient denies heartburn and reflux. Patient decreases spicy foods and tomato based foods. Patient states she drinks decaffeinated coffee. Patient has thoracic aorta ectasia. This is stable. Patient sees Cardiology. Patient has obesity. Patient states she would like to get the weight off.      Review of Systems   Constitutional:  Positive for fatigue (patient states she has been running back and forth taking care of her aunt and uncle). Negative for activity change, appetite change, chills, fever and unexpected weight change. HENT:  Negative for congestion, postnasal drip, rhinorrhea, sinus pressure, sore throat and trouble swallowing. Eyes:  Positive for visual disturbance (little blurry vision, pt states she sees her Ophthalmologist soon). Respiratory:  Negative for cough, chest tightness, shortness of breath and wheezing. Cardiovascular:  Negative for chest pain, palpitations and leg swelling. Gastrointestinal:  Negative for abdominal pain, blood in stool, constipation, diarrhea, nausea and vomiting. Genitourinary:  Negative for dysuria, frequency, hematuria and urgency. Musculoskeletal:  Negative for arthralgias and myalgias. Skin:  Negative for rash and wound. Neurological:  Negative for dizziness, tremors, syncope, weakness, light-headedness, numbness and headaches. Psychiatric/Behavioral:  Positive for dysphoric mood. Negative for decreased concentration and sleep disturbance. The patient is nervous/anxious.       Allergies   Allergen Reactions    No Known Allergies      Outpatient Medications Marked as Taking for the 2/6/23 encounter (Office Visit) with Mercedes Melchor MD   Medication Sig Dispense Refill    amLODIPine (NORVASC) 5 MG tablet TAKE 1 TABLET BY MOUTH EVERY DAY      hydroCHLOROthiazide (HYDRODIURIL) 25 MG tablet TAKE 1 TABLET BY MOUTH EVERY DAY      Alcohol Swabs (DROPSAFE ALCOHOL PREP) 70 % PADS USE DAILY 100 each 3    Blood Glucose Calibration (TRUE METRIX LEVEL 1) Low SOLN USE AS DIRECTED WITH GLUCOSE METER FOR CALIBRATION 1 each 0    TURMERIC PO Take by mouth      insulin detemir (LEVEMIR FLEXTOUCH) 100 UNIT/ML injection pen Inject 24 Units into the skin nightly      Insulin Pen Needle (DROPLET PEN NEEDLES) 31G X 8 MM MISC USE AS DIRECTED 100 each 3    losartan (COZAAR) 100 MG tablet TAKE 1 TABLET EVERY DAY 90 tablet 3    metFORMIN (GLUCOPHAGE) 1000 MG tablet TAKE 1 TABLET TWICE DAILY WITH MEALS 180 tablet 1    rosuvastatin (CRESTOR) 20 MG tablet TAKE 1 TABLET EVERY NIGHT 90 tablet 1    omeprazole (PRILOSEC) 40 MG delayed release capsule TAKE 1 CAPSULE BY MOUTH EVERY MORNING (BEFORE BREAKFAST) 90 capsule 1    TRUEplus Lancets 30G MISC 1 each by Does not apply route daily 100 each 3    DULoxetine (CYMBALTA) 60 MG extended release capsule TAKE 1 CAPSULE EVERY DAY 90 capsule 1    Blood Glucose Monitoring Suppl (TRUE METRIX METER) KAM Use to monitor blood sugar 1 each 0    blood glucose test strips (TRUE METRIX BLOOD GLUCOSE TEST) strip 1 each by In Vitro route daily As needed. 100 each 3    FEROSUL 325 (65 Fe) MG tablet TAKE ONE TABLET BY MOUTH TWICE A DAY WITH MEALS (Patient taking differently: Take 325 mg by mouth daily (with breakfast)) 60 tablet 1    verapamil (CALAN SR) 120 MG extended release tablet Take 1 tablet by mouth daily 90 tablet 3    acetaminophen (TYLENOL) 325 MG tablet Take 650 mg by mouth every 6 hours as needed for Pain      aspirin 325 MG EC tablet Take 1 tablet by mouth daily 30 tablet 3    vitamin B-12 (CYANOCOBALAMIN) 500 MCG tablet Take 2 tablets by mouth daily 60 tablet 3    Ascorbic Acid (VITAMIN C) 500 MG tablet Take 500 mg by mouth daily. Cholecalciferol (VITAMIN D PO) Take 5,000 Units by mouth daily       MULTIPLE VITAMIN PO Take 1 tablet by mouth daily            Vitals:    02/06/23 0747   BP: 112/74   Pulse: 90   SpO2: 96%   Weight: 232 lb (105.2 kg)   Height: 5' 5\" (1.651 m)     Body mass index is 38.61 kg/m². Physical Exam  Nursing note reviewed. Constitutional:       General: She is not in acute distress. Appearance: Normal appearance. She is well-developed. HENT:      Mouth/Throat:      Pharynx: Oropharynx is clear. Eyes:      General: Lids are normal.      Extraocular Movements: Extraocular movements intact. Conjunctiva/sclera: Conjunctivae normal.      Pupils: Pupils are equal, round, and reactive to light.    Neck: Thyroid: No thyromegaly. Vascular: No carotid bruit. Cardiovascular:      Rate and Rhythm: Normal rate and regular rhythm. Heart sounds: Normal heart sounds, S1 normal and S2 normal. No murmur heard. No friction rub. No gallop. Pulmonary:      Effort: Pulmonary effort is normal. No respiratory distress. Breath sounds: Normal breath sounds. No wheezing, rhonchi or rales. Abdominal:      General: Bowel sounds are normal. There is no distension. Palpations: Abdomen is soft. Tenderness: There is no abdominal tenderness. Musculoskeletal:      Cervical back: Neck supple. Right lower leg: No edema. Left lower leg: No edema. Lymphadenopathy:      Head:      Right side of head: No submandibular adenopathy. Left side of head: No submandibular adenopathy. Neurological:      Mental Status: She is alert and oriented to person, place, and time.    Psychiatric:         Mood and Affect: Mood normal.         Results for POC orders placed in visit on 02/06/23   POCT glycosylated hemoglobin (Hb A1C)   Result Value Ref Range    Hemoglobin A1C 8.4 %     Lab Review   Orders Only on 01/24/2023   Component Date Value    Protein, Ur 01/24/2023 11.00     Creatinine, Ur 01/24/2023 36.7     Protein/Creat Ratio 01/24/2023 0.3    Orders Only on 01/24/2023   Component Date Value    Vit D, 25-Hydroxy 01/24/2023 63.8    Orders Only on 01/24/2023   Component Date Value    PTH 01/24/2023 45.5    Orders Only on 01/24/2023   Component Date Value    Phosphorus 01/24/2023 3.7    Orders Only on 01/24/2023   Component Date Value    Sodium 01/24/2023 135 (A)     Potassium 01/24/2023 4.5     Chloride 01/24/2023 97 (A)     CO2 01/24/2023 25     Anion Gap 01/24/2023 13     Glucose 01/24/2023 263 (A)     BUN 01/24/2023 26 (A)     Creatinine 01/24/2023 1.5 (A)     Est, Glom Filt Rate 01/24/2023 38 (A)     Calcium 01/24/2023 9.4     Total Protein 01/24/2023 6.2 (A)     Albumin 01/24/2023 4.0 Albumin/Globulin Ratio 01/24/2023 1.8     Total Bilirubin 01/24/2023 0.3     Alkaline Phosphatase 01/24/2023 53     ALT 01/24/2023 11     AST 01/24/2023 17    Orders Only on 01/24/2023   Component Date Value    WBC 01/24/2023 5.2     RBC 01/24/2023 4.32     Hemoglobin 01/24/2023 12.2     Hematocrit 01/24/2023 38.5     MCV 01/24/2023 89.2     MCH 01/24/2023 28.2     MCHC 01/24/2023 31.7     RDW 01/24/2023 15.1     Platelets 81/68/2475 199     MPV 01/24/2023 8.5     Neutrophils % 01/24/2023 71.0     Lymphocytes % 01/24/2023 22.1     Monocytes % 01/24/2023 4.6     Eosinophils % 01/24/2023 1.4     Basophils % 01/24/2023 0.9     Neutrophils Absolute 01/24/2023 3.7     Lymphocytes Absolute 01/24/2023 1.2     Monocytes Absolute 01/24/2023 0.2     Eosinophils Absolute 01/24/2023 0.1     Basophils Absolute 01/24/2023 0.0    Orders Only on 01/24/2023   Component Date Value    Color, UA 01/24/2023 Yellow     Clarity, UA 01/24/2023 Clear     Glucose, Ur 01/24/2023 100 (A)     Bilirubin Urine 01/24/2023 Negative     Ketones, Urine 01/24/2023 Negative     Specific Gravity, UA 01/24/2023 1.008     Blood, Urine 01/24/2023 Negative     pH, UA 01/24/2023 6.5     Protein, UA 01/24/2023 Negative     Urobilinogen, Urine 01/24/2023 0.2     Nitrite, Urine 01/24/2023 Negative     Leukocyte Esterase, Urine 01/24/2023 Negative     Microscopic Examination 01/24/2023 Not Indicated     Urine Type 01/24/2023 NotGiven    Admission on 12/12/2022, Discharged on 12/12/2022   Component Date Value    POC Glucose 12/12/2022 121 (A)     Performed on 12/12/2022 ACCU-CHEK    Orders Only on 11/04/2022   Component Date Value    Iron 11/04/2022 71     TIBC 11/04/2022 289     Iron Saturation 11/04/2022 25     Vit D, 25-Hydroxy 11/04/2022 67.0     Vitamin B-12 11/04/2022 863     TSH 11/04/2022 3.83     Cholesterol, Total 11/04/2022 142     Triglycerides 11/04/2022 70     HDL 11/04/2022 59     LDL Calculated 11/04/2022 69     VLDL Cholesterol Calcula* 11/04/2022 14     WBC 11/04/2022 5.9     RBC 11/04/2022 4.18     Hemoglobin 11/04/2022 12.0     Hematocrit 11/04/2022 36.4     MCV 11/04/2022 87.0     MCH 11/04/2022 28.7     MCHC 11/04/2022 33.1     RDW 11/04/2022 15.2     Platelets 77/60/1426 182     MPV 11/04/2022 8.7     Neutrophils % 11/04/2022 72.4     Lymphocytes % 11/04/2022 19.1     Monocytes % 11/04/2022 5.8     Eosinophils % 11/04/2022 2.0     Basophils % 11/04/2022 0.7     Neutrophils Absolute 11/04/2022 4.3     Lymphocytes Absolute 11/04/2022 1.1     Monocytes Absolute 11/04/2022 0.3     Eosinophils Absolute 11/04/2022 0.1     Basophils Absolute 11/04/2022 0.0     Sodium 11/04/2022 138     Potassium 11/04/2022 4.7     Chloride 11/04/2022 99     CO2 11/04/2022 28     Anion Gap 11/04/2022 11     Glucose 11/04/2022 123 (A)     BUN 11/04/2022 19     Creatinine 11/04/2022 1.1     Est, Glom Filt Rate 11/04/2022 55 (A)     Calcium 11/04/2022 9.3     Total Protein 11/04/2022 6.7     Albumin 11/04/2022 4.3     Albumin/Globulin Ratio 11/04/2022 1.8     Total Bilirubin 11/04/2022 0.5     Alkaline Phosphatase 11/04/2022 59     ALT 11/04/2022 12     AST 11/04/2022 18     Microalbumin, Random Uri* 11/04/2022 <1.20     Creatinine, Ur 11/04/2022 69.7     Microalbumin Creatinine * 11/04/2022 see below    Office Visit on 11/04/2022   Component Date Value    Hemoglobin A1C 11/04/2022 7.3    There may be more visits with results that are not included. Assessment/Plan     1. Type 2 diabetes mellitus without complication, with long-term current use of insulin (HCC)  - Hemoglobin A1c of 8.4% shows diabetes is not controlled  -start Semaglutide,0.25 or 0.5MG/DOS, (OZEMPIC, 0.25 OR 0.5 MG/DOSE,) 2 MG/1.5ML SOPN; Inject 0.25 mg into the skin once a week  Dispense: 1.5 mL; Refill: 1  -Increase insulin detemir (LEVEMIR FLEXTOUCH) 100 UNIT/ML injection pen;  Inject 26 Units into the skin nightly  -Continue Metformin 1000 mg 2 times daily  -Limit carbohydrates to 45 grams with meals and 15 grams with snacks  -monitor blood sugars  -goal for blood sugar fasting or pre-meal  is   -goal for blood sugar 2 hours after a meal is less than 180  -goal for blood sugar at bedtime is less than 150  -Regular aerobic exercise  -POCT glycosylated hemoglobin (Hb A1C)    2. Essential hypertension  -stable  -Continue  losartan 100 mg once daily   -Continue verapamil  mg once daily  -Continue Amlodipine 5mg once daily  -Continue HCTZ 25mg once daily  -Low sodium diet  -Regular aerobic exercise    3. Mixed hyperlipidemia  -stable  -Continue rosuvastatin 20 mg nightly  -Low fat, low cholesterol diet  -Regular aerobic exercise    4. Moderate episode of recurrent major depressive disorder (HCC)  -stable  -Continue Cymbalta 60mg once daily    5. Anxiety  -stable  -Continue Cymbalta 60mg once daily    6. Gastroesophageal reflux disease, unspecified whether esophagitis present  -stable  -Continue  omeprazole 40 mg once daily  -Decrease caffeine, avoid spicy foods, avoid tomato based foods  -Eat small meals instead of large meals  -Wait 2-3 hours after eating before lying down     7. Thoracic aortic ectasia (HCC)  -stable  -continue care per Cardiology    8. Severe obesity (BMI 35.0-39. 9) with comorbidity ([facility] [address]  - Start Semaglutide,0.25 or 0.5MG/DOS, (OZEMPIC, 0.25 OR 0.5 MG/DOSE,) 2 MG/1.5ML SOPN; Inject 0.25 mg into the skin once a week  Dispense: 1.5 mL; Refill: 1  -Limit carbohydrates to 45 grams with meals and 15 grams with snacks  -Regular aerobic exercise      Discussed medications with patient, who voiced understanding of their use and indications. All questions answered. Return in about 3 months (around 5/6/2023) for diabetes, hypertension, hyperlipidemia, and obesity.

## 2023-02-22 DIAGNOSIS — I71.21 ANEURYSM OF ASCENDING AORTA WITHOUT RUPTURE: Primary | ICD-10-CM

## 2023-02-25 ASSESSMENT — ENCOUNTER SYMPTOMS
CHEST TIGHTNESS: 0
SINUS PRESSURE: 0
WHEEZING: 0
RHINORRHEA: 0
SORE THROAT: 0
TROUBLE SWALLOWING: 0
ABDOMINAL PAIN: 0
SHORTNESS OF BREATH: 0
DIARRHEA: 0
CONSTIPATION: 0
VOMITING: 0
NAUSEA: 0
COUGH: 0
BLOOD IN STOOL: 0

## 2023-03-17 DIAGNOSIS — F33.1 MODERATE EPISODE OF RECURRENT MAJOR DEPRESSIVE DISORDER (HCC): ICD-10-CM

## 2023-03-17 DIAGNOSIS — Z79.4 TYPE 2 DIABETES MELLITUS WITHOUT COMPLICATION, WITH LONG-TERM CURRENT USE OF INSULIN (HCC): ICD-10-CM

## 2023-03-17 DIAGNOSIS — E11.9 TYPE 2 DIABETES MELLITUS WITHOUT COMPLICATION, WITH LONG-TERM CURRENT USE OF INSULIN (HCC): ICD-10-CM

## 2023-03-17 DIAGNOSIS — F41.9 ANXIETY: ICD-10-CM

## 2023-03-18 NOTE — TELEPHONE ENCOUNTER
Medication:   Requested Prescriptions     Pending Prescriptions Disp Refills    verapamil (CALAN SR) 120 MG extended release tablet [Pharmacy Med Name: VERAPAMIL HCL  MG Tablet Extended Release] 90 tablet 3     Sig: TAKE 1 TABLET EVERY DAY    TRUE METRIX BLOOD GLUCOSE TEST strip [Pharmacy Med Name: TRUE METRIX SELF MONITORING BLOOD GLUCOSE STRIPS   Strip] 100 strip      Sig: TEST DAILY AS NEEDED.     DULoxetine (CYMBALTA) 60 MG extended release capsule [Pharmacy Med Name: DULOXETINE HYDROCHLORIDE 60 MG Capsule Delayed Release Particles] 90 capsule 1     Sig: TAKE 1 CAPSULE EVERY DAY     Last Filled:  2/21/2022    Last appt: 2/6/2023   Next appt: 5/8/2023    Last Labs DM:   Lab Results   Component Value Date/Time    LABA1C 8.4 02/06/2023 08:06 AM     Last Lipid:   Lab Results   Component Value Date/Time    CHOL 142 11/04/2022 08:41 AM    TRIG 70 11/04/2022 08:41 AM    HDL 59 11/04/2022 08:41 AM    HDL 47 05/11/2012 09:13 AM    LDLCALC 69 11/04/2022 08:41 AM       Last Thyroid:   Lab Results   Component Value Date/Time    TSH 3.83 11/04/2022 08:41 AM

## 2023-03-20 ENCOUNTER — HOSPITAL ENCOUNTER (OUTPATIENT)
Dept: CT IMAGING | Age: 68
Discharge: HOME | End: 2023-03-20
Payer: MEDICARE

## 2023-03-20 DIAGNOSIS — I71.21 ANEURYSM OF ASCENDING AORTA WITHOUT RUPTURE (HCC): ICD-10-CM

## 2023-03-20 PROCEDURE — 71250 CT THORAX DX C-: CPT

## 2023-03-20 RX ORDER — DULOXETIN HYDROCHLORIDE 60 MG/1
CAPSULE, DELAYED RELEASE ORAL
Qty: 90 CAPSULE | Refills: 1 | Status: SHIPPED | OUTPATIENT
Start: 2023-03-20

## 2023-03-20 RX ORDER — CALCIUM CITRATE/VITAMIN D3 200MG-6.25
TABLET ORAL
Qty: 100 STRIP | Refills: 3 | Status: SHIPPED | OUTPATIENT
Start: 2023-03-20

## 2023-03-30 DIAGNOSIS — E11.9 TYPE 2 DIABETES MELLITUS WITHOUT COMPLICATION, WITH LONG-TERM CURRENT USE OF INSULIN (HCC): ICD-10-CM

## 2023-03-30 DIAGNOSIS — Z79.4 TYPE 2 DIABETES MELLITUS WITHOUT COMPLICATION, WITH LONG-TERM CURRENT USE OF INSULIN (HCC): ICD-10-CM

## 2023-03-30 DIAGNOSIS — E66.01 SEVERE OBESITY (BMI 35.0-39.9) WITH COMORBIDITY (HCC): ICD-10-CM

## 2023-03-30 NOTE — TELEPHONE ENCOUNTER
Medication:   Requested Prescriptions     Pending Prescriptions Disp Refills    OZEMPIC, 0.25 OR 0.5 MG/DOSE, 2 MG/1.5ML SOPN [Pharmacy Med Name: Jenni Prayer 0.25-0.5 MG/DOSE PEN]  1     Sig: INJECT 0.25MG INTO THE SKIN ONE TIME PER WEEK        Last Filled:      Patient Phone Number: [phone] (home) [phone] (work)    Last appt: 2/6/2023   Next appt: 5/8/2023    Last OARRS: No flowsheet data found. Preferred Pharmacy:   [facility] [id number] [facility] -  [phone]  [address]  Phone: [phone] Fax: [phone]    [address] [facility] Mail [facility] [phone] - f [phone]  [address] [facility] [address]  Phone: [phone] Fax: [phone]    [facility] [address] [address] Eugenio Hernández [phone] Arthur Pollard [phone]  [address] [place] [address]  Phone: [phone] Fax: [phone]  Medication:   Requested Prescriptions     Pending Prescriptions Disp Refills    OZEMPIC, 0.25 OR 0.5 MG/DOSE, 2 MG/1.5ML SOPN [Pharmacy Med Name: Jenni Prayer 0.25-0.5 MG/DOSE PEN]  1     Sig: INJECT 0.25MG INTO THE SKIN ONE TIME PER WEEK        Last Filled:      Patient Phone Number: [phone] (home) [phone] (work)    Last appt: 2/6/2023   Next appt: 5/8/2023    Last OARRS: No flowsheet data found.     Preferred Pharmacy:   [facility] [id number] [facility] -  [phone]  [address] [place] [address] [address]  Phone: [phone] Fax: [phone] [address] [facility] Mail Delivery - Michelle Ellison [phone] - F [phone]  [address] [facility] [address]  Phone: [phone] Fax: [phone] - [facility], [address] [place] [address] [phone] [facility] [phone]  [address] [place] [address]  Phone: [phone] Fax: [phone]

## 2023-03-31 RX ORDER — SEMAGLUTIDE 1.34 MG/ML
0.5 INJECTION, SOLUTION SUBCUTANEOUS WEEKLY
Qty: 1.5 ML | Refills: 1 | Status: SHIPPED | OUTPATIENT
Start: 2023-03-31

## 2023-03-31 NOTE — TELEPHONE ENCOUNTER
Call patient. She needs to increase Ozempic to the next dose of 0.5mg weekly. I sent a new prescription to her pharmacy. The medication needs to be titrated monthly initially to help with weight loss in addition to her diabetes.

## 2023-04-02 DIAGNOSIS — K21.9 GASTROESOPHAGEAL REFLUX DISEASE, UNSPECIFIED WHETHER ESOPHAGITIS PRESENT: ICD-10-CM

## 2023-04-03 RX ORDER — OMEPRAZOLE 40 MG/1
40 CAPSULE, DELAYED RELEASE ORAL
Qty: 90 CAPSULE | Refills: 1 | Status: SHIPPED | OUTPATIENT
Start: 2023-04-03

## 2023-04-03 RX ORDER — BLOOD-GLUCOSE CONTROL, LOW
EACH MISCELLANEOUS
Qty: 1 EACH | Refills: 0 | Status: SHIPPED | OUTPATIENT
Start: 2023-04-03

## 2023-04-03 NOTE — TELEPHONE ENCOUNTER
Medication:   Requested Prescriptions     Pending Prescriptions Disp Refills    omeprazole (PRILOSEC) 40 MG delayed release capsule [Pharmacy Med Name: OMEPRAZOLE 40 MG Capsule Delayed Release] 90 capsule 1     Sig: TAKE 1 CAPSULE BY MOUTH EVERY MORNING (BEFORE BREAKFAST)    Blood Glucose Calibration (TRUE METRIX LEVEL 1) Low SOLN [Pharmacy Med Name: TRUE METRIX CONTROL SOLUTION LEVEL 1 Low  Solution] 1 each 0     Sig: USE AS DIRECTED WITH GLUCOSE METER     Last Filled:  7/28/22 12.5.22    Last appt: 2/6/2023   Next appt: 5/8/2023    Last OARRS: No flowsheet data found.

## 2023-04-13 ENCOUNTER — TELEPHONE (OUTPATIENT)
Dept: PRIMARY CARE CLINIC | Age: 68
End: 2023-04-13

## 2023-04-18 ENCOUNTER — TELEPHONE (OUTPATIENT)
Dept: CARDIOTHORACIC SURGERY | Age: 68
End: 2023-04-18

## 2023-04-18 NOTE — TELEPHONE ENCOUNTER
Patient is in surveillance for ascending aortic aneurysm. Dr. Tom Thomson reviewed CT chest w/o contrast performed on 3/20/23 and states that the aorta is stable and he would like to repeat the same study in 1 year. Patient is aware and agreeable with plan.

## 2023-04-21 NOTE — TELEPHONE ENCOUNTER
Patient notified to stop Ozempic and to call and schedule appointment if symptoms do not subside. She said she has an appointment 5/8/23 already she will discuss then.

## 2023-04-21 NOTE — TELEPHONE ENCOUNTER
Call patient. She should stop Ozempic and schedule appointment if symptoms do not resolve with stopping it.

## 2023-05-08 ENCOUNTER — OFFICE VISIT (OUTPATIENT)
Dept: PRIMARY CARE CLINIC | Age: 68
End: 2023-05-08
Payer: MEDICARE

## 2023-05-08 ENCOUNTER — TELEPHONE (OUTPATIENT)
Dept: PRIMARY CARE CLINIC | Age: 68
End: 2023-05-08

## 2023-05-08 VITALS
OXYGEN SATURATION: 97 % | DIASTOLIC BLOOD PRESSURE: 84 MMHG | SYSTOLIC BLOOD PRESSURE: 106 MMHG | BODY MASS INDEX: 36.38 KG/M2 | WEIGHT: 218.6 LBS | HEART RATE: 82 BPM

## 2023-05-08 DIAGNOSIS — R10.30 LOWER ABDOMINAL PAIN: ICD-10-CM

## 2023-05-08 DIAGNOSIS — E66.01 SEVERE OBESITY (BMI 35.0-39.9) WITH COMORBIDITY (HCC): ICD-10-CM

## 2023-05-08 DIAGNOSIS — E11.9 TYPE 2 DIABETES MELLITUS WITHOUT COMPLICATION, WITH LONG-TERM CURRENT USE OF INSULIN (HCC): Primary | ICD-10-CM

## 2023-05-08 DIAGNOSIS — R11.0 NAUSEA: ICD-10-CM

## 2023-05-08 DIAGNOSIS — E78.2 MIXED HYPERLIPIDEMIA: ICD-10-CM

## 2023-05-08 DIAGNOSIS — R19.7 DIARRHEA, UNSPECIFIED TYPE: ICD-10-CM

## 2023-05-08 DIAGNOSIS — Z79.4 TYPE 2 DIABETES MELLITUS WITHOUT COMPLICATION, WITH LONG-TERM CURRENT USE OF INSULIN (HCC): Primary | ICD-10-CM

## 2023-05-08 DIAGNOSIS — I10 ESSENTIAL HYPERTENSION: ICD-10-CM

## 2023-05-08 LAB
ALBUMIN SERPL-MCNC: 4.2 G/DL (ref 3.4–5)
ALBUMIN/GLOB SERPL: 1.7 {RATIO} (ref 1.1–2.2)
ALP SERPL-CCNC: 44 U/L (ref 40–129)
ALT SERPL-CCNC: 11 U/L (ref 10–40)
ANION GAP SERPL CALCULATED.3IONS-SCNC: 11 MMOL/L (ref 3–16)
AST SERPL-CCNC: 20 U/L (ref 15–37)
BASOPHILS # BLD: 0.1 K/UL (ref 0–0.2)
BASOPHILS NFR BLD: 0.9 %
BILIRUB SERPL-MCNC: 0.5 MG/DL (ref 0–1)
BUN SERPL-MCNC: 25 MG/DL (ref 7–20)
CALCIUM SERPL-MCNC: 9.6 MG/DL (ref 8.3–10.6)
CHLORIDE SERPL-SCNC: 102 MMOL/L (ref 99–110)
CO2 SERPL-SCNC: 28 MMOL/L (ref 21–32)
CREAT SERPL-MCNC: 1.4 MG/DL (ref 0.6–1.2)
DEPRECATED RDW RBC AUTO: 15.4 % (ref 12.4–15.4)
EOSINOPHIL # BLD: 0 K/UL (ref 0–0.6)
EOSINOPHIL NFR BLD: 0.8 %
GFR SERPLBLD CREATININE-BSD FMLA CKD-EPI: 41 ML/MIN/{1.73_M2}
GLUCOSE SERPL-MCNC: 97 MG/DL (ref 70–99)
HBA1C MFR BLD: 6.4 %
HCT VFR BLD AUTO: 38.4 % (ref 36–48)
HGB BLD-MCNC: 12.3 G/DL (ref 12–16)
LIPASE SERPL-CCNC: 28 U/L (ref 13–60)
LYMPHOCYTES # BLD: 1.3 K/UL (ref 1–5.1)
LYMPHOCYTES NFR BLD: 22 %
MCH RBC QN AUTO: 28.5 PG (ref 26–34)
MCHC RBC AUTO-ENTMCNC: 32 G/DL (ref 31–36)
MCV RBC AUTO: 89 FL (ref 80–100)
MONOCYTES # BLD: 0.4 K/UL (ref 0–1.3)
MONOCYTES NFR BLD: 6.1 %
NEUTROPHILS # BLD: 4.1 K/UL (ref 1.7–7.7)
NEUTROPHILS NFR BLD: 70.2 %
PLATELET # BLD AUTO: 218 K/UL (ref 135–450)
PMV BLD AUTO: 8.2 FL (ref 5–10.5)
POTASSIUM SERPL-SCNC: 4.3 MMOL/L (ref 3.5–5.1)
PROT SERPL-MCNC: 6.7 G/DL (ref 6.4–8.2)
RBC # BLD AUTO: 4.31 M/UL (ref 4–5.2)
SODIUM SERPL-SCNC: 141 MMOL/L (ref 136–145)
WBC # BLD AUTO: 5.8 K/UL (ref 4–11)

## 2023-05-08 PROCEDURE — G8427 DOCREV CUR MEDS BY ELIG CLIN: HCPCS | Performed by: INTERNAL MEDICINE

## 2023-05-08 PROCEDURE — 1123F ACP DISCUSS/DSCN MKR DOCD: CPT | Performed by: INTERNAL MEDICINE

## 2023-05-08 PROCEDURE — 3017F COLORECTAL CA SCREEN DOC REV: CPT | Performed by: INTERNAL MEDICINE

## 2023-05-08 PROCEDURE — 3074F SYST BP LT 130 MM HG: CPT | Performed by: INTERNAL MEDICINE

## 2023-05-08 PROCEDURE — 1036F TOBACCO NON-USER: CPT | Performed by: INTERNAL MEDICINE

## 2023-05-08 PROCEDURE — G8417 CALC BMI ABV UP PARAM F/U: HCPCS | Performed by: INTERNAL MEDICINE

## 2023-05-08 PROCEDURE — 3078F DIAST BP <80 MM HG: CPT | Performed by: INTERNAL MEDICINE

## 2023-05-08 PROCEDURE — 1090F PRES/ABSN URINE INCON ASSESS: CPT | Performed by: INTERNAL MEDICINE

## 2023-05-08 PROCEDURE — G9899 SCRN MAM PERF RSLTS DOC: HCPCS | Performed by: INTERNAL MEDICINE

## 2023-05-08 PROCEDURE — 83036 HEMOGLOBIN GLYCOSYLATED A1C: CPT | Performed by: INTERNAL MEDICINE

## 2023-05-08 PROCEDURE — G8399 PT W/DXA RESULTS DOCUMENT: HCPCS | Performed by: INTERNAL MEDICINE

## 2023-05-08 PROCEDURE — 2022F DILAT RTA XM EVC RTNOPTHY: CPT | Performed by: INTERNAL MEDICINE

## 2023-05-08 PROCEDURE — 3044F HG A1C LEVEL LT 7.0%: CPT | Performed by: INTERNAL MEDICINE

## 2023-05-08 PROCEDURE — 99214 OFFICE O/P EST MOD 30 MIN: CPT | Performed by: INTERNAL MEDICINE

## 2023-05-08 RX ORDER — ONDANSETRON 4 MG/1
4 TABLET, FILM COATED ORAL 3 TIMES DAILY PRN
Qty: 30 TABLET | Refills: 0 | Status: SHIPPED | OUTPATIENT
Start: 2023-05-08

## 2023-05-08 RX ORDER — AMLODIPINE BESYLATE 10 MG/1
10 TABLET ORAL DAILY
COMMUNITY
Start: 2023-03-15

## 2023-05-08 NOTE — TELEPHONE ENCOUNTER
Patient stating medication too expensive  dapagliflozin (FARXIGA) 5 MG tablet    *Patient requesting a less expensive medication    Please follow up with patient    [facility], [place] [address] [place] [address]

## 2023-05-11 ENCOUNTER — TELEPHONE (OUTPATIENT)
Dept: PRIMARY CARE CLINIC | Age: 68
End: 2023-05-11

## 2023-05-11 NOTE — TELEPHONE ENCOUNTER
Pt called and stated she was approved by Marilyn Gibson they will be sending some paperwork over that needs to be filled out

## 2023-05-11 NOTE — TELEPHONE ENCOUNTER
Pt says that the Guadlupe Cue is to expensive and she be given somrthing else    Should she still take metformin she has been taking this because the other was to expensive     Please call pt [phone]

## 2023-05-15 DIAGNOSIS — R11.0 NAUSEA: ICD-10-CM

## 2023-05-15 DIAGNOSIS — R10.30 LOWER ABDOMINAL PAIN: ICD-10-CM

## 2023-05-16 LAB
BACTERIA URNS QL MICRO: NORMAL /HPF
BILIRUB UR QL STRIP.AUTO: NEGATIVE
CLARITY UR: CLEAR
COLOR UR: YELLOW
EPI CELLS #/AREA URNS AUTO: 1 /HPF (ref 0–5)
GLUCOSE UR STRIP.AUTO-MCNC: NEGATIVE MG/DL
HGB UR QL STRIP.AUTO: NEGATIVE
HYALINE CASTS #/AREA URNS AUTO: 2 /LPF (ref 0–8)
KETONES UR STRIP.AUTO-MCNC: NEGATIVE MG/DL
LEUKOCYTE ESTERASE UR QL STRIP.AUTO: ABNORMAL
NITRITE UR QL STRIP.AUTO: NEGATIVE
PH UR STRIP.AUTO: 6 [PH] (ref 5–8)
PROT UR STRIP.AUTO-MCNC: ABNORMAL MG/DL
RBC CLUMPS #/AREA URNS AUTO: 0 /HPF (ref 0–4)
SP GR UR STRIP.AUTO: 1.01 (ref 1–1.03)
UA DIPSTICK W REFLEX MICRO PNL UR: YES
URN SPEC COLLECT METH UR: ABNORMAL
UROBILINOGEN UR STRIP-ACNC: 1 E.U./DL
WBC #/AREA URNS AUTO: 1 /HPF (ref 0–5)

## 2023-05-17 ASSESSMENT — ENCOUNTER SYMPTOMS
TROUBLE SWALLOWING: 0
RHINORRHEA: 0
BACK PAIN: 0
SINUS PRESSURE: 0
CONSTIPATION: 0
DIARRHEA: 1
ABDOMINAL DISTENTION: 0
WHEEZING: 0
SHORTNESS OF BREATH: 0
ABDOMINAL PAIN: 1
BLOOD IN STOOL: 0
VOMITING: 0
COUGH: 0
SORE THROAT: 0
NAUSEA: 1
CHEST TIGHTNESS: 0

## 2023-05-19 ENCOUNTER — OFFICE VISIT (OUTPATIENT)
Dept: PRIMARY CARE CLINIC | Age: 68
End: 2023-05-19

## 2023-05-19 VITALS
DIASTOLIC BLOOD PRESSURE: 84 MMHG | HEART RATE: 74 BPM | OXYGEN SATURATION: 97 % | SYSTOLIC BLOOD PRESSURE: 130 MMHG | BODY MASS INDEX: 36.44 KG/M2 | WEIGHT: 219 LBS

## 2023-05-19 DIAGNOSIS — R82.998 LEUKOCYTES IN URINE: ICD-10-CM

## 2023-05-19 DIAGNOSIS — N18.31 STAGE 3A CHRONIC KIDNEY DISEASE (HCC): ICD-10-CM

## 2023-05-19 DIAGNOSIS — R19.7 DIARRHEA, UNSPECIFIED TYPE: ICD-10-CM

## 2023-05-19 DIAGNOSIS — R11.0 NAUSEA: ICD-10-CM

## 2023-05-19 DIAGNOSIS — R10.30 LOWER ABDOMINAL PAIN: Primary | ICD-10-CM

## 2023-05-19 LAB
ANION GAP SERPL CALCULATED.3IONS-SCNC: 11 MMOL/L (ref 3–16)
BUN SERPL-MCNC: 23 MG/DL (ref 7–20)
CALCIUM SERPL-MCNC: 9.5 MG/DL (ref 8.3–10.6)
CHLORIDE SERPL-SCNC: 98 MMOL/L (ref 99–110)
CO2 SERPL-SCNC: 28 MMOL/L (ref 21–32)
CREAT SERPL-MCNC: 1.3 MG/DL (ref 0.6–1.2)
GFR SERPLBLD CREATININE-BSD FMLA CKD-EPI: 45 ML/MIN/{1.73_M2}
GLUCOSE SERPL-MCNC: 100 MG/DL (ref 70–99)
POTASSIUM SERPL-SCNC: 4.7 MMOL/L (ref 3.5–5.1)
SODIUM SERPL-SCNC: 137 MMOL/L (ref 136–145)

## 2023-05-20 LAB — BACTERIA UR CULT: NORMAL

## 2023-05-27 ASSESSMENT — ENCOUNTER SYMPTOMS
VOMITING: 0
NAUSEA: 1
ABDOMINAL PAIN: 1
BLOOD IN STOOL: 0
RHINORRHEA: 0
CHEST TIGHTNESS: 0
CONSTIPATION: 0
ABDOMINAL DISTENTION: 0
SHORTNESS OF BREATH: 0
SINUS PRESSURE: 0
SORE THROAT: 0
COUGH: 0
WHEEZING: 0
DIARRHEA: 1
BACK PAIN: 0

## 2023-07-07 ENCOUNTER — OFFICE VISIT (OUTPATIENT)
Dept: PRIMARY CARE CLINIC | Age: 68
End: 2023-07-07

## 2023-07-07 VITALS
BODY MASS INDEX: 36.32 KG/M2 | DIASTOLIC BLOOD PRESSURE: 68 MMHG | WEIGHT: 218 LBS | TEMPERATURE: 97.3 F | SYSTOLIC BLOOD PRESSURE: 104 MMHG | HEIGHT: 65 IN | OXYGEN SATURATION: 97 % | HEART RATE: 82 BPM | RESPIRATION RATE: 16 BRPM

## 2023-07-07 DIAGNOSIS — M70.51 PATELLAR BURSITIS OF RIGHT KNEE: Primary | ICD-10-CM

## 2023-07-20 ENCOUNTER — TELEPHONE (OUTPATIENT)
Dept: PRIMARY CARE CLINIC | Age: 68
End: 2023-07-20

## 2023-07-20 NOTE — TELEPHONE ENCOUNTER
----- Message from Jose Roberto Gaspar sent at 7/20/2023  8:43 AM EDT -----  Subject: Referral Request    Reason for referral request? Psychiatrist referral request - Pt believes   her previous referral was to a Psychiatrist and not a Psychologist.   Provider patient wants to be referred to(if known):     Provider Phone Number(if known):[phone]    Additional Information for Provider? Pt stated if there is a Psychiatrist   associated with [facility] she would like to scheduled with that provider,   if not please reach out with a new provider. Pt stated her previous   Psychiatrist does not answer when she calls over to that practice.   ---------------------------------------------------------------------------  --------------  Lelia SHIELDS    [phone]; OK to leave message on voicemail  ---------------------------------------------------------------------------  --------------

## 2023-08-16 NOTE — TELEPHONE ENCOUNTER
I had left a message on patient's voicemail previously regarding this message. I needed to know if patient wanted a referral to Psychiatry or a  Psychologist. Patient sent a MyChart reply message back saying she finally got in touch with her previous Therapist.

## 2023-08-21 ENCOUNTER — OFFICE VISIT (OUTPATIENT)
Dept: PRIMARY CARE CLINIC | Age: 68
End: 2023-08-21
Payer: MEDICARE

## 2023-08-21 VITALS
HEIGHT: 65 IN | WEIGHT: 222.6 LBS | TEMPERATURE: 98.6 F | OXYGEN SATURATION: 98 % | SYSTOLIC BLOOD PRESSURE: 128 MMHG | HEART RATE: 67 BPM | BODY MASS INDEX: 37.09 KG/M2 | RESPIRATION RATE: 16 BRPM | DIASTOLIC BLOOD PRESSURE: 84 MMHG

## 2023-08-21 DIAGNOSIS — I10 ESSENTIAL HYPERTENSION: ICD-10-CM

## 2023-08-21 DIAGNOSIS — L85.3 DRY SKIN: ICD-10-CM

## 2023-08-21 DIAGNOSIS — M54.2 NECK PAIN: ICD-10-CM

## 2023-08-21 DIAGNOSIS — Z79.4 TYPE 2 DIABETES MELLITUS WITHOUT COMPLICATION, WITH LONG-TERM CURRENT USE OF INSULIN (HCC): ICD-10-CM

## 2023-08-21 DIAGNOSIS — F33.1 MODERATE EPISODE OF RECURRENT MAJOR DEPRESSIVE DISORDER (HCC): ICD-10-CM

## 2023-08-21 DIAGNOSIS — E11.9 TYPE 2 DIABETES MELLITUS WITHOUT COMPLICATION, WITH LONG-TERM CURRENT USE OF INSULIN (HCC): ICD-10-CM

## 2023-08-21 DIAGNOSIS — E11.9 TYPE 2 DIABETES MELLITUS WITHOUT COMPLICATION, WITH LONG-TERM CURRENT USE OF INSULIN (HCC): Primary | ICD-10-CM

## 2023-08-21 DIAGNOSIS — N18.31 STAGE 3A CHRONIC KIDNEY DISEASE (HCC): ICD-10-CM

## 2023-08-21 DIAGNOSIS — Z79.4 TYPE 2 DIABETES MELLITUS WITHOUT COMPLICATION, WITH LONG-TERM CURRENT USE OF INSULIN (HCC): Primary | ICD-10-CM

## 2023-08-21 DIAGNOSIS — K21.9 GASTROESOPHAGEAL REFLUX DISEASE, UNSPECIFIED WHETHER ESOPHAGITIS PRESENT: ICD-10-CM

## 2023-08-21 DIAGNOSIS — E78.2 MIXED HYPERLIPIDEMIA: ICD-10-CM

## 2023-08-21 DIAGNOSIS — R23.4 SCALY SKIN: ICD-10-CM

## 2023-08-21 DIAGNOSIS — B07.9 WART OF HAND: ICD-10-CM

## 2023-08-21 DIAGNOSIS — F41.9 ANXIETY: ICD-10-CM

## 2023-08-21 LAB
ALBUMIN SERPL-MCNC: 4.2 G/DL (ref 3.4–5)
ALBUMIN/GLOB SERPL: 1.9 {RATIO} (ref 1.1–2.2)
ALP SERPL-CCNC: 57 U/L (ref 40–129)
ALT SERPL-CCNC: 15 U/L (ref 10–40)
ANION GAP SERPL CALCULATED.3IONS-SCNC: 11 MMOL/L (ref 3–16)
AST SERPL-CCNC: 18 U/L (ref 15–37)
BILIRUB SERPL-MCNC: 0.3 MG/DL (ref 0–1)
BUN SERPL-MCNC: 44 MG/DL (ref 7–20)
CALCIUM SERPL-MCNC: 9.5 MG/DL (ref 8.3–10.6)
CHLORIDE SERPL-SCNC: 104 MMOL/L (ref 99–110)
CHOLEST SERPL-MCNC: 143 MG/DL (ref 0–199)
CO2 SERPL-SCNC: 29 MMOL/L (ref 21–32)
CREAT SERPL-MCNC: 1.4 MG/DL (ref 0.6–1.2)
GFR SERPLBLD CREATININE-BSD FMLA CKD-EPI: 41 ML/MIN/{1.73_M2}
GLUCOSE SERPL-MCNC: 98 MG/DL (ref 70–99)
HBA1C MFR BLD: 7.8 %
HDLC SERPL-MCNC: 62 MG/DL (ref 40–60)
LDLC SERPL CALC-MCNC: 63 MG/DL
POTASSIUM SERPL-SCNC: 4.5 MMOL/L (ref 3.5–5.1)
PROT SERPL-MCNC: 6.4 G/DL (ref 6.4–8.2)
SODIUM SERPL-SCNC: 144 MMOL/L (ref 136–145)
TRIGL SERPL-MCNC: 89 MG/DL (ref 0–150)
VLDLC SERPL CALC-MCNC: 18 MG/DL

## 2023-08-21 PROCEDURE — 1123F ACP DISCUSS/DSCN MKR DOCD: CPT | Performed by: INTERNAL MEDICINE

## 2023-08-21 PROCEDURE — 3078F DIAST BP <80 MM HG: CPT | Performed by: INTERNAL MEDICINE

## 2023-08-21 PROCEDURE — G8427 DOCREV CUR MEDS BY ELIG CLIN: HCPCS | Performed by: INTERNAL MEDICINE

## 2023-08-21 PROCEDURE — 99214 OFFICE O/P EST MOD 30 MIN: CPT | Performed by: INTERNAL MEDICINE

## 2023-08-21 PROCEDURE — 3017F COLORECTAL CA SCREEN DOC REV: CPT | Performed by: INTERNAL MEDICINE

## 2023-08-21 PROCEDURE — 2022F DILAT RTA XM EVC RTNOPTHY: CPT | Performed by: INTERNAL MEDICINE

## 2023-08-21 PROCEDURE — 83036 HEMOGLOBIN GLYCOSYLATED A1C: CPT | Performed by: INTERNAL MEDICINE

## 2023-08-21 PROCEDURE — G9899 SCRN MAM PERF RSLTS DOC: HCPCS | Performed by: INTERNAL MEDICINE

## 2023-08-21 PROCEDURE — G8417 CALC BMI ABV UP PARAM F/U: HCPCS | Performed by: INTERNAL MEDICINE

## 2023-08-21 PROCEDURE — 3074F SYST BP LT 130 MM HG: CPT | Performed by: INTERNAL MEDICINE

## 2023-08-21 PROCEDURE — G8399 PT W/DXA RESULTS DOCUMENT: HCPCS | Performed by: INTERNAL MEDICINE

## 2023-08-21 PROCEDURE — 3051F HG A1C>EQUAL 7.0%<8.0%: CPT | Performed by: INTERNAL MEDICINE

## 2023-08-21 PROCEDURE — 1090F PRES/ABSN URINE INCON ASSESS: CPT | Performed by: INTERNAL MEDICINE

## 2023-08-21 PROCEDURE — 1036F TOBACCO NON-USER: CPT | Performed by: INTERNAL MEDICINE

## 2023-08-21 RX ORDER — PREDNISONE 5 MG/1
TABLET ORAL
COMMUNITY
Start: 2023-08-18

## 2023-08-21 RX ORDER — CYCLOBENZAPRINE HCL 5 MG
TABLET ORAL
COMMUNITY
Start: 2023-08-18

## 2023-08-21 RX ORDER — AMMONIUM LACTATE 5 %
LOTION (GRAM) TOPICAL
Qty: 222 ML | Refills: 2 | Status: SHIPPED | OUTPATIENT
Start: 2023-08-21

## 2023-08-21 RX ORDER — NAPROXEN 500 MG/1
500 TABLET ORAL 2 TIMES DAILY
COMMUNITY
Start: 2023-08-18 | End: 2023-08-21

## 2023-08-21 ASSESSMENT — ENCOUNTER SYMPTOMS
BLOOD IN STOOL: 0
VOMITING: 0
RHINORRHEA: 0
SORE THROAT: 0
SHORTNESS OF BREATH: 0
SINUS PRESSURE: 0
CHEST TIGHTNESS: 0
NAUSEA: 0
CONSTIPATION: 0
ABDOMINAL PAIN: 0
COUGH: 0
TROUBLE SWALLOWING: 0
WHEEZING: 0
DIARRHEA: 0

## 2023-08-21 NOTE — PATIENT INSTRUCTIONS
-Low sodium diet  -Low fat, low cholesterol diet  -Limit carbohydrates to 45 grams with meals and 15 grams with snacks  -monitor blood sugars  -goal for blood sugar fasting or pre-meal  is   -goal for blood sugar 2 hours after a meal is less than 180  -goal for blood sugar at bedtime is less than 150  -Regular aerobic exercise    -Stop Naprosyn  -Avoid NSAID's such as otc Ibuprofen, Advil, Motrin, Naprosyn, and Aleve as well as prescription NSAID's

## 2023-08-21 NOTE — PROGRESS NOTES
Date of Visit: 2023    Rivsa Cannon (:  1955) is a 75 y.o. female,  Established patient here for evaluation of the following chief complaint(s):  Diabetes, Hypertension, Hyperlipidemia, Anxiety, Depression, and Gastroesophageal Reflux      ASSESSMENT/PLAN:    1. Type 2 diabetes mellitus without complication, with long-term current use of insulin (HCC)  -Hemoglobin A1c of 7.8% shows diabetes is not controlled  -Continue Levemir insulin 26 units nightly  -Increase dapagliflozin (FARXIGA) 10 MG tablet; Take 1 tablet by mouth every morning  Dispense: 90 tablet; Refill: 1  -Limit carbohydrates to 45 grams with meals and 15 grams with snacks  -monitor blood sugars  -goal for blood sugar fasting or pre-meal  is   -goal for blood sugar 2 hours after a meal is less than 180  -goal for blood sugar at bedtime is less than 150  -Regular aerobic exercise  -POCT glycosylated hemoglobin (Hb A1C)  -Comprehensive Metabolic Panel; Future    2. Essential hypertension  -stable  -Continue  losartan 100 mg once daily   -Continue verapamil  mg once daily  -Continue Amlodipine 10mg once daily  -Continue HCTZ 25mg once daily  -Low sodium diet  -Regular aerobic exercise  -Comprehensive Metabolic Panel; Future    3. Mixed hyperlipidemia  -stable  -LDL is at goal  -Continue Rosuvastatin 20 mg nightly  -Low fat, low cholesterol diet  -Regular aerobic exercise  -Comprehensive Metabolic Panel; Future  -Lipid Panel; Future    4. Moderate episode of recurrent major depressive disorder (HCC)  -not controlled  - increased due to her sister being murdered  - patient declines increase in medication  - Patient restarted counseling 2 weeks ago    5. Anxiety  -not controlled  - increased due to her sister being murdered  - patient declines increase in medication  - Patient restarted counseling 2 weeks ago    6.  Gastroesophageal reflux disease, unspecified whether esophagitis present  -stable  -Continue Omeprazole 40 mg

## 2023-08-22 ENCOUNTER — HOSPITAL ENCOUNTER (OUTPATIENT)
Dept: GENERAL RADIOLOGY | Age: 68
Discharge: HOME | End: 2023-08-22
Payer: MEDICARE

## 2023-08-22 DIAGNOSIS — M54.2 NECK PAIN: ICD-10-CM

## 2023-08-22 PROCEDURE — 72040 X-RAY EXAM NECK SPINE 2-3 VW: CPT

## 2023-08-24 LAB
CATARACTS: POSITIVE
DIABETIC RETINOPATHY: NEGATIVE
GLAUCOMA: NEGATIVE
INTRAOCULAR PRESSURE EYE: 16
INTRAOCULAR PRESSURE EYE: 17
VISUAL ACUITY DISTANCE LEFT EYE: NORMAL
VISUAL ACUITY DISTANCE RIGHT EYE: NORMAL

## 2023-08-31 PROBLEM — M54.2 NECK PAIN: Status: ACTIVE | Noted: 2023-08-31

## 2023-08-31 PROBLEM — L85.3 DRY SKIN: Status: ACTIVE | Noted: 2023-08-31

## 2023-08-31 PROBLEM — R23.4 SCALY SKIN: Status: ACTIVE | Noted: 2023-08-31

## 2023-08-31 PROBLEM — B07.9 WART OF HAND: Status: ACTIVE | Noted: 2023-08-31

## 2023-09-11 SDOH — HEALTH STABILITY: PHYSICAL HEALTH: ON AVERAGE, HOW MANY DAYS PER WEEK DO YOU ENGAGE IN MODERATE TO STRENUOUS EXERCISE (LIKE A BRISK WALK)?: 2 DAYS

## 2023-09-11 SDOH — HEALTH STABILITY: PHYSICAL HEALTH: ON AVERAGE, HOW MANY MINUTES DO YOU ENGAGE IN EXERCISE AT THIS LEVEL?: 30 MIN

## 2023-09-11 ASSESSMENT — SOCIAL DETERMINANTS OF HEALTH (SDOH)

## 2023-09-12 ENCOUNTER — OFFICE VISIT (OUTPATIENT)
Dept: ORTHOPEDIC SURGERY | Age: 68
End: 2023-09-12
Payer: MEDICARE

## 2023-09-12 VITALS — BODY MASS INDEX: 36.32 KG/M2 | WEIGHT: 218 LBS | HEIGHT: 65 IN

## 2023-09-12 DIAGNOSIS — S16.1XXA STRAIN OF NECK MUSCLE, INITIAL ENCOUNTER: ICD-10-CM

## 2023-09-12 DIAGNOSIS — M47.812 CERVICAL SPONDYLOSIS: Primary | ICD-10-CM

## 2023-09-12 PROCEDURE — G8427 DOCREV CUR MEDS BY ELIG CLIN: HCPCS | Performed by: PHYSICIAN ASSISTANT

## 2023-09-12 PROCEDURE — 1123F ACP DISCUSS/DSCN MKR DOCD: CPT | Performed by: PHYSICIAN ASSISTANT

## 2023-09-12 PROCEDURE — 1090F PRES/ABSN URINE INCON ASSESS: CPT | Performed by: PHYSICIAN ASSISTANT

## 2023-09-12 PROCEDURE — G8417 CALC BMI ABV UP PARAM F/U: HCPCS | Performed by: PHYSICIAN ASSISTANT

## 2023-09-12 PROCEDURE — 1036F TOBACCO NON-USER: CPT | Performed by: PHYSICIAN ASSISTANT

## 2023-09-12 PROCEDURE — 99204 OFFICE O/P NEW MOD 45 MIN: CPT | Performed by: PHYSICIAN ASSISTANT

## 2023-09-12 PROCEDURE — 3017F COLORECTAL CA SCREEN DOC REV: CPT | Performed by: PHYSICIAN ASSISTANT

## 2023-09-12 PROCEDURE — G9899 SCRN MAM PERF RSLTS DOC: HCPCS | Performed by: PHYSICIAN ASSISTANT

## 2023-09-12 PROCEDURE — G8399 PT W/DXA RESULTS DOCUMENT: HCPCS | Performed by: PHYSICIAN ASSISTANT
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
## 2023-10-03 DIAGNOSIS — Z79.4 TYPE 2 DIABETES MELLITUS WITHOUT COMPLICATION, WITH LONG-TERM CURRENT USE OF INSULIN (HCC): ICD-10-CM

## 2023-10-03 DIAGNOSIS — E11.9 TYPE 2 DIABETES MELLITUS WITHOUT COMPLICATION, WITH LONG-TERM CURRENT USE OF INSULIN (HCC): ICD-10-CM

## 2023-10-03 NOTE — TELEPHONE ENCOUNTER
Medication:   Requested Prescriptions     Pending Prescriptions Disp Refills    LEVEMIR FLEXPEN 100 UNIT/ML injection pen [Pharmacy Med Name: Gray Sanchez 100 UNIT/ML Solution Pen-injector] 30 mL      Sig: INJECT 20 UNITS UNDER THE SKIN EVERY NIGHT          Last appt: 8/21/2023   Next appt: 11/9/2023    Last OARRS:        No data to display

## 2023-11-06 SDOH — HEALTH STABILITY: PHYSICAL HEALTH: ON AVERAGE, HOW MANY DAYS PER WEEK DO YOU ENGAGE IN MODERATE TO STRENUOUS EXERCISE (LIKE A BRISK WALK)?: 3 DAYS

## 2023-11-06 SDOH — HEALTH STABILITY: PHYSICAL HEALTH: ON AVERAGE, HOW MANY MINUTES DO YOU ENGAGE IN EXERCISE AT THIS LEVEL?: 10 MIN

## 2023-11-06 ASSESSMENT — PATIENT HEALTH QUESTIONNAIRE - PHQ9
5. POOR APPETITE OR OVEREATING: 1
6. FEELING BAD ABOUT YOURSELF - OR THAT YOU ARE A FAILURE OR HAVE LET YOURSELF OR YOUR FAMILY DOWN: 0
SUM OF ALL RESPONSES TO PHQ QUESTIONS 1-9: 6
SUM OF ALL RESPONSES TO PHQ9 QUESTIONS 1 & 2: 1
8. MOVING OR SPEAKING SO SLOWLY THAT OTHER PEOPLE COULD HAVE NOTICED. OR THE OPPOSITE, BEING SO FIGETY OR RESTLESS THAT YOU HAVE BEEN MOVING AROUND A LOT MORE THAN USUAL: 0
SUM OF ALL RESPONSES TO PHQ QUESTIONS 1-9: 6
9. THOUGHTS THAT YOU WOULD BE BETTER OFF DEAD, OR OF HURTING YOURSELF: 0
3. TROUBLE FALLING OR STAYING ASLEEP: 1
1. LITTLE INTEREST OR PLEASURE IN DOING THINGS: 1
2. FEELING DOWN, DEPRESSED OR HOPELESS: 0
SUM OF ALL RESPONSES TO PHQ QUESTIONS 1-9: 6
4. FEELING TIRED OR HAVING LITTLE ENERGY: 3
7. TROUBLE CONCENTRATING ON THINGS, SUCH AS READING THE NEWSPAPER OR WATCHING TELEVISION: 0
SUM OF ALL RESPONSES TO PHQ QUESTIONS 1-9: 6
10. IF YOU CHECKED OFF ANY PROBLEMS, HOW DIFFICULT HAVE THESE PROBLEMS MADE IT FOR YOU TO DO YOUR WORK, TAKE CARE OF THINGS AT HOME, OR GET ALONG WITH OTHER PEOPLE: 0

## 2023-11-06 ASSESSMENT — COLUMBIA-SUICIDE SEVERITY RATING SCALE - C-SSRS
7. DID THIS OCCUR IN THE LAST THREE MONTHS: NO
4. HAVE YOU HAD THESE THOUGHTS AND HAD SOME INTENTION OF ACTING ON THEM?: NO
5. HAVE YOU STARTED TO WORK OUT OR WORKED OUT THE DETAILS OF HOW TO KILL YOURSELF? DO YOU INTEND TO CARRY OUT THIS PLAN?: NO
3. HAVE YOU BEEN THINKING ABOUT HOW YOU MIGHT KILL YOURSELF?: NO

## 2023-11-06 ASSESSMENT — LIFESTYLE VARIABLES
HOW MANY STANDARD DRINKS CONTAINING ALCOHOL DO YOU HAVE ON A TYPICAL DAY: PATIENT DOES NOT DRINK
HOW MANY STANDARD DRINKS CONTAINING ALCOHOL DO YOU HAVE ON A TYPICAL DAY: 0
HOW OFTEN DO YOU HAVE A DRINK CONTAINING ALCOHOL: NEVER
HOW OFTEN DO YOU HAVE SIX OR MORE DRINKS ON ONE OCCASION: 1
HOW OFTEN DO YOU HAVE A DRINK CONTAINING ALCOHOL: 1

## 2023-11-09 ENCOUNTER — OFFICE VISIT (OUTPATIENT)
Dept: PRIMARY CARE CLINIC | Age: 68
End: 2023-11-09

## 2023-11-09 VITALS
HEART RATE: 74 BPM | RESPIRATION RATE: 16 BRPM | HEIGHT: 65 IN | SYSTOLIC BLOOD PRESSURE: 118 MMHG | DIASTOLIC BLOOD PRESSURE: 78 MMHG | OXYGEN SATURATION: 100 % | WEIGHT: 219.4 LBS | BODY MASS INDEX: 36.55 KG/M2 | TEMPERATURE: 97.1 F

## 2023-11-09 DIAGNOSIS — M54.2 NECK PAIN: ICD-10-CM

## 2023-11-09 DIAGNOSIS — E11.9 TYPE 2 DIABETES MELLITUS WITHOUT COMPLICATION, WITH LONG-TERM CURRENT USE OF INSULIN (HCC): ICD-10-CM

## 2023-11-09 DIAGNOSIS — Z79.4 TYPE 2 DIABETES MELLITUS WITHOUT COMPLICATION, WITH LONG-TERM CURRENT USE OF INSULIN (HCC): ICD-10-CM

## 2023-11-09 DIAGNOSIS — E66.01 CLASS 2 SEVERE OBESITY DUE TO EXCESS CALORIES WITH SERIOUS COMORBIDITY AND BODY MASS INDEX (BMI) OF 36.0 TO 36.9 IN ADULT (HCC): ICD-10-CM

## 2023-11-09 DIAGNOSIS — Z23 NEED FOR INFLUENZA VACCINATION: ICD-10-CM

## 2023-11-09 DIAGNOSIS — Z00.00 MEDICARE ANNUAL WELLNESS VISIT, SUBSEQUENT: Primary | ICD-10-CM

## 2023-11-09 DIAGNOSIS — Z23 NEED FOR HEPATITIS B VACCINATION: ICD-10-CM

## 2023-11-09 DIAGNOSIS — Z23 NEED FOR PNEUMOCOCCAL VACCINE: ICD-10-CM

## 2023-11-09 RX ORDER — ACETAMINOPHEN 500 MG
500 TABLET ORAL 4 TIMES DAILY PRN
Qty: 120 TABLET | Refills: 1 | Status: SHIPPED | OUTPATIENT
Start: 2023-11-09

## 2023-11-09 RX ORDER — TIRZEPATIDE 2.5 MG/.5ML
2.5 INJECTION, SOLUTION SUBCUTANEOUS WEEKLY
Qty: 2 ML | Refills: 0 | Status: SHIPPED | OUTPATIENT
Start: 2023-11-09

## 2023-11-09 NOTE — PROGRESS NOTES
Medicare Annual Wellness Visit    Lluvia Perez is here for Medicare AWV    Assessment & Plan     1. Medicare annual wellness visit, subsequent  -Medicare AWV done    2. Need for influenza vaccination  - Influenza, FLUAD, (age 72 y+), IM, Preservative Free, 0.5 mL given    3. Type 2 diabetes mellitus without complication, with long-term   current use of insulin ([facility])  -Last hemoglobin A1c of 7.8% shows diabetes is not controlled  -Continue Levemir 26 units nightly until supply runs out  -Continue Farxiga 10 mg once daily  -Start Tirzepatide [facility]) 2.5 MG/0.5ML SOPN SC injection; Inject 0.5 mLs into the skin once a week  Dispense: 2 mL; Refill: 0  -Limit carbohydrates to 45 grams with meals and 15 grams with snacks  -monitor blood sugars  -goal for blood sugar fasting or pre-meal  is   -goal for blood sugar 2 hours after a meal is less than 180  -goal for blood sugar at bedtime is less than 150  -Regular aerobic exercise  - DIABETES FOOT EXAM  -Microalbumin / Creatinine Urine Ratio    4. Neck pain  -flared up  -Start acetaminophen (TYLENOL) 500 MG tablet; Take 1 tablet by mouth 4 times daily as needed for Pain  Dispense: 120 tablet; Refill: 1  -Neck exercises  -Follow-up with Orthopedics    5. Class 2 severe obesity due to excess calories with serious comorbidity and body mass index (BMI) of 36.0 to 36.9 in adult ([facility])  -BMI 36.51  -Start Tirzepatide [facility]) 2.5 MG/0.5ML SOPN SC injection; Inject 0.5 mLs into the skin once a week for diabetes and weight loss dispense: 2 mL; Refill: 0  -Limit carbohydrates to 45 grams with meals and 15 grams with snacks  -Regular aerobic exercise    6. Need for pneumococcal vaccine  - Pneumococcal, PCV20, PREVNAR 20, (age 6w+), IM, PF given    7. Need for hepatitis B vaccination  - Patient given prescription for hepatitis b vaccine (ENGERIX-B) 20 MCG/ML VIANNEY injection;  Inject 1 mL into the muscle once for 1 dose Repeat at 30 day and at 6 months  Dispense: 1 mL; Refill:

## 2023-11-10 LAB
CREAT UR-MCNC: 106 MG/DL (ref 28–259)
MICROALBUMIN UR DL<=1MG/L-MCNC: <1.2 MG/DL
MICROALBUMIN/CREAT UR: NORMAL MG/G (ref 0–30)

## 2023-11-26 PROBLEM — E66.01 CLASS 2 SEVERE OBESITY DUE TO EXCESS CALORIES WITH SERIOUS COMORBIDITY AND BODY MASS INDEX (BMI) OF 36.0 TO 36.9 IN ADULT (HCC): Status: ACTIVE | Noted: 2023-11-26

## 2023-11-26 PROBLEM — E66.812 CLASS 2 SEVERE OBESITY DUE TO EXCESS CALORIES WITH SERIOUS COMORBIDITY AND BODY MASS INDEX (BMI) OF 36.0 TO 36.9 IN ADULT: Status: ACTIVE | Noted: 2023-11-26

## 2023-12-05 RX ORDER — PEN NEEDLE, DIABETIC 31 GX5/16"
NEEDLE, DISPOSABLE MISCELLANEOUS
Qty: 100 EACH | Refills: 3 | Status: SHIPPED | OUTPATIENT
Start: 2023-12-05

## 2023-12-05 NOTE — TELEPHONE ENCOUNTER
Medication:   Requested Prescriptions     Pending Prescriptions Disp Refills    DROPLET PEN NEEDLES 31G X 8 MM [place] [Pharmacy Med Name: Vignesh Gull [id number] 31G X 8 MM] 100 each 3     Sig: USE AS DIRECTED        Last Filled:      Patient Phone Number: [phone] (home)     Last appt: 11/9/2023   Next appt: 12/19/2023    Last OARRS:        No data to display                Preferred Pharmacy:   [facility] [phone] - F [phone]  [facility] [place] [address]  Phone: [phone] Fax: [phone]    [facility] [address] [phone] Kenna Spurling [phone]  [address] [address]  Phone: [phone] Fax: [phone]  Medication:   Requested Prescriptions     Pending Prescriptions Disp Refills    DROPLET PEN NEEDLES 31G X 8 MM [place] [Pharmacy Med Name: Vignesh Gull [id number] 31G X 8 MM] 100 each 3     Sig: USE AS DIRECTED        Last Filled:      Patient Phone Number: [phone] (home)     Last appt: 11/9/2023   Next appt: 12/19/2023    Last OARRS:        No data to display                Preferred Pharmacy:   [facility] [phone] - F [phone]  [facility] [address]  Phone: [phone] Fax: [phone]    [facility], [address] [place] [address] [phone] Maru Spuramita [phone]  [address]  [facility] [address]  Phone: [phone] Fax: [phone]

## 2023-12-19 ENCOUNTER — OFFICE VISIT (OUTPATIENT)
Dept: PRIMARY CARE CLINIC | Age: 68
End: 2023-12-19

## 2023-12-19 VITALS
WEIGHT: 220 LBS | HEART RATE: 80 BPM | OXYGEN SATURATION: 97 % | DIASTOLIC BLOOD PRESSURE: 82 MMHG | BODY MASS INDEX: 36.61 KG/M2 | SYSTOLIC BLOOD PRESSURE: 118 MMHG

## 2023-12-19 DIAGNOSIS — E66.01 CLASS 2 SEVERE OBESITY DUE TO EXCESS CALORIES WITH SERIOUS COMORBIDITY AND BODY MASS INDEX (BMI) OF 36.0 TO 36.9 IN ADULT (HCC): ICD-10-CM

## 2023-12-19 DIAGNOSIS — Z79.4 TYPE 2 DIABETES MELLITUS WITHOUT COMPLICATION, WITH LONG-TERM CURRENT USE OF INSULIN (HCC): Primary | ICD-10-CM

## 2023-12-19 DIAGNOSIS — E11.9 TYPE 2 DIABETES MELLITUS WITHOUT COMPLICATION, WITH LONG-TERM CURRENT USE OF INSULIN (HCC): Primary | ICD-10-CM

## 2023-12-19 LAB — HBA1C MFR BLD: 8.5 %

## 2023-12-19 RX ORDER — SEMAGLUTIDE 0.68 MG/ML
0.25 INJECTION, SOLUTION SUBCUTANEOUS WEEKLY
Qty: 3 ML | Refills: 0 | Status: SHIPPED | OUTPATIENT
Start: 2023-12-19

## 2023-12-19 NOTE — PROGRESS NOTES
Bilirubin 09/25/2023 0.3     Alkaline Phosphatase 09/25/2023 66     ALT 09/25/2023 17     AST 09/25/2023 22    Orders Only on 09/25/2023   Component Date Value    WBC 09/25/2023 4.9     RBC 09/25/2023 4.32     Hemoglobin 09/25/2023 12.8     Hematocrit 09/25/2023 38.4     MCV 09/25/2023 88.9     MCH 09/25/2023 29.7     MCHC 09/25/2023 33.4     RDW 09/25/2023 15.7 (H)     Platelets 70/57/1946 179     MPV 09/25/2023 8.7     Neutrophils % 09/25/2023 65.8     Lymphocytes % 09/25/2023 25.7     Monocytes % 09/25/2023 5.2     Eosinophils % 09/25/2023 2.3     Basophils % 09/25/2023 1.0     Neutrophils Absolute 09/25/2023 3.2     Lymphocytes Absolute 09/25/2023 1.3     Monocytes Absolute 09/25/2023 0.3     Eosinophils Absolute 09/25/2023 0.1     Basophils Absolute 09/25/2023 0.1    Orders Only on 09/25/2023   Component Date Value    Color, UA 09/25/2023 Yellow     Clarity, UA 09/25/2023 Clear     Glucose, Ur 09/25/2023 >=1000 (A)     Bilirubin Urine 09/25/2023 Negative     Ketones, Urine 09/25/2023 Negative     Specific Gravity, UA 09/25/2023 1.032     Blood, Urine 09/25/2023 Negative     pH, UA 09/25/2023 6.5     Protein, UA 09/25/2023 Negative     Urobilinogen, Urine 09/25/2023 0.2     Nitrite, Urine 09/25/2023 Negative     Leukocyte Esterase, Urine 09/25/2023 Negative     Microscopic Examination 09/25/2023 Not Indicated     Urine Type 09/25/2023 [facility] Outpatient Visit on 08/22/2023   Component Date Value    Visual Acuity Distance R* 08/24/2023 20/40     Intraocular Pressure Eye 08/24/2023 16     Visual Acuity Distance L* 08/24/2023 20/40     Intraocular Pressure Eye 08/24/2023 17     Diabetic Retinopathy 08/24/2023 NEGATIVE     Cataracts 08/24/2023 POSITIVE     Glaucoma 08/24/2023 NEGATIVE    There may be more visits with results that are not included.            Medications, Allergies, Social history, Medical history, and results reviewed      An electronic signature was used to authenticate this

## 2024-01-05 ENCOUNTER — PATIENT MESSAGE (OUTPATIENT)
Dept: PRIMARY CARE CLINIC | Age: 69
End: 2024-01-05

## 2024-01-08 ENCOUNTER — PATIENT MESSAGE (OUTPATIENT)
Dept: PRIMARY CARE CLINIC | Age: 69
End: 2024-01-08

## 2024-01-08 DIAGNOSIS — E11.9 TYPE 2 DIABETES MELLITUS WITHOUT COMPLICATION, WITH LONG-TERM CURRENT USE OF INSULIN (HCC): ICD-10-CM

## 2024-01-08 DIAGNOSIS — Z79.4 TYPE 2 DIABETES MELLITUS WITHOUT COMPLICATION, WITH LONG-TERM CURRENT USE OF INSULIN (HCC): ICD-10-CM

## 2024-01-08 NOTE — TELEPHONE ENCOUNTER
From: Leanne Pretty  To: Dr. Maryjane Avery  Sent: 1/8/2024 12:37 PM EST  Subject: New Prescription for Farxiga    dapagliflozin 10 MG tablet  Commonly known as: Farxiga  Quantity:90 tablets  Day supply:90  Enter comments for this prescription  fax new prescription to [phone].    [facility]

## 2024-01-09 NOTE — TELEPHONE ENCOUNTER
Patient needs prescription written [facility]

## 2024-01-25 DIAGNOSIS — Z79.4 TYPE 2 DIABETES MELLITUS WITHOUT COMPLICATION, WITH LONG-TERM CURRENT USE OF INSULIN (HCC): Primary | ICD-10-CM

## 2024-01-25 DIAGNOSIS — E11.9 TYPE 2 DIABETES MELLITUS WITHOUT COMPLICATION, WITH LONG-TERM CURRENT USE OF INSULIN (HCC): Primary | ICD-10-CM

## 2024-01-25 RX ORDER — BLOOD SUGAR DIAGNOSTIC
1 STRIP MISCELLANEOUS DAILY
Qty: 100 EACH | Refills: 3 | Status: SHIPPED | OUTPATIENT
Start: 2024-01-25 | End: 2024-01-26 | Stop reason: SDUPTHER

## 2024-01-25 RX ORDER — INSULIN GLARGINE 100 [IU]/ML
28 INJECTION, SOLUTION SUBCUTANEOUS NIGHTLY
Qty: 15 ML | Refills: 0 | Status: SHIPPED | OUTPATIENT
Start: 2024-01-25

## 2024-01-26 DIAGNOSIS — Z79.4 TYPE 2 DIABETES MELLITUS WITHOUT COMPLICATION, WITH LONG-TERM CURRENT USE OF INSULIN (HCC): ICD-10-CM

## 2024-01-26 DIAGNOSIS — E11.9 TYPE 2 DIABETES MELLITUS WITHOUT COMPLICATION, WITH LONG-TERM CURRENT USE OF INSULIN (HCC): ICD-10-CM

## 2024-01-26 RX ORDER — BLOOD SUGAR DIAGNOSTIC
1 STRIP MISCELLANEOUS DAILY
Qty: 100 EACH | Refills: 3 | Status: SHIPPED | OUTPATIENT
Start: 2024-01-26

## 2024-01-26 NOTE — TELEPHONE ENCOUNTER
Patient needs script sent to [facility] instead of [facility] pharmacy. Script sent per patient request and patient notified via my chart message.

## 2024-02-08 ENCOUNTER — TELEPHONE (OUTPATIENT)
Dept: PRIMARY CARE CLINIC | Age: 69
End: 2024-02-08

## 2024-02-08 NOTE — TELEPHONE ENCOUNTER
Patient uses patient assistance program for Ozempic. The shipment was received today and placed on shelf in POC D. Patient notified and stated she will  2/9/2024

## 2024-02-11 ENCOUNTER — PATIENT MESSAGE (OUTPATIENT)
Dept: PRIMARY CARE CLINIC | Age: 69
End: 2024-02-11

## 2024-02-12 NOTE — TELEPHONE ENCOUNTER
Medication:   Requested Prescriptions     Pending Prescriptions Disp Refills    verapamil (CALAN SR) 120 MG extended release tablet 30 tablet 0     Sig: Take 1 tablet by mouth daily     Last Filled:  3.20.23    Last appt: 12/19/2023   Next appt: 3/19/2024    Last OARRS:        No data to display

## 2024-02-12 NOTE — TELEPHONE ENCOUNTER
From: Leanne Pretty  To: Dr. Maryjane Avery  Sent: 2/11/2024 6:42 PM EST  Subject: verapamil 120 MG extended release tablet    Dr Avery,  I need a refill on the verapamil, I am completely out. [facility] ThankS

## 2024-02-22 ENCOUNTER — TELEPHONE (OUTPATIENT)
Dept: PRIMARY CARE CLINIC | Age: 69
End: 2024-02-22

## 2024-02-22 NOTE — TELEPHONE ENCOUNTER
Received patient Lanuts from [employer] patient assistance program. Patient notified to come  at earliest convenience. Lantus has been placed in POC D refrigerator.

## 2024-02-23 DIAGNOSIS — N18.31 STAGE 3A CHRONIC KIDNEY DISEASE (HCC): Primary | ICD-10-CM

## 2024-03-18 NOTE — PROGRESS NOTES
morning 1/9/24  Yes Maryajne Avery MD   Semaglutide,0.25 or 0.5MG/DOS, (OZEMPIC, 0.25 OR 0.5 MG/DOSE,) 2 MG/3ML SOPN Inject 0.25 mg into the skin once a week 12/19/23  Yes Maryjane Avery MD   DROPLET PEN NEEDLES 31G X 8 MM MISC USE AS DIRECTED 12/5/23  Yes Maryjane Avery MD   acetaminophen (TYLENOL) 500 MG tablet Take 1 tablet by mouth 4 times daily as needed for Pain 11/9/23  Yes Maryjane Avery MD   ammonium lactate (LAC-HYDRIN FIVE) 5 % LOTN lotion Apply topical daily as needed 8/21/23  Yes Maryjane Avery MD   rosuvastatin (CRESTOR) 20 MG tablet TAKE 1 TABLET EVERY NIGHT 6/14/23  Yes Maryjane Avery MD   Blood Glucose Calibration (TRUE METRIX LEVEL 1) Low SOLN USE AS DIRECTED WITH GLUCOSE METER 4/3/23  Yes Maryjane Avery MD   Alcohol Swabs (DROPSAFE ALCOHOL PREP) 70 % PADS USE DAILY 1/3/23  Yes Maryjane Avery MD   losartan (COZAAR) 100 MG tablet TAKE 1 TABLET EVERY DAY 10/1/22  Yes Maryjane Avery MD   TRUEplus Lancets 30G MISC 1 each by Does not apply route daily 5/17/22  Yes Maryjane Avery MD   Blood Glucose Monitoring Suppl (TRUE METRIX METER) KAM Use to monitor blood sugar 2/21/22  Yes Maryjane Avery MD   FEROSUL 325 (65 Fe) MG tablet TAKE ONE TABLET BY MOUTH TWICE A DAY WITH MEALS  Patient taking differently: Take 1 tablet by mouth daily (with breakfast) 1/10/22  Yes Maryjane Avery MD   aspirin 325 MG EC tablet Take 1 tablet by mouth daily 3/25/21  Yes Ewa Ybarra APRN - CNP   vitamin B-12 (CYANOCOBALAMIN) 500 MCG tablet Take 2 tablets by mouth daily 11/23/15  Yes Maryjane Avery MD   Ascorbic Acid (VITAMIN C) 500 MG tablet Take 1 tablet by mouth daily   Yes ProviderNehemias MD   Cholecalciferol (VITAMIN D PO) Take 5,000 Units by mouth daily    Yes ProviderNehemias MD   MULTIPLE VITAMIN PO Take 1 tablet by mouth daily    Yes Provider, Historical, MD   TURMERIC PO Take by mouth  Patient not taking: Reported on 3/19/2024    Provider,

## 2024-03-19 ENCOUNTER — OFFICE VISIT (OUTPATIENT)
Dept: CARDIOLOGY CLINIC | Age: 69
End: 2024-03-19
Payer: COMMERCIAL

## 2024-03-19 ENCOUNTER — OFFICE VISIT (OUTPATIENT)
Dept: PRIMARY CARE CLINIC | Age: 69
End: 2024-03-19
Payer: COMMERCIAL

## 2024-03-19 VITALS
WEIGHT: 224.8 LBS | DIASTOLIC BLOOD PRESSURE: 68 MMHG | HEART RATE: 90 BPM | BODY MASS INDEX: 37.41 KG/M2 | SYSTOLIC BLOOD PRESSURE: 110 MMHG

## 2024-03-19 VITALS
HEART RATE: 61 BPM | SYSTOLIC BLOOD PRESSURE: 108 MMHG | BODY MASS INDEX: 37.11 KG/M2 | OXYGEN SATURATION: 98 % | WEIGHT: 223 LBS | DIASTOLIC BLOOD PRESSURE: 80 MMHG

## 2024-03-19 DIAGNOSIS — I10 ESSENTIAL HYPERTENSION: ICD-10-CM

## 2024-03-19 DIAGNOSIS — K21.9 GASTROESOPHAGEAL REFLUX DISEASE, UNSPECIFIED WHETHER ESOPHAGITIS PRESENT: ICD-10-CM

## 2024-03-19 DIAGNOSIS — Z79.4 TYPE 2 DIABETES MELLITUS WITHOUT COMPLICATION, WITH LONG-TERM CURRENT USE OF INSULIN (HCC): ICD-10-CM

## 2024-03-19 DIAGNOSIS — N18.31 STAGE 3A CHRONIC KIDNEY DISEASE (HCC): ICD-10-CM

## 2024-03-19 DIAGNOSIS — E78.2 MIXED HYPERLIPIDEMIA: ICD-10-CM

## 2024-03-19 DIAGNOSIS — E66.01 SEVERE OBESITY (BMI 35.0-39.9) WITH COMORBIDITY (HCC): ICD-10-CM

## 2024-03-19 DIAGNOSIS — R01.1 MURMUR: ICD-10-CM

## 2024-03-19 DIAGNOSIS — Q23.0 AORTIC STENOSIS DUE TO BICUSPID AORTIC VALVE: ICD-10-CM

## 2024-03-19 DIAGNOSIS — I77.810 THORACIC AORTIC ECTASIA (HCC): ICD-10-CM

## 2024-03-19 DIAGNOSIS — L30.9 DERMATITIS: ICD-10-CM

## 2024-03-19 DIAGNOSIS — F41.9 ANXIETY: ICD-10-CM

## 2024-03-19 DIAGNOSIS — E11.9 TYPE 2 DIABETES MELLITUS WITHOUT COMPLICATION, WITH LONG-TERM CURRENT USE OF INSULIN (HCC): Primary | ICD-10-CM

## 2024-03-19 DIAGNOSIS — Q23.1 AORTIC STENOSIS DUE TO BICUSPID AORTIC VALVE: ICD-10-CM

## 2024-03-19 DIAGNOSIS — E78.2 MIXED HYPERLIPIDEMIA: Primary | ICD-10-CM

## 2024-03-19 DIAGNOSIS — F33.1 MODERATE EPISODE OF RECURRENT MAJOR DEPRESSIVE DISORDER (HCC): ICD-10-CM

## 2024-03-19 DIAGNOSIS — I71.21 ANEURYSM OF ASCENDING AORTA WITHOUT RUPTURE (HCC): ICD-10-CM

## 2024-03-19 DIAGNOSIS — Z95.2 S/P AORTIC VALVE REPLACEMENT AND AORTOPLASTY: ICD-10-CM

## 2024-03-19 DIAGNOSIS — Z79.4 TYPE 2 DIABETES MELLITUS WITHOUT COMPLICATION, WITH LONG-TERM CURRENT USE OF INSULIN (HCC): Primary | ICD-10-CM

## 2024-03-19 DIAGNOSIS — E11.9 TYPE 2 DIABETES MELLITUS WITHOUT COMPLICATION, WITH LONG-TERM CURRENT USE OF INSULIN (HCC): ICD-10-CM

## 2024-03-19 DIAGNOSIS — I35.9 AORTIC VALVE CALCIFICATION: ICD-10-CM

## 2024-03-19 LAB
ALBUMIN SERPL-MCNC: 4 G/DL (ref 3.4–5)
ALBUMIN/GLOB SERPL: 1.5 {RATIO} (ref 1.1–2.2)
ALP SERPL-CCNC: 64 U/L (ref 40–129)
ALT SERPL-CCNC: 14 U/L (ref 10–40)
ANION GAP SERPL CALCULATED.3IONS-SCNC: 10 MMOL/L (ref 3–16)
AST SERPL-CCNC: 21 U/L (ref 15–37)
BILIRUB SERPL-MCNC: 0.4 MG/DL (ref 0–1)
BUN SERPL-MCNC: 33 MG/DL (ref 7–20)
CALCIUM SERPL-MCNC: 9.8 MG/DL (ref 8.3–10.6)
CHLORIDE SERPL-SCNC: 103 MMOL/L (ref 99–110)
CHOLEST SERPL-MCNC: 149 MG/DL (ref 0–199)
CO2 SERPL-SCNC: 28 MMOL/L (ref 21–32)
CREAT SERPL-MCNC: 1.8 MG/DL (ref 0.6–1.2)
GFR SERPLBLD CREATININE-BSD FMLA CKD-EPI: 30 ML/MIN/{1.73_M2}
GLUCOSE SERPL-MCNC: 101 MG/DL (ref 70–99)
HBA1C MFR BLD: 7.6 %
HDLC SERPL-MCNC: 62 MG/DL (ref 40–60)
LDLC SERPL CALC-MCNC: 76 MG/DL
POTASSIUM SERPL-SCNC: 4.6 MMOL/L (ref 3.5–5.1)
PROT SERPL-MCNC: 6.7 G/DL (ref 6.4–8.2)
SODIUM SERPL-SCNC: 141 MMOL/L (ref 136–145)
TRIGL SERPL-MCNC: 56 MG/DL (ref 0–150)
VLDLC SERPL CALC-MCNC: 11 MG/DL

## 2024-03-19 PROCEDURE — 99204 OFFICE O/P NEW MOD 45 MIN: CPT | Performed by: INTERNAL MEDICINE

## 2024-03-19 PROCEDURE — 99214 OFFICE O/P EST MOD 30 MIN: CPT | Performed by: INTERNAL MEDICINE

## 2024-03-19 PROCEDURE — 83036 HEMOGLOBIN GLYCOSYLATED A1C: CPT | Performed by: INTERNAL MEDICINE

## 2024-03-19 PROCEDURE — 3078F DIAST BP <80 MM HG: CPT | Performed by: INTERNAL MEDICINE

## 2024-03-19 PROCEDURE — 3051F HG A1C>EQUAL 7.0%<8.0%: CPT | Performed by: INTERNAL MEDICINE

## 2024-03-19 PROCEDURE — 3079F DIAST BP 80-89 MM HG: CPT | Performed by: INTERNAL MEDICINE

## 2024-03-19 PROCEDURE — 1123F ACP DISCUSS/DSCN MKR DOCD: CPT | Performed by: INTERNAL MEDICINE

## 2024-03-19 PROCEDURE — 93000 ELECTROCARDIOGRAM COMPLETE: CPT | Performed by: INTERNAL MEDICINE

## 2024-03-19 PROCEDURE — 3074F SYST BP LT 130 MM HG: CPT | Performed by: INTERNAL MEDICINE

## 2024-03-19 RX ORDER — HYDROCHLOROTHIAZIDE 25 MG/1
25 TABLET ORAL DAILY
Qty: 90 TABLET | Refills: 1 | Status: SHIPPED | OUTPATIENT
Start: 2024-03-19

## 2024-03-19 RX ORDER — OMEPRAZOLE 40 MG/1
40 CAPSULE, DELAYED RELEASE ORAL
Qty: 90 CAPSULE | Refills: 1 | Status: SHIPPED | OUTPATIENT
Start: 2024-03-19

## 2024-03-19 RX ORDER — DULOXETIN HYDROCHLORIDE 60 MG/1
60 CAPSULE, DELAYED RELEASE ORAL DAILY
Qty: 90 CAPSULE | Refills: 1 | Status: SHIPPED | OUTPATIENT
Start: 2024-03-19

## 2024-03-19 RX ORDER — TRIAMCINOLONE ACETONIDE 1 MG/G
CREAM TOPICAL
Qty: 454 G | Refills: 2 | Status: SHIPPED | OUTPATIENT
Start: 2024-03-19

## 2024-03-19 SDOH — ECONOMIC STABILITY: FOOD INSECURITY: WITHIN THE PAST 12 MONTHS, YOU WORRIED THAT YOUR FOOD WOULD RUN OUT BEFORE YOU GOT MONEY TO BUY MORE.: NEVER TRUE

## 2024-03-19 SDOH — ECONOMIC STABILITY: INCOME INSECURITY: HOW HARD IS IT FOR YOU TO PAY FOR THE VERY BASICS LIKE FOOD, HOUSING, MEDICAL CARE, AND HEATING?: NOT HARD AT ALL

## 2024-03-19 SDOH — ECONOMIC STABILITY: FOOD INSECURITY: WITHIN THE PAST 12 MONTHS, THE FOOD YOU BOUGHT JUST DIDN'T LAST AND YOU DIDN'T HAVE MONEY TO GET MORE.: NEVER TRUE

## 2024-03-19 ASSESSMENT — ENCOUNTER SYMPTOMS
BLOOD IN STOOL: 0
CHEST TIGHTNESS: 0
ABDOMINAL DISTENTION: 0
EYE DISCHARGE: 0
WHEEZING: 0
ABDOMINAL PAIN: 0
COUGH: 0
VOMITING: 0
SHORTNESS OF BREATH: 0
BACK PAIN: 0
FACIAL SWELLING: 0

## 2024-03-19 ASSESSMENT — PATIENT HEALTH QUESTIONNAIRE - PHQ9
6. FEELING BAD ABOUT YOURSELF - OR THAT YOU ARE A FAILURE OR HAVE LET YOURSELF OR YOUR FAMILY DOWN: NOT AT ALL
SUM OF ALL RESPONSES TO PHQ QUESTIONS 1-9: 0
3. TROUBLE FALLING OR STAYING ASLEEP: NOT AT ALL
10. IF YOU CHECKED OFF ANY PROBLEMS, HOW DIFFICULT HAVE THESE PROBLEMS MADE IT FOR YOU TO DO YOUR WORK, TAKE CARE OF THINGS AT HOME, OR GET ALONG WITH OTHER PEOPLE: NOT DIFFICULT AT ALL
SUM OF ALL RESPONSES TO PHQ QUESTIONS 1-9: 0
7. TROUBLE CONCENTRATING ON THINGS, SUCH AS READING THE NEWSPAPER OR WATCHING TELEVISION: NOT AT ALL
SUM OF ALL RESPONSES TO PHQ QUESTIONS 1-9: 0
SUM OF ALL RESPONSES TO PHQ9 QUESTIONS 1 & 2: 0
1. LITTLE INTEREST OR PLEASURE IN DOING THINGS: NOT AT ALL
5. POOR APPETITE OR OVEREATING: NOT AT ALL
9. THOUGHTS THAT YOU WOULD BE BETTER OFF DEAD, OR OF HURTING YOURSELF: NOT AT ALL
8. MOVING OR SPEAKING SO SLOWLY THAT OTHER PEOPLE COULD HAVE NOTICED. OR THE OPPOSITE, BEING SO FIGETY OR RESTLESS THAT YOU HAVE BEEN MOVING AROUND A LOT MORE THAN USUAL: NOT AT ALL
4. FEELING TIRED OR HAVING LITTLE ENERGY: NOT AT ALL
SUM OF ALL RESPONSES TO PHQ QUESTIONS 1-9: 0
2. FEELING DOWN, DEPRESSED OR HOPELESS: NOT AT ALL

## 2024-03-19 NOTE — PROGRESS NOTES
Date of Visit: 3/19/2024    Leanne Pretty (:  1955) is a 68 y.o. female,  Established patient here for evaluation of the following chief complaint(s):  Diabetes, Hypertension, Cholesterol Problem, Depression, Anxiety, and Gastroesophageal Reflux      ASSESSMENT/PLAN:    1. Type 2 diabetes mellitus without complication, with long-term current use of insulin ([facility])  -Hemoglobin A1c of 7.6% shows improvement but diabetes is not controlled  -Continue Lantus insulin 28 units nightly  -Continue Farxiga 10mg once daily  -Increase Ozempic to 0.5 mg SC weekly for 4 weeks then increase Semaglutide, to 1 MG/DOSE, 4 MG/3ML SOPN; Inject 1 mg into the skin once a week  Dispense: 9 mL; Refill: 0  -Limit carbohydrates to 45 grams with meals and 15 grams with snacks  -monitor blood sugars  -goal for blood sugar fasting or pre-meal  is   -goal for blood sugar 2 hours after a meal is less than 180  -goal for blood sugar at bedtime is less than 150  -Regular aerobic exercise  - POCT glycosylated hemoglobin (Hb A1C)  - Comprehensive Metabolic Panel; Future    2. Essential hypertension  -stable  -Continue  losartan 100 mg once daily   -Continue verapamil  mg once daily  -Continue hydroCHLOROthiazide (HYDRODIURIL) 25 MG tablet; Take 1 tablet by mouth daily  Dispense: 90 tablet; Refill: 1  -Low sodium diet  -Regular aerobic exercise  -Comprehensive Metabolic Panel; Future    3. Mixed hyperlipidemia  -Stable  -LDL is at goal  -Continue Rosuvastatin 20 mg nightly  -Low fat, low cholesterol diet  -Regular aerobic exercise  -Comprehensive Metabolic Panel; Future  -Lipid Panel; Future    4. Moderate episode of recurrent major depressive disorder (HCC)  -Stable  -Continue DULoxetine (CYMBALTA) 60 MG extended release capsule; Take 1 capsule by mouth daily  Dispense: 90 capsule; Refill: 1    5. Anxiety  -Stable  -Continue DULoxetine (CYMBALTA) 60 MG extended release capsule; Take 1 capsule by mouth daily  Dispense: 90

## 2024-03-20 ENCOUNTER — TELEPHONE (OUTPATIENT)
Dept: PRIMARY CARE CLINIC | Age: 69
End: 2024-03-20

## 2024-03-20 RX ORDER — PEN NEEDLE, DIABETIC 31 GX5/16"
NEEDLE, DISPOSABLE MISCELLANEOUS
Qty: 100 EACH | Refills: 3 | Status: SHIPPED | OUTPATIENT
Start: 2024-03-20 | End: 2024-03-22

## 2024-03-20 NOTE — TELEPHONE ENCOUNTER
Medication:   Requested Prescriptions     Pending Prescriptions Disp Refills    Insulin Pen Needle (DROPLET PEN NEEDLES) 31G X 8 MM MISC 100 each 3     Last Filled:  12.5.23    Last appt: 3/19/2024   Next appt: 6/24/2024    Last OARRS:        No data to display

## 2024-03-20 NOTE — TELEPHONE ENCOUNTER
Call [email or web address] Patient Assistance. Patient needs a dose increase for Ozempic to 1mg SC weekly. I put the printed prescription in [employer] To Do folder. Find out when patient will receive the Ozempic.    Contact patient with status of prescription when done.

## 2024-03-20 NOTE — TELEPHONE ENCOUNTER
Tried to get through to [employer] and the automated system hung up on me after not being able to get connected to a representative. Will have to try again.

## 2024-03-21 NOTE — TELEPHONE ENCOUNTER
[employer] sent a new request form for patient prescription to be updated. Placed in Dr. Avery's box to sign, will fax once completed with the prescription order.

## 2024-03-22 DIAGNOSIS — Z79.4 TYPE 2 DIABETES MELLITUS WITHOUT COMPLICATION, WITH LONG-TERM CURRENT USE OF INSULIN (HCC): Primary | ICD-10-CM

## 2024-03-22 DIAGNOSIS — E11.9 TYPE 2 DIABETES MELLITUS WITHOUT COMPLICATION, WITH LONG-TERM CURRENT USE OF INSULIN (HCC): Primary | ICD-10-CM

## 2024-03-22 NOTE — TELEPHONE ENCOUNTER
Received a fax from [employer] that droplet needles are not covered by patient insurance but [employer] pen needles are. New script sent to [employer] at this time.

## 2024-03-25 ENCOUNTER — TELEPHONE (OUTPATIENT)
Dept: PRIMARY CARE CLINIC | Age: 69
End: 2024-03-25

## 2024-03-25 PROBLEM — L30.9 DERMATITIS: Status: ACTIVE | Noted: 2024-03-25

## 2024-03-25 ASSESSMENT — ENCOUNTER SYMPTOMS
NAUSEA: 0
DIARRHEA: 0
SHORTNESS OF BREATH: 0
WHEEZING: 0
SORE THROAT: 0
COUGH: 0
BLOOD IN STOOL: 0
CONSTIPATION: 0
CHEST TIGHTNESS: 0
SINUS PRESSURE: 0
RHINORRHEA: 0
VOMITING: 0
ABDOMINAL PAIN: 0

## 2024-03-25 NOTE — TELEPHONE ENCOUNTER
Pt called, Saturday morning had stomach pain, dizzy an fell on floor, pt states she did not hurt herself.  GRACE started ozempic that day also.  Please call pt.  Would like to talk to Carly.

## 2024-03-26 ENCOUNTER — OFFICE VISIT (OUTPATIENT)
Dept: PRIMARY CARE CLINIC | Age: 69
End: 2024-03-26

## 2024-03-26 VITALS
RESPIRATION RATE: 16 BRPM | OXYGEN SATURATION: 100 % | WEIGHT: 222 LBS | TEMPERATURE: 97.1 F | SYSTOLIC BLOOD PRESSURE: 124 MMHG | DIASTOLIC BLOOD PRESSURE: 82 MMHG | HEART RATE: 74 BPM | BODY MASS INDEX: 36.94 KG/M2

## 2024-03-26 DIAGNOSIS — R55 VASOVAGAL SYNCOPE: Primary | ICD-10-CM

## 2024-03-26 DIAGNOSIS — R53.83 OTHER FATIGUE: ICD-10-CM

## 2024-03-26 DIAGNOSIS — Z95.2 S/P AORTIC VALVE REPLACEMENT AND AORTOPLASTY: ICD-10-CM

## 2024-03-26 DIAGNOSIS — N18.31 TYPE 2 DIABETES MELLITUS WITH STAGE 3A CHRONIC KIDNEY DISEASE, WITH LONG-TERM CURRENT USE OF INSULIN (HCC): ICD-10-CM

## 2024-03-26 DIAGNOSIS — I71.21 ANEURYSM OF ASCENDING AORTA WITHOUT RUPTURE (HCC): ICD-10-CM

## 2024-03-26 DIAGNOSIS — Z79.4 TYPE 2 DIABETES MELLITUS WITH STAGE 3A CHRONIC KIDNEY DISEASE, WITH LONG-TERM CURRENT USE OF INSULIN (HCC): ICD-10-CM

## 2024-03-26 DIAGNOSIS — E11.22 TYPE 2 DIABETES MELLITUS WITH STAGE 3A CHRONIC KIDNEY DISEASE, WITH LONG-TERM CURRENT USE OF INSULIN (HCC): ICD-10-CM

## 2024-03-26 PROBLEM — E11.40 TYPE 2 DIABETES MELLITUS WITH DIABETIC NEUROPATHY (HCC): Status: ACTIVE | Noted: 2024-03-26

## 2024-03-26 LAB
ANION GAP SERPL CALCULATED.3IONS-SCNC: 11 MMOL/L (ref 3–16)
BASOPHILS # BLD: 0 K/UL (ref 0–0.2)
BASOPHILS NFR BLD: 0.3 %
BUN SERPL-MCNC: 29 MG/DL (ref 7–20)
CALCIUM SERPL-MCNC: 9.5 MG/DL (ref 8.3–10.6)
CHLORIDE SERPL-SCNC: 103 MMOL/L (ref 99–110)
CO2 SERPL-SCNC: 27 MMOL/L (ref 21–32)
CREAT SERPL-MCNC: 1.3 MG/DL (ref 0.6–1.2)
DEPRECATED RDW RBC AUTO: 15 % (ref 12.4–15.4)
EOSINOPHIL # BLD: 0 K/UL (ref 0–0.6)
EOSINOPHIL NFR BLD: 0.6 %
GFR SERPLBLD CREATININE-BSD FMLA CKD-EPI: 44 ML/MIN/{1.73_M2}
GLUCOSE SERPL-MCNC: 139 MG/DL (ref 70–99)
HCT VFR BLD AUTO: 40.5 % (ref 36–48)
HGB BLD-MCNC: 13.3 G/DL (ref 12–16)
LYMPHOCYTES # BLD: 1.3 K/UL (ref 1–5.1)
LYMPHOCYTES NFR BLD: 17.2 %
MCH RBC QN AUTO: 29.6 PG (ref 26–34)
MCHC RBC AUTO-ENTMCNC: 32.7 G/DL (ref 31–36)
MCV RBC AUTO: 90.3 FL (ref 80–100)
MONOCYTES # BLD: 0.4 K/UL (ref 0–1.3)
MONOCYTES NFR BLD: 5.2 %
NEUTROPHILS # BLD: 5.9 K/UL (ref 1.7–7.7)
NEUTROPHILS NFR BLD: 76.7 %
PLATELET # BLD AUTO: 187 K/UL (ref 135–450)
PMV BLD AUTO: 8.5 FL (ref 5–10.5)
POTASSIUM SERPL-SCNC: 4.3 MMOL/L (ref 3.5–5.1)
RBC # BLD AUTO: 4.49 M/UL (ref 4–5.2)
SODIUM SERPL-SCNC: 141 MMOL/L (ref 136–145)
TSH SERPL DL<=0.005 MIU/L-ACNC: 1.4 UIU/ML (ref 0.27–4.2)
WBC # BLD AUTO: 7.7 K/UL (ref 4–11)

## 2024-03-26 NOTE — PROGRESS NOTES
Administered    COVID-19, MODERNA BLUE border, Primary or Immunocompromised, (age 12y+), IM, 100 mcg/0.5mL 05/01/2021, 05/29/2021, 01/10/2022    Influenza 10/12/2012    Influenza Vaccine, unspecified formulation 09/20/2016, 09/19/2017    Influenza Virus Vaccine 09/29/2013, 09/25/2015, 10/09/2019, 09/14/2020    Influenza Whole 11/14/2011    Influenza, AFLURIA (age 3 yrs+), FLUZONE, (age 6 mo+), MDV, 0.5mL 10/01/2016, 09/01/2018    Influenza, FLUAD, (age 65 y+), Adjuvanted, 0.5mL 09/06/2021, 11/04/2022, 11/09/2023    Influenza, FLUARIX, FLULAVAL, FLUZONE (age 6 mo+) AND AFLURIA, (age 3 y+), PF, 0.5mL 09/07/2018    Influenza, MDCK, Preservative free 10/01/2014    Pneumococcal, PCV-13, PREVNAR 13, (age 6w+), IM, 0.5mL 09/20/2017    Pneumococcal, PCV20, PREVNAR 20, (age 6w+), IM, 0.5mL 11/09/2023    Pneumococcal, PPSV23, PNEUMOVAX 23, (age 2y+), SC/IM, 0.5mL 12/15/2000, 09/29/2013, 09/07/2018    TDaP, ADACEL (age 10y-64y), BOOSTRIX (age 10y+), IM, 0.5mL 09/01/2016    Tetanus 05/17/2010    Zoster Live (Zostavax) 02/14/2015    Zoster Recombinant (Shingrix) 07/29/2020, 11/03/2020       There are no preventive care reminders to display for this patient.    Assessment / Plan:   1. Vasovagal syncope  Acute, suspect vagal reaction. However has multiple comorbidities. Due to stable BP and HR, recommend monitoring for now and if ANY chest pain or SOB, immediately go to ED. Will forward this to PCP and cardiologist.     2. Other fatigue  Chronic, check labs  - Basic Metabolic Panel; Future  - TSH with Reflex to FT4 ([place] Only); Future  - CBC with Auto Differential; Future    3. Type 2 diabetes mellitus with stage 3a chronic kidney disease, with long-term current use of insulin (HCC)  Chronic, on insulin, having some hypoglycemia episodes. Keep snacks on hand/juice. F/u PCP.   Recheck BMP due to acute decrease in GFR; f/u Nephrology next week as scheduled.     4. Aneurysm of ascending aorta without rupture (HCC)  Chronic,

## 2024-04-02 NOTE — TELEPHONE ENCOUNTER
This has been addressed. Spoke with patient 3/25/24 and had her schedule appointment. Patient seen by Dr. Hoffman on 3/26/24.

## 2024-04-05 ENCOUNTER — TELEPHONE (OUTPATIENT)
Dept: PRIMARY CARE CLINIC | Age: 69
End: 2024-04-05

## 2024-04-09 ENCOUNTER — TELEPHONE (OUTPATIENT)
Dept: PRIMARY CARE CLINIC | Age: 69
End: 2024-04-09

## 2024-04-09 DIAGNOSIS — E11.9 TYPE 2 DIABETES MELLITUS WITHOUT COMPLICATION, WITH LONG-TERM CURRENT USE OF INSULIN (HCC): ICD-10-CM

## 2024-04-09 DIAGNOSIS — Z79.4 TYPE 2 DIABETES MELLITUS WITHOUT COMPLICATION, WITH LONG-TERM CURRENT USE OF INSULIN (HCC): ICD-10-CM

## 2024-04-09 NOTE — TELEPHONE ENCOUNTER
Received 4 pens of Ozempic 4mg/3ml from Patient assistance. Patient notified to . Medication placed in fridge in POC D

## 2024-04-11 DIAGNOSIS — N18.32 STAGE 3B CHRONIC KIDNEY DISEASE (HCC): ICD-10-CM

## 2024-04-11 LAB
ANION GAP SERPL CALCULATED.3IONS-SCNC: 16 MMOL/L (ref 3–16)
BUN SERPL-MCNC: 31 MG/DL (ref 7–20)
CALCIUM SERPL-MCNC: 9.3 MG/DL (ref 8.3–10.6)
CHLORIDE SERPL-SCNC: 98 MMOL/L (ref 99–110)
CO2 SERPL-SCNC: 25 MMOL/L (ref 21–32)
CREAT SERPL-MCNC: 1.5 MG/DL (ref 0.6–1.2)
GFR SERPLBLD CREATININE-BSD FMLA CKD-EPI: 37 ML/MIN/{1.73_M2}
GLUCOSE SERPL-MCNC: 177 MG/DL (ref 70–99)
POTASSIUM SERPL-SCNC: 4.5 MMOL/L (ref 3.5–5.1)
SODIUM SERPL-SCNC: 139 MMOL/L (ref 136–145)

## 2024-04-18 ENCOUNTER — TELEPHONE (OUTPATIENT)
Dept: PRIMARY CARE CLINIC | Age: 69
End: 2024-04-18

## 2024-04-18 DIAGNOSIS — Z79.4 TYPE 2 DIABETES MELLITUS WITHOUT COMPLICATION, WITH LONG-TERM CURRENT USE OF INSULIN (HCC): ICD-10-CM

## 2024-04-18 DIAGNOSIS — E11.9 TYPE 2 DIABETES MELLITUS WITHOUT COMPLICATION, WITH LONG-TERM CURRENT USE OF INSULIN (HCC): ICD-10-CM

## 2024-04-18 RX ORDER — SEMAGLUTIDE 0.68 MG/ML
0.25 INJECTION, SOLUTION SUBCUTANEOUS WEEKLY
Qty: 9 ML | Refills: 0 | COMMUNITY
Start: 2024-04-18

## 2024-04-18 NOTE — TELEPHONE ENCOUNTER
Received patients Ozempic from patient assistance program. 3 boxes of Ozempic were placed in POC D refrigerator. Voicemail left for patient notifying her it was delivered and she can  at her convenience.

## 2024-04-20 ENCOUNTER — HOSPITAL ENCOUNTER (OUTPATIENT)
Dept: MRI IMAGING | Age: 69
Discharge: HOME | End: 2024-04-20
Attending: INTERNAL MEDICINE
Payer: COMMERCIAL

## 2024-04-20 DIAGNOSIS — I71.21 ANEURYSM OF ASCENDING AORTA WITHOUT RUPTURE (HCC): ICD-10-CM

## 2024-04-20 PROCEDURE — 6360000004 HC RX CONTRAST MEDICATION: Performed by: INTERNAL MEDICINE

## 2024-04-20 PROCEDURE — A9576 INJ PROHANCE MULTIPACK: HCPCS | Performed by: INTERNAL MEDICINE

## 2024-04-20 PROCEDURE — C8911 MRA W/O FOL W/CONT, CHEST: HCPCS

## 2024-04-20 RX ADMIN — GADOTERIDOL 20 ML: 279.3 INJECTION, SOLUTION INTRAVENOUS at 15:47

## 2024-05-29 ENCOUNTER — TELEPHONE (OUTPATIENT)
Dept: PRIMARY CARE CLINIC | Age: 69
End: 2024-05-29

## 2024-05-29 RX ORDER — INSULIN GLARGINE 100 [IU]/ML
INJECTION, SOLUTION SUBCUTANEOUS
Qty: 30 ML | Refills: 0 | COMMUNITY
Start: 2024-05-29

## 2024-05-29 NOTE — TELEPHONE ENCOUNTER
Received shipment of patient Lantus 2 pens placed in POC D refrigerator. Voicemail left to notify patient and to .

## 2024-06-02 ENCOUNTER — APPOINTMENT (OUTPATIENT)
Dept: CT IMAGING | Age: 69
End: 2024-06-02
Payer: COMMERCIAL

## 2024-06-02 ENCOUNTER — HOSPITAL ENCOUNTER (EMERGENCY)
Age: 69
Discharge: HOME | End: 2024-06-02
Attending: EMERGENCY MEDICINE
Payer: COMMERCIAL

## 2024-06-02 VITALS
OXYGEN SATURATION: 93 % | HEART RATE: 88 BPM | TEMPERATURE: 97.7 F | SYSTOLIC BLOOD PRESSURE: 111 MMHG | RESPIRATION RATE: 21 BRPM | DIASTOLIC BLOOD PRESSURE: 69 MMHG

## 2024-06-02 DIAGNOSIS — E86.0 DEHYDRATION: ICD-10-CM

## 2024-06-02 DIAGNOSIS — K59.00 CONSTIPATION, UNSPECIFIED CONSTIPATION TYPE: Primary | ICD-10-CM

## 2024-06-02 LAB
ALBUMIN SERPL-MCNC: 4 G/DL (ref 3.4–5)
ALBUMIN/GLOB SERPL: 1.5 {RATIO} (ref 1.1–2.2)
ALP SERPL-CCNC: 55 U/L (ref 40–129)
ALT SERPL-CCNC: 16 U/L (ref 10–40)
ANION GAP SERPL CALCULATED.3IONS-SCNC: 13 MMOL/L (ref 3–16)
AST SERPL-CCNC: 24 U/L (ref 15–37)
BACTERIA URNS QL MICRO: ABNORMAL /HPF
BASOPHILS # BLD: 0.1 K/UL (ref 0–0.2)
BASOPHILS NFR BLD: 0.6 %
BILIRUB SERPL-MCNC: 0.3 MG/DL (ref 0–1)
BILIRUB UR QL STRIP.AUTO: NEGATIVE
BUN SERPL-MCNC: 23 MG/DL (ref 7–20)
CALCIUM SERPL-MCNC: 9.1 MG/DL (ref 8.3–10.6)
CHLORIDE SERPL-SCNC: 101 MMOL/L (ref 99–110)
CLARITY UR: CLEAR
CO2 SERPL-SCNC: 26 MMOL/L (ref 21–32)
COLOR UR: ABNORMAL
CREAT SERPL-MCNC: 1.4 MG/DL (ref 0.6–1.2)
DEPRECATED RDW RBC AUTO: 15.2 % (ref 12.4–15.4)
EKG ATRIAL RATE: 81 BPM
EKG DIAGNOSIS: NORMAL
EKG P AXIS: 59 DEGREES
EKG P-R INTERVAL: 146 MS
EKG Q-T INTERVAL: 442 MS
EKG QRS DURATION: 132 MS
EKG QTC CALCULATION (BAZETT): 513 MS
EKG R AXIS: -49 DEGREES
EKG T AXIS: 89 DEGREES
EKG VENTRICULAR RATE: 81 BPM
EOSINOPHIL # BLD: 0.1 K/UL (ref 0–0.6)
EOSINOPHIL NFR BLD: 0.8 %
EPI CELLS #/AREA URNS AUTO: 1 /HPF (ref 0–5)
GFR SERPLBLD CREATININE-BSD FMLA CKD-EPI: 41 ML/MIN/{1.73_M2}
GLUCOSE BLD-MCNC: 115 MG/DL (ref 70–99)
GLUCOSE SERPL-MCNC: 214 MG/DL (ref 70–99)
GLUCOSE UR STRIP.AUTO-MCNC: >=1000 MG/DL
HCT VFR BLD AUTO: 41.1 % (ref 36–48)
HGB BLD-MCNC: 13.3 G/DL (ref 12–16)
HGB UR QL STRIP.AUTO: ABNORMAL
HYALINE CASTS #/AREA URNS AUTO: 1 /LPF (ref 0–8)
KETONES UR STRIP.AUTO-MCNC: NEGATIVE MG/DL
LACTATE BLDV-SCNC: 2.3 MMOL/L (ref 0.4–1.9)
LACTATE BLDV-SCNC: 2.6 MMOL/L (ref 0.4–2)
LACTATE BLDV-SCNC: 2.9 MMOL/L (ref 0.4–1.9)
LEUKOCYTE ESTERASE UR QL STRIP.AUTO: ABNORMAL
LIPASE SERPL-CCNC: 30 U/L (ref 13–60)
LYMPHOCYTES # BLD: 2.3 K/UL (ref 1–5.1)
LYMPHOCYTES NFR BLD: 20.5 %
MCH RBC QN AUTO: 29.5 PG (ref 26–34)
MCHC RBC AUTO-ENTMCNC: 32.4 G/DL (ref 31–36)
MCV RBC AUTO: 90.8 FL (ref 80–100)
MONOCYTES # BLD: 0.7 K/UL (ref 0–1.3)
MONOCYTES NFR BLD: 5.8 %
NEUTROPHILS # BLD: 8.1 K/UL (ref 1.7–7.7)
NEUTROPHILS NFR BLD: 72.3 %
NITRITE UR QL STRIP.AUTO: NEGATIVE
PERFORMED ON: ABNORMAL
PH UR STRIP.AUTO: 6.5 [PH] (ref 5–8)
PLATELET # BLD AUTO: 217 K/UL (ref 135–450)
PMV BLD AUTO: 7.3 FL (ref 5–10.5)
POTASSIUM SERPL-SCNC: 3.1 MMOL/L (ref 3.5–5.1)
PROT SERPL-MCNC: 6.6 G/DL (ref 6.4–8.2)
PROT UR STRIP.AUTO-MCNC: NEGATIVE MG/DL
RBC # BLD AUTO: 4.52 M/UL (ref 4–5.2)
RBC CLUMPS #/AREA URNS AUTO: 5 /HPF (ref 0–4)
SODIUM SERPL-SCNC: 140 MMOL/L (ref 136–145)
SP GR UR STRIP.AUTO: >=1.03 (ref 1–1.03)
TROPONIN, HIGH SENSITIVITY: 14 NG/L (ref 0–14)
TROPONIN, HIGH SENSITIVITY: 15 NG/L (ref 0–14)
UA COMPLETE W REFLEX CULTURE PNL UR: ABNORMAL
UA DIPSTICK W REFLEX MICRO PNL UR: YES
URN SPEC COLLECT METH UR: ABNORMAL
UROBILINOGEN UR STRIP-ACNC: 1 E.U./DL
WBC # BLD AUTO: 11.2 K/UL (ref 4–11)
WBC #/AREA URNS AUTO: 1 /HPF (ref 0–5)

## 2024-06-02 PROCEDURE — 99284 EMERGENCY DEPT VISIT MOD MDM: CPT

## 2024-06-02 PROCEDURE — 36415 COLL VENOUS BLD VENIPUNCTURE: CPT

## 2024-06-02 PROCEDURE — 96360 HYDRATION IV INFUSION INIT: CPT

## 2024-06-02 PROCEDURE — 2580000003 HC RX 258: Performed by: NURSE PRACTITIONER

## 2024-06-02 PROCEDURE — 85025 COMPLETE CBC W/AUTO DIFF WBC: CPT

## 2024-06-02 PROCEDURE — 93010 ELECTROCARDIOGRAM REPORT: CPT | Performed by: INTERNAL MEDICINE

## 2024-06-02 PROCEDURE — 74176 CT ABD & PELVIS W/O CONTRAST: CPT

## 2024-06-02 PROCEDURE — 81001 URINALYSIS AUTO W/SCOPE: CPT

## 2024-06-02 PROCEDURE — 80053 COMPREHEN METABOLIC PANEL: CPT

## 2024-06-02 PROCEDURE — 84484 ASSAY OF TROPONIN QUANT: CPT

## 2024-06-02 PROCEDURE — 93005 ELECTROCARDIOGRAM TRACING: CPT | Performed by: NURSE PRACTITIONER

## 2024-06-02 PROCEDURE — 96361 HYDRATE IV INFUSION ADD-ON: CPT

## 2024-06-02 PROCEDURE — 83605 ASSAY OF LACTIC ACID: CPT

## 2024-06-02 PROCEDURE — 83690 ASSAY OF LIPASE: CPT

## 2024-06-02 RX ORDER — 0.9 % SODIUM CHLORIDE 0.9 %
1000 INTRAVENOUS SOLUTION INTRAVENOUS ONCE
Status: COMPLETED | OUTPATIENT
Start: 2024-06-02 | End: 2024-06-02

## 2024-06-02 RX ADMIN — SODIUM CHLORIDE 1000 ML: 9 INJECTION, SOLUTION INTRAVENOUS at 03:25

## 2024-06-02 RX ADMIN — Medication 240 ML: at 04:01

## 2024-06-02 ASSESSMENT — PAIN SCALES - GENERAL: PAINLEVEL_OUTOF10: 10

## 2024-06-02 ASSESSMENT — LIFESTYLE VARIABLES
HOW MANY STANDARD DRINKS CONTAINING ALCOHOL DO YOU HAVE ON A TYPICAL DAY: 1 OR 2
HOW OFTEN DO YOU HAVE A DRINK CONTAINING ALCOHOL: MONTHLY OR LESS

## 2024-06-02 ASSESSMENT — PAIN DESCRIPTION - LOCATION: LOCATION: ABDOMEN

## 2024-06-02 ASSESSMENT — PAIN - FUNCTIONAL ASSESSMENT: PAIN_FUNCTIONAL_ASSESSMENT: 0-10

## 2024-06-02 NOTE — ED PROVIDER NOTES
I personally saw this patient in conjunction with the advanced practice provider.  Additionally, I took independent history and physical.  I agree with the history, assessment, and manage plan as documented in the chart except as otherwise noted.  Nursing notes reviewed and I agree except as otherwise noted.          Labs Reviewed   CBC WITH AUTO DIFFERENTIAL - Abnormal; Notable for the following components:       Result Value    WBC 11.2 (*)     Neutrophils Absolute 8.1 (*)     All other components within normal limits   COMPREHENSIVE METABOLIC PANEL - Abnormal; Notable for the following components:    Potassium 3.1 (*)     Glucose 214 (*)     BUN 23 (*)     Creatinine 1.4 (*)     Est, Glom Filt Rate 41 (*)     All other components within normal limits   LACTATE, SEPSIS - Abnormal; Notable for the following components:    Lactic Acid, Sepsis 2.3 (*)     All other components within normal limits   LACTATE, SEPSIS - Abnormal; Notable for the following components:    Lactic Acid, Sepsis 2.9 (*)     All other components within normal limits   URINALYSIS WITH REFLEX TO CULTURE - Abnormal; Notable for the following components:    Color, UA DARK YELLOW (*)     Glucose, Ur >=1000 (*)     Blood, Urine MODERATE (*)     Leukocyte Esterase, Urine TRACE (*)     All other components within normal limits   MICROSCOPIC URINALYSIS - Abnormal; Notable for the following components:    RBC, UA 5 (*)     All other components within normal limits   TROPONIN - Abnormal; Notable for the following components:    Troponin, High Sensitivity 15 (*)     All other components within normal limits   POCT GLUCOSE - Abnormal; Notable for the following components:    POC Glucose 115 (*)     All other components within normal limits   LIPASE   TROPONIN   LACTIC ACID        CT ABDOMEN PELVIS WO CONTRAST Additional Contrast? None   Preliminary Result   1. No small bowel obstruction as clinically queried.   2. Constipation.   3. Fibroid uterus.

## 2024-06-02 NOTE — ED PROVIDER NOTES
[facility] EMERGENCY DEPARTMENT  EMERGENCY DEPARTMENT ENCOUNTER        Pt Name: Leanne Pretty  MRN: [id number]  Birthdate 1955  Date of evaluation: 6/2/2024  Provider: LIZZIE Martin - CNP  PCP: Maryjane Avery MD  Note Started: 1:09 AM EDT 6/2/24       I have seen and evaluated this patient with my supervising physician manuel      CHIEF COMPLAINT       Chief Complaint   Patient presents with    Abdominal Pain     Pt from home via [facility] c/o abdominal and constipation x 1 week. Per EMS, pt did pass out while going to the restroom earlier today.       HISTORY OF PRESENT ILLNESS: 1 or more Elements     History from : Patient and Family daughter    Limitations to history : None    Leanne Pretty is a 68 y.o. female who presents to the emergency department with complaint of constipation/rectal pain.  The patient reports that she has been on Ozempic for 3 months and has suffered greatly with constipation.  She has used magnesium citrate, home enema, Dulcolax, and stool softener without relief.  She reports that she was seen by GI and placed on Linzess low-dose without relief of symptoms, has not had follow-up appointment yet.    States that while bearing down on the commode today that she did lose consciousness, syncopal event.  No chest pain prior, reports that she became very lightheaded and hot/sweating.  She is not injured.  Reports that her friend was in the home and she woke up immediately.    Denies any headache, fever, visual disturbances.  No chest pain or pressure.  No neck or back pain.  No shortness of breath, cough, or congestion.  No nausea, vomiting, diarrhea, or dysuria.  No rash.    Nursing Notes were all reviewed and agreed with or any disagreements were addressed in the HPI.    REVIEW OF SYSTEMS :      Review of Systems   Constitutional:  Negative for activity change, chills and fever.   Respiratory:  Negative for chest tightness and shortness of breath.

## 2024-06-03 ENCOUNTER — PATIENT MESSAGE (OUTPATIENT)
Dept: PRIMARY CARE CLINIC | Age: 69
End: 2024-06-03

## 2024-06-03 ENCOUNTER — OFFICE VISIT (OUTPATIENT)
Dept: PRIMARY CARE CLINIC | Age: 69
End: 2024-06-03
Payer: COMMERCIAL

## 2024-06-03 VITALS
RESPIRATION RATE: 16 BRPM | DIASTOLIC BLOOD PRESSURE: 80 MMHG | BODY MASS INDEX: 36.11 KG/M2 | OXYGEN SATURATION: 99 % | HEART RATE: 108 BPM | SYSTOLIC BLOOD PRESSURE: 124 MMHG | TEMPERATURE: 97.1 F | WEIGHT: 217 LBS

## 2024-06-03 DIAGNOSIS — K59.09 CHRONIC CONSTIPATION: Primary | ICD-10-CM

## 2024-06-03 DIAGNOSIS — E11.22 TYPE 2 DIABETES MELLITUS WITH STAGE 3A CHRONIC KIDNEY DISEASE, WITH LONG-TERM CURRENT USE OF INSULIN (HCC): ICD-10-CM

## 2024-06-03 DIAGNOSIS — R55 VASOVAGAL SYNCOPE: ICD-10-CM

## 2024-06-03 DIAGNOSIS — K64.9 HEMORRHOIDS, UNSPECIFIED HEMORRHOID TYPE: ICD-10-CM

## 2024-06-03 DIAGNOSIS — Z79.4 TYPE 2 DIABETES MELLITUS WITH STAGE 3A CHRONIC KIDNEY DISEASE, WITH LONG-TERM CURRENT USE OF INSULIN (HCC): ICD-10-CM

## 2024-06-03 DIAGNOSIS — N18.31 TYPE 2 DIABETES MELLITUS WITH STAGE 3A CHRONIC KIDNEY DISEASE, WITH LONG-TERM CURRENT USE OF INSULIN (HCC): ICD-10-CM

## 2024-06-03 PROCEDURE — 3079F DIAST BP 80-89 MM HG: CPT | Performed by: NURSE PRACTITIONER

## 2024-06-03 PROCEDURE — 3074F SYST BP LT 130 MM HG: CPT | Performed by: NURSE PRACTITIONER

## 2024-06-03 PROCEDURE — 1123F ACP DISCUSS/DSCN MKR DOCD: CPT | Performed by: NURSE PRACTITIONER

## 2024-06-03 PROCEDURE — 99214 OFFICE O/P EST MOD 30 MIN: CPT | Performed by: NURSE PRACTITIONER

## 2024-06-03 PROCEDURE — 3051F HG A1C>EQUAL 7.0%<8.0%: CPT | Performed by: NURSE PRACTITIONER

## 2024-06-03 ASSESSMENT — ENCOUNTER SYMPTOMS
ANAL BLEEDING: 0
SHORTNESS OF BREATH: 0
ABDOMINAL DISTENTION: 0
RECTAL PAIN: 1
ABDOMINAL PAIN: 0
VOMITING: 0
CONSTIPATION: 1
NAUSEA: 0
DIARRHEA: 0
COUGH: 0
WHEEZING: 0
BLOOD IN STOOL: 0

## 2024-06-03 NOTE — PROGRESS NOTES
PE:  Physical Exam  Vitals and nursing note reviewed.   Constitutional:       General: She is not in acute distress.     Appearance: Normal appearance. She is well-developed and normal weight. She is not diaphoretic.   Cardiovascular:      Rate and Rhythm: Normal rate and regular rhythm.      Heart sounds: Normal heart sounds. No murmur heard.     No friction rub.   Pulmonary:      Effort: Pulmonary effort is normal. No respiratory distress.      Breath sounds: Normal breath sounds. No wheezing, rhonchi or rales.   Abdominal:      General: Abdomen is flat. Bowel sounds are normal. There is no distension.      Palpations: Abdomen is soft. There is no mass.      Tenderness: There is no abdominal tenderness. There is no guarding or rebound.      Hernia: No hernia is present.   Skin:     General: Skin is warm and dry.      Findings: No rash.   Neurological:      Mental Status: She is alert and oriented to person, place, and time.   Psychiatric:         Mood and Affect: Mood normal.         Behavior: Behavior normal.        CT ABDOMEN PELVIS WO CONTRAST Additional Contrast? None   Preliminary Result   1. No small bowel obstruction as clinically queried.   2. Constipation.   3. Fibroid uterus.       Patient Active Problem List   Diagnosis    Essential hypertension    Urinary incontinence    Urinary urgency    GERD (gastroesophageal reflux disease)    Depression    Vasovagal syncope    Aortic valve calcification    Epigastric abdominal pain    Fatigue    Vitamin D deficiency    Vitamin B 12 deficiency    Hyperlipidemia    Anxiety    Metallic taste    Type 2 diabetes mellitus without complication, with long-term current use of insulin (HCC)    Moderate episode of recurrent major depressive disorder (HCC)    Murmur    Moderate to severe aortic stenosis    Anemia    Thyroid nodule    Right renal mass    Aortic stenosis due to bicuspid aortic valve    S/P aortic valve replacement and aortoplasty    Gastroesophageal reflux

## 2024-06-03 NOTE — PATIENT INSTRUCTIONS
STOP OZEMPIC    CONTINUE ON LANTUS 28UNITS AT NIGHT AND FARXIGA.   CONTINUE TO MONITOR GLU AND KEEP GLU LOG. BRING IN TO UPCOMING APT.     START ON DAILY STOOL SOFTENER AND DAILY FIBER SUPPLEMENT (METAMUCIL)    IF NO BM X 2 DAYS, MAY TAKE MIRALAX    MAY USE PREPARATION H WITH LIDOCAINE OTC FOR HEMORRHOID RELIEF.     CALL GI FOR FOLLOW UP AND FURTHER INSTRUCTIONS.

## 2024-06-03 NOTE — TELEPHONE ENCOUNTER
From: Leanne Pretty  To: Dr. Maryjane Avery  Sent: 6/3/2024 7:36 AM EDT  Subject: Emergency room followup.    Dr Avery I passed out again on Saturday evening I went to the hospital I was severely constipated and they told me to follow up with you I guess to check to make sure everything was going okay please give me a call [phone] want sex

## 2024-06-24 ENCOUNTER — OFFICE VISIT (OUTPATIENT)
Dept: PRIMARY CARE CLINIC | Age: 69
End: 2024-06-24
Payer: COMMERCIAL

## 2024-06-24 VITALS
OXYGEN SATURATION: 98 % | SYSTOLIC BLOOD PRESSURE: 110 MMHG | HEART RATE: 76 BPM | WEIGHT: 218.4 LBS | BODY MASS INDEX: 36.34 KG/M2 | DIASTOLIC BLOOD PRESSURE: 76 MMHG

## 2024-06-24 DIAGNOSIS — Z79.4 TYPE 2 DIABETES MELLITUS WITHOUT COMPLICATION, WITH LONG-TERM CURRENT USE OF INSULIN (HCC): Primary | ICD-10-CM

## 2024-06-24 DIAGNOSIS — E11.9 TYPE 2 DIABETES MELLITUS WITHOUT COMPLICATION, WITH LONG-TERM CURRENT USE OF INSULIN (HCC): Primary | ICD-10-CM

## 2024-06-24 LAB — HBA1C MFR BLD: 6.9 %

## 2024-06-24 PROCEDURE — 83036 HEMOGLOBIN GLYCOSYLATED A1C: CPT | Performed by: INTERNAL MEDICINE

## 2024-06-24 PROCEDURE — 99213 OFFICE O/P EST LOW 20 MIN: CPT | Performed by: INTERNAL MEDICINE

## 2024-06-24 PROCEDURE — 3078F DIAST BP <80 MM HG: CPT | Performed by: INTERNAL MEDICINE

## 2024-06-24 PROCEDURE — 3044F HG A1C LEVEL LT 7.0%: CPT | Performed by: INTERNAL MEDICINE

## 2024-06-24 PROCEDURE — 1123F ACP DISCUSS/DSCN MKR DOCD: CPT | Performed by: INTERNAL MEDICINE

## 2024-06-24 PROCEDURE — 3074F SYST BP LT 130 MM HG: CPT | Performed by: INTERNAL MEDICINE

## 2024-06-24 ASSESSMENT — ENCOUNTER SYMPTOMS
ABDOMINAL PAIN: 0
SHORTNESS OF BREATH: 0
NAUSEA: 0
VOMITING: 0

## 2024-06-24 NOTE — PROGRESS NOTES
variantAbnormal ECGConfirmed by ALEXI CATALAN MD ([id number]) on 6/2/2024 12:55:06 PM     Troponin, High Sensitivi* 06/02/2024 14     POC Glucose 06/02/2024 115 (H)     Performed on 06/02/2024 ACCU-CHEK     Lactic Acid 06/02/2024 2.6 (H)    Orders Only on 04/11/2024   Component Date Value    Sodium 04/11/2024 139     Potassium 04/11/2024 4.5     Chloride 04/11/2024 98 (L)     CO2 04/11/2024 25     Anion Gap 04/11/2024 16     Glucose 04/11/2024 177 (H)     BUN 04/11/2024 31 (H)     Creatinine 04/11/2024 1.5 (H)     Est, Glom Filt Rate 04/11/2024 37 (A)     Calcium 04/11/2024 9.3    Orders Only on 03/26/2024   Component Date Value    WBC 03/26/2024 7.7     RBC 03/26/2024 4.49     Hemoglobin 03/26/2024 13.3     Hematocrit 03/26/2024 40.5     MCV 03/26/2024 90.3     MCH 03/26/2024 29.6     MCHC 03/26/2024 32.7     RDW 03/26/2024 15.0     Platelets 03/26/2024 187     MPV 03/26/2024 8.5     Neutrophils % 03/26/2024 76.7     Lymphocytes % 03/26/2024 17.2     Monocytes % 03/26/2024 5.2     Eosinophils % 03/26/2024 0.6     Basophils % 03/26/2024 0.3     Neutrophils Absolute 03/26/2024 5.9     Lymphocytes Absolute 03/26/2024 1.3     Monocytes Absolute 03/26/2024 0.4     Eosinophils Absolute 03/26/2024 0.0     Basophils Absolute 03/26/2024 0.0     TSH Reflex FT4 03/26/2024 1.40     Sodium 03/26/2024 141     Potassium 03/26/2024 4.3     Chloride 03/26/2024 103     CO2 03/26/2024 27     Anion Gap 03/26/2024 11     Glucose 03/26/2024 139 (H)     BUN 03/26/2024 29 (H)     Creatinine 03/26/2024 1.3 (H)     Est, Glom Filt Rate 03/26/2024 44 (A)     Calcium 03/26/2024 9.5    Orders Only on 03/19/2024   Component Date Value    Cholesterol, Total 03/19/2024 149     Triglycerides 03/19/2024 56     HDL 03/19/2024 62 (H)     LDL Calculated 03/19/2024 76     VLDL Cholesterol Calcula* 03/19/2024 11     Sodium 03/19/2024 141     Potassium 03/19/2024 4.6     Chloride 03/19/2024 103     CO2 03/19/2024 28     Anion Gap 03/19/2024 10     Glucose

## 2024-06-24 NOTE — PATIENT INSTRUCTIONS
-Continue same medications  -Limit carbohydrates to 45 grams with meals and 15 grams with snacks  -monitor blood sugars  -goal for blood sugar fasting or pre-meal  is   -goal for blood sugar 2 hours after a meal is less than 180  -goal for blood sugar at bedtime is less than 150  -Regular aerobic exercise

## 2024-06-27 NOTE — TELEPHONE ENCOUNTER
Medication:   Requested Prescriptions     Pending Prescriptions Disp Refills    verapamil (CALAN SR) 120 MG extended release tablet [Pharmacy Med Name: VERAPAMIL  MG TABLET] 90 tablet 1     Sig: TAKE 1 TABLET BY MOUTH EVERY DAY     Last Filled:  02/14/24    Last appt: 6/24/2024   Next appt: 9/24/2024    Last OARRS:        No data to display

## 2024-07-02 DIAGNOSIS — N18.32 STAGE 3B CHRONIC KIDNEY DISEASE (HCC): ICD-10-CM

## 2024-08-29 RX ORDER — LOSARTAN POTASSIUM 100 MG/1
100 TABLET ORAL DAILY
Qty: 90 TABLET | Refills: 0 | Status: SHIPPED | OUTPATIENT
Start: 2024-08-29

## 2024-08-29 NOTE — TELEPHONE ENCOUNTER
Medication:   Requested Prescriptions     Pending Prescriptions Disp Refills    losartan (COZAAR) 100 MG tablet 90 tablet 3     Sig: Take 1 tablet by mouth daily     Last Filled:  10.1.22    Last appt: 3.19.24  Next appt: 9/24/2024    Last OARRS:        No data to display

## 2024-09-02 DIAGNOSIS — K21.9 GASTROESOPHAGEAL REFLUX DISEASE, UNSPECIFIED WHETHER ESOPHAGITIS PRESENT: ICD-10-CM

## 2024-09-03 RX ORDER — OMEPRAZOLE 40 MG/1
40 CAPSULE, DELAYED RELEASE ORAL
Qty: 90 CAPSULE | Refills: 0 | Status: SHIPPED | OUTPATIENT
Start: 2024-09-03

## 2024-09-03 NOTE — TELEPHONE ENCOUNTER
Medication:   Requested Prescriptions     Pending Prescriptions Disp Refills    omeprazole (PRILOSEC) 40 MG delayed release capsule [Pharmacy Med Name: OMEPRAZOLE CAP 40MG] 90 capsule 1     Sig: TAKE 1 CAPSULE EVERY       MORNING, BEFORE BREAKFAST     Last Filled:  03/19/2024    Last appt: 6/24/2024   Next appt: 9/24/2024    Last OARRS:        No data to display

## 2024-09-08 RX ORDER — INSULIN GLARGINE 100 [IU]/ML
INJECTION, SOLUTION SUBCUTANEOUS
Qty: 30 ML | Refills: 0 | COMMUNITY
Start: 2024-09-08

## 2024-09-24 ENCOUNTER — OFFICE VISIT (OUTPATIENT)
Dept: PRIMARY CARE CLINIC | Age: 69
End: 2024-09-24
Payer: COMMERCIAL

## 2024-09-24 VITALS
SYSTOLIC BLOOD PRESSURE: 120 MMHG | DIASTOLIC BLOOD PRESSURE: 78 MMHG | HEART RATE: 83 BPM | BODY MASS INDEX: 36.38 KG/M2 | WEIGHT: 218.6 LBS | OXYGEN SATURATION: 98 %

## 2024-09-24 DIAGNOSIS — I10 ESSENTIAL HYPERTENSION: ICD-10-CM

## 2024-09-24 DIAGNOSIS — F41.9 ANXIETY: ICD-10-CM

## 2024-09-24 DIAGNOSIS — F32.A DEPRESSION, UNSPECIFIED DEPRESSION TYPE: ICD-10-CM

## 2024-09-24 DIAGNOSIS — K21.9 GASTROESOPHAGEAL REFLUX DISEASE, UNSPECIFIED WHETHER ESOPHAGITIS PRESENT: ICD-10-CM

## 2024-09-24 DIAGNOSIS — E11.9 TYPE 2 DIABETES MELLITUS WITHOUT COMPLICATION, WITH LONG-TERM CURRENT USE OF INSULIN (HCC): ICD-10-CM

## 2024-09-24 DIAGNOSIS — E11.9 TYPE 2 DIABETES MELLITUS WITHOUT COMPLICATION, WITH LONG-TERM CURRENT USE OF INSULIN (HCC): Primary | ICD-10-CM

## 2024-09-24 DIAGNOSIS — Z79.4 TYPE 2 DIABETES MELLITUS WITHOUT COMPLICATION, WITH LONG-TERM CURRENT USE OF INSULIN (HCC): Primary | ICD-10-CM

## 2024-09-24 DIAGNOSIS — E78.2 MIXED HYPERLIPIDEMIA: ICD-10-CM

## 2024-09-24 DIAGNOSIS — Z23 NEED FOR INFLUENZA VACCINATION: ICD-10-CM

## 2024-09-24 DIAGNOSIS — Z79.4 TYPE 2 DIABETES MELLITUS WITHOUT COMPLICATION, WITH LONG-TERM CURRENT USE OF INSULIN (HCC): ICD-10-CM

## 2024-09-24 LAB
ALBUMIN SERPL-MCNC: 3.8 G/DL (ref 3.4–5)
ALBUMIN/GLOB SERPL: 1.8 {RATIO} (ref 1.1–2.2)
ALP SERPL-CCNC: 60 U/L (ref 40–129)
ALT SERPL-CCNC: 18 U/L (ref 10–40)
ANION GAP SERPL CALCULATED.3IONS-SCNC: 10 MMOL/L (ref 3–16)
AST SERPL-CCNC: 25 U/L (ref 15–37)
BILIRUB SERPL-MCNC: 0.3 MG/DL (ref 0–1)
BUN SERPL-MCNC: 39 MG/DL (ref 7–20)
CALCIUM SERPL-MCNC: 9.4 MG/DL (ref 8.3–10.6)
CHLORIDE SERPL-SCNC: 101 MMOL/L (ref 99–110)
CHOLEST SERPL-MCNC: 159 MG/DL (ref 0–199)
CO2 SERPL-SCNC: 26 MMOL/L (ref 21–32)
CREAT SERPL-MCNC: 1.3 MG/DL (ref 0.6–1.2)
GFR SERPLBLD CREATININE-BSD FMLA CKD-EPI: 44 ML/MIN/{1.73_M2}
GLUCOSE SERPL-MCNC: 117 MG/DL (ref 70–99)
HBA1C MFR BLD: 7.5 %
HDLC SERPL-MCNC: 58 MG/DL (ref 40–60)
LDLC SERPL CALC-MCNC: 85 MG/DL
POTASSIUM SERPL-SCNC: 4.4 MMOL/L (ref 3.5–5.1)
PROT SERPL-MCNC: 5.9 G/DL (ref 6.4–8.2)
SODIUM SERPL-SCNC: 137 MMOL/L (ref 136–145)
TRIGL SERPL-MCNC: 80 MG/DL (ref 0–150)
VLDLC SERPL CALC-MCNC: 16 MG/DL

## 2024-09-24 PROCEDURE — 90653 IIV ADJUVANT VACCINE IM: CPT | Performed by: INTERNAL MEDICINE

## 2024-09-24 PROCEDURE — 1123F ACP DISCUSS/DSCN MKR DOCD: CPT | Performed by: INTERNAL MEDICINE

## 2024-09-24 PROCEDURE — 3051F HG A1C>EQUAL 7.0%<8.0%: CPT | Performed by: INTERNAL MEDICINE

## 2024-09-24 PROCEDURE — 3074F SYST BP LT 130 MM HG: CPT | Performed by: INTERNAL MEDICINE

## 2024-09-24 PROCEDURE — 83036 HEMOGLOBIN GLYCOSYLATED A1C: CPT | Performed by: INTERNAL MEDICINE

## 2024-09-24 PROCEDURE — G0008 ADMIN INFLUENZA VIRUS VAC: HCPCS | Performed by: INTERNAL MEDICINE

## 2024-09-24 PROCEDURE — 3078F DIAST BP <80 MM HG: CPT | Performed by: INTERNAL MEDICINE

## 2024-09-24 PROCEDURE — 99214 OFFICE O/P EST MOD 30 MIN: CPT | Performed by: INTERNAL MEDICINE

## 2024-09-24 RX ORDER — GLIMEPIRIDE 1 MG/1
1 TABLET ORAL
Qty: 30 TABLET | Refills: 3 | Status: SHIPPED | OUTPATIENT
Start: 2024-09-24

## 2024-09-27 DIAGNOSIS — E78.2 MIXED HYPERLIPIDEMIA: ICD-10-CM

## 2024-09-27 RX ORDER — ROSUVASTATIN CALCIUM 20 MG/1
20 TABLET, COATED ORAL NIGHTLY
Qty: 30 TABLET | Refills: 0 | Status: SHIPPED | OUTPATIENT
Start: 2024-09-27

## 2024-09-29 PROBLEM — Z23 NEED FOR INFLUENZA VACCINATION: Status: ACTIVE | Noted: 2024-09-29

## 2024-10-01 ENCOUNTER — OFFICE VISIT (OUTPATIENT)
Dept: PRIMARY CARE CLINIC | Age: 69
End: 2024-10-01
Payer: COMMERCIAL

## 2024-10-01 ENCOUNTER — TELEPHONE (OUTPATIENT)
Dept: PRIMARY CARE CLINIC | Age: 69
End: 2024-10-01

## 2024-10-01 VITALS
SYSTOLIC BLOOD PRESSURE: 120 MMHG | TEMPERATURE: 97.4 F | HEART RATE: 83 BPM | DIASTOLIC BLOOD PRESSURE: 84 MMHG | OXYGEN SATURATION: 98 % | WEIGHT: 215 LBS | BODY MASS INDEX: 35.78 KG/M2 | RESPIRATION RATE: 16 BRPM

## 2024-10-01 DIAGNOSIS — M79.10 MYALGIA: ICD-10-CM

## 2024-10-01 DIAGNOSIS — B34.9 VIRAL ILLNESS: Primary | ICD-10-CM

## 2024-10-01 LAB
Lab: NORMAL
QC PASS/FAIL: NORMAL
SARS-COV-2 RDRP RESP QL NAA+PROBE: NEGATIVE

## 2024-10-01 PROCEDURE — 3074F SYST BP LT 130 MM HG: CPT | Performed by: NURSE PRACTITIONER

## 2024-10-01 PROCEDURE — 1123F ACP DISCUSS/DSCN MKR DOCD: CPT | Performed by: NURSE PRACTITIONER

## 2024-10-01 PROCEDURE — 87635 SARS-COV-2 COVID-19 AMP PRB: CPT | Performed by: NURSE PRACTITIONER

## 2024-10-01 PROCEDURE — 3079F DIAST BP 80-89 MM HG: CPT | Performed by: NURSE PRACTITIONER

## 2024-10-01 PROCEDURE — 99213 OFFICE O/P EST LOW 20 MIN: CPT | Performed by: NURSE PRACTITIONER

## 2024-10-01 RX ORDER — FLUTICASONE PROPIONATE 50 MCG
1 SPRAY, SUSPENSION (ML) NASAL DAILY
Qty: 16 G | Refills: 0 | Status: SHIPPED | OUTPATIENT
Start: 2024-10-01

## 2024-10-01 RX ORDER — MECLIZINE HCL 12.5 MG 12.5 MG/1
12.5 TABLET ORAL 3 TIMES DAILY PRN
Qty: 30 TABLET | Refills: 0 | Status: SHIPPED | OUTPATIENT
Start: 2024-10-01 | End: 2024-10-11

## 2024-10-01 ASSESSMENT — ENCOUNTER SYMPTOMS
SINUS PAIN: 0
SORE THROAT: 0
VOICE CHANGE: 0
SINUS PRESSURE: 0
SHORTNESS OF BREATH: 0
RHINORRHEA: 1
COUGH: 0
ABDOMINAL PAIN: 0
NAUSEA: 0
VOMITING: 0
TROUBLE SWALLOWING: 0
CHEST TIGHTNESS: 0
WHEEZING: 0
DIARRHEA: 0

## 2024-10-01 NOTE — TELEPHONE ENCOUNTER
Patient states when she wakes up in the mornings, her head feels \"fuzzy\" and she hears swishing sounds, which is causing her to feel unbalanced. Patient would like to know if this is a concern she needs to be seen for or if there is any advice we can give. Please advise.

## 2024-10-01 NOTE — PROGRESS NOTES
ENCOUNTER DATE: 10/1/2024     NAME: Leanne Pretty   AGE: 68 y.o.   GENDER: female   YOB: 1955    Chief Complaint   Patient presents with    Other     Pt got the flu shot on 9/24/24 now has been feeling strange and getting chills        ASSESSMENT/PLAN:  1. Viral illness  Likely viral  Discussed symptomatic treatment  May take Tylenol as needed for body aches  Rapid COVID-negative in office  Increase fluids.  Rest  Trial of meclizine for intermittent dizziness.  ? Vertigo  Add Flonase   Call if no improvement or symptoms worsen    2. Myalgia  - POCT COVID-19 Rapid, NAAT      No follow-ups on file.     HPI:   Patient is here for a problem visit    Received flu vaccine on 9/24/24  Started feeling bad on Sunday.   \"Head was fuzzy with this movement on\". Feels off balance/dizzy at times. Hears intermittent swooshing.  No headaches  Symptoms worse when she stands up or moves her head a certain way.   Symptoms will last few seconds.  Also feels fatigued and generally weak.   No fevers. No congestion.   Has body aches and chills since Sunday.  Has been checking temperature.  Eyes have been puffy. No sinus pressure.   Some nasal drainage and is clear.  No cough  Had similar symptoms in the past     Tested negative for covid yesterday with home test.  Would like repeat test  Recently started on glimepiride on 9/24. No other med changes  Had recent labs  Glu running 100 in am and 220 in PM.   Drinking fluids.     ROS:  Review of Systems   Constitutional:  Positive for chills and fatigue. Negative for fever.   HENT:  Positive for rhinorrhea. Negative for congestion, ear pain, postnasal drip, sinus pressure, sinus pain, sore throat, trouble swallowing and voice change.    Respiratory:  Negative for cough, chest tightness, shortness of breath and wheezing.    Cardiovascular:  Negative for chest pain.   Gastrointestinal:  Negative for abdominal pain, diarrhea, nausea and vomiting.   Musculoskeletal:  Positive

## 2024-10-19 DIAGNOSIS — E11.9 TYPE 2 DIABETES MELLITUS WITHOUT COMPLICATION, WITH LONG-TERM CURRENT USE OF INSULIN (HCC): ICD-10-CM

## 2024-10-19 DIAGNOSIS — Z79.4 TYPE 2 DIABETES MELLITUS WITHOUT COMPLICATION, WITH LONG-TERM CURRENT USE OF INSULIN (HCC): ICD-10-CM

## 2024-10-20 DIAGNOSIS — E78.2 MIXED HYPERLIPIDEMIA: ICD-10-CM

## 2024-10-21 RX ORDER — ROSUVASTATIN CALCIUM 20 MG/1
20 TABLET, COATED ORAL NIGHTLY
Qty: 90 TABLET | Refills: 1 | Status: SHIPPED | OUTPATIENT
Start: 2024-10-21

## 2024-10-21 NOTE — TELEPHONE ENCOUNTER
Medication:   Requested Prescriptions     Pending Prescriptions Disp Refills    Insulin Pen Needle 32G X 6 MM MISC 100 each 3     Si Box by Does not apply route daily     Last Filled:  2024    Last appt: 10/1/2024   Next appt: 10/20/2024    Last OARRS:        No data to display

## 2024-10-21 NOTE — TELEPHONE ENCOUNTER
Medication:   Requested Prescriptions     Pending Prescriptions Disp Refills    rosuvastatin (CRESTOR) 20 MG tablet [Pharmacy Med Name: ROSUVASTATIN CALCIUM 20 MG TAB] 90 tablet 1     Sig: TAKE 1 TABLET BY MOUTH EVERY DAY AT NIGHT     Last Filled:  0/9/27/2024    Last appt: 10/1/2024   Next appt: 11/14/2024    Last Lipid:   Lab Results   Component Value Date/Time    CHOL 159 09/24/2024 08:29 AM    TRIG 80 09/24/2024 08:29 AM    HDL 58 09/24/2024 08:29 AM    HDL 47 05/11/2012 09:13 AM

## 2024-10-29 PROBLEM — Z23 NEED FOR INFLUENZA VACCINATION: Status: RESOLVED | Noted: 2024-09-29 | Resolved: 2024-10-29

## 2024-11-02 SDOH — HEALTH STABILITY: PHYSICAL HEALTH: ON AVERAGE, HOW MANY DAYS PER WEEK DO YOU ENGAGE IN MODERATE TO STRENUOUS EXERCISE (LIKE A BRISK WALK)?: 2 DAYS

## 2024-11-02 SDOH — HEALTH STABILITY: PHYSICAL HEALTH: ON AVERAGE, HOW MANY MINUTES DO YOU ENGAGE IN EXERCISE AT THIS LEVEL?: 10 MIN

## 2024-11-02 ASSESSMENT — PATIENT HEALTH QUESTIONNAIRE - PHQ9
SUM OF ALL RESPONSES TO PHQ QUESTIONS 1-9: 0
1. LITTLE INTEREST OR PLEASURE IN DOING THINGS: NOT AT ALL
2. FEELING DOWN, DEPRESSED OR HOPELESS: NOT AT ALL
SUM OF ALL RESPONSES TO PHQ9 QUESTIONS 1 & 2: 0

## 2024-11-02 ASSESSMENT — LIFESTYLE VARIABLES
HOW OFTEN DO YOU HAVE A DRINK CONTAINING ALCOHOL: 1
HOW OFTEN DO YOU HAVE SIX OR MORE DRINKS ON ONE OCCASION: 1
HOW MANY STANDARD DRINKS CONTAINING ALCOHOL DO YOU HAVE ON A TYPICAL DAY: 0
HOW OFTEN DO YOU HAVE A DRINK CONTAINING ALCOHOL: NEVER
HOW MANY STANDARD DRINKS CONTAINING ALCOHOL DO YOU HAVE ON A TYPICAL DAY: PATIENT DOES NOT DRINK

## 2024-11-14 ENCOUNTER — TELEMEDICINE (OUTPATIENT)
Dept: PRIMARY CARE CLINIC | Age: 69
End: 2024-11-14

## 2024-11-14 DIAGNOSIS — Z00.00 MEDICARE ANNUAL WELLNESS VISIT, SUBSEQUENT: Primary | ICD-10-CM

## 2024-11-14 RX ORDER — ASPIRIN 81 MG/1
81 TABLET ORAL DAILY
COMMUNITY

## 2024-11-14 NOTE — PROGRESS NOTES
Fe) MG tablet TAKE ONE TABLET BY MOUTH TWICE A DAY WITH MEALS  Patient taking differently: Take 1 tablet by mouth daily (with breakfast) Yes Maryjane Avery MD   acetaminophen (TYLENOL) 325 MG tablet Take 2 tablets by mouth every 6 hours as needed for Pain Yes Nehemias Schmitt MD   vitamin B-12 (CYANOCOBALAMIN) 500 MCG tablet Take 2 tablets by mouth daily Yes Maryjane Avery MD   Ascorbic Acid (VITAMIN C) 500 MG tablet Take 1 tablet by mouth daily Yes Nehemias Schmitt MD   Cholecalciferol (VITAMIN D PO) Take 5,000 Units by mouth daily  Yes Nehemias Schmitt MD   MULTIPLE VITAMIN PO Take 1 tablet by mouth daily  Yes Nehemias Schmitt MD       CareTeam (Including outside providers/suppliers regularly involved in providing care):   Patient Care Team:  Maryjane Avery MD as PCP - General  Maryjane Avery MD as PCP - Empaneled Provider  Dayton Martins MD as Obstetrician (Obstetrics & Gynecology)  Keanu Rosado MD as Consulting Physician (Gastroenterology)  Katheryn Morfin MD as Surgeon (General Surgery)      Reviewed and updated this visit:  Allergies  Meds       ICarly LPN, 11/14/2024, performed the documented evaluation under the direct supervision of the attending physician.  Leanne Pretty, was evaluated through a synchronous (real-time) audio-video encounter. The patient (or guardian if applicable) is aware that this is a billable service, which includes applicable co-pays. This Virtual Visit was conducted with patient's (and/or legal guardian's) consent. Patient identification was verified, and a caregiver was present when appropriate.   The patient was located at Home: [address] [place] [id number]  Provider was located at Facility (Appt Dept): [address] [place] [address]  [address] [place] [address]  Confirm you are appropriately licensed, registered, or certified to deliver care in the state where the patient is located as indicated above. If you are not or

## 2024-11-16 DIAGNOSIS — Z79.4 TYPE 2 DIABETES MELLITUS WITHOUT COMPLICATION, WITH LONG-TERM CURRENT USE OF INSULIN (HCC): ICD-10-CM

## 2024-11-16 DIAGNOSIS — E11.9 TYPE 2 DIABETES MELLITUS WITHOUT COMPLICATION, WITH LONG-TERM CURRENT USE OF INSULIN (HCC): ICD-10-CM

## 2024-11-18 RX ORDER — GLIMEPIRIDE 1 MG/1
1 TABLET ORAL
Qty: 90 TABLET | Refills: 1 | Status: SHIPPED | OUTPATIENT
Start: 2024-11-18

## 2024-11-18 RX ORDER — VERAPAMIL HYDROCHLORIDE 120 MG/1
120 TABLET, FILM COATED, EXTENDED RELEASE ORAL DAILY
Qty: 90 TABLET | Refills: 1 | Status: SHIPPED | OUTPATIENT
Start: 2024-11-18

## 2024-11-18 RX ORDER — LOSARTAN POTASSIUM 100 MG/1
100 TABLET ORAL DAILY
Qty: 90 TABLET | Refills: 1 | Status: SHIPPED | OUTPATIENT
Start: 2024-11-18

## 2024-11-18 NOTE — TELEPHONE ENCOUNTER
Medication:   Requested Prescriptions     Pending Prescriptions Disp Refills    verapamil (CALAN SR) 120 MG extended release tablet [Pharmacy Med Name: VERAPAMIL  MG TABLET] 90 tablet 1     Sig: TAKE 1 TABLET BY MOUTH EVERY DAY    glimepiride (AMARYL) 1 MG tablet [Pharmacy Med Name: GLIMEPIRIDE 1 MG TABLET] 90 tablet 1     Sig: TAKE 1 TABLET BY MOUTH EVERY DAY BEFORE BREAKFAST    losartan (COZAAR) 100 MG tablet [Pharmacy Med Name: LOSARTAN POTASSIUM 100 MG TAB] 90 tablet 0     Sig: TAKE 1 TABLET BY MOUTH EVERY DAY     Last Filled:  6/27/24 9/24/24 8/29/24    Last appt: 9/24/24  Next appt: 12/23/2024    Last OARRS:        No data to display

## 2024-11-25 RX ORDER — FLUTICASONE PROPIONATE 50 MCG
SPRAY, SUSPENSION (ML) NASAL
Qty: 48 G | Refills: 0 | Status: SHIPPED | OUTPATIENT
Start: 2024-11-25
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
## 2024-11-25 NOTE — TELEPHONE ENCOUNTER
Medication:   Requested Prescriptions     Pending Prescriptions Disp Refills    fluticasone (FLONASE) 50 MCG/ACT nasal spray [Pharmacy Med Name: FLUTICASONE PROP 50 MCG SPRAY]  1     Sig: SPRAY 1 SPRAY INTO EACH NOSTRIL EVERY DAY     Last Filled:  10.1.24    Last appt: 11/14/2024   Next appt: 12/23/2024    Last OARRS:        No data to display

## 2024-12-01 DIAGNOSIS — K21.9 GASTROESOPHAGEAL REFLUX DISEASE, UNSPECIFIED WHETHER ESOPHAGITIS PRESENT: ICD-10-CM

## 2024-12-02 DIAGNOSIS — Z79.4 TYPE 2 DIABETES MELLITUS WITHOUT COMPLICATION, WITH LONG-TERM CURRENT USE OF INSULIN (HCC): ICD-10-CM

## 2024-12-02 DIAGNOSIS — E11.9 TYPE 2 DIABETES MELLITUS WITHOUT COMPLICATION, WITH LONG-TERM CURRENT USE OF INSULIN (HCC): ICD-10-CM

## 2024-12-02 RX ORDER — OMEPRAZOLE 40 MG/1
40 CAPSULE, DELAYED RELEASE ORAL
Qty: 90 CAPSULE | Refills: 0 | Status: SHIPPED | OUTPATIENT
Start: 2024-12-02

## 2024-12-02 NOTE — TELEPHONE ENCOUNTER
Medication:   Requested Prescriptions     Pending Prescriptions Disp Refills    Alcohol Swabs (DROPSAFE ALCOHOL PREP) 70 % PADS 100 each 3    dapagliflozin (FARXIGA) 10 MG tablet 90 tablet 3     Sig: Take 1 tablet by mouth every morning     Last Filled:  1/3/23 1/9/24     Last appt: 11/14/2024   Next appt: 12/23/2024    Last OARRS:        No data to display              Farxiga script needs faxed to [phone]

## 2024-12-02 NOTE — TELEPHONE ENCOUNTER
Medication:   Requested Prescriptions     Pending Prescriptions Disp Refills    omeprazole (PRILOSEC) 40 MG delayed release capsule [Pharmacy Med Name: OMEPRAZOLE CAP 40MG] 90 capsule 0     Sig: TAKE 1 CAPSULE EVERY       MORNING, BEFORE BREAKFAST     Last Filled:  9/3/2024    Last appt: 11/14/2024   Next appt: 12/23/2024    Last OARRS:        No data to display

## 2024-12-03 ENCOUNTER — OFFICE VISIT (OUTPATIENT)
Dept: CARDIOLOGY CLINIC | Age: 69
End: 2024-12-03
Payer: COMMERCIAL

## 2024-12-03 VITALS
HEART RATE: 84 BPM | OXYGEN SATURATION: 94 % | SYSTOLIC BLOOD PRESSURE: 118 MMHG | DIASTOLIC BLOOD PRESSURE: 72 MMHG | WEIGHT: 223 LBS | BODY MASS INDEX: 37.11 KG/M2

## 2024-12-03 DIAGNOSIS — Q23.81 AORTIC STENOSIS DUE TO BICUSPID AORTIC VALVE: ICD-10-CM

## 2024-12-03 DIAGNOSIS — I10 ESSENTIAL HYPERTENSION: Primary | ICD-10-CM

## 2024-12-03 DIAGNOSIS — Q23.0 AORTIC STENOSIS DUE TO BICUSPID AORTIC VALVE: ICD-10-CM

## 2024-12-03 DIAGNOSIS — E78.5 HYPERLIPIDEMIA, UNSPECIFIED HYPERLIPIDEMIA TYPE: ICD-10-CM

## 2024-12-03 DIAGNOSIS — I71.21 ANEURYSM OF ASCENDING AORTA WITHOUT RUPTURE (HCC): ICD-10-CM

## 2024-12-03 PROCEDURE — 3074F SYST BP LT 130 MM HG: CPT | Performed by: INTERNAL MEDICINE

## 2024-12-03 PROCEDURE — 1159F MED LIST DOCD IN RCRD: CPT | Performed by: INTERNAL MEDICINE

## 2024-12-03 PROCEDURE — 1123F ACP DISCUSS/DSCN MKR DOCD: CPT | Performed by: INTERNAL MEDICINE

## 2024-12-03 PROCEDURE — 99214 OFFICE O/P EST MOD 30 MIN: CPT | Performed by: INTERNAL MEDICINE

## 2024-12-03 PROCEDURE — 3078F DIAST BP <80 MM HG: CPT | Performed by: INTERNAL MEDICINE

## 2024-12-03 RX ORDER — ISOPROPYL ALCOHOL 70 ML/100ML
SWAB TOPICAL
Qty: 100 EACH | Refills: 3 | Status: SHIPPED | OUTPATIENT
Start: 2024-12-03

## 2024-12-03 RX ORDER — DAPAGLIFLOZIN 10 MG/1
10 TABLET, FILM COATED ORAL EVERY MORNING
Qty: 90 TABLET | Refills: 3 | Status: SHIPPED | OUTPATIENT
Start: 2024-12-03

## 2024-12-23 ENCOUNTER — PATIENT MESSAGE (OUTPATIENT)
Dept: PRIMARY CARE CLINIC | Age: 69
End: 2024-12-23

## 2024-12-23 NOTE — TELEPHONE ENCOUNTER
Spoke to Tamar at [employer], has shipment in process, currently. Due to Holiday, shipments are delayed at this time

## 2024-12-30 ENCOUNTER — PATIENT MESSAGE (OUTPATIENT)
Dept: PRIMARY CARE CLINIC | Age: 69
End: 2024-12-30

## 2024-12-30 NOTE — TELEPHONE ENCOUNTER
Medication:   Requested Prescriptions     Pending Prescriptions Disp Refills    SEMGLEE, YFGN, 100 UNIT/ML SOPN [Pharmacy Med Name: SEMGLEE (YFGN) 100 UNIT/ML PEN]       Sig: INJECT 28 UNITS SUBCUTANEOUSLY NIGHTLY     Last Filled:  04/18/2024    Last appt: 11/14/2024   Next appt: 1/30/2025    Last OARRS:        No data to display

## 2024-12-31 RX ORDER — INSULIN GLARGINE-YFGN 100 [IU]/ML
INJECTION, SOLUTION SUBCUTANEOUS
OUTPATIENT
Start: 2024-12-31

## 2025-01-27 SDOH — ECONOMIC STABILITY: INCOME INSECURITY: IN THE LAST 12 MONTHS, WAS THERE A TIME WHEN YOU WERE NOT ABLE TO PAY THE MORTGAGE OR RENT ON TIME?: NO

## 2025-01-27 SDOH — ECONOMIC STABILITY: FOOD INSECURITY: WITHIN THE PAST 12 MONTHS, THE FOOD YOU BOUGHT JUST DIDN'T LAST AND YOU DIDN'T HAVE MONEY TO GET MORE.: NEVER TRUE

## 2025-01-27 SDOH — ECONOMIC STABILITY: FOOD INSECURITY: WITHIN THE PAST 12 MONTHS, YOU WORRIED THAT YOUR FOOD WOULD RUN OUT BEFORE YOU GOT MONEY TO BUY MORE.: NEVER TRUE

## 2025-01-27 ASSESSMENT — PATIENT HEALTH QUESTIONNAIRE - PHQ9
8. MOVING OR SPEAKING SO SLOWLY THAT OTHER PEOPLE COULD HAVE NOTICED. OR THE OPPOSITE - BEING SO FIDGETY OR RESTLESS THAT YOU HAVE BEEN MOVING AROUND A LOT MORE THAN USUAL: NOT AT ALL
1. LITTLE INTEREST OR PLEASURE IN DOING THINGS: SEVERAL DAYS
4. FEELING TIRED OR HAVING LITTLE ENERGY: SEVERAL DAYS
10. IF YOU CHECKED OFF ANY PROBLEMS, HOW DIFFICULT HAVE THESE PROBLEMS MADE IT FOR YOU TO DO YOUR WORK, TAKE CARE OF THINGS AT HOME, OR GET ALONG WITH OTHER PEOPLE: NOT DIFFICULT AT ALL
SUM OF ALL RESPONSES TO PHQ QUESTIONS 1-9: 4
SUM OF ALL RESPONSES TO PHQ QUESTIONS 1-9: 4
10. IF YOU CHECKED OFF ANY PROBLEMS, HOW DIFFICULT HAVE THESE PROBLEMS MADE IT FOR YOU TO DO YOUR WORK, TAKE CARE OF THINGS AT HOME, OR GET ALONG WITH OTHER PEOPLE: NOT DIFFICULT AT ALL
9. THOUGHTS THAT YOU WOULD BE BETTER OFF DEAD, OR OF HURTING YOURSELF: NOT AT ALL
4. FEELING TIRED OR HAVING LITTLE ENERGY: SEVERAL DAYS
SUM OF ALL RESPONSES TO PHQ QUESTIONS 1-9: 4
1. LITTLE INTEREST OR PLEASURE IN DOING THINGS: SEVERAL DAYS
7. TROUBLE CONCENTRATING ON THINGS, SUCH AS READING THE NEWSPAPER OR WATCHING TELEVISION: NOT AT ALL
SUM OF ALL RESPONSES TO PHQ9 QUESTIONS 1 & 2: 1
5. POOR APPETITE OR OVEREATING: SEVERAL DAYS
3. TROUBLE FALLING OR STAYING ASLEEP: SEVERAL DAYS
SUM OF ALL RESPONSES TO PHQ QUESTIONS 1-9: 4
9. THOUGHTS THAT YOU WOULD BE BETTER OFF DEAD, OR OF HURTING YOURSELF: NOT AT ALL
8. MOVING OR SPEAKING SO SLOWLY THAT OTHER PEOPLE COULD HAVE NOTICED. OR THE OPPOSITE, BEING SO FIGETY OR RESTLESS THAT YOU HAVE BEEN MOVING AROUND A LOT MORE THAN USUAL: NOT AT ALL
2. FEELING DOWN, DEPRESSED OR HOPELESS: NOT AT ALL
6. FEELING BAD ABOUT YOURSELF - OR THAT YOU ARE A FAILURE OR HAVE LET YOURSELF OR YOUR FAMILY DOWN: NOT AT ALL
2. FEELING DOWN, DEPRESSED OR HOPELESS: NOT AT ALL
7. TROUBLE CONCENTRATING ON THINGS, SUCH AS READING THE NEWSPAPER OR WATCHING TELEVISION: NOT AT ALL
6. FEELING BAD ABOUT YOURSELF - OR THAT YOU ARE A FAILURE OR HAVE LET YOURSELF OR YOUR FAMILY DOWN: NOT AT ALL
3. TROUBLE FALLING OR STAYING ASLEEP: SEVERAL DAYS
SUM OF ALL RESPONSES TO PHQ QUESTIONS 1-9: 4
5. POOR APPETITE OR OVEREATING: SEVERAL DAYS

## 2025-01-30 ENCOUNTER — OFFICE VISIT (OUTPATIENT)
Dept: PRIMARY CARE CLINIC | Age: 70
End: 2025-01-30
Payer: MEDICARE

## 2025-01-30 VITALS
HEIGHT: 65 IN | TEMPERATURE: 97.8 F | BODY MASS INDEX: 37.32 KG/M2 | RESPIRATION RATE: 16 BRPM | SYSTOLIC BLOOD PRESSURE: 128 MMHG | DIASTOLIC BLOOD PRESSURE: 76 MMHG | WEIGHT: 224 LBS | HEART RATE: 82 BPM | OXYGEN SATURATION: 96 %

## 2025-01-30 DIAGNOSIS — E66.812 CLASS 2 SEVERE OBESITY DUE TO EXCESS CALORIES WITH SERIOUS COMORBIDITY AND BODY MASS INDEX (BMI) OF 37.0 TO 37.9 IN ADULT: ICD-10-CM

## 2025-01-30 DIAGNOSIS — E78.2 MIXED HYPERLIPIDEMIA: ICD-10-CM

## 2025-01-30 DIAGNOSIS — N18.32 STAGE 3B CHRONIC KIDNEY DISEASE (HCC): ICD-10-CM

## 2025-01-30 DIAGNOSIS — I10 ESSENTIAL HYPERTENSION: ICD-10-CM

## 2025-01-30 DIAGNOSIS — K21.9 GASTROESOPHAGEAL REFLUX DISEASE, UNSPECIFIED WHETHER ESOPHAGITIS PRESENT: ICD-10-CM

## 2025-01-30 DIAGNOSIS — Z79.4 TYPE 2 DIABETES MELLITUS WITHOUT COMPLICATION, WITH LONG-TERM CURRENT USE OF INSULIN (HCC): ICD-10-CM

## 2025-01-30 DIAGNOSIS — E11.9 TYPE 2 DIABETES MELLITUS WITHOUT COMPLICATION, WITH LONG-TERM CURRENT USE OF INSULIN (HCC): Primary | ICD-10-CM

## 2025-01-30 DIAGNOSIS — E11.9 TYPE 2 DIABETES MELLITUS WITHOUT COMPLICATION, WITH LONG-TERM CURRENT USE OF INSULIN (HCC): ICD-10-CM

## 2025-01-30 DIAGNOSIS — Z79.4 TYPE 2 DIABETES MELLITUS WITHOUT COMPLICATION, WITH LONG-TERM CURRENT USE OF INSULIN (HCC): Primary | ICD-10-CM

## 2025-01-30 DIAGNOSIS — E66.01 CLASS 2 SEVERE OBESITY DUE TO EXCESS CALORIES WITH SERIOUS COMORBIDITY AND BODY MASS INDEX (BMI) OF 37.0 TO 37.9 IN ADULT: ICD-10-CM

## 2025-01-30 LAB — HBA1C MFR BLD: 7.9 %

## 2025-01-30 PROCEDURE — 1160F RVW MEDS BY RX/DR IN RCRD: CPT | Performed by: INTERNAL MEDICINE

## 2025-01-30 PROCEDURE — G8417 CALC BMI ABV UP PARAM F/U: HCPCS | Performed by: INTERNAL MEDICINE

## 2025-01-30 PROCEDURE — G8399 PT W/DXA RESULTS DOCUMENT: HCPCS | Performed by: INTERNAL MEDICINE

## 2025-01-30 PROCEDURE — 99214 OFFICE O/P EST MOD 30 MIN: CPT | Performed by: INTERNAL MEDICINE

## 2025-01-30 PROCEDURE — 3017F COLORECTAL CA SCREEN DOC REV: CPT | Performed by: INTERNAL MEDICINE

## 2025-01-30 PROCEDURE — 1090F PRES/ABSN URINE INCON ASSESS: CPT | Performed by: INTERNAL MEDICINE

## 2025-01-30 PROCEDURE — G9899 SCRN MAM PERF RSLTS DOC: HCPCS | Performed by: INTERNAL MEDICINE

## 2025-01-30 PROCEDURE — 3051F HG A1C>EQUAL 7.0%<8.0%: CPT | Performed by: INTERNAL MEDICINE

## 2025-01-30 PROCEDURE — G8427 DOCREV CUR MEDS BY ELIG CLIN: HCPCS | Performed by: INTERNAL MEDICINE

## 2025-01-30 PROCEDURE — 3078F DIAST BP <80 MM HG: CPT | Performed by: INTERNAL MEDICINE

## 2025-01-30 PROCEDURE — 2022F DILAT RTA XM EVC RTNOPTHY: CPT | Performed by: INTERNAL MEDICINE

## 2025-01-30 PROCEDURE — 3074F SYST BP LT 130 MM HG: CPT | Performed by: INTERNAL MEDICINE

## 2025-01-30 PROCEDURE — G2211 COMPLEX E/M VISIT ADD ON: HCPCS | Performed by: INTERNAL MEDICINE

## 2025-01-30 PROCEDURE — 83036 HEMOGLOBIN GLYCOSYLATED A1C: CPT | Performed by: INTERNAL MEDICINE

## 2025-01-30 PROCEDURE — 1036F TOBACCO NON-USER: CPT | Performed by: INTERNAL MEDICINE

## 2025-01-30 PROCEDURE — 1159F MED LIST DOCD IN RCRD: CPT | Performed by: INTERNAL MEDICINE

## 2025-01-30 PROCEDURE — 1123F ACP DISCUSS/DSCN MKR DOCD: CPT | Performed by: INTERNAL MEDICINE

## 2025-01-30 RX ORDER — OMEPRAZOLE 40 MG/1
40 CAPSULE, DELAYED RELEASE ORAL
Qty: 90 CAPSULE | Refills: 1 | Status: SHIPPED | OUTPATIENT
Start: 2025-01-30

## 2025-01-30 ASSESSMENT — ENCOUNTER SYMPTOMS
SINUS PRESSURE: 0
WHEEZING: 0
TROUBLE SWALLOWING: 0
VOMITING: 0
BLOOD IN STOOL: 0
DIARRHEA: 0
NAUSEA: 0
ABDOMINAL PAIN: 0
COUGH: 0
CONSTIPATION: 0
SORE THROAT: 0
CHEST TIGHTNESS: 0
RHINORRHEA: 0
SHORTNESS OF BREATH: 0

## 2025-01-30 NOTE — PROGRESS NOTES
Date of Visit: 2025    Leanne Pretty (:  1955) is a 69 y.o. female,  Established patient here for evaluation of the following chief complaint(s):  Diabetes and Hypertension      ASSESSMENT/PLAN:    Assessment & Plan  1. Type 2 diabetes mellitus  - Hemoglobin A1c: 7.9%, indicating uncontrolled diabetes  - Reduce Lantus insulin to 26 units nightly due to frequent hypoglycemic episodes  - Continue Farxiga 10 mg once daily  - Continue glimepiride 1 mg with breakfast  - Limit carbohydrate intake to 45 g with meals and 15 g with snacks  - Continue blood glucose monitoring using continuous glucose monitor  - Regular aerobic exercise recommended  - Referrals to endocrinology and diabetes education for a dietitian  - Diabetic foot exam to be performed today    2. Hypertension  - Hypertension is stable  - Continue losartan 100 mg once daily  - Continue verapamil  mg once daily  - Continue HCTZ 37.5 mg once daily  - Low sodium diet and regular aerobic exercise recommended    3. Mixed hyperlipidemia  - Mixed hyperlipidemia is stable  - LDL is at goal  - Continue rosuvastatin 20 mg nightly  - Low fat, low cholesterol diet and regular aerobic exercise recommended  - Comprehensive metabolic panel and lipid panel to be checked    4. Gastroesophageal reflux disease  - GERD is stable  - Continue omeprazole 40 mg once daily before breakfast  - Decrease caffeine intake  - Avoid spicy foods and tomato-based foods  - Small meals recommended instead of large ones  - Wait 2 to 3 hours after eating before lying down    5. Obesity  - BMI: 37.28, indicating class 2 obesity  - Referral to weight management made    6. Stage 3b chronic kidney disease  - Chronic kidney disease is stable  - Avoid NSAIDs  - Continue care as per nephrology    Follow-up  - Patient to follow up in 3 months for diabetes        SUBJECTIVE:    History of Present Illness  The patient is a 69-year-old female who presents for follow-up of

## 2025-01-31 LAB
ALBUMIN SERPL-MCNC: 4.1 G/DL (ref 3.4–5)
ALBUMIN/GLOB SERPL: 1.8 {RATIO} (ref 1.1–2.2)
ALP SERPL-CCNC: 60 U/L (ref 40–129)
ALT SERPL-CCNC: 19 U/L (ref 10–40)
ANION GAP SERPL CALCULATED.3IONS-SCNC: 9 MMOL/L (ref 3–16)
AST SERPL-CCNC: 24 U/L (ref 15–37)
BILIRUB SERPL-MCNC: <0.2 MG/DL (ref 0–1)
BUN SERPL-MCNC: 30 MG/DL (ref 7–20)
CALCIUM SERPL-MCNC: 9.4 MG/DL (ref 8.3–10.6)
CHLORIDE SERPL-SCNC: 107 MMOL/L (ref 99–110)
CHOLEST SERPL-MCNC: 165 MG/DL (ref 0–199)
CO2 SERPL-SCNC: 28 MMOL/L (ref 21–32)
CREAT SERPL-MCNC: 1.4 MG/DL (ref 0.6–1.2)
GFR SERPLBLD CREATININE-BSD FMLA CKD-EPI: 40 ML/MIN/{1.73_M2}
GLUCOSE SERPL-MCNC: 113 MG/DL (ref 70–99)
HDLC SERPL-MCNC: 60 MG/DL (ref 40–60)
LDLC SERPL CALC-MCNC: 89 MG/DL
POTASSIUM SERPL-SCNC: 4.7 MMOL/L (ref 3.5–5.1)
PROT SERPL-MCNC: 6.4 G/DL (ref 6.4–8.2)
SODIUM SERPL-SCNC: 144 MMOL/L (ref 136–145)
TRIGL SERPL-MCNC: 80 MG/DL (ref 0–150)
VLDLC SERPL CALC-MCNC: 16 MG/DL

## 2025-02-05 ENCOUNTER — OFFICE VISIT (OUTPATIENT)
Dept: ENDOCRINOLOGY | Age: 70
End: 2025-02-05
Payer: MEDICARE

## 2025-02-05 ENCOUNTER — TELEPHONE (OUTPATIENT)
Dept: PRIMARY CARE CLINIC | Age: 70
End: 2025-02-05

## 2025-02-05 DIAGNOSIS — E11.9 TYPE 2 DIABETES MELLITUS WITHOUT COMPLICATION, WITH LONG-TERM CURRENT USE OF INSULIN (HCC): Primary | ICD-10-CM

## 2025-02-05 DIAGNOSIS — Z79.4 TYPE 2 DIABETES MELLITUS WITHOUT COMPLICATION, WITH LONG-TERM CURRENT USE OF INSULIN (HCC): Primary | ICD-10-CM

## 2025-02-05 PROCEDURE — 97802 MEDICAL NUTRITION INDIV IN: CPT

## 2025-02-05 PROCEDURE — 3051F HG A1C>EQUAL 7.0%<8.0%: CPT

## 2025-02-05 NOTE — PROGRESS NOTES
B-12 (CYANOCOBALAMIN) 500 MCG tablet Take 2 tablets by mouth daily 60 tablet 3    Ascorbic Acid (VITAMIN C) 500 MG tablet Take 1 tablet by mouth daily      Cholecalciferol (VITAMIN D PO) Take 5,000 Units by mouth daily       MULTIPLE VITAMIN PO Take 1 tablet by mouth daily        No current facility-administered medications for this visit.       NUTRITION ASSESSMENT    Biochemical Data:  Reference range:  Normal <5.7%  Prediabetes: 5.7 - 6.4%  Diabetes: >6.4%  Glycemic control for adults with diabetes: <7.0 %     Lab Results   Component Value Date    LABA1C 7.9 01/30/2025     Lab Results   Component Value Date    .5 03/08/2021       Lab Results   Component Value Date    CHOL 165 01/30/2025    CHOL 159 09/24/2024    CHOL 149 03/19/2024     Lab Results   Component Value Date    TRIG 80 01/30/2025    TRIG 80 09/24/2024    TRIG 56 03/19/2024     Lab Results   Component Value Date    HDL 60 01/30/2025    HDL 58 09/24/2024    HDL 62 (H) 03/19/2024     No components found for: \"LDLCALC\", \"LDLCHOLESTEROL\"  Lab Results   Component Value Date    VLDL 16 01/30/2025    VLDL 16 09/24/2024    VLDL 11 03/19/2024     No results found for: \"CHOLHDLRATIO\"    Lab Results   Component Value Date    WBC 6.2 09/24/2024    HGB 12.7 09/24/2024    HCT 39.3 09/24/2024    MCV 89.5 09/24/2024     09/24/2024       Lab Results   Component Value Date    CREATININE 1.4 (H) 01/30/2025    BUN 30 (H) 01/30/2025     01/30/2025    K 4.7 01/30/2025     01/30/2025    CO2 28 01/30/2025       Anthropometric Measurements:  Wt:   Wt Readings from Last 3 Encounters:   01/30/25 101.6 kg (224 lb)   12/03/24 101.2 kg (223 lb)   12/03/24 98.9 kg (218 lb)      BMI:   BMI Readings from Last 3 Encounters:   01/30/25 37.28 kg/m²   12/03/24 37.11 kg/m²   12/03/24 36.28 kg/m²   7% Weight loss goal weight: 15#    Food and Nutrition History:   Nutrition Awareness/Previous DSMES: no  Frequency of Meals Eaten away from home:1-3x weekly    Food

## 2025-02-05 NOTE — TELEPHONE ENCOUNTER
Call patient. I received a notice from [employer] that she is eligible for assistance to receive Lantus insulin through 12/31/25 at no cost. The shipment will be shipped directly to our office and we will call her to pick it up when it arrives.

## 2025-03-24 PROBLEM — N18.30 STAGE 3 CHRONIC KIDNEY DISEASE (HCC): Status: ACTIVE | Noted: 2025-03-24

## 2025-03-24 PROBLEM — E11.40 POORLY CONTROLLED TYPE 2 DIABETES MELLITUS WITH NEUROPATHY (HCC): Status: ACTIVE | Noted: 2025-03-24

## 2025-03-24 PROBLEM — E11.65 POORLY CONTROLLED TYPE 2 DIABETES MELLITUS WITH NEUROPATHY (HCC): Status: ACTIVE | Noted: 2025-03-24

## 2025-03-24 PROBLEM — I10 PRIMARY HYPERTENSION: Status: ACTIVE | Noted: 2025-03-24

## 2025-03-25 ENCOUNTER — OFFICE VISIT (OUTPATIENT)
Dept: ENDOCRINOLOGY | Age: 70
End: 2025-03-25
Payer: MEDICARE

## 2025-03-25 VITALS
HEART RATE: 71 BPM | WEIGHT: 227 LBS | DIASTOLIC BLOOD PRESSURE: 80 MMHG | OXYGEN SATURATION: 100 % | BODY MASS INDEX: 37.82 KG/M2 | TEMPERATURE: 98 F | RESPIRATION RATE: 14 BRPM | SYSTOLIC BLOOD PRESSURE: 129 MMHG | HEIGHT: 65 IN

## 2025-03-25 DIAGNOSIS — E11.65 POORLY CONTROLLED TYPE 2 DIABETES MELLITUS WITH NEUROPATHY (HCC): Primary | ICD-10-CM

## 2025-03-25 DIAGNOSIS — E78.2 MIXED HYPERLIPIDEMIA: ICD-10-CM

## 2025-03-25 DIAGNOSIS — E11.40 POORLY CONTROLLED TYPE 2 DIABETES MELLITUS WITH NEUROPATHY (HCC): Primary | ICD-10-CM

## 2025-03-25 DIAGNOSIS — E11.65 POORLY CONTROLLED TYPE 2 DIABETES MELLITUS WITH NEUROPATHY (HCC): ICD-10-CM

## 2025-03-25 DIAGNOSIS — E66.812 CLASS 2 SEVERE OBESITY WITH SERIOUS COMORBIDITY AND BODY MASS INDEX (BMI) OF 37.0 TO 37.9 IN ADULT, UNSPECIFIED OBESITY TYPE: ICD-10-CM

## 2025-03-25 DIAGNOSIS — I10 PRIMARY HYPERTENSION: ICD-10-CM

## 2025-03-25 DIAGNOSIS — E66.01 CLASS 2 SEVERE OBESITY WITH SERIOUS COMORBIDITY AND BODY MASS INDEX (BMI) OF 37.0 TO 37.9 IN ADULT, UNSPECIFIED OBESITY TYPE: ICD-10-CM

## 2025-03-25 DIAGNOSIS — N18.30 STAGE 3 CHRONIC KIDNEY DISEASE, UNSPECIFIED WHETHER STAGE 3A OR 3B CKD (HCC): ICD-10-CM

## 2025-03-25 DIAGNOSIS — E11.40 POORLY CONTROLLED TYPE 2 DIABETES MELLITUS WITH NEUROPATHY (HCC): ICD-10-CM

## 2025-03-25 LAB
ALBUMIN SERPL-MCNC: 4 G/DL (ref 3.4–5)
ALBUMIN/GLOB SERPL: 1.7 {RATIO} (ref 1.1–2.2)
ALP SERPL-CCNC: 61 U/L (ref 40–129)
ALT SERPL-CCNC: 16 U/L (ref 10–40)
ANION GAP SERPL CALCULATED.3IONS-SCNC: 8 MMOL/L (ref 3–16)
AST SERPL-CCNC: 23 U/L (ref 15–37)
BILIRUB SERPL-MCNC: 0.4 MG/DL (ref 0–1)
BUN SERPL-MCNC: 33 MG/DL (ref 7–20)
CALCIUM SERPL-MCNC: 9.2 MG/DL (ref 8.3–10.6)
CHLORIDE SERPL-SCNC: 103 MMOL/L (ref 99–110)
CO2 SERPL-SCNC: 28 MMOL/L (ref 21–32)
CREAT SERPL-MCNC: 1.3 MG/DL (ref 0.6–1.2)
GFR SERPLBLD CREATININE-BSD FMLA CKD-EPI: 44 ML/MIN/{1.73_M2}
GLUCOSE SERPL-MCNC: 146 MG/DL (ref 70–99)
POTASSIUM SERPL-SCNC: 4.4 MMOL/L (ref 3.5–5.1)
PROT SERPL-MCNC: 6.3 G/DL (ref 6.4–8.2)
SODIUM SERPL-SCNC: 139 MMOL/L (ref 136–145)

## 2025-03-25 PROCEDURE — 3051F HG A1C>EQUAL 7.0%<8.0%: CPT | Performed by: INTERNAL MEDICINE

## 2025-03-25 PROCEDURE — 1123F ACP DISCUSS/DSCN MKR DOCD: CPT | Performed by: INTERNAL MEDICINE

## 2025-03-25 PROCEDURE — 3079F DIAST BP 80-89 MM HG: CPT | Performed by: INTERNAL MEDICINE

## 2025-03-25 PROCEDURE — 1160F RVW MEDS BY RX/DR IN RCRD: CPT | Performed by: INTERNAL MEDICINE

## 2025-03-25 PROCEDURE — 1159F MED LIST DOCD IN RCRD: CPT | Performed by: INTERNAL MEDICINE

## 2025-03-25 PROCEDURE — 3074F SYST BP LT 130 MM HG: CPT | Performed by: INTERNAL MEDICINE

## 2025-03-25 PROCEDURE — 99205 OFFICE O/P NEW HI 60 MIN: CPT | Performed by: INTERNAL MEDICINE

## 2025-03-25 RX ORDER — METFORMIN HYDROCHLORIDE 500 MG/1
500 TABLET, EXTENDED RELEASE ORAL
Qty: 60 TABLET | Refills: 3 | Status: SHIPPED | OUTPATIENT
Start: 2025-03-25

## 2025-03-25 RX ORDER — INSULIN GLARGINE 100 [IU]/ML
INJECTION, SOLUTION SUBCUTANEOUS
Qty: 30 ML | Refills: 0
Start: 2025-03-25

## 2025-03-25 RX ORDER — VIT C/B6/B5/MAGNESIUM/HERB 173 50-5-6-5MG
500 CAPSULE ORAL DAILY
COMMUNITY

## 2025-03-25 NOTE — PROGRESS NOTES
7.5    Basal pattern review: Basal normoglycemia and hyperglycemia  Postprandial pattern review: Postprandial hyperglycemia  Hypoglycemia review: Morning hypoglycemia  Activity related review: Not recorded      Based on the data, I recommend:    1.  Healthy food choices, portion control    2.  Hypoglycemia management and prevention    3.  Continue Lantus adjustments, consider to decrease if indicated.  Patient stated that not always CGM reading is the same as fingerstick reading, usually fingerstick reading is higher than CGM reading.    4.  Continue Farxiga 10 mg daily.    5.  Will try Mounjaro or Trulicity, depending on tolerability and side effects.  Discontinue if side effects.    6.  Start metformin  mg twice a day, dose was adjusted based on her GFR.  Discontinue if side effects.      Total time I spent for this encounter gathering history, performing physical exam, coordinating care, counseling, and documenting in the chart -60 minutes    The patient (or guardian, if applicable) and other individuals in attendance with the patient were advised that Artificial Intelligence will be utilized during this visit to record, process the conversation to generate a clinical note, and support improvement of the AI technology. The patient (or guardian, if applicable) and other individuals in attendance at the appointment consented to the use of AI, including the recording.      Return in about 1 month (around 4/25/2025) for diabetes.    Electronically signed by July Avery MD on 3/25/2025 at 9:10 PM

## 2025-03-26 ENCOUNTER — TELEPHONE (OUTPATIENT)
Dept: ENDOCRINOLOGY | Age: 70
End: 2025-03-26

## 2025-03-27 LAB — C PEPTIDE SERPL-MCNC: 2.6 NG/ML (ref 1.1–4.4)

## 2025-03-27 NOTE — TELEPHONE ENCOUNTER
Fax scanned in media is for a level 1 appeal for a tier exception. [place] dept does not handle appeals not initiated by PA dept as well as tier exceptions. This will have to be handled at the clinic level. Thanks!    If this requires a response please respond to the pool ( P MHCX PSC MEDICATION PRE-AUTH).      Thank you please advise patient.

## 2025-03-31 ENCOUNTER — TELEPHONE (OUTPATIENT)
Dept: ENDOCRINOLOGY | Age: 70
End: 2025-03-31

## 2025-03-31 NOTE — TELEPHONE ENCOUNTER
Redetermination notice of denial of Medicare prescription drug coverage for Mounjaro 2.5 mg.     PA  received and the information provided in support of your patients appeal request to obtain Mounjaro 2.5 mg under Medicare RX drug plan. As we informed your patient, we are unable to approve this request.    Letter scanned

## 2025-04-02 ENCOUNTER — PATIENT MESSAGE (OUTPATIENT)
Dept: ENDOCRINOLOGY | Age: 70
End: 2025-04-02

## 2025-04-02 DIAGNOSIS — E11.65 POORLY CONTROLLED TYPE 2 DIABETES MELLITUS WITH NEUROPATHY (HCC): Primary | ICD-10-CM

## 2025-04-02 DIAGNOSIS — E11.40 POORLY CONTROLLED TYPE 2 DIABETES MELLITUS WITH NEUROPATHY (HCC): Primary | ICD-10-CM

## 2025-04-03 DIAGNOSIS — E11.65 POORLY CONTROLLED TYPE 2 DIABETES MELLITUS WITH NEUROPATHY (HCC): ICD-10-CM

## 2025-04-03 DIAGNOSIS — E11.40 POORLY CONTROLLED TYPE 2 DIABETES MELLITUS WITH NEUROPATHY (HCC): ICD-10-CM

## 2025-04-04 RX ORDER — INSULIN GLARGINE 100 [IU]/ML
INJECTION, SOLUTION SUBCUTANEOUS
Qty: 30 ML | Refills: 0 | COMMUNITY
Start: 2025-04-04

## 2025-04-07 RX ORDER — TIRZEPATIDE 5 MG/.5ML
5 INJECTION, SOLUTION SUBCUTANEOUS WEEKLY
Qty: 6 ML | Refills: 0 | Status: SHIPPED | OUTPATIENT
Start: 2025-04-07

## 2025-04-07 NOTE — TELEPHONE ENCOUNTER
Sent 90 day mounjaro in     Please advise (uses chrissie reader)    I also have been having low blood sugars through the night and early morning.   I am going to reduce my insulin to 24 units nightly.  Dr. Avery also mentioned not liking me on glimiripide.  Could this in combination with the other diabetes meds be causing some of my blood sugar lows.  Is it possible to stop taking it.     Thank you for your help and patience

## 2025-04-07 NOTE — TELEPHONE ENCOUNTER
I advised patient to stop glimepiride.  Continue Mounjaro 5 mg weekly.  Continue decreased Lantus dose to 24 units nightly.  If blood glucose will increase in the mornings, then adjust Lantus insulin.

## 2025-04-16 ENCOUNTER — TELEPHONE (OUTPATIENT)
Dept: ENDOCRINOLOGY | Age: 70
End: 2025-04-16

## 2025-04-24 DIAGNOSIS — E78.2 MIXED HYPERLIPIDEMIA: ICD-10-CM

## 2025-04-24 RX ORDER — VERAPAMIL HYDROCHLORIDE 120 MG/1
120 TABLET, FILM COATED, EXTENDED RELEASE ORAL DAILY
Qty: 90 TABLET | Refills: 1 | Status: SHIPPED | OUTPATIENT
Start: 2025-04-24

## 2025-04-24 RX ORDER — ROSUVASTATIN CALCIUM 20 MG/1
20 TABLET, COATED ORAL NIGHTLY
Qty: 90 TABLET | Refills: 1 | Status: SHIPPED | OUTPATIENT
Start: 2025-04-24

## 2025-04-24 RX ORDER — LOSARTAN POTASSIUM 100 MG/1
100 TABLET ORAL DAILY
Qty: 90 TABLET | Refills: 1 | Status: SHIPPED | OUTPATIENT
Start: 2025-04-24

## 2025-04-24 NOTE — TELEPHONE ENCOUNTER
Medication:   Requested Prescriptions     Pending Prescriptions Disp Refills    verapamil (CALAN SR) 120 MG extended release tablet 90 tablet 1     Sig: Take 1 tablet by mouth daily    rosuvastatin (CRESTOR) 20 MG tablet 90 tablet 1     Sig: Take 1 tablet by mouth nightly     Last Filled:  11/18/2024, 10/21/2024    Last appt: 1/30/2025   Next appt: 4/30/2025    Last OARRS:        No data to display

## 2025-04-24 NOTE — TELEPHONE ENCOUNTER
Medication:   Requested Prescriptions     Pending Prescriptions Disp Refills    losartan (COZAAR) 100 MG tablet [Pharmacy Med Name: LOSARTAN 100MG TABLETS] 90 tablet 1     Sig: TAKE 1 TABLET BY MOUTH EVERY DAY     Last Filled:  11/18/2024    Last appt: 1/30/2025   Next appt: 4/30/2025    Last OARRS:        No data to display

## 2025-04-25 ENCOUNTER — OFFICE VISIT (OUTPATIENT)
Dept: ENDOCRINOLOGY | Age: 70
End: 2025-04-25
Payer: MEDICARE

## 2025-04-25 VITALS
OXYGEN SATURATION: 97 % | SYSTOLIC BLOOD PRESSURE: 108 MMHG | RESPIRATION RATE: 14 BRPM | HEART RATE: 83 BPM | DIASTOLIC BLOOD PRESSURE: 70 MMHG | WEIGHT: 224 LBS | BODY MASS INDEX: 37.28 KG/M2 | TEMPERATURE: 98 F

## 2025-04-25 DIAGNOSIS — E78.2 MIXED HYPERLIPIDEMIA: ICD-10-CM

## 2025-04-25 DIAGNOSIS — I10 PRIMARY HYPERTENSION: ICD-10-CM

## 2025-04-25 DIAGNOSIS — N18.30 STAGE 3 CHRONIC KIDNEY DISEASE, UNSPECIFIED WHETHER STAGE 3A OR 3B CKD (HCC): ICD-10-CM

## 2025-04-25 DIAGNOSIS — E11.65 POORLY CONTROLLED TYPE 2 DIABETES MELLITUS WITH NEUROPATHY (HCC): Primary | ICD-10-CM

## 2025-04-25 DIAGNOSIS — E11.40 POORLY CONTROLLED TYPE 2 DIABETES MELLITUS WITH NEUROPATHY (HCC): Primary | ICD-10-CM

## 2025-04-25 DIAGNOSIS — E66.01 CLASS 2 SEVERE OBESITY WITH SERIOUS COMORBIDITY AND BODY MASS INDEX (BMI) OF 37.0 TO 37.9 IN ADULT, UNSPECIFIED OBESITY TYPE (HCC): ICD-10-CM

## 2025-04-25 DIAGNOSIS — E66.812 CLASS 2 SEVERE OBESITY WITH SERIOUS COMORBIDITY AND BODY MASS INDEX (BMI) OF 37.0 TO 37.9 IN ADULT, UNSPECIFIED OBESITY TYPE (HCC): ICD-10-CM

## 2025-04-25 PROCEDURE — 3074F SYST BP LT 130 MM HG: CPT | Performed by: INTERNAL MEDICINE

## 2025-04-25 PROCEDURE — 3078F DIAST BP <80 MM HG: CPT | Performed by: INTERNAL MEDICINE

## 2025-04-25 PROCEDURE — 3051F HG A1C>EQUAL 7.0%<8.0%: CPT | Performed by: INTERNAL MEDICINE

## 2025-04-25 PROCEDURE — 1123F ACP DISCUSS/DSCN MKR DOCD: CPT | Performed by: INTERNAL MEDICINE

## 2025-04-25 PROCEDURE — 1159F MED LIST DOCD IN RCRD: CPT | Performed by: INTERNAL MEDICINE

## 2025-04-25 PROCEDURE — 99214 OFFICE O/P EST MOD 30 MIN: CPT | Performed by: INTERNAL MEDICINE

## 2025-04-25 PROCEDURE — 1160F RVW MEDS BY RX/DR IN RCRD: CPT | Performed by: INTERNAL MEDICINE

## 2025-04-25 PROCEDURE — 95251 CONT GLUC MNTR ANALYSIS I&R: CPT | Performed by: INTERNAL MEDICINE

## 2025-04-25 RX ORDER — INSULIN GLARGINE 100 [IU]/ML
INJECTION, SOLUTION SUBCUTANEOUS
Qty: 30 ML | Refills: 0
Start: 2025-04-25

## 2025-04-25 NOTE — PROGRESS NOTES
Leanne Pretty is a 69 y.o. female, presenting for evaluation of the following:     Poorly controlled type 2 diabetes mellitus with neuropathy (HCC) [E11.40, E11.65]  The patient is a 69-year-old female who presents for evaluation of diabetes.    Current symptoms/problems include per glycemia and show no change.     Diagnosed with Type 2 diabetes mellitus in 2005.     Comorbid conditions: Hyperlipidemia, hypertension, Neuropathy, and Chronic Kidney Disease    Current diabetic medications include: Farxiga 10 mg daily, glimepiride 1 mg daily, Lantus 28 mg nightly    Intolerance to diabetes medications: Yes Ozempic     Weight trend: stable  Prior visit with dietician: yes  Current diet: on average, 3 meals per day  Current exercise: Walking     Current monitoring regimen: home blood tests - 3-4 times daily  Has brought blood glucose log/meter:  Yes  Home blood sugar records: fasting range:  and postprandial range: 100-220   Any episodes of hypoglycemia? Yes  Hypoglycemia frequency and time(s): Rare  Does patient have Glucagon emergency kit? No  Does patient have rapid acting carbohydrate?  Yes  Does patient wear a medic alert bracelet or necklace?  No    She has been living with diabetes for over two decades, with a history of gestational diabetes during her last pregnancy. Insulin therapy has been utilized for approximately 5 years, with dosage adjustments made based on her blood glucose levels. Hypoglycemia is reported in the morning, with readings in the 60s, leading to a reduction in her insulin dose to 26 units. Blood glucose levels are monitored using both fingerstick and Cheyenne methods, with recent low readings reported this week and the previous week. However, a recent fingerstick test did not confirm these low readings. The placement of her Cheyenne sensor alternates between her left and right arms. No episodes of severe hypoglycemia are reported, but syncope characterized by overheating and vertigo occurs.  Purse String (Intermediate) Text: Given the location of the defect and the characteristics of the surrounding skin a pursestring intermediate closure was deemed most appropriate.  Undermining was performed circumfirentially around the surgical defect.  A purstring suture was then placed and tightened.

## 2025-04-30 ENCOUNTER — TELEPHONE (OUTPATIENT)
Dept: ENDOCRINOLOGY | Age: 70
End: 2025-04-30

## 2025-04-30 ENCOUNTER — OFFICE VISIT (OUTPATIENT)
Dept: PRIMARY CARE CLINIC | Age: 70
End: 2025-04-30
Payer: MEDICARE

## 2025-04-30 VITALS
OXYGEN SATURATION: 99 % | DIASTOLIC BLOOD PRESSURE: 72 MMHG | HEART RATE: 90 BPM | RESPIRATION RATE: 13 BRPM | HEIGHT: 65 IN | SYSTOLIC BLOOD PRESSURE: 120 MMHG | WEIGHT: 223 LBS | BODY MASS INDEX: 37.15 KG/M2 | TEMPERATURE: 97.6 F

## 2025-04-30 DIAGNOSIS — R25.2 MUSCLE CRAMPS: ICD-10-CM

## 2025-04-30 DIAGNOSIS — Z79.4 TYPE 2 DIABETES MELLITUS WITHOUT COMPLICATION, WITH LONG-TERM CURRENT USE OF INSULIN (HCC): Primary | ICD-10-CM

## 2025-04-30 DIAGNOSIS — M79.651 RIGHT THIGH PAIN: ICD-10-CM

## 2025-04-30 DIAGNOSIS — E11.40 POORLY CONTROLLED TYPE 2 DIABETES MELLITUS WITH NEUROPATHY (HCC): ICD-10-CM

## 2025-04-30 DIAGNOSIS — E11.9 TYPE 2 DIABETES MELLITUS WITHOUT COMPLICATION, WITH LONG-TERM CURRENT USE OF INSULIN (HCC): Primary | ICD-10-CM

## 2025-04-30 DIAGNOSIS — E11.65 POORLY CONTROLLED TYPE 2 DIABETES MELLITUS WITH NEUROPATHY (HCC): ICD-10-CM

## 2025-04-30 DIAGNOSIS — D64.9 ANEMIA, UNSPECIFIED TYPE: ICD-10-CM

## 2025-04-30 DIAGNOSIS — I10 PRIMARY HYPERTENSION: ICD-10-CM

## 2025-04-30 DIAGNOSIS — E78.2 MIXED HYPERLIPIDEMIA: ICD-10-CM

## 2025-04-30 PROBLEM — F33.1 MODERATE EPISODE OF RECURRENT MAJOR DEPRESSIVE DISORDER (HCC): Status: RESOLVED | Noted: 2018-10-29 | Resolved: 2025-04-30

## 2025-04-30 LAB
ALBUMIN SERPL-MCNC: 4 G/DL (ref 3.4–5)
ALBUMIN/GLOB SERPL: 1.8 {RATIO} (ref 1.1–2.2)
ALP SERPL-CCNC: 55 U/L (ref 40–129)
ALT SERPL-CCNC: 14 U/L (ref 10–40)
ANION GAP SERPL CALCULATED.3IONS-SCNC: 10 MMOL/L (ref 3–16)
AST SERPL-CCNC: 25 U/L (ref 15–37)
BILIRUB SERPL-MCNC: 0.4 MG/DL (ref 0–1)
BUN SERPL-MCNC: 23 MG/DL (ref 7–20)
CALCIUM SERPL-MCNC: 9.6 MG/DL (ref 8.3–10.6)
CHLORIDE SERPL-SCNC: 102 MMOL/L (ref 99–110)
CHOLEST SERPL-MCNC: 140 MG/DL (ref 0–199)
CK SERPL-CCNC: 98 U/L (ref 26–192)
CO2 SERPL-SCNC: 29 MMOL/L (ref 21–32)
CREAT SERPL-MCNC: 1.4 MG/DL (ref 0.6–1.2)
EST. AVERAGE GLUCOSE BLD GHB EST-MCNC: 162.8 MG/DL
GFR SERPLBLD CREATININE-BSD FMLA CKD-EPI: 40 ML/MIN/{1.73_M2}
GLUCOSE SERPL-MCNC: 106 MG/DL (ref 70–99)
HBA1C MFR BLD: 7.3 %
HDLC SERPL-MCNC: 52 MG/DL (ref 40–60)
LDLC SERPL CALC-MCNC: 69 MG/DL
MAGNESIUM SERPL-MCNC: 1.93 MG/DL (ref 1.8–2.4)
POTASSIUM SERPL-SCNC: 4.4 MMOL/L (ref 3.5–5.1)
PROT SERPL-MCNC: 6.2 G/DL (ref 6.4–8.2)
SODIUM SERPL-SCNC: 141 MMOL/L (ref 136–145)
TRIGL SERPL-MCNC: 96 MG/DL (ref 0–150)
VLDLC SERPL CALC-MCNC: 19 MG/DL

## 2025-04-30 PROCEDURE — 99214 OFFICE O/P EST MOD 30 MIN: CPT | Performed by: INTERNAL MEDICINE

## 2025-04-30 PROCEDURE — G2211 COMPLEX E/M VISIT ADD ON: HCPCS | Performed by: INTERNAL MEDICINE

## 2025-04-30 PROCEDURE — 1160F RVW MEDS BY RX/DR IN RCRD: CPT | Performed by: INTERNAL MEDICINE

## 2025-04-30 PROCEDURE — 1123F ACP DISCUSS/DSCN MKR DOCD: CPT | Performed by: INTERNAL MEDICINE

## 2025-04-30 PROCEDURE — 3051F HG A1C>EQUAL 7.0%<8.0%: CPT | Performed by: INTERNAL MEDICINE

## 2025-04-30 PROCEDURE — 3078F DIAST BP <80 MM HG: CPT | Performed by: INTERNAL MEDICINE

## 2025-04-30 PROCEDURE — 1159F MED LIST DOCD IN RCRD: CPT | Performed by: INTERNAL MEDICINE

## 2025-04-30 PROCEDURE — 3074F SYST BP LT 130 MM HG: CPT | Performed by: INTERNAL MEDICINE

## 2025-04-30 NOTE — TELEPHONE ENCOUNTER
Please advise patient:  Lipid panel showed good cholesterol levels.  LDL 69, at target.  Normal triglycerides.  Kidney function fluctuating, creatinine 1.4 with GFR of 40, previously 1.3 and 1.4.  Otherwise chemistry panel looks good.  Glucose was 106 in the morning.  Not elevated CK.  Normal magnesium.  Hemoglobin A1c pending.  No results yet.  No new orders.  Let me know if any questions.

## 2025-04-30 NOTE — PATIENT INSTRUCTIONS
- Tylenol 650mg 3 times daily as needed   - can try over the counter Biofreeze or Icy Hot topical pain reliever

## 2025-04-30 NOTE — PROGRESS NOTES
Date of Visit: 2025    Leanne Pretty (:  1955) is a 69 y.o. female,  Established patient here for evaluation of the following chief complaint(s):  Diabetes, thigh pain, and muscle cramps      ASSESSMENT/PLAN:    Assessment & Plan  1. Type 2 diabetes with hyperglycemia with long-term current use of insulin  - Last hemoglobin A1c was 7.9%, indicating uncontrolled diabetes  - Diabetes medications adjusted by endocrinology:  - continue Lantus insulin 20 units nightly  - continue Mounjaro 5 mg subcutaneously weekly  - continue Metformin  mg twice daily with meals  - continue Farxiga 10 mg once daily  - Blood sugars will be monitored with goals:  - Fasting or premeal:   - <180 two hours post-meal  - <150 at bedtime  - Regular aerobic exercise recommended  - continue care per endocrinology    2. Anemia  - Recent CBCs are normal, showing no anemia  - Iron supplementation can be discontinued  - Continue an iron-rich diet    3. Right thigh pain  - Tylenol 650 mg three times daily as needed  - Use of topical over-the-counter pain relievers such as IcyHot or Biofreeze recommended  - Referral to orthopedics will be considered if pain persists    4. Muscle cramps  - Possible causes include statin cholesterol medication or hydration issues  - CK and magnesium tests will be conducted  - Advised to stay hydrated          Return in about 3 months (around 2025) for hypertension, hyperlipidemia, GERD, and allergic rhinitis.    SUBJECTIVE:    History of Present Illness  The patient is a 69-year-old female who presents for a follow-up of type 2 diabetes. She also has questions about anemia and complaints of right thigh pain.    Her type 2 diabetes was uncontrolled at the last visit. She has been under the care of an endocrinologist since her last visit, resulting in medication adjustments. She is currently on Lantus insulin 20 units nightly, metformin  mg twice daily with meals, Mounjaro 5 mg

## 2025-05-01 ENCOUNTER — RESULTS FOLLOW-UP (OUTPATIENT)
Dept: PRIMARY CARE CLINIC | Age: 70
End: 2025-05-01

## 2025-05-08 ENCOUNTER — PATIENT MESSAGE (OUTPATIENT)
Dept: ENDOCRINOLOGY | Age: 70
End: 2025-05-08

## 2025-05-08 DIAGNOSIS — E11.40 POORLY CONTROLLED TYPE 2 DIABETES MELLITUS WITH NEUROPATHY (HCC): ICD-10-CM

## 2025-05-08 DIAGNOSIS — E11.65 POORLY CONTROLLED TYPE 2 DIABETES MELLITUS WITH NEUROPATHY (HCC): ICD-10-CM

## 2025-05-08 RX ORDER — INSULIN GLARGINE 100 [IU]/ML
INJECTION, SOLUTION SUBCUTANEOUS
Qty: 30 ML | Refills: 0
Start: 2025-05-08 | End: 2025-05-08 | Stop reason: SDUPTHER

## 2025-05-08 RX ORDER — INSULIN GLARGINE 100 [IU]/ML
INJECTION, SOLUTION SUBCUTANEOUS
Qty: 30 ML | Refills: 0
Start: 2025-05-08

## 2025-05-08 ASSESSMENT — ENCOUNTER SYMPTOMS
BLOOD IN STOOL: 0
WHEEZING: 0
SINUS PRESSURE: 0

## 2025-05-08 NOTE — TELEPHONE ENCOUNTER
Pt uses reader       Dr Avery,   I'm still having some lows in the morning not lasting more than 15 minutes.   I started my second week dosage of Mounjaro 5.0.  My levels are staying pretty much in range except for a couple of them after I had a cup of coffee.  Yesterday I was not feeling well at all and was not eating much.  I did decrease my insulin to 16 units and did not have any lows through the night .  I will send you my report later on today.  I just wanted you to be aware.  Thanks       
Spoke with patient.  She is still experiencing low blood glucose.  Advised to decrease Lantus to 10 units.  Continue decreasing the dose if still hypoglycemic.  
s/p stent x 3 in RCA  ASA   Plavix

## 2025-05-09 ENCOUNTER — TELEPHONE (OUTPATIENT)
Dept: ENDOCRINOLOGY | Age: 70
End: 2025-05-09

## 2025-05-19 ENCOUNTER — PATIENT MESSAGE (OUTPATIENT)
Dept: ENDOCRINOLOGY | Age: 70
End: 2025-05-19

## 2025-05-20 NOTE — TELEPHONE ENCOUNTER
Dr Avery,  I decreased my insulin dosage to 5 units    nightly and still getting lows.  I have attached my lastest RYLIE 2 CGM report.  Tomorrow, Tuesday, May 20, I take    another Mounjaro injection (5.0).    Tonight I will decrease my insulin to 2-3 unit depending on my numbers after I eat tonight.  Will keep you informed. Thanks

## 2025-05-20 NOTE — TELEPHONE ENCOUNTER
I called patient, advised to stop Lantus insulin.  Monitor closely.   Continue asa, beta-blocker.   Plavix for poor targets on hold due to thrombocytopenia, trend platelets - check daily CBC  Hold statin due to elevated LFTs & INR, trend liver enzymes-check daily CMP & coags  Ambulate 4x daily as tolerated and with PT.  Cough and deep breathe, Incentive Spirometry Q1h, Chest PT.   Disposition: Pending PT eval, home when medically cleared
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
## 2025-06-03 ENCOUNTER — OFFICE VISIT (OUTPATIENT)
Dept: CARDIOLOGY CLINIC | Age: 70
End: 2025-06-03
Payer: MEDICARE

## 2025-06-03 VITALS
HEART RATE: 84 BPM | DIASTOLIC BLOOD PRESSURE: 60 MMHG | WEIGHT: 212.8 LBS | BODY MASS INDEX: 35.41 KG/M2 | SYSTOLIC BLOOD PRESSURE: 98 MMHG

## 2025-06-03 DIAGNOSIS — E78.2 MIXED HYPERLIPIDEMIA: ICD-10-CM

## 2025-06-03 DIAGNOSIS — I71.21 ANEURYSM OF ASCENDING AORTA WITHOUT RUPTURE: ICD-10-CM

## 2025-06-03 DIAGNOSIS — Z95.2 S/P AORTIC VALVE REPLACEMENT AND AORTOPLASTY: ICD-10-CM

## 2025-06-03 DIAGNOSIS — I35.0 AORTIC STENOSIS DUE TO BICUSPID AORTIC VALVE: ICD-10-CM

## 2025-06-03 DIAGNOSIS — I10 PRIMARY HYPERTENSION: ICD-10-CM

## 2025-06-03 DIAGNOSIS — Q23.81 AORTIC STENOSIS DUE TO BICUSPID AORTIC VALVE: ICD-10-CM

## 2025-06-03 DIAGNOSIS — R55 VASOVAGAL SYNCOPE: Primary | ICD-10-CM

## 2025-06-03 PROCEDURE — 99214 OFFICE O/P EST MOD 30 MIN: CPT | Performed by: INTERNAL MEDICINE

## 2025-06-03 PROCEDURE — 3078F DIAST BP <80 MM HG: CPT | Performed by: INTERNAL MEDICINE

## 2025-06-03 PROCEDURE — 1159F MED LIST DOCD IN RCRD: CPT | Performed by: INTERNAL MEDICINE

## 2025-06-03 PROCEDURE — 3074F SYST BP LT 130 MM HG: CPT | Performed by: INTERNAL MEDICINE

## 2025-06-03 PROCEDURE — 1123F ACP DISCUSS/DSCN MKR DOCD: CPT | Performed by: INTERNAL MEDICINE

## 2025-06-03 PROCEDURE — 93000 ELECTROCARDIOGRAM COMPLETE: CPT | Performed by: INTERNAL MEDICINE

## 2025-06-03 NOTE — PATIENT INSTRUCTIONS
Please record blood pressure once a day for a week and call our office [phone] or use [employer] to send blood pressure log.   Echo in December

## 2025-06-03 NOTE — ASSESSMENT & PLAN NOTE
Somewhat low, continue verapamil and losartan.  Decrease dose of HCTZ to 25 daily, currently taking 1 and half tablets  She will reach out with BP reads, may need to decrease dose of losartan

## 2025-06-04 ENCOUNTER — TELEPHONE (OUTPATIENT)
Dept: PRIMARY CARE CLINIC | Age: 70
End: 2025-06-04

## 2025-06-04 DIAGNOSIS — E11.9 TYPE 2 DIABETES MELLITUS WITHOUT COMPLICATION, WITH LONG-TERM CURRENT USE OF INSULIN (HCC): ICD-10-CM

## 2025-06-04 DIAGNOSIS — Z79.4 TYPE 2 DIABETES MELLITUS WITHOUT COMPLICATION, WITH LONG-TERM CURRENT USE OF INSULIN (HCC): ICD-10-CM

## 2025-06-04 RX ORDER — DAPAGLIFLOZIN 10 MG/1
10 TABLET, FILM COATED ORAL EVERY MORNING
Qty: 90 TABLET | Refills: 1 | Status: SHIPPED | OUTPATIENT
Start: 2025-06-04

## 2025-06-04 NOTE — TELEPHONE ENCOUNTER
Medication:   Requested Prescriptions     Pending Prescriptions Disp Refills    dapagliflozin (FARXIGA) 10 MG tablet 90 tablet 3     Sig: Take 1 tablet by mouth every morning     Last Filled:  12/03/2024    Last appt: 4/30/2025   Next appt: 7/30/2025    Last OARRS:        No data to display

## 2025-06-05 NOTE — TELEPHONE ENCOUNTER
Spoke with patient in regards to medication for  [facility] handles diabetic medication. Gave paper work to endo Dr nydia castro

## 2025-06-10 ENCOUNTER — PROCEDURE VISIT (OUTPATIENT)
Dept: ENDOCRINOLOGY | Age: 70
End: 2025-06-10
Payer: MEDICARE

## 2025-06-10 DIAGNOSIS — E11.40 TYPE 2 DIABETES MELLITUS WITH DIABETIC NEUROPATHY, UNSPECIFIED WHETHER LONG TERM INSULIN USE (HCC): Primary | ICD-10-CM

## 2025-06-10 PROCEDURE — 95251 CONT GLUC MNTR ANALYSIS I&R: CPT | Performed by: INTERNAL MEDICINE

## 2025-06-10 NOTE — PROGRESS NOTES
CGMS Download Review and Recommendations  See scanned document for blood glucose tracing documentation  [employer] personal CGMS data downloaded and reviewed.    Average glucose 108± 24.8 SD  Time in range: 95%  Time above 180: 2%  Time under 70: 3%   GMI 5.9%    Basal pattern review: Basal normoglycemia  Postprandial pattern review: Occasional postprandial hyperglycemia  Hypoglycemia review: Hypoglycemia at night and during the day  Activity related review: Not recorded      Based on the data, I recommend:    1.  Healthy food choices, portion control    2.  Continue Mounjaro, increase as tolerated    3.  Resume metformin  mg 1-2 tablets daily     4.  Do not take insulin    5.  Counseled patient about hypoglycemia risk on insulin and Mounjaro versus metformin and Mounjaro.  Metformin and Mounjaro combination is a safe and will not cause clinically significant hypoglycemia.  Blood glucose levels on the CGM sometimes appear to be lower, if that happens, check with fingerstick for true level.  Also, sleeping on the sensor might decrease blood glucose as well.  Therefore safer combination is to take metformin and Mounjaro and increase Mounjaro as needed and tolerated versus Mounjaro and insulin.    6.  Patient has coffee with sugar and creamer in the morning.  Advised to skip coffee for couple hours and follow if she still has increasing blood glucose at that time.  Discussed Yulisa phenomenon.    Patient stated understanding.    July Avery MD

## 2025-06-20 ENCOUNTER — PATIENT MESSAGE (OUTPATIENT)
Dept: ENDOCRINOLOGY | Age: 70
End: 2025-06-20

## 2025-06-20 DIAGNOSIS — E11.40 POORLY CONTROLLED TYPE 2 DIABETES MELLITUS WITH NEUROPATHY (HCC): Primary | ICD-10-CM

## 2025-06-20 DIAGNOSIS — E11.65 POORLY CONTROLLED TYPE 2 DIABETES MELLITUS WITH NEUROPATHY (HCC): Primary | ICD-10-CM

## 2025-06-23 NOTE — TELEPHONE ENCOUNTER
Medication:   Requested Prescriptions     Pending Prescriptions Disp Refills    Tirzepatide (MOUNJARO) 7.5 MG/0.5ML SOAJ pen 2 mL 0     Sig: Inject 7.5 mg into the skin every 7 days       Last Filled:      Patient Phone Number: [phone] (home)     Last appt: 6/10/2025   Next appt: 8/25/2025    Last Labs DM:   Lab Results   Component Value Date/Time    LABA1C 7.3 04/30/2025 08:12 AM

## 2025-07-09 DIAGNOSIS — E11.65 POORLY CONTROLLED TYPE 2 DIABETES MELLITUS WITH NEUROPATHY (HCC): ICD-10-CM

## 2025-07-09 DIAGNOSIS — E11.40 POORLY CONTROLLED TYPE 2 DIABETES MELLITUS WITH NEUROPATHY (HCC): ICD-10-CM

## 2025-07-09 RX ORDER — METFORMIN HYDROCHLORIDE 500 MG/1
500 TABLET, EXTENDED RELEASE ORAL
Qty: 60 TABLET | Refills: 2 | Status: SHIPPED | OUTPATIENT
Start: 2025-07-09

## 2025-07-11 ENCOUNTER — TELEPHONE (OUTPATIENT)
Dept: PRIMARY CARE CLINIC | Age: 70
End: 2025-07-11

## 2025-07-11 NOTE — TELEPHONE ENCOUNTER
Spoke with patient to advise Lantus from PAP was received. Patient states she is no longer on lantus and shipment was supposed to be cancelled.

## 2025-07-17 ENCOUNTER — TELEPHONE (OUTPATIENT)
Dept: ENDOCRINOLOGY | Age: 70
End: 2025-07-17

## 2025-07-26 DIAGNOSIS — E11.65 POORLY CONTROLLED TYPE 2 DIABETES MELLITUS WITH NEUROPATHY (HCC): ICD-10-CM

## 2025-07-26 DIAGNOSIS — E11.40 POORLY CONTROLLED TYPE 2 DIABETES MELLITUS WITH NEUROPATHY (HCC): ICD-10-CM

## 2025-07-28 ENCOUNTER — PATIENT MESSAGE (OUTPATIENT)
Dept: ENDOCRINOLOGY | Age: 70
End: 2025-07-28

## 2025-07-29 RX ORDER — TIRZEPATIDE 7.5 MG/.5ML
INJECTION, SOLUTION SUBCUTANEOUS
Qty: 2 ML | Refills: 0 | Status: SHIPPED | OUTPATIENT
Start: 2025-07-29

## 2025-07-29 NOTE — TELEPHONE ENCOUNTER
Dr Avery. My lows pretty much happens when I'm sleep. My meter alarm went off around 3, it was 68 did a finger stick it was 107. When it goes low I don't always have the meter with me. Sometimes I can't tell that it low and sometimes I feel nauseous and yucky shaky. I lost around 20 pounds. There are nights when I don't take the metformin especially if my number are in the 90s and 80s. Most days my appetite sucks and eat very little.

## 2025-07-30 ENCOUNTER — OFFICE VISIT (OUTPATIENT)
Dept: PRIMARY CARE CLINIC | Age: 70
End: 2025-07-30
Payer: MEDICARE

## 2025-07-30 VITALS
HEART RATE: 78 BPM | RESPIRATION RATE: 13 BRPM | WEIGHT: 200 LBS | DIASTOLIC BLOOD PRESSURE: 62 MMHG | SYSTOLIC BLOOD PRESSURE: 110 MMHG | OXYGEN SATURATION: 96 % | TEMPERATURE: 97.6 F | HEIGHT: 65 IN | BODY MASS INDEX: 33.32 KG/M2

## 2025-07-30 DIAGNOSIS — I10 ESSENTIAL HYPERTENSION: Primary | ICD-10-CM

## 2025-07-30 DIAGNOSIS — E78.2 MIXED HYPERLIPIDEMIA: ICD-10-CM

## 2025-07-30 DIAGNOSIS — K21.9 GASTROESOPHAGEAL REFLUX DISEASE, UNSPECIFIED WHETHER ESOPHAGITIS PRESENT: ICD-10-CM

## 2025-07-30 DIAGNOSIS — J30.9 ALLERGIC RHINITIS, UNSPECIFIED SEASONALITY, UNSPECIFIED TRIGGER: ICD-10-CM

## 2025-07-30 PROCEDURE — 3074F SYST BP LT 130 MM HG: CPT | Performed by: INTERNAL MEDICINE

## 2025-07-30 PROCEDURE — G2211 COMPLEX E/M VISIT ADD ON: HCPCS | Performed by: INTERNAL MEDICINE

## 2025-07-30 PROCEDURE — 1123F ACP DISCUSS/DSCN MKR DOCD: CPT | Performed by: INTERNAL MEDICINE

## 2025-07-30 PROCEDURE — 99214 OFFICE O/P EST MOD 30 MIN: CPT | Performed by: INTERNAL MEDICINE

## 2025-07-30 PROCEDURE — 3078F DIAST BP <80 MM HG: CPT | Performed by: INTERNAL MEDICINE

## 2025-07-30 RX ORDER — TIRZEPATIDE 5 MG/.5ML
5 INJECTION, SOLUTION SUBCUTANEOUS WEEKLY
COMMUNITY

## 2025-07-30 NOTE — TELEPHONE ENCOUNTER
I haven't picked up the 7.5 [facility] is out of stock. I have some 5.0 left before you increase it. Will check with fingerstick when alarm goes off. Thanks

## 2025-08-07 PROBLEM — J30.9 ALLERGIC RHINITIS: Status: ACTIVE | Noted: 2025-08-07

## 2025-08-07 PROBLEM — I10 ESSENTIAL HYPERTENSION: Status: ACTIVE | Noted: 2025-08-07

## 2025-08-07 ASSESSMENT — ENCOUNTER SYMPTOMS
CONSTIPATION: 0
WHEEZING: 0
SINUS PRESSURE: 0
NAUSEA: 0
SORE THROAT: 0
DIARRHEA: 0
COUGH: 0
SHORTNESS OF BREATH: 0
RHINORRHEA: 0
BLOOD IN STOOL: 0
CHEST TIGHTNESS: 0
VOMITING: 0
TROUBLE SWALLOWING: 0
ABDOMINAL PAIN: 0

## 2025-08-19 DIAGNOSIS — I10 PRIMARY HYPERTENSION: ICD-10-CM

## 2025-08-19 DIAGNOSIS — E11.65 POORLY CONTROLLED TYPE 2 DIABETES MELLITUS WITH NEUROPATHY (HCC): ICD-10-CM

## 2025-08-19 DIAGNOSIS — E78.2 MIXED HYPERLIPIDEMIA: ICD-10-CM

## 2025-08-19 DIAGNOSIS — E11.40 POORLY CONTROLLED TYPE 2 DIABETES MELLITUS WITH NEUROPATHY (HCC): ICD-10-CM

## 2025-08-19 LAB
ALBUMIN SERPL-MCNC: 3.8 G/DL (ref 3.4–5)
ALBUMIN/GLOB SERPL: 1.7 {RATIO} (ref 1.1–2.2)
ALP SERPL-CCNC: 47 U/L (ref 40–129)
ALT SERPL-CCNC: 15 U/L (ref 10–40)
ANION GAP SERPL CALCULATED.3IONS-SCNC: 10 MMOL/L (ref 3–16)
AST SERPL-CCNC: 23 U/L (ref 15–37)
BILIRUB SERPL-MCNC: 0.6 MG/DL (ref 0–1)
BUN SERPL-MCNC: 23 MG/DL (ref 7–20)
CALCIUM SERPL-MCNC: 9.5 MG/DL (ref 8.3–10.6)
CHLORIDE SERPL-SCNC: 102 MMOL/L (ref 99–110)
CHOLEST SERPL-MCNC: 134 MG/DL (ref 0–199)
CO2 SERPL-SCNC: 28 MMOL/L (ref 21–32)
CREAT SERPL-MCNC: 1.4 MG/DL (ref 0.6–1.2)
EST. AVERAGE GLUCOSE BLD GHB EST-MCNC: 145.6 MG/DL
GFR SERPLBLD CREATININE-BSD FMLA CKD-EPI: 40 ML/MIN/{1.73_M2}
GLUCOSE SERPL-MCNC: 133 MG/DL (ref 70–99)
HBA1C MFR BLD: 6.7 %
HDLC SERPL-MCNC: 50 MG/DL (ref 40–60)
LDLC SERPL CALC-MCNC: 69 MG/DL
POTASSIUM SERPL-SCNC: 3.9 MMOL/L (ref 3.5–5.1)
PROT SERPL-MCNC: 6 G/DL (ref 6.4–8.2)
SODIUM SERPL-SCNC: 140 MMOL/L (ref 136–145)
TRIGL SERPL-MCNC: 73 MG/DL (ref 0–150)
VLDLC SERPL CALC-MCNC: 15 MG/DL

## 2025-08-22 ENCOUNTER — TELEPHONE (OUTPATIENT)
Dept: ENDOCRINOLOGY | Age: 70
End: 2025-08-22

## 2025-08-24 PROBLEM — E11.40 TYPE 2 DIABETES, CONTROLLED, WITH NEUROPATHY (HCC): Status: ACTIVE | Noted: 2025-08-24

## 2025-08-25 ENCOUNTER — TELEMEDICINE (OUTPATIENT)
Dept: ENDOCRINOLOGY | Age: 70
End: 2025-08-25
Payer: MEDICARE

## 2025-08-25 DIAGNOSIS — I10 PRIMARY HYPERTENSION: ICD-10-CM

## 2025-08-25 DIAGNOSIS — E66.811 CLASS 1 OBESITY WITH SERIOUS COMORBIDITY AND BODY MASS INDEX (BMI) OF 33.0 TO 33.9 IN ADULT, UNSPECIFIED OBESITY TYPE: ICD-10-CM

## 2025-08-25 DIAGNOSIS — N18.30 STAGE 3 CHRONIC KIDNEY DISEASE, UNSPECIFIED WHETHER STAGE 3A OR 3B CKD (HCC): ICD-10-CM

## 2025-08-25 DIAGNOSIS — E11.40 TYPE 2 DIABETES, CONTROLLED, WITH NEUROPATHY (HCC): Primary | ICD-10-CM

## 2025-08-25 DIAGNOSIS — E78.2 MIXED HYPERLIPIDEMIA: ICD-10-CM

## 2025-08-25 PROCEDURE — 95251 CONT GLUC MNTR ANALYSIS I&R: CPT | Performed by: INTERNAL MEDICINE

## 2025-08-25 PROCEDURE — 1123F ACP DISCUSS/DSCN MKR DOCD: CPT | Performed by: INTERNAL MEDICINE

## 2025-08-25 PROCEDURE — 1160F RVW MEDS BY RX/DR IN RCRD: CPT | Performed by: INTERNAL MEDICINE

## 2025-08-25 PROCEDURE — 1159F MED LIST DOCD IN RCRD: CPT | Performed by: INTERNAL MEDICINE

## 2025-08-25 PROCEDURE — 99214 OFFICE O/P EST MOD 30 MIN: CPT | Performed by: INTERNAL MEDICINE

## 2025-08-25 PROCEDURE — 3044F HG A1C LEVEL LT 7.0%: CPT | Performed by: INTERNAL MEDICINE

## (undated) DEVICE — SURGICAL SET UP - SURE SET: Brand: MEDLINE INDUSTRIES, INC.

## (undated) DEVICE — SURE SET-DOUBLE BASIN-LF: Brand: MEDLINE INDUSTRIES, INC.

## (undated) DEVICE — E-Z CLEAN, NON-STICK, PTFE COATED, ELECTROSURGICAL BLADE ELECTRODE, MODIFIED EXTENDED INSULATION, 2.5 INCH (6.35 CM): Brand: MEGADYNE

## (undated) DEVICE — SUTURE VCRL SZ 3-0 L27IN ABSRB UD L24MM FS-1 3/8 CIR REV J442H

## (undated) DEVICE — SUTURE S STL SZ 6 L18IN NONABSORBABLE SIL L48MM V-40 1/2 M649G

## (undated) DEVICE — INTENDED FOR TISSUE SEPARATION, AND OTHER PROCEDURES THAT REQUIRE A SHARP SURGICAL BLADE TO PUNCTURE OR CUT.: Brand: BARD-PARKER ® CARBON RIB-BACK BLADES

## (undated) DEVICE — CATHETERIZATION TRAY 16 FR 5 CC FOL STR TIP URIMTR BARDX IC

## (undated) DEVICE — Z INACTIVE NO SUPPLIER SOLUTIONIRRIG 3000ML 0.9% SOD CHL FLX CONT [79720808] [HOSPIRA WORLDWIDE INC]

## (undated) DEVICE — SUTURE ETHBND 4-0 L30IN NONABSORBABLE GRN L17MM RB-1 1/2 X551H

## (undated) DEVICE — SUTURE VCRL SZ 3-0 L18IN ABSRB UD L26MM SH 1/2 CIR J864D

## (undated) DEVICE — SUTURE NONABSORBABLE MONOFILAMENT 4-0 RB-1 36 IN BLU PROLENE 8557H

## (undated) DEVICE — DUAL LUMEN STOMACH TUBE: Brand: SALEM SUMP

## (undated) DEVICE — 1.5L THIN WALL CAN: Brand: CRD

## (undated) DEVICE — FORCEPS BX L240CM JAW DIA2.4MM ORNG L CAP W/ NDL DISP RAD

## (undated) DEVICE — DRAPE,INSTRUMENT,MAGNETIC,10X16: Brand: MEDLINE

## (undated) DEVICE — SYRINGE MED 10ML LUERLOCK TIP W/O SFTY DISP

## (undated) DEVICE — DRESSING GERM DIA1IN CNTR H DIA7MM BLU CHG ANTIMIC PROTCT

## (undated) DEVICE — BAG BLD TRNSF 1 CPLR IV ST 600ML TERUFLEX

## (undated) DEVICE — DRAPE SLUSH W44XL66IN FOR RECT BSIN ORS HUSH SYS PLATE-DRP

## (undated) DEVICE — SOLUTION IV SODIUM CHL 0.9% 500 ML INJ FLX CONTAINER

## (undated) DEVICE — ORTHO PRE OP PACK: Brand: MEDLINE INDUSTRIES, INC.

## (undated) DEVICE — Z INACTIVE OBSOLETE PER MEDTRONIC ADAPTER Y TYP RECIRCULATING FEM LUER CLMP W  CLR CODE ARROWS

## (undated) DEVICE — COVER US PRB W15XL244CM ST SURGICAL/INTRAOPERATIVE W/

## (undated) DEVICE — JEWISH HOSPITAL TURNOVER KIT: Brand: MEDLINE INDUSTRIES, INC.

## (undated) DEVICE — CONNECTOR STR 3/8IN ST 050506000 375STRCONN

## (undated) DEVICE — COR-KNOT MINI® DEVICE KIT: Brand: COR-KNOT MINI®

## (undated) DEVICE — COR-KNOT® QUICK LOAD® 6-POUCH: Brand: COR-KNOT® QUICK LOAD®

## (undated) DEVICE — SUTURE PDS II SZ 0 L27IN ABSRB VLT L36MM CT-1 1/2 CIR Z340H

## (undated) DEVICE — PROCEDURE KIT COMP 5Y10R8

## (undated) DEVICE — SUTURE GOR TX SZ 2-0 L36IN NONABSORBABLE L18MM TH-18 1/2 3N04B

## (undated) DEVICE — DRAPE SURG HRT STRL

## (undated) DEVICE — AGENT HEMSTAT W2XL14IN OXIDIZED REGENERATED CELOS ABSRB FOR

## (undated) DEVICE — TRAY PREP DRY W/ PREM GLV 2 APPL 6 SPNG 2 UNDPD 1 OVERWRAP

## (undated) DEVICE — CANNULA PERF L125IN OD15FR POLYVI CHL VEN RG AUTO INFL SMOOT

## (undated) DEVICE — GAUZE,SPONGE,4"X4",16PLY,XRAY,STRL,LF: Brand: MEDLINE

## (undated) DEVICE — STANDARD HYPODERMIC NEEDLE,POLYPROPYLENE HUB: Brand: MONOJECT

## (undated) DEVICE — CANNULA SAMP CO2 AD GRN 7FT CO2 AND 7FT O2 TBNG UNIV CONN

## (undated) DEVICE — FELT SURG W6XL6IN THK1.65MM PTFE FOR THE REP OF SEPT DEFCT

## (undated) DEVICE — ADHESIVE SKIN CLSR 0.7ML TOP DERMBND ADV

## (undated) DEVICE — RESERVOIR AUTOTRANSFUSION 225/120 CC GS FILTERED XTRA

## (undated) DEVICE — VENT CANN 13FR CONN DIA0.25IN L VENT PVC VENT CONN R ANG

## (undated) DEVICE — SOLUTION IV CARDPLG PLEGISOL

## (undated) DEVICE — PACK,EENT,TURBAN DRAPE,PK II: Brand: MEDLINE

## (undated) DEVICE — 500ML,PRESSURE INFUSER W/STOPCOCK: Brand: MEDLINE

## (undated) DEVICE — GLOVE SURG SZ 75 L12IN FNGR THK94MIL TRNSLUC YEL LTX

## (undated) DEVICE — FILTER OXGNTR GAS BACT VIR REMOVAL W/ 1/4 INLET

## (undated) DEVICE — PRESSURE TUBING: Brand: TRUWAVE

## (undated) DEVICE — SYSTEM SKIN CLSR 22CM DERMBND PRINEO

## (undated) DEVICE — OPEN HRT CDS

## (undated) DEVICE — SUTURE PERMA-HAND SZ 3-0 L144IN NONABSORBABLE BLK LIGAPAK LA54G

## (undated) DEVICE — SUTURE ETHLN SZ 4-0 L18IN NONABSORBABLE BLK L19MM PS-2 3/8 1667H

## (undated) DEVICE — CATHETER ETER URIN 18FR ROB NELATON RED RUB ALL PURP

## (undated) DEVICE — APPLIER CLP L9.38IN M LIG TI DISP STR RNG HNDL LIGACLP

## (undated) DEVICE — COVER LT HNDL BLU PLAS

## (undated) DEVICE — PENCIL ES ULT VAC W TELSCP NOSE EZ CLN BLDE 10FT

## (undated) DEVICE — 3M™ TEGADERM™ TRANSPARENT FILM DRESSING FRAME STYLE, 1626W, 4 IN X 4-3/4 IN (10 CM X 12 CM), 50/CT 4CT/CASE: Brand: 3M™ TEGADERM™

## (undated) DEVICE — Device

## (undated) DEVICE — TUBING, SUCTION, 1/4" X 12', STRAIGHT: Brand: MEDLINE

## (undated) DEVICE — PLATE ES AD W 9FT CRD 2

## (undated) DEVICE — FOGARTY - HYDRAGRIP SURGICAL - CLAMP INSERTS: Brand: FOGARTY SOFTJAW

## (undated) DEVICE — ELECTROSURGICAL PENCIL ROCKER SWITCH NON COATED BLADE ELECTRODE 10 FT (3 M) CORD HOLSTER: Brand: MEGADYNE

## (undated) DEVICE — SUTURE VCRL SZ 0 L18IN ABSRB VLT L36MM CT-1 1/2 CIR J740D

## (undated) DEVICE — SUTURE PROL SZ 4-0 L36IN NONABSORBABLE BLU L26MM SH 1/2 CIR 8521H

## (undated) DEVICE — TOWEL,STOP FLAG GOLD N-W: Brand: MEDLINE

## (undated) DEVICE — SUTURE ABSORBABLE BRAIDED 2-0 CT-1 27 IN UD VICRYL J259H

## (undated) DEVICE — SUTURE PERMA-HAND SZ 2 L60IN NONABSORBABLE BLK SILK BRAID SA8H

## (undated) DEVICE — CONNECTOR IV TB L28MM CLR VLV ACCS NDLLSS DISP MAXPLUS

## (undated) DEVICE — BLOOD TRANSFUSION FILTER: Brand: HAEMONETICS

## (undated) DEVICE — SUTURE ETHBND EXCEL 2-0 L30IN NONABSORBABLE GRN L26MM SH MX563

## (undated) DEVICE — CANNULA PERF L15IN DIA29FR VEN 3 STG THN WALL DSGN W  VENT

## (undated) DEVICE — KIT INFUS PMP 270ML 4ML/HR 2ML/SITE SOAK CATH L5IN N NARC

## (undated) DEVICE — BLANKET WRM W40.2XL55.9IN IORT LO BODY + MISTRAL AIR

## (undated) DEVICE — SOLUTION NORMOSOL-R PH 7.4   X

## (undated) DEVICE — CANNULA PERF 24FR 51CML 45DEG TIP 3 8IN CONN 20CML SUT RNG

## (undated) DEVICE — PROBE 8225101 5PK STD PRASS FL TIP ROHS

## (undated) DEVICE — SOLUTION IV IRRIG POUR BRL 0.9% SODIUM CHL 2F7124

## (undated) DEVICE — SPONGE GZ W4XL4IN COT 12 PLY TYP VII WVN C FLD DSGN

## (undated) DEVICE — Device: Brand: MEDEX

## (undated) DEVICE — SUTURE NONABSORBABLE BRAIDED 2-0 RB-1 10X30 IN ETHBND EXCEL 10X42

## (undated) DEVICE — 3M™ STERI-DRAPE™ INSTRUMENT POUCH 1018: Brand: STERI-DRAPE™

## (undated) DEVICE — SUTURE NONABSORBABLE MONOFILAMENT 5-0 C-1 1X24 IN PROLENE 8725H

## (undated) DEVICE — CANNULA PERF 7FR L5.5IN AORT ROOT RADPQ STD TIP W/ VENT LN

## (undated) DEVICE — 3M™ TEGADERM™ TRANSPARENT FILM DRESSING FRAME STYLE, 1624W, 2-3/8 IN X 2-3/4 IN (6 CM X 7 CM), 100/CT 4CT/CASE: Brand: 3M™ TEGADERM™

## (undated) DEVICE — TOWEL,OR,DSP,ST,BLUE,DLX,8/PK,10PK/CS: Brand: MEDLINE

## (undated) DEVICE — SPONGE,PEANUT,XRAY,ST,SM,3/8",5/CARD: Brand: MEDLINE INDUSTRIES, INC.

## (undated) DEVICE — CABLE BPLR L12FT FLYING LD DISPOSABLE

## (undated) DEVICE — TUBING SUCT L12FT DIA025IN UNIV W SCALLOPED FEM CONN

## (undated) DEVICE — WAX SURG 2.5GM HEMSTAT BNE BEESWAX PARAFFIN ISO PALMITATE

## (undated) DEVICE — FLUID TRAP FOR MINIVAC ES EQUIP FLD TRAP

## (undated) DEVICE — DRESSING FOAM 0.75IN 1.5MM H DISK CHG IMPREG AEGIS

## (undated) DEVICE — GARMENT,MEDLINE,DVT,INT,CALF,MED, GEN2: Brand: MEDLINE

## (undated) DEVICE — SHEET,DRAPE,53X77,STERILE: Brand: MEDLINE

## (undated) DEVICE — STAPLER SKIN H3.9MM WIRE DIA0.58MM CRWN 6.9MM 35 STPL ROT

## (undated) DEVICE — STERNUM SAW BLADE, 9.4MM X 34MM X 1MM: Brand: MICROAIRE®

## (undated) DEVICE — GLOVE ORANGE PI 7 1/2   MSG9075

## (undated) DEVICE — DRAIN SURG SGL COLL PT TB FOR ATS BG OASIS

## (undated) DEVICE — TUBING AUTOTRNS SUCT 1/4 IN LEN W/ ASPIR ANTICOAG SORIN

## (undated) DEVICE — AIR SHEET,LAT,COMFORT GLIDE, BLEND 40X80: Brand: MEDLINE

## (undated) DEVICE — LARGE BORE STOPCOCK WITH ROTATING MALE LUER LOCK